# Patient Record
Sex: FEMALE | Race: WHITE | NOT HISPANIC OR LATINO | Employment: UNEMPLOYED | URBAN - METROPOLITAN AREA
[De-identification: names, ages, dates, MRNs, and addresses within clinical notes are randomized per-mention and may not be internally consistent; named-entity substitution may affect disease eponyms.]

---

## 2019-05-11 PROCEDURE — P9612 CATHETERIZE FOR URINE SPEC: HCPCS

## 2019-05-11 PROCEDURE — 99284 EMERGENCY DEPT VISIT MOD MDM: CPT

## 2019-05-12 ENCOUNTER — HOSPITAL ENCOUNTER (INPATIENT)
Facility: HOSPITAL | Age: 34
LOS: 2 days | Discharge: HOME | End: 2019-05-14
Attending: EMERGENCY MEDICINE | Admitting: INTERNAL MEDICINE

## 2019-05-12 ENCOUNTER — APPOINTMENT (OUTPATIENT)
Dept: CT IMAGING | Facility: HOSPITAL | Age: 34
End: 2019-05-12

## 2019-05-12 DIAGNOSIS — D72.829 LEUKOCYTOSIS, UNSPECIFIED TYPE: ICD-10-CM

## 2019-05-12 DIAGNOSIS — E86.0 DEHYDRATION: ICD-10-CM

## 2019-05-12 DIAGNOSIS — R73.9 HYPERGLYCEMIA: ICD-10-CM

## 2019-05-12 DIAGNOSIS — N39.0 ACUTE UTI: ICD-10-CM

## 2019-05-12 DIAGNOSIS — K52.9 COLITIS: Primary | ICD-10-CM

## 2019-05-12 PROBLEM — E11.9 TYPE 2 DIABETES MELLITUS, WITHOUT LONG-TERM CURRENT USE OF INSULIN (HCC): Status: ACTIVE | Noted: 2019-05-12

## 2019-05-12 PROBLEM — E87.1 HYPONATREMIA: Status: ACTIVE | Noted: 2019-05-12

## 2019-05-12 PROBLEM — A41.9 SEPSIS (HCC): Status: ACTIVE | Noted: 2019-05-12

## 2019-05-12 PROBLEM — R10.2 SUPRAPUBIC PAIN, ACUTE: Status: ACTIVE | Noted: 2019-05-12

## 2019-05-12 LAB
ALBUMIN SERPL-MCNC: 4.2 G/DL (ref 3.5–5.2)
ALBUMIN/GLOB SERPL: 1.1 G/DL
ALP SERPL-CCNC: 114 U/L (ref 39–117)
ALT SERPL W P-5'-P-CCNC: 18 U/L (ref 1–33)
ANION GAP SERPL CALCULATED.3IONS-SCNC: 16.3 MMOL/L
ANION GAP SERPL CALCULATED.3IONS-SCNC: 19.5 MMOL/L
AST SERPL-CCNC: 22 U/L (ref 1–32)
BACTERIA UR QL AUTO: ABNORMAL /HPF
BACTERIA UR QL AUTO: NORMAL /HPF
BASOPHILS # BLD AUTO: 0.07 10*3/MM3 (ref 0–0.2)
BASOPHILS NFR BLD AUTO: 0.3 % (ref 0–1.5)
BILIRUB SERPL-MCNC: 0.8 MG/DL (ref 0.2–1.2)
BILIRUB UR QL STRIP: NEGATIVE
BILIRUB UR QL STRIP: NEGATIVE
BUN BLD-MCNC: 11 MG/DL (ref 6–20)
BUN BLD-MCNC: 13 MG/DL (ref 6–20)
BUN/CREAT SERPL: 21 (ref 7–25)
BUN/CREAT SERPL: 22 (ref 7–25)
CALCIUM SPEC-SCNC: 8.2 MG/DL (ref 8.6–10.5)
CALCIUM SPEC-SCNC: 9.5 MG/DL (ref 8.6–10.5)
CHLORIDE SERPL-SCNC: 102 MMOL/L (ref 98–107)
CHLORIDE SERPL-SCNC: 98 MMOL/L (ref 98–107)
CLARITY UR: ABNORMAL
CLARITY UR: CLEAR
CO2 SERPL-SCNC: 13.5 MMOL/L (ref 22–29)
CO2 SERPL-SCNC: 14.7 MMOL/L (ref 22–29)
COLOR UR: ABNORMAL
COLOR UR: ABNORMAL
CREAT BLD-MCNC: 0.5 MG/DL (ref 0.57–1)
CREAT BLD-MCNC: 0.62 MG/DL (ref 0.57–1)
D-LACTATE SERPL-SCNC: 1 MMOL/L (ref 0.5–2)
DEPRECATED RDW RBC AUTO: 42.7 FL (ref 37–54)
DEPRECATED RDW RBC AUTO: 43.3 FL (ref 37–54)
EOSINOPHIL # BLD AUTO: 0.07 10*3/MM3 (ref 0–0.4)
EOSINOPHIL NFR BLD AUTO: 0.3 % (ref 0.3–6.2)
ERYTHROCYTE [DISTWIDTH] IN BLOOD BY AUTOMATED COUNT: 12.6 % (ref 12.3–15.4)
ERYTHROCYTE [DISTWIDTH] IN BLOOD BY AUTOMATED COUNT: 12.8 % (ref 12.3–15.4)
GFR SERPL CREATININE-BSD FRML MDRD: 109 ML/MIN/1.73
GFR SERPL CREATININE-BSD FRML MDRD: 140 ML/MIN/1.73
GLOBULIN UR ELPH-MCNC: 3.7 GM/DL
GLUCOSE BLD-MCNC: 215 MG/DL (ref 65–99)
GLUCOSE BLD-MCNC: 301 MG/DL (ref 65–99)
GLUCOSE BLDC GLUCOMTR-MCNC: 197 MG/DL (ref 70–130)
GLUCOSE BLDC GLUCOMTR-MCNC: 201 MG/DL (ref 70–130)
GLUCOSE BLDC GLUCOMTR-MCNC: 209 MG/DL (ref 70–130)
GLUCOSE BLDC GLUCOMTR-MCNC: 226 MG/DL (ref 70–130)
GLUCOSE UR STRIP-MCNC: ABNORMAL MG/DL
GLUCOSE UR STRIP-MCNC: ABNORMAL MG/DL
HBA1C MFR BLD: 9.8 % (ref 4.8–5.6)
HCG SERPL QL: NEGATIVE
HCT VFR BLD AUTO: 42.3 % (ref 34–46.6)
HCT VFR BLD AUTO: 50.8 % (ref 34–46.6)
HGB BLD-MCNC: 14 G/DL (ref 12–15.9)
HGB BLD-MCNC: 16.5 G/DL (ref 12–15.9)
HGB UR QL STRIP.AUTO: ABNORMAL
HGB UR QL STRIP.AUTO: ABNORMAL
HOLD SPECIMEN: NORMAL
HOLD SPECIMEN: NORMAL
HYALINE CASTS UR QL AUTO: ABNORMAL /LPF
HYALINE CASTS UR QL AUTO: NORMAL /LPF
IMM GRANULOCYTES # BLD AUTO: 0.31 10*3/MM3 (ref 0–0.05)
IMM GRANULOCYTES NFR BLD AUTO: 1.3 % (ref 0–0.5)
KETONES UR QL STRIP: ABNORMAL
KETONES UR QL STRIP: ABNORMAL
LEUKOCYTE ESTERASE UR QL STRIP.AUTO: ABNORMAL
LEUKOCYTE ESTERASE UR QL STRIP.AUTO: NEGATIVE
LIPASE SERPL-CCNC: 47 U/L (ref 13–60)
LYMPHOCYTES # BLD AUTO: 2.01 10*3/MM3 (ref 0.7–3.1)
LYMPHOCYTES NFR BLD AUTO: 8.5 % (ref 19.6–45.3)
MCH RBC QN AUTO: 30.2 PG (ref 26.6–33)
MCH RBC QN AUTO: 30.3 PG (ref 26.6–33)
MCHC RBC AUTO-ENTMCNC: 32.5 G/DL (ref 31.5–35.7)
MCHC RBC AUTO-ENTMCNC: 33.1 G/DL (ref 31.5–35.7)
MCV RBC AUTO: 91.6 FL (ref 79–97)
MCV RBC AUTO: 93 FL (ref 79–97)
MONOCYTES # BLD AUTO: 1.22 10*3/MM3 (ref 0.1–0.9)
MONOCYTES NFR BLD AUTO: 5.2 % (ref 5–12)
NEUTROPHILS # BLD AUTO: 19.96 10*3/MM3 (ref 1.7–7)
NEUTROPHILS NFR BLD AUTO: 84.4 % (ref 42.7–76)
NITRITE UR QL STRIP: NEGATIVE
NITRITE UR QL STRIP: POSITIVE
NRBC BLD AUTO-RTO: 0 /100 WBC (ref 0–0.2)
PH UR STRIP.AUTO: <=5 [PH] (ref 5–8)
PH UR STRIP.AUTO: <=5 [PH] (ref 5–8)
PLAT MORPH BLD: NORMAL
PLATELET # BLD AUTO: 294 10*3/MM3 (ref 140–450)
PLATELET # BLD AUTO: 347 10*3/MM3 (ref 140–450)
PMV BLD AUTO: 9.2 FL (ref 6–12)
PMV BLD AUTO: 9.2 FL (ref 6–12)
POTASSIUM BLD-SCNC: 3.8 MMOL/L (ref 3.5–5.2)
POTASSIUM BLD-SCNC: 4.8 MMOL/L (ref 3.5–5.2)
PROCALCITONIN SERPL-MCNC: 0.11 NG/ML (ref 0.1–0.25)
PROT SERPL-MCNC: 7.9 G/DL (ref 6–8.5)
PROT UR QL STRIP: ABNORMAL
PROT UR QL STRIP: ABNORMAL
RBC # BLD AUTO: 4.62 10*6/MM3 (ref 3.77–5.28)
RBC # BLD AUTO: 5.46 10*6/MM3 (ref 3.77–5.28)
RBC # UR: ABNORMAL /HPF
RBC # UR: NORMAL /HPF
RBC MORPH BLD: NORMAL
REF LAB TEST METHOD: ABNORMAL
REF LAB TEST METHOD: NORMAL
SODIUM BLD-SCNC: 131 MMOL/L (ref 136–145)
SODIUM BLD-SCNC: 133 MMOL/L (ref 136–145)
SP GR UR STRIP: >=1.03 (ref 1–1.03)
SP GR UR STRIP: >=1.03 (ref 1–1.03)
SQUAMOUS #/AREA URNS HPF: ABNORMAL /HPF
SQUAMOUS #/AREA URNS HPF: NORMAL /HPF
UROBILINOGEN UR QL STRIP: ABNORMAL
UROBILINOGEN UR QL STRIP: ABNORMAL
WBC MORPH BLD: NORMAL
WBC NRBC COR # BLD: 19.16 10*3/MM3 (ref 3.4–10.8)
WBC NRBC COR # BLD: 23.64 10*3/MM3 (ref 3.4–10.8)
WBC UR QL AUTO: ABNORMAL /HPF
WBC UR QL AUTO: NORMAL /HPF
WHOLE BLOOD HOLD SPECIMEN: NORMAL

## 2019-05-12 PROCEDURE — 80053 COMPREHEN METABOLIC PANEL: CPT | Performed by: PHYSICIAN ASSISTANT

## 2019-05-12 PROCEDURE — 85025 COMPLETE CBC W/AUTO DIFF WBC: CPT | Performed by: PHYSICIAN ASSISTANT

## 2019-05-12 PROCEDURE — 84703 CHORIONIC GONADOTROPIN ASSAY: CPT | Performed by: PHYSICIAN ASSISTANT

## 2019-05-12 PROCEDURE — 63710000001 INSULIN LISPRO (HUMAN) PER 5 UNITS: Performed by: NURSE PRACTITIONER

## 2019-05-12 PROCEDURE — 74177 CT ABD & PELVIS W/CONTRAST: CPT

## 2019-05-12 PROCEDURE — 82962 GLUCOSE BLOOD TEST: CPT

## 2019-05-12 PROCEDURE — 36415 COLL VENOUS BLD VENIPUNCTURE: CPT | Performed by: NURSE PRACTITIONER

## 2019-05-12 PROCEDURE — 87040 BLOOD CULTURE FOR BACTERIA: CPT | Performed by: PHYSICIAN ASSISTANT

## 2019-05-12 PROCEDURE — 83605 ASSAY OF LACTIC ACID: CPT | Performed by: PHYSICIAN ASSISTANT

## 2019-05-12 PROCEDURE — 25010000002 IOPAMIDOL 61 % SOLUTION: Performed by: EMERGENCY MEDICINE

## 2019-05-12 PROCEDURE — 25010000002 HYDROMORPHONE PER 4 MG: Performed by: PHYSICIAN ASSISTANT

## 2019-05-12 PROCEDURE — 84145 PROCALCITONIN (PCT): CPT | Performed by: PHYSICIAN ASSISTANT

## 2019-05-12 PROCEDURE — 85007 BL SMEAR W/DIFF WBC COUNT: CPT | Performed by: PHYSICIAN ASSISTANT

## 2019-05-12 PROCEDURE — 25010000002 ONDANSETRON PER 1 MG: Performed by: PHYSICIAN ASSISTANT

## 2019-05-12 PROCEDURE — 83036 HEMOGLOBIN GLYCOSYLATED A1C: CPT | Performed by: NURSE PRACTITIONER

## 2019-05-12 PROCEDURE — 99254 IP/OBS CNSLTJ NEW/EST MOD 60: CPT | Performed by: INTERNAL MEDICINE

## 2019-05-12 PROCEDURE — 81001 URINALYSIS AUTO W/SCOPE: CPT | Performed by: PHYSICIAN ASSISTANT

## 2019-05-12 PROCEDURE — 83690 ASSAY OF LIPASE: CPT | Performed by: PHYSICIAN ASSISTANT

## 2019-05-12 PROCEDURE — 85027 COMPLETE CBC AUTOMATED: CPT | Performed by: NURSE PRACTITIONER

## 2019-05-12 PROCEDURE — 25010000002 PIPERACILLIN SOD-TAZOBACTAM PER 1 G: Performed by: PHYSICIAN ASSISTANT

## 2019-05-12 PROCEDURE — 25010000002 MORPHINE PER 10 MG: Performed by: INTERNAL MEDICINE

## 2019-05-12 PROCEDURE — 25010000002 HYDROMORPHONE 1 MG/ML SOLUTION: Performed by: PHYSICIAN ASSISTANT

## 2019-05-12 PROCEDURE — 25010000002 PIPERACILLIN SOD-TAZOBACTAM PER 1 G: Performed by: HOSPITALIST

## 2019-05-12 RX ORDER — DEXTROSE MONOHYDRATE 25 G/50ML
25 INJECTION, SOLUTION INTRAVENOUS
Status: DISCONTINUED | OUTPATIENT
Start: 2019-05-12 | End: 2019-05-14 | Stop reason: HOSPADM

## 2019-05-12 RX ORDER — ACETAMINOPHEN 325 MG/1
650 TABLET ORAL EVERY 4 HOURS PRN
Status: DISCONTINUED | OUTPATIENT
Start: 2019-05-12 | End: 2019-05-14 | Stop reason: HOSPADM

## 2019-05-12 RX ORDER — PANTOPRAZOLE SODIUM 20 MG/1
20 TABLET, DELAYED RELEASE ORAL
Status: DISCONTINUED | OUTPATIENT
Start: 2019-05-12 | End: 2019-05-12

## 2019-05-12 RX ORDER — HYDROMORPHONE HYDROCHLORIDE 1 MG/ML
0.5 INJECTION, SOLUTION INTRAMUSCULAR; INTRAVENOUS; SUBCUTANEOUS ONCE
Status: COMPLETED | OUTPATIENT
Start: 2019-05-12 | End: 2019-05-12

## 2019-05-12 RX ORDER — SODIUM CHLORIDE 0.9 % (FLUSH) 0.9 %
1-10 SYRINGE (ML) INJECTION AS NEEDED
Status: DISCONTINUED | OUTPATIENT
Start: 2019-05-12 | End: 2019-05-14 | Stop reason: HOSPADM

## 2019-05-12 RX ORDER — PANTOPRAZOLE SODIUM 40 MG/1
40 TABLET, DELAYED RELEASE ORAL
Status: DISCONTINUED | OUTPATIENT
Start: 2019-05-12 | End: 2019-05-14 | Stop reason: HOSPADM

## 2019-05-12 RX ORDER — GABAPENTIN 250 MG/5ML
250 SOLUTION ORAL AS NEEDED
COMMUNITY
End: 2019-05-14 | Stop reason: HOSPADM

## 2019-05-12 RX ORDER — FLUTICASONE PROPIONATE 50 MCG
1 SPRAY, SUSPENSION (ML) NASAL DAILY
Status: DISCONTINUED | OUTPATIENT
Start: 2019-05-12 | End: 2019-05-14 | Stop reason: HOSPADM

## 2019-05-12 RX ORDER — SODIUM CHLORIDE 0.9 % (FLUSH) 0.9 %
3 SYRINGE (ML) INJECTION EVERY 12 HOURS SCHEDULED
Status: DISCONTINUED | OUTPATIENT
Start: 2019-05-12 | End: 2019-05-14 | Stop reason: HOSPADM

## 2019-05-12 RX ORDER — MORPHINE SULFATE 2 MG/ML
2 INJECTION, SOLUTION INTRAMUSCULAR; INTRAVENOUS
Status: DISCONTINUED | OUTPATIENT
Start: 2019-05-12 | End: 2019-05-14 | Stop reason: HOSPADM

## 2019-05-12 RX ORDER — NICOTINE POLACRILEX 4 MG
15 LOZENGE BUCCAL
Status: DISCONTINUED | OUTPATIENT
Start: 2019-05-12 | End: 2019-05-14 | Stop reason: HOSPADM

## 2019-05-12 RX ORDER — ONDANSETRON 2 MG/ML
4 INJECTION INTRAMUSCULAR; INTRAVENOUS ONCE
Status: COMPLETED | OUTPATIENT
Start: 2019-05-12 | End: 2019-05-12

## 2019-05-12 RX ORDER — LEVOTHYROXINE SODIUM 0.1 MG/1
200 TABLET ORAL
Status: DISCONTINUED | OUTPATIENT
Start: 2019-05-12 | End: 2019-05-14 | Stop reason: HOSPADM

## 2019-05-12 RX ORDER — POLYETHYLENE GLYCOL 3350 17 G/17G
17 POWDER, FOR SOLUTION ORAL
Status: DISCONTINUED | OUTPATIENT
Start: 2019-05-12 | End: 2019-05-12

## 2019-05-12 RX ORDER — CETIRIZINE HYDROCHLORIDE 10 MG/1
10 TABLET ORAL NIGHTLY
Status: DISCONTINUED | OUTPATIENT
Start: 2019-05-12 | End: 2019-05-14 | Stop reason: HOSPADM

## 2019-05-12 RX ORDER — DOCUSATE SODIUM 100 MG/1
100 CAPSULE, LIQUID FILLED ORAL 2 TIMES DAILY
Status: DISCONTINUED | OUTPATIENT
Start: 2019-05-12 | End: 2019-05-14 | Stop reason: HOSPADM

## 2019-05-12 RX ORDER — NALOXONE HCL 0.4 MG/ML
0.4 VIAL (ML) INJECTION
Status: DISCONTINUED | OUTPATIENT
Start: 2019-05-12 | End: 2019-05-14 | Stop reason: HOSPADM

## 2019-05-12 RX ORDER — POLYETHYLENE GLYCOL 3350 17 G/17G
17 POWDER, FOR SOLUTION ORAL 2 TIMES DAILY
Status: DISCONTINUED | OUTPATIENT
Start: 2019-05-12 | End: 2019-05-13

## 2019-05-12 RX ORDER — MONTELUKAST SODIUM 10 MG/1
10 TABLET ORAL NIGHTLY
Status: DISCONTINUED | OUTPATIENT
Start: 2019-05-12 | End: 2019-05-14 | Stop reason: HOSPADM

## 2019-05-12 RX ORDER — SODIUM CHLORIDE 9 MG/ML
75 INJECTION, SOLUTION INTRAVENOUS CONTINUOUS
Status: DISCONTINUED | OUTPATIENT
Start: 2019-05-12 | End: 2019-05-13

## 2019-05-12 RX ADMIN — FLUTICASONE PROPIONATE 1 SPRAY: 50 SPRAY, METERED NASAL at 13:23

## 2019-05-12 RX ADMIN — INSULIN LISPRO 2 UNITS: 100 INJECTION, SOLUTION INTRAVENOUS; SUBCUTANEOUS at 21:37

## 2019-05-12 RX ADMIN — SODIUM CHLORIDE, PRESERVATIVE FREE 3 ML: 5 INJECTION INTRAVENOUS at 08:08

## 2019-05-12 RX ADMIN — SODIUM CHLORIDE 1000 ML: 9 INJECTION, SOLUTION INTRAVENOUS at 01:51

## 2019-05-12 RX ADMIN — MORPHINE SULFATE 2 MG: 2 INJECTION, SOLUTION INTRAMUSCULAR; INTRAVENOUS at 16:25

## 2019-05-12 RX ADMIN — POLYETHYLENE GLYCOL 3350 17 G: 17 POWDER, FOR SOLUTION ORAL at 21:37

## 2019-05-12 RX ADMIN — DOCUSATE SODIUM 100 MG: 100 CAPSULE, LIQUID FILLED ORAL at 11:51

## 2019-05-12 RX ADMIN — ACETAMINOPHEN 650 MG: 325 TABLET, FILM COATED ORAL at 15:04

## 2019-05-12 RX ADMIN — TAZOBACTAM SODIUM AND PIPERACILLIN SODIUM 3.38 G: 375; 3 INJECTION, SOLUTION INTRAVENOUS at 04:35

## 2019-05-12 RX ADMIN — ONDANSETRON 4 MG: 2 INJECTION INTRAMUSCULAR; INTRAVENOUS at 05:39

## 2019-05-12 RX ADMIN — PANTOPRAZOLE SODIUM 40 MG: 40 TABLET, DELAYED RELEASE ORAL at 11:51

## 2019-05-12 RX ADMIN — CETIRIZINE HYDROCHLORIDE 10 MG: 10 TABLET, FILM COATED ORAL at 21:37

## 2019-05-12 RX ADMIN — DOCUSATE SODIUM 100 MG: 100 CAPSULE, LIQUID FILLED ORAL at 21:37

## 2019-05-12 RX ADMIN — IOPAMIDOL 85 ML: 612 INJECTION, SOLUTION INTRAVENOUS at 02:26

## 2019-05-12 RX ADMIN — SODIUM CHLORIDE 100 ML/HR: 9 INJECTION, SOLUTION INTRAVENOUS at 21:35

## 2019-05-12 RX ADMIN — INSULIN LISPRO 3 UNITS: 100 INJECTION, SOLUTION INTRAVENOUS; SUBCUTANEOUS at 11:51

## 2019-05-12 RX ADMIN — ONDANSETRON 4 MG: 2 INJECTION INTRAMUSCULAR; INTRAVENOUS at 01:51

## 2019-05-12 RX ADMIN — SODIUM CHLORIDE 100 ML/HR: 9 INJECTION, SOLUTION INTRAVENOUS at 08:07

## 2019-05-12 RX ADMIN — HYDROMORPHONE HYDROCHLORIDE 1 MG: 1 INJECTION, SOLUTION INTRAMUSCULAR; INTRAVENOUS; SUBCUTANEOUS at 01:52

## 2019-05-12 RX ADMIN — HYDROMORPHONE HYDROCHLORIDE 0.5 MG: 1 INJECTION, SOLUTION INTRAMUSCULAR; INTRAVENOUS; SUBCUTANEOUS at 05:39

## 2019-05-12 RX ADMIN — SODIUM CHLORIDE, PRESERVATIVE FREE 3 ML: 5 INJECTION INTRAVENOUS at 21:38

## 2019-05-12 RX ADMIN — MORPHINE SULFATE 2 MG: 2 INJECTION, SOLUTION INTRAMUSCULAR; INTRAVENOUS at 20:35

## 2019-05-12 RX ADMIN — SODIUM CHLORIDE 1000 ML: 9 INJECTION, SOLUTION INTRAVENOUS at 04:36

## 2019-05-12 RX ADMIN — INSULIN LISPRO 3 UNITS: 100 INJECTION, SOLUTION INTRAVENOUS; SUBCUTANEOUS at 17:35

## 2019-05-12 RX ADMIN — TAZOBACTAM SODIUM AND PIPERACILLIN SODIUM 3.38 G: 375; 3 INJECTION, SOLUTION INTRAVENOUS at 10:06

## 2019-05-12 RX ADMIN — LEVOTHYROXINE SODIUM 200 MCG: 100 TABLET ORAL at 13:23

## 2019-05-12 RX ADMIN — TAZOBACTAM SODIUM AND PIPERACILLIN SODIUM 3.38 G: 375; 3 INJECTION, SOLUTION INTRAVENOUS at 18:31

## 2019-05-12 NOTE — CONSULTS
[facility]  Initial Inpatient Consult Note    Referring Provider: Dr. Kirk    Reason for Consultation: Chronic intermittent constipation, abnormal imaging, abdominal pain    Subjective     History of present illness:    34 y.o. female with chronic intermittent constipation, controlled with diet and over-the-counter laxatives and stool softeners, presents with abdominal pain of 4 days duration.  Lower quadrants bilaterally.  New onset on Thursday, has never had pain like this before.  Colicky in nature, worse with movement better at rest.  She has been constipated now for 6 days.  No bowel movement.  She denies rectal bleeding or diarrhea.  She has no vomiting.  She has never had a colonoscopy in her lifetime.  She believes her brother has some sort of colitis but has no details about the.  She has had no weight loss.      She also endorses GI upset with nausea and possible food poisoning she believes after [employer] sandwich 7 days ago.  There was a few days in between that illness and then the development of the abdominal pain Thursday.    She also has chronic GERD, takes 20 mg of Protonix a day    Past Medical History:  Past Medical History:   Diagnosis Date   • Diabetes mellitus (CMS/HCC)    • Disease of thyroid gland    • GERD (gastroesophageal reflux disease)    • Kidney stone    • Pregnancy    • Seasonal allergies      Past Surgical History:  Past Surgical History:   Procedure Laterality Date   • NEPHROSTOMY TRACT DILATATION W/ LITHOTRIPSY        Social History:   Social History     Tobacco Use   • Smoking status: Current Every Day Smoker     Packs/day: 0.50     Types: Cigarettes   Substance Use Topics   • Alcohol use: No      Family History:  History reviewed. No pertinent family history.    Home Meds:  Medications Prior to Admission   Medication Sig Dispense Refill Last Dose   • cetirizine (ZYRTEC ALLERGY) 10 MG tablet Take 10 mg by mouth.   5/11/2019 at Unknown time   • docusate  sodium (COLACE) 100 MG capsule Take 100 mg by mouth.   5/11/2019 at Unknown time   • fluticasone ([employer] FLUTICASONE PROPIONATE) 50 MCG/ACT nasal spray 1 spray into each nostril.   5/11/2019 at Unknown time   • gabapentin (NEURONTIN) 250 MG/5ML solution Take 250 mg by mouth As Needed.   5/11/2019 at Unknown time   • Insulin Lispro (HUMALOG KWIKPEN) 100 UNIT/ML solution pen-injector INJECT 1 UNIT PER 20MG/DL > 120 AS DIRECTED. MAX 25 UNITS PER DAY   5/11/2019 at Unknown time   • levothyroxine (SYNTHROID, LEVOTHROID) 200 MCG tablet Take 200 mcg by mouth.   5/11/2019 at Unknown time   • metFORMIN (GLUCOPHAGE) 500 MG tablet Take 500 mg by mouth.   5/11/2019 at Unknown time   • montelukast (SINGULAIR) 10 MG tablet Take 10 mg by mouth.   5/11/2019 at Unknown time   • ondansetron (ZOFRAN) 4 MG tablet Take 1 tablet by mouth every 6 (six) hours as needed for nausea or vomiting. 10 tablet 0 5/11/2019 at Unknown time   • pantoprazole (PROTONIX) 20 MG EC tablet Take 20 mg by mouth.   5/11/2019 at Unknown time     Current Meds:     cetirizine 10 mg Oral Nightly   docusate sodium 100 mg Oral BID   fluticasone 1 spray Nasal Daily   insulin lispro 0-7 Units Subcutaneous 4x Daily With Meals & Nightly   levothyroxine 200 mcg Oral Q AM   montelukast 10 mg Oral Nightly   pantoprazole 40 mg Oral Q AM   piperacillin-tazobactam 3.375 g Intravenous Q8H   sodium chloride 3 mL Intravenous Q12H     Allergies:  Allergies   Allergen Reactions   • Ciprofloxacin Anaphylaxis   • Sulfa Antibiotics Anaphylaxis   • Bactrim [Sulfamethoxazole-Trimethoprim] Angioedema     Review of Systems  She has weakness fatigue all other systems reviewed and negative     Objective     Vital Signs  Temp:  [98.2 °F (36.8 °C)-98.8 °F (37.1 °C)] 98.3 °F (36.8 °C)  Heart Rate:  [] 89  Resp:  [18-20] 18  BP: (107-156)/() 140/86  Physical Exam:  General Appearance:    Alert, cooperative, in no acute distress   Head:    Normocephalic, without obvious abnormality,  atraumatic   Eyes:            Lids and lashes normal, conjunctivae and sclerae normal, no   icterus   Throat:   No oral lesions, no thrush, oral mucosa moist   Neck:   No adenopathy, supple, trachea midline, no thyromegaly, no   carotid bruit, no JVD   Lungs:     Clear to auscultation,respirations regular, even and                   unlabored    Heart:    Regular rhythm and normal rate, normal S1 and S2, no            murmur, no gallop, no rub, no click   Chest Wall:    No abnormalities observed   Abdomen:     Normal bowel sounds, no masses, no organomegaly, soft        tender, non-distended, no guarding, no rebound                 tenderness   Rectal:     Deferred   Extremities:   no edema, no cyanosis, no redness   Skin:   No bleeding, bruising or rash   Lymph nodes:   No palpable adenopathy   Psychiatric:  Judgement and insight: normal   Orientation to person place and time: normal   Mood and affect: normal   Results Review:   I reviewed the patient's new clinical results.    Results from last 7 days   Lab Units 05/12/19  0703 05/12/19  0046   WBC 10*3/mm3 19.16* 23.64*   HEMOGLOBIN g/dL 14.0 16.5*   HEMATOCRIT % 42.3 50.8*   PLATELETS 10*3/mm3 294 347     Results from last 7 days   Lab Units 05/12/19  0703 05/12/19  0046   SODIUM mmol/L 133* 131*   POTASSIUM mmol/L 3.8 4.8   CHLORIDE mmol/L 102 98   CO2 mmol/L 14.7* 13.5*   BUN mg/dL 11 13   CREATININE mg/dL 0.50* 0.62   CALCIUM mg/dL 8.2* 9.5   BILIRUBIN mg/dL  --  0.8   ALK PHOS U/L  --  114   ALT (SGPT) U/L  --  18   AST (SGOT) U/L  --  22   GLUCOSE mg/dL 215* 301*         Lab Results   Lab Value Date/Time    LIPASE 47 05/12/2019 0046    LIPASE 37 06/18/2016 0255       Radiology:  CT Abdomen Pelvis With Contrast   Final Result       1. Abnormally thick-walled appearance to the sigmoid colon, with   adjacent pericolonic soft tissue stranding. Constellation of findings is   favored to reflect colitis. No pneumatosis or free air is seen. There is   no evidence  of obstruction.   2. Bilateral nonobstructing renal stones.       Radiation dose reduction techniques were utilized, including automated   exposure control and exposure modulation based on body size.       This report was finalized on 5/12/2019 2:42 AM by Dr. Marie Jeong M.D.              Assessment/Plan   Patient Active Problem List   Diagnosis   • Colitis presumed infectious   • Type 2 diabetes mellitus, without long-term current use of insulin (CMS/[facility])   • Suprapubic pain, acute   • Hyponatremia   • Dehydration   • Sepsis (CMS/[facility])     Problem list    Abnormal imaging, colitis in the setting of worsening constipation  Abdominal pain secondary to above  Chronic GERD      Assessment/Plan    She has what appears to be a colitis with associated constipation, not diarrhea or rectal bleeding  We have to wonder if whether she has fecal loading in this area with a reactive colitis  I will continue IV antibiotics  I am going to start stool softeners and gentle laxatives as she has been having worsening constipation over the last 6 days  She may come to a medication such as Linzess or Amitiza as an outpatient  She should absolutely have a colonoscopy in 6 to 8 weeks when she is recovered from this acute event not only for a history of chronic constipation but also to rule out a malignancy in this area as a cause for her symptoms and cause for the colitis that is found on imaging today  If she does not improve over the next couple days she may require unprepped lower endoscopy as an inpatient  Follow her white blood cell count daily and if it worsens I would get infectious disease consultation    I discussed the patients findings and my recommendations with patient.    Samuel Morfin MD

## 2019-05-12 NOTE — ED PROVIDER NOTES
" EMERGENCY DEPARTMENT ENCOUNTER    CHIEF COMPLAINT  Chief Complaint: lower abd pain  History given by: patient   History limited by: nothing   Room Number: 26/26  PMD: Layla Monique APRN      HPI:  Pt is a 34 y.o. female who presents to the ED complaining of constant lower abd pain that started 4 days ago. Pt states the pain worsened 1-2 days ago. Pt admits nausea, cough, L flank pain, and chills. Pt denies fever, vomiting, loose stools, and diarrhea. Pt reports she was seen at [facility] 2 nights ago they told her \"nothing was wrong\". Pt had a CT abd/pelvis w/o contrast at that time which revealed stones in kidney. Pt states they told her that \"the stones are not moving and not causing any problems\". Pt is a diabetic. Hx of kidney stones. There are no other complaints at this time.     Duration:  4 days   Onset: gradual  Timing: constant   Location: lower abd   Radiation: none mentioned   Quality: \"pain\"  Intensity/Severity: moderate  Progression: unchanged   Associated Symptoms:  pt admits nausea, cough, L flank pain, and chills  Aggravating Factors: none mentioned   Alleviating Factors: none mentioned   Previous Episodes: hx of kidney stones   Treatment before arrival: pt was seen at [facility] 2 nights ago for the same symptoms     PAST MEDICAL HISTORY  Active Ambulatory Problems     Diagnosis Date Noted   • No Active Ambulatory Problems     Resolved Ambulatory Problems     Diagnosis Date Noted   • No Resolved Ambulatory Problems     Past Medical History:   Diagnosis Date   • Diabetes mellitus (CMS/HCC)    • Disease of thyroid gland    • GERD (gastroesophageal reflux disease)    • Kidney stone    • Pregnancy    • Seasonal allergies        PAST SURGICAL HISTORY  Past Surgical History:   Procedure Laterality Date   • NEPHROSTOMY TRACT DILATATION W/ LITHOTRIPSY         FAMILY HISTORY  History reviewed. No pertinent family history.    SOCIAL HISTORY  Social History     Socioeconomic History   • Marital status:  "     Spouse name: Not on file   • Number of children: Not on file   • Years of education: Not on file   • Highest education level: Not on file   Tobacco Use   • Smoking status: Current Every Day Smoker     Packs/day: 0.50     Types: Cigarettes   Substance and Sexual Activity   • Alcohol use: No   • Drug use: No       ALLERGIES  Ciprofloxacin; Sulfa antibiotics; and Bactrim [sulfamethoxazole-trimethoprim]    REVIEW OF SYSTEMS  Review of Systems   Constitutional: Positive for chills. Negative for fever.   HENT: Negative for sore throat.    Eyes: Negative.    Respiratory: Positive for cough. Negative for shortness of breath.    Cardiovascular: Negative for chest pain.   Gastrointestinal: Positive for abdominal pain (lower) and nausea. Negative for blood in stool, diarrhea and vomiting.   Genitourinary: Positive for flank pain (Left). Negative for dysuria.   Musculoskeletal: Negative for neck pain.   Skin: Negative for rash.   Neurological: Negative for weakness, numbness and headaches.   Hematological: Negative.    Psychiatric/Behavioral: Negative.    All other systems reviewed and are negative.      PHYSICAL EXAM  ED Triage Vitals   Temp Heart Rate Resp BP SpO2   05/11/19 2317 05/11/19 2317 05/11/19 2317 05/11/19 2359 05/11/19 2317   98.8 °F (37.1 °C) (!) 132 20 (!) 156/102 96 %      Temp src Heart Rate Source Patient Position BP Location FiO2 (%)   05/11/19 2317 05/11/19 2317 -- 05/12/19 0032 --   Tympanic Monitor  Left arm        Physical Exam   Constitutional: She is oriented to person, place, and time. No distress.   HENT:   Head: Normocephalic.   Eyes: EOM are normal. Pupils are equal, round, and reactive to light.   Neck: Normal range of motion. Neck supple.   Cardiovascular: Normal rate, regular rhythm and normal heart sounds.   Good peripheral pulses.   Pulmonary/Chest: Effort normal and breath sounds normal. No respiratory distress.   Abdominal: Soft. There is tenderness in the right lower quadrant. There is  guarding (across suprapubic area) and CVA tenderness (questionable). There is no rebound.   Musculoskeletal: Normal range of motion. She exhibits no edema.   No BLE edema.   Neurological: She is alert and oriented to person, place, and time. She has normal sensation and normal strength.   Skin: Skin is warm and dry. No rash noted.   Psychiatric: Mood and affect normal.   Nursing note and vitals reviewed.      LAB RESULTS  Lab Results (last 24 hours)     Procedure Component Value Units Date/Time    CBC & Differential [[id number]] Collected:  05/12/19 0046    Specimen:  Blood Updated:  05/12/19 0119    Narrative:       The following orders were created for panel order CBC & Differential.  Procedure                               Abnormality         Status                     ---------                               -----------         ------                     Scan Slide[[id number]]                    Normal              Final result               CBC Auto Differential[[id number]]         Abnormal            Final result                 Please view results for these tests on the individual orders.    Comprehensive Metabolic Panel [[id number]]  (Abnormal) Collected:  05/12/19 0046    Specimen:  Blood Updated:  05/12/19 0121     Glucose 301 mg/dL      BUN 13 mg/dL      Creatinine 0.62 mg/dL      Sodium 131 mmol/L      Potassium 4.8 mmol/L      Comment: Specimen hemolyzed.  Results may be affected.        Chloride 98 mmol/L      CO2 13.5 mmol/L      Calcium 9.5 mg/dL      Total Protein 7.9 g/dL      Albumin 4.20 g/dL      ALT (SGPT) 18 U/L      Comment: Specimen hemolyzed.  Results may be affected.        AST (SGOT) 22 U/L      Comment: Specimen hemolyzed.  Results may be affected.        Alkaline Phosphatase 114 U/L      Total Bilirubin 0.8 mg/dL      eGFR Non African Amer 109 mL/min/1.73      Globulin 3.7 gm/dL      A/G Ratio 1.1 g/dL      BUN/Creatinine Ratio 21.0     Anion Gap 19.5 mmol/L     Narrative:       GFR Normal  >60  Chronic Kidney Disease <60  Kidney Failure <15    Lipase [[id number]]  (Normal) Collected:  05/12/19 0046    Specimen:  Blood Updated:  05/12/19 0119     Lipase 47 U/L     hCG, Serum, Qualitative [[id number]]  (Normal) Collected:  05/12/19 0046    Specimen:  Blood Updated:  05/12/19 0118     HCG Qualitative Negative    CBC Auto Differential [[id number]]  (Abnormal) Collected:  05/12/19 0046    Specimen:  Blood Updated:  05/12/19 0119     WBC 23.64 10*3/mm3      RBC 5.46 10*6/mm3      Hemoglobin 16.5 g/dL      Hematocrit 50.8 %      MCV 93.0 fL      MCH 30.2 pg      MCHC 32.5 g/dL      RDW 12.6 %      RDW-SD 43.3 fl      MPV 9.2 fL      Platelets 347 10*3/mm3      Neutrophil % 84.4 %      Lymphocyte % 8.5 %      Monocyte % 5.2 %      Eosinophil % 0.3 %      Basophil % 0.3 %      Immature Grans % 1.3 %      Neutrophils, Absolute 19.96 10*3/mm3      Lymphocytes, Absolute 2.01 10*3/mm3      Monocytes, Absolute 1.22 10*3/mm3      Eosinophils, Absolute 0.07 10*3/mm3      Basophils, Absolute 0.07 10*3/mm3      Immature Grans, Absolute 0.31 10*3/mm3      nRBC 0.0 /100 WBC     Scan Slide [[id number]]  (Normal) Collected:  05/12/19 0046    Specimen:  Blood Updated:  05/12/19 0119     RBC Morphology Normal     WBC Morphology Normal     Platelet Morphology Normal    Procalcitonin [[id number]]  (Normal) Collected:  05/12/19 0046    Specimen:  Blood Updated:  05/12/19 0402     Procalcitonin 0.11 ng/mL     Narrative:       As a Marker for Sepsis (Non-Neonates):   1. <0.5 ng/mL represents a low risk of severe sepsis and/or septic shock.  1. >2 ng/mL represents a high risk of severe sepsis and/or septic shock.    As a Marker for Lower Respiratory Tract Infections that require antibiotic therapy:  PCT on Admission     Antibiotic Therapy             6-12 Hrs later  > 0.5                Strongly Recommended            >0.25 - <0.5         Recommended  0.1 - 0.25           Discouraged                   Remeasure/reassess PCT  <0.1            "      Strongly Discouraged          Remeasure/reassess PCT      As 28 day mortality risk marker: \"Change in Procalcitonin Result\" (> 80 % or <=80 %) if Day 0 (or Day 1) and Day 4 values are available. Refer to http://www.[facility]-pct-calculator.com/   Change in PCT <=80 %   A decrease of PCT levels below or equal to 80 % defines a positive change in PCT test result representing a higher risk for 28-day all-cause mortality of patients diagnosed with severe sepsis or septic shock.  Change in PCT > 80 %   A decrease of PCT levels of more than 80 % defines a negative change in PCT result representing a lower risk for 28-day all-cause mortality of patients diagnosed with severe sepsis or septic shock.                Urinalysis With Microscopic If Indicated (No Culture) - Urine, Clean Catch [[id number]]  (Abnormal) Collected:  05/12/19 0145    Specimen:  Urine, Clean Catch Updated:  05/12/19 0212     Color, UA Dark Yellow     Appearance, UA Cloudy     pH, UA <=5.0     Specific Gravity, UA >=1.030     Glucose, UA >=1000 mg/dL (3+)     Ketones, UA 80 mg/dL (3+)     Bilirubin, UA Negative     Blood, UA Large (3+)     Protein, UA >=300 mg/dL (3+)     Leuk Esterase, UA Trace     Nitrite, UA Positive     Urobilinogen, UA 1.0 E.U./dL    Urinalysis, Microscopic Only - Urine, Clean Catch [[id number]]  (Abnormal) Collected:  05/12/19 0145    Specimen:  Urine, Clean Catch Updated:  05/12/19 0208     RBC, UA Too Numerous to Count /HPF      WBC, UA 13-20 /HPF      Bacteria, UA None Seen /HPF      Squamous Epithelial Cells, UA 3-6 /HPF      Hyaline Casts, UA 7-12 /LPF      Methodology Automated Microscopy    Urinalysis With Culture If Indicated - Urine, Catheter [[id number]]  (Abnormal) Collected:  05/12/19 0421    Specimen:  Urine, Catheter Updated:  05/12/19 0447     Color, UA Dark Yellow     Appearance, UA Clear     pH, UA <=5.0     Specific Gravity, UA >=1.030     Glucose, UA >=1000 mg/dL (3+)     Ketones, UA 80 mg/dL (3+)     Bilirubin, " UA Negative     Blood, UA Trace     Protein, UA 30 mg/dL (1+)     Leuk Esterase, UA Negative     Nitrite, UA Negative     Urobilinogen, UA 1.0 E.U./dL    Urinalysis, Microscopic Only - Urine, Catheter [[id number]] Collected:  05/12/19 0421    Specimen:  Urine, Catheter Updated:  05/12/19 0447     RBC, UA 0-2 /HPF      WBC, UA 0-2 /HPF      Bacteria, UA None Seen /HPF      Squamous Epithelial Cells, UA 0-2 /HPF      Hyaline Casts, UA 3-6 /LPF      Methodology Automated Microscopy    Lactic Acid, Plasma [[id number]]  (Normal) Collected:  05/12/19 0429    Specimen:  Blood Updated:  05/12/19 0453     Lactate 1.0 mmol/L     Blood Culture - Blood, Arm, Left [[id number]] Collected:  05/12/19 0429    Specimen:  Blood from Arm, Left Updated:  05/12/19 0436    Blood Culture - Blood, Arm, Right [[id number]] Collected:  05/12/19 0429    Specimen:  Blood from Arm, Right Updated:  05/12/19 0436          I ordered the above labs and reviewed the results    RADIOLOGY  CT Abdomen Pelvis With Contrast   Final Result       1. Abnormally thick-walled appearance to the sigmoid colon, with   adjacent pericolonic soft tissue stranding. Constellation of findings is   favored to reflect colitis. No pneumatosis or free air is seen. There is   no evidence of obstruction.   2. Bilateral nonobstructing renal stones.       Radiation dose reduction techniques were utilized, including automated   exposure control and exposure modulation based on body size.       This report was finalized on 5/12/2019 2:42 AM by Dr. Marie Jeong M.D.               I ordered the above noted radiological studies. Interpreted by radiologist. Reviewed by me in PACS.       PROCEDURES  Procedures      PROGRESS AND CONSULTS     0148  Ordered IVF bolus for hydration, Dilaudid for pain, and Zofran for N/V.    0208  Ordered CT abd/pelvis for further evaluation.     0319  Ordered procal, blood culture, and lactic acid for further evaluation.     0330  Ordered  Zosyn.    0340  Discussed pt case with Dr. Mora (ER physician). After his own bedside evaluation of the pt, Dr. Mora agrees with the plan of care.     0424  Placed a call to A.    0432  Ordered IVF bolus for hydration.     0436  Discussed pt case with Beatriz martin [facility] who agrees to admit pt to Dr. Ingram.     0444  Ordered urinalysis for further evaluation.     0457  Rechecked pt. Pt is resting comfortably. Notified pt of elevated lab results and imaging which revealed bilateral non-obstructing renal stones. Discussed the plan to admit for further treatment. Pt understands and agrees with the plan, all questions answered.      MEDICAL DECISION MAKING  Results were reviewed/discussed with the patient and they were also made aware of online access. Pt also made aware that some labs, such as cultures, will not be resulted during ER visit and follow up with PMD is necessary.     MDM  Number of Diagnoses or Management Options  Acute UTI:   Colitis:   Dehydration:   Hyperglycemia:   Leukocytosis, unspecified type:      Amount and/or Complexity of Data Reviewed  Clinical lab tests: reviewed and ordered (Glucose= 301  Sodium= 131  Lipase= 47  WBC= 23.64  HGB= 16.5)  Tests in the radiology section of CPT®: ordered and reviewed (CT abd/pelvis= Abnormally thick-walled appearance to the sigmoid colon w adjacent pericolonic soft tissue stranding. Bilateral non-obstructing renal stones.)  Decide to obtain previous medical records or to obtain history from someone other than the patient: yes  Review and summarize past medical records: yes (Pt's previous medical records show pt was seen here in the ED on 11/9/2016 for hyperglycemia.)  Discuss the patient with other providers: yes (Dr. Mora (ER physician)  Beatriz martin [facility])           DIAGNOSIS  Final diagnoses:   Colitis   Acute UTI   Leukocytosis, unspecified type   Dehydration   Hyperglycemia       DISPOSITION  ADMISSION    Discussed treatment plan and reason for admission with  pt/family and admitting physician.  Pt/family voiced understanding of the plan for admission for further testing/treatment as needed.       Latest Documented Vital Signs:  As of 4:58 AM  BP- 149/92 HR- 117 Temp- 98.8 °F (37.1 °C) (Tympanic) O2 sat- 95%    --  Documentation assistance provided by martinez Posada for Luis Vo PA-C.  Information recorded by the scribe was done at my direction and has been verified and validated by me.     Mildred Posada  05/12/19 1909       Luis Vo III, PA  05/12/19 0511

## 2019-05-12 NOTE — PROGRESS NOTES
Pharmacy Consult: Zosyn IV Dosing  Patient: Edie Camacho  Admission Date: 512  MRN: [id number]  : 1985    34 y.o. female 85.7 kg (189 lb).    Pharmacy consulted to dose per JEFE Brewer  Indication: UTI    Day of Zosyn therapy:   Current Zosyn regimen: 3.375 gm IV q8hrs EI  Other antimicrobials: None    Results from last 7 days   Lab Units 19  0703 19  0046   CREATININE mg/dL 0.50* 0.62     Estimated Creatinine Clearance: 171.6 mL/min (A) (by C-G formula based on SCr of 0.5 mg/dL (L)).  Body mass index is 30.51 kg/m².    Lab Results   Component Value Date    WBC 19.16 (H) 2019    WBC 23.64 (H) 2019     Temp:  [98.2 °F (36.8 °C)-98.8 °F (37.1 °C)] 98.5 °F (36.9 °C)  Heart Rate:  [] 90  Resp:  [18-20] 18  BP: (107-156)/() 144/87    Baseline cultures/labs/radiology:   Lactate: 1.0   PCT: 0.11   Blood cx x2: In process   CT abd/pelvis: Abnormally thick-walled appearance to the sigmoid colon, with  adjacent pericolonic soft tissue stranding. Constellation of findings is favored to reflect colitis. No pneumatosis or free air is seen. There is no evidence of obstruction.    Assessment/Plan:    Will continue Zosyn to 3.375 gm IV Q 8 hr infused over 4 hours.  Will monitor and adjust as needed.     “Pharmacy will discontinue the Pharmacy to Dose consult at this time. Renal function will continue to be monitored and dosing adjustments will be made by pharmacy based on renal function if necessary.”     Thank you,    Salma Mcdaniels [facility]

## 2019-05-12 NOTE — ED NOTES
Pt reports having lower pelvic pain x 2 days. Pt reports that the pain is wrapping around her abd. Pt states that she is in not having any difficulty urinating. Pt state that she hasn't had a BM in 3-4 days. Pt reports going to [facility] same 2 nights ago and they did not find any. Pt reports having vomiting.     Sandra Baez RN  05/11/19 0554

## 2019-05-12 NOTE — H&P
Patient Name:  Edie Camacho  YOB: 1985  MRN:  [id number]  Admit Date:  5/12/2019  Patient Care Team:  Layla Monique APRN as PCP - General (Family Medicine)      Subjective   History Present Illness     Chief Complaint   Patient presents with   • Pelvic Pain       Ms. Camacho is a 34 y.o. smoker with a history of type 2 DM and nephrolithiaisis that presents to [facility] complaining of left lower quadrant/suprapubic pain described as intense intermittent cramping which started 3 days ago.  She visited her PCP and [facility] ER and had no abnormal findings at that time. She reports nausea and vomiting initially but has not had this in the past couple days. She denies diarrhea, and in fact she has not had a BM in about 4d.  There has been no dysuria, flank pain, or abnormal vaginal discharge.  She is currently menstruating.  She denies having had this issue before and has had no history of colitis.  She was found to have a markedly elevated WBC count and evidence of sigmoid colitis on abdominal CT.    History of Present Illness  Review of Systems   Constitutional: Positive for appetite change (decreased). Negative for chills and fever.   HENT: Negative for congestion, mouth sores, nosebleeds, sore throat and trouble swallowing.    Eyes: Negative for redness and visual disturbance.   Respiratory: Negative for cough, choking and shortness of breath.    Cardiovascular: Negative for chest pain, palpitations and leg swelling.   Gastrointestinal: Positive for abdominal pain, constipation, nausea and vomiting. Negative for blood in stool and diarrhea.   Endocrine: Negative for cold intolerance and heat intolerance.   Genitourinary: Negative for difficulty urinating, dysuria, flank pain and hematuria.   Musculoskeletal: Negative for arthralgias and myalgias.   Skin: Negative for pallor and rash.   Neurological: Negative for dizziness, weakness, light-headedness and headaches.    Hematological: Negative for adenopathy. Does not bruise/bleed easily.   Psychiatric/Behavioral: Negative for confusion and decreased concentration.        Personal History     Past Medical History:   Diagnosis Date   • Diabetes mellitus (CMS/HCC)    • Disease of thyroid gland    • GERD (gastroesophageal reflux disease)    • Kidney stone    • Pregnancy    • Seasonal allergies      Past Surgical History:   Procedure Laterality Date   • NEPHROSTOMY TRACT DILATATION W/ LITHOTRIPSY       History reviewed. No pertinent family history.  Social History     Tobacco Use   • Smoking status: Current Every Day Smoker     Packs/day: 0.50     Types: Cigarettes   Substance Use Topics   • Alcohol use: No   • Drug use: No     Medications Prior to Admission   Medication Sig Dispense Refill Last Dose   • cetirizine (ZYRTEC ALLERGY) 10 MG tablet Take 10 mg by mouth.   5/11/2019 at Unknown time   • docusate sodium (COLACE) 100 MG capsule Take 100 mg by mouth.   5/11/2019 at Unknown time   • fluticasone ([employer] FLUTICASONE PROPIONATE) 50 MCG/ACT nasal spray 1 spray into each nostril.   5/11/2019 at Unknown time   • gabapentin (NEURONTIN) 250 MG/5ML solution Take 250 mg by mouth As Needed.   5/11/2019 at Unknown time   • Insulin Lispro (HUMALOG KWIKPEN) 100 UNIT/ML solution pen-injector INJECT 1 UNIT PER 20MG/DL > 120 AS DIRECTED. MAX 25 UNITS PER DAY   5/11/2019 at Unknown time   • levothyroxine (SYNTHROID, LEVOTHROID) 200 MCG tablet Take 200 mcg by mouth.   5/11/2019 at Unknown time   • metFORMIN (GLUCOPHAGE) 500 MG tablet Take 500 mg by mouth.   5/11/2019 at Unknown time   • montelukast (SINGULAIR) 10 MG tablet Take 10 mg by mouth.   5/11/2019 at Unknown time   • ondansetron (ZOFRAN) 4 MG tablet Take 1 tablet by mouth every 6 (six) hours as needed for nausea or vomiting. 10 tablet 0 5/11/2019 at Unknown time   • pantoprazole (PROTONIX) 20 MG EC tablet Take 20 mg by mouth.   5/11/2019 at Unknown time     Allergies:    Allergies   Allergen  Reactions   • Ciprofloxacin Anaphylaxis   • Sulfa Antibiotics Anaphylaxis   • Bactrim [Sulfamethoxazole-Trimethoprim] Angioedema       Objective    Objective     Vital Signs  Temp:  [98.2 °F (36.8 °C)-98.8 °F (37.1 °C)] 98.5 °F (36.9 °C)  Heart Rate:  [] 90  Resp:  [18-20] 18  BP: (107-156)/() 144/87  SpO2:  [93 %-100 %] 93 %  on   ;   Device (Oxygen Therapy): room air  Body mass index is 30.51 kg/m².    Physical Exam   Constitutional: She is oriented to person, place, and time. No distress.   HENT:   Head: Normocephalic and atraumatic.   Mouth/Throat: Oropharynx is clear and moist.   Eyes: Conjunctivae and EOM are normal. Pupils are equal, round, and reactive to light.   Neck: Normal range of motion. Neck supple. No JVD present.   Cardiovascular: Normal rate, regular rhythm and intact distal pulses.   Pulmonary/Chest: Effort normal and breath sounds normal.   Abdominal: Soft. Bowel sounds are normal. She exhibits no distension. There is tenderness (very tender LLQ/suprapubic area). There is no rebound and no guarding.   Musculoskeletal: She exhibits no edema or tenderness.   Neurological: She is alert and oriented to person, place, and time.   Skin: Skin is warm and dry. Capillary refill takes less than 2 seconds. She is not diaphoretic.   Psychiatric: She has a normal mood and affect. Her behavior is normal.   Nursing note and vitals reviewed.      Results Review:  I reviewed the patient's new clinical results.  I reviewed the patient's new imaging results and agree with the interpretation.  I reviewed the patient's other test results and agree with the interpretation  I personally viewed and interpreted the patient's EKG/Telemetry data    Lab Results (last 24 hours)     Procedure Component Value Units Date/Time    CBC & Differential [[id number]] Collected:  05/12/19 0046    Specimen:  Blood Updated:  05/12/19 0119    Narrative:       The following orders were created for panel order CBC &  Differential.  Procedure                               Abnormality         Status                     ---------                               -----------         ------                     Scan Slide[[id number]]                    Normal              Final result               CBC Auto Differential[[id number]]         Abnormal            Final result                 Please view results for these tests on the individual orders.    Comprehensive Metabolic Panel [[id number]]  (Abnormal) Collected:  05/12/19 0046    Specimen:  Blood Updated:  05/12/19 0121     Glucose 301 mg/dL      BUN 13 mg/dL      Creatinine 0.62 mg/dL      Sodium 131 mmol/L      Potassium 4.8 mmol/L      Comment: Specimen hemolyzed.  Results may be affected.        Chloride 98 mmol/L      CO2 13.5 mmol/L      Calcium 9.5 mg/dL      Total Protein 7.9 g/dL      Albumin 4.20 g/dL      ALT (SGPT) 18 U/L      Comment: Specimen hemolyzed.  Results may be affected.        AST (SGOT) 22 U/L      Comment: Specimen hemolyzed.  Results may be affected.        Alkaline Phosphatase 114 U/L      Total Bilirubin 0.8 mg/dL      eGFR Non African Amer 109 mL/min/1.73      Globulin 3.7 gm/dL      A/G Ratio 1.1 g/dL      BUN/Creatinine Ratio 21.0     Anion Gap 19.5 mmol/L     Narrative:       GFR Normal >60  Chronic Kidney Disease <60  Kidney Failure <15    Lipase [[id number]]  (Normal) Collected:  05/12/19 0046    Specimen:  Blood Updated:  05/12/19 0119     Lipase 47 U/L     hCG, Serum, Qualitative [[id number]]  (Normal) Collected:  05/12/19 0046    Specimen:  Blood Updated:  05/12/19 0118     HCG Qualitative Negative    CBC Auto Differential [[id number]]  (Abnormal) Collected:  05/12/19 0046    Specimen:  Blood Updated:  05/12/19 0119     WBC 23.64 10*3/mm3      RBC 5.46 10*6/mm3      Hemoglobin 16.5 g/dL      Hematocrit 50.8 %      MCV 93.0 fL      MCH 30.2 pg      MCHC 32.5 g/dL      RDW 12.6 %      RDW-SD 43.3 fl      MPV 9.2 fL      Platelets 347 10*3/mm3      Neutrophil %  "84.4 %      Lymphocyte % 8.5 %      Monocyte % 5.2 %      Eosinophil % 0.3 %      Basophil % 0.3 %      Immature Grans % 1.3 %      Neutrophils, Absolute 19.96 10*3/mm3      Lymphocytes, Absolute 2.01 10*3/mm3      Monocytes, Absolute 1.22 10*3/mm3      Eosinophils, Absolute 0.07 10*3/mm3      Basophils, Absolute 0.07 10*3/mm3      Immature Grans, Absolute 0.31 10*3/mm3      nRBC 0.0 /100 WBC     Scan Slide [[id number]]  (Normal) Collected:  05/12/19 0046    Specimen:  Blood Updated:  05/12/19 0119     RBC Morphology Normal     WBC Morphology Normal     Platelet Morphology Normal    Procalcitonin [[id number]]  (Normal) Collected:  05/12/19 0046    Specimen:  Blood Updated:  05/12/19 0402     Procalcitonin 0.11 ng/mL     Narrative:       As a Marker for Sepsis (Non-Neonates):   1. <0.5 ng/mL represents a low risk of severe sepsis and/or septic shock.  1. >2 ng/mL represents a high risk of severe sepsis and/or septic shock.    As a Marker for Lower Respiratory Tract Infections that require antibiotic therapy:  PCT on Admission     Antibiotic Therapy             6-12 Hrs later  > 0.5                Strongly Recommended            >0.25 - <0.5         Recommended  0.1 - 0.25           Discouraged                   Remeasure/reassess PCT  <0.1                 Strongly Discouraged          Remeasure/reassess PCT      As 28 day mortality risk marker: \"Change in Procalcitonin Result\" (> 80 % or <=80 %) if Day 0 (or Day 1) and Day 4 values are available. Refer to [email or web address]-pct-[email or web address]/   Change in PCT <=80 %   A decrease of PCT levels below or equal to 80 % defines a positive change in PCT test result representing a higher risk for 28-day all-cause mortality of patients diagnosed with severe sepsis or septic shock.  Change in PCT > 80 %   A decrease of PCT levels of more than 80 % defines a negative change in PCT result representing a lower risk for 28-day all-cause mortality of patients diagnosed with severe " sepsis or septic shock.                Urinalysis With Microscopic If Indicated (No Culture) - Urine, Clean Catch [[id number]]  (Abnormal) Collected:  05/12/19 0145    Specimen:  Urine, Clean Catch Updated:  05/12/19 0212     Color, UA Dark Yellow     Appearance, UA Cloudy     pH, UA <=5.0     Specific Gravity, UA >=1.030     Glucose, UA >=1000 mg/dL (3+)     Ketones, UA 80 mg/dL (3+)     Bilirubin, UA Negative     Blood, UA Large (3+)     Protein, UA >=300 mg/dL (3+)     Leuk Esterase, UA Trace     Nitrite, UA Positive     Urobilinogen, UA 1.0 E.U./dL    Urinalysis, Microscopic Only - Urine, Clean Catch [[id number]]  (Abnormal) Collected:  05/12/19 0145    Specimen:  Urine, Clean Catch Updated:  05/12/19 0208     RBC, UA Too Numerous to Count /HPF      WBC, UA 13-20 /HPF      Bacteria, UA None Seen /HPF      Squamous Epithelial Cells, UA 3-6 /HPF      Hyaline Casts, UA 7-12 /LPF      Methodology Automated Microscopy    Urinalysis With Culture If Indicated - Urine, Catheter [[id number]]  (Abnormal) Collected:  05/12/19 0421    Specimen:  Urine, Catheter Updated:  05/12/19 0447     Color, UA Dark Yellow     Appearance, UA Clear     pH, UA <=5.0     Specific Gravity, UA >=1.030     Glucose, UA >=1000 mg/dL (3+)     Ketones, UA 80 mg/dL (3+)     Bilirubin, UA Negative     Blood, UA Trace     Protein, UA 30 mg/dL (1+)     Leuk Esterase, UA Negative     Nitrite, UA Negative     Urobilinogen, UA 1.0 E.U./dL    Urinalysis, Microscopic Only - Urine, Catheter [[id number]] Collected:  05/12/19 0421    Specimen:  Urine, Catheter Updated:  05/12/19 0447     RBC, UA 0-2 /HPF      WBC, UA 0-2 /HPF      Bacteria, UA None Seen /HPF      Squamous Epithelial Cells, UA 0-2 /HPF      Hyaline Casts, UA 3-6 /LPF      Methodology Automated Microscopy    Lactic Acid, Plasma [[id number]]  (Normal) Collected:  05/12/19 0429    Specimen:  Blood Updated:  05/12/19 0453     Lactate 1.0 mmol/L     Blood Culture - Blood, Arm, Left [[id number]] Collected:   05/12/19 0429    Specimen:  Blood from Arm, Left Updated:  05/12/19 0436    Blood Culture - Blood, Arm, Right [[id number]] Collected:  05/12/19 0429    Specimen:  Blood from Arm, Right Updated:  05/12/19 0436    Hemoglobin A1c [[id number]]  (Abnormal) Collected:  05/12/19 0703    Specimen:  Blood Updated:  05/12/19 0751     Hemoglobin A1C 9.80 %     Narrative:       Hemoglobin A1C Ranges:    Increased Risk for Diabetes  5.7% to 6.4%  Diabetes                     >= 6.5%  Diabetic Goal                < 7.0%    Basic Metabolic Panel [[id number]]  (Abnormal) Collected:  05/12/19 0703    Specimen:  Blood Updated:  05/12/19 0807     Glucose 215 mg/dL      BUN 11 mg/dL      Creatinine 0.50 mg/dL      Sodium 133 mmol/L      Potassium 3.8 mmol/L      Chloride 102 mmol/L      CO2 14.7 mmol/L      Calcium 8.2 mg/dL      eGFR Non African Amer 140 mL/min/1.73      BUN/Creatinine Ratio 22.0     Anion Gap 16.3 mmol/L     Narrative:       GFR Normal >60  Chronic Kidney Disease <60  Kidney Failure <15    CBC (No Diff) [[id number]]  (Abnormal) Collected:  05/12/19 0703    Specimen:  Blood Updated:  05/12/19 0746     WBC 19.16 10*3/mm3      RBC 4.62 10*6/mm3      Hemoglobin 14.0 g/dL      Hematocrit 42.3 %      MCV 91.6 fL      MCH 30.3 pg      MCHC 33.1 g/dL      RDW 12.8 %      RDW-SD 42.7 fl      MPV 9.2 fL      Platelets 294 10*3/mm3     POC Glucose Once [[id number]]  (Abnormal) Collected:  05/12/19 0719    Specimen:  Blood Updated:  05/12/19 0721     Glucose 209 mg/dL     POC Glucose Once [[id number]]  (Abnormal) Collected:  05/12/19 1108    Specimen:  Blood Updated:  05/12/19 1110     Glucose 226 mg/dL           Imaging Results (last 24 hours)     Procedure Component Value Units Date/Time    CT Abdomen Pelvis With Contrast [[id number]] Collected:  05/12/19 0235     Updated:  05/12/19 0245    Narrative:       CT OF THE ABDOMEN AND PELVIS WITH CONTRAST     HISTORY: Bilateral lower abdominal pain radiating to both legs.     COMPARISON:  None available.     TECHNIQUE: Axial CT imaging was obtained from the dome of diaphragm to  the symphysis pubis. IV contrast was administered.     FINDINGS:  Images through the lung bases bilaterally demonstrate some groundglass  infiltrates. These are nonspecific, and may reflect some atelectasis.  The stomach and proximal small bowel appear unremarkable. The adrenal  glands are within normal limits. The spleen is normal. No focal hepatic  lesions are seen. The liver may be steatotic. However, this is difficult  to fully assess on this contrast-enhanced study. The spleen appears  normal. The pancreas appears unremarkable. Bilateral nonobstructing  renal stones are identified. Both kidneys enhance symmetrically. Patient  does have an area of cortical thinning identified posteriorly within the  right kidney, likely reflecting the sequela of prior insult. The  appendix is visualized, and is within normal limits. The uterus and  ovaries appear normal. This patient's sigmoid colon appears abnormally  thick walled, with associated pericolonic soft tissue stranding. The  appearance is concerning for colitis. Patient really does not have any  significant diverticular disease, so this would argue against  diverticulitis. There is no adenopathy seen within the pelvis. Review of  bony windows does not demonstrate any aggressive osseous abnormalities.  Degenerative changes are noted at L5-S1.       Impression:          1. Abnormally thick-walled appearance to the sigmoid colon, with  adjacent pericolonic soft tissue stranding. Constellation of findings is  favored to reflect colitis. No pneumatosis or free air is seen. There is  no evidence of obstruction.  2. Bilateral nonobstructing renal stones.     Radiation dose reduction techniques were utilized, including automated  exposure control and exposure modulation based on body size.     This report was finalized on 5/12/2019 2:42 AM by Dr. Marie Jeong M.D.                   No orders to display        Assessment/Plan     Active Hospital Problems    Diagnosis POA   • Colitis presumed infectious [K52.9] Yes   • Type 2 diabetes mellitus, without long-term current use of insulin (CMS/HCC) [E11.9] Yes   • Suprapubic pain, acute [R10.2] Yes   • Hyponatremia [E87.1] Yes   • Dehydration [E86.0] Yes   • Sepsis (CMS/HCC) [A41.9] Yes   Abbominal Pain  - she appears to have sigmoid colitis on abdominal CT-it is unusual that she is not having diarrhea if this is in fact colitis but her symptoms and tenderness are out of proportion to what would be expected from constipation  - no e/o UTI or ureteral calculi, no e/o gynecologic issues  - meets SIRS with WBC 23k and tachycardia, LA nml-will send blood cx's and start on zosyn  - will ask GI opinion    Hyponatremia  - mild, probably from dehydration given history and as evidenced by hemoconcentration  - give IVF until she can take PO reliably    Type 2 DM  - cover with ssi  - A1C 9.80%    I discussed the patients findings and my recommendations with patient and nursing staff.    VTE Prophylaxis - SCDs .  Code Status - Full code.       Sam Kirk MD  [facility]  05/12/19  11:50 AM

## 2019-05-12 NOTE — ED NOTES
"Pt to R c/o bilateral lower abdominal pain that radiates to both flanks. Pt has Hx of kidney stones and reports she went to [facility] 2 nights ago and they told her \"nothing was wrong but they scanned me and found some stones but they said the stones aren't moving\" Pt denies being sent home with medications.     Suzan Bruner, RUFINO  05/12/19 0034    "

## 2019-05-12 NOTE — ED PROVIDER NOTES
Pt presents to the ED complaining of suprapubic abd pain for the past 3 days. Pt confirms difficulty urinating, but denies known fever or dysuria. Pt states this is the first menstrual cycle she has had since being started on birth control.     On exam, moderate lower abd tenderness without mass or rebound, normal active bowel sounds.     I agree with midlevel plan to treat pt with abx for cystitis and UTI.    The [facility] and I have discussed this patient's history, physical exam, and treatment plan.  I have reviewed the documentation and personally had a face to face interaction with the patient. I affirm the documentation and agree with the treatment and plan.  The attached note describes my personal findings.    Documentation assistance provided by martinez Rothman for Dr. Mora.  Information recorded by the scribe was done at my direction and has been verified and validated by me.            Jammie Rothman  05/12/19 0346       Anthony Mora MD  05/12/19 2323

## 2019-05-13 PROBLEM — K59.09 CHRONIC CONSTIPATION: Status: ACTIVE | Noted: 2019-05-13

## 2019-05-13 PROBLEM — E87.20 METABOLIC ACIDOSIS: Status: ACTIVE | Noted: 2019-05-13

## 2019-05-13 PROBLEM — E86.0 DEHYDRATION: Status: RESOLVED | Noted: 2019-05-12 | Resolved: 2019-05-13

## 2019-05-13 LAB
BACTERIA UR QL AUTO: ABNORMAL /HPF
BILIRUB UR QL STRIP: NEGATIVE
CLARITY UR: CLEAR
COLOR UR: YELLOW
GLUCOSE BLDC GLUCOMTR-MCNC: 193 MG/DL (ref 70–130)
GLUCOSE BLDC GLUCOMTR-MCNC: 213 MG/DL (ref 70–130)
GLUCOSE BLDC GLUCOMTR-MCNC: 248 MG/DL (ref 70–130)
GLUCOSE BLDC GLUCOMTR-MCNC: 263 MG/DL (ref 70–130)
GLUCOSE UR STRIP-MCNC: ABNORMAL MG/DL
HGB UR QL STRIP.AUTO: ABNORMAL
HYALINE CASTS UR QL AUTO: ABNORMAL /LPF
KETONES UR QL STRIP: ABNORMAL
LEUKOCYTE ESTERASE UR QL STRIP.AUTO: NEGATIVE
NITRITE UR QL STRIP: NEGATIVE
PH UR STRIP.AUTO: 7 [PH] (ref 5–8)
PROT UR QL STRIP: NEGATIVE
RBC # UR: ABNORMAL /HPF
REF LAB TEST METHOD: ABNORMAL
SP GR UR STRIP: 1.03 (ref 1–1.03)
SQUAMOUS #/AREA URNS HPF: ABNORMAL /HPF
UROBILINOGEN UR QL STRIP: ABNORMAL
WBC UR QL AUTO: ABNORMAL /HPF

## 2019-05-13 PROCEDURE — 82962 GLUCOSE BLOOD TEST: CPT

## 2019-05-13 PROCEDURE — 25010000002 PIPERACILLIN SOD-TAZOBACTAM PER 1 G: Performed by: HOSPITALIST

## 2019-05-13 PROCEDURE — 63710000001 INSULIN LISPRO (HUMAN) PER 5 UNITS: Performed by: NURSE PRACTITIONER

## 2019-05-13 PROCEDURE — 25010000002 MORPHINE PER 10 MG: Performed by: INTERNAL MEDICINE

## 2019-05-13 PROCEDURE — 63710000001 INSULIN GLARGINE PER 5 UNITS: Performed by: NURSE PRACTITIONER

## 2019-05-13 PROCEDURE — 81001 URINALYSIS AUTO W/SCOPE: CPT | Performed by: INTERNAL MEDICINE

## 2019-05-13 PROCEDURE — 99231 SBSQ HOSP IP/OBS SF/LOW 25: CPT | Performed by: INTERNAL MEDICINE

## 2019-05-13 RX ORDER — INSULIN GLARGINE 100 [IU]/ML
10 INJECTION, SOLUTION SUBCUTANEOUS NIGHTLY
Status: DISCONTINUED | OUTPATIENT
Start: 2019-05-13 | End: 2019-05-14 | Stop reason: HOSPADM

## 2019-05-13 RX ORDER — LUBIPROSTONE 8 UG/1
8 CAPSULE ORAL 2 TIMES DAILY WITH MEALS
Status: DISCONTINUED | OUTPATIENT
Start: 2019-05-13 | End: 2019-05-14 | Stop reason: HOSPADM

## 2019-05-13 RX ORDER — LACTULOSE 10 G/15ML
10 SOLUTION ORAL 2 TIMES DAILY PRN
Status: DISCONTINUED | OUTPATIENT
Start: 2019-05-13 | End: 2019-05-14 | Stop reason: HOSPADM

## 2019-05-13 RX ADMIN — SODIUM CHLORIDE 75 ML/HR: 9 INJECTION, SOLUTION INTRAVENOUS at 17:50

## 2019-05-13 RX ADMIN — MORPHINE SULFATE 2 MG: 2 INJECTION, SOLUTION INTRAMUSCULAR; INTRAVENOUS at 00:38

## 2019-05-13 RX ADMIN — LEVOTHYROXINE SODIUM 200 MCG: 100 TABLET ORAL at 05:11

## 2019-05-13 RX ADMIN — METFORMIN HYDROCHLORIDE 500 MG: 500 TABLET ORAL at 17:52

## 2019-05-13 RX ADMIN — MONTELUKAST SODIUM 10 MG: 10 TABLET, FILM COATED ORAL at 08:07

## 2019-05-13 RX ADMIN — MORPHINE SULFATE 2 MG: 2 INJECTION, SOLUTION INTRAMUSCULAR; INTRAVENOUS at 23:17

## 2019-05-13 RX ADMIN — SODIUM CHLORIDE, PRESERVATIVE FREE 3 ML: 5 INJECTION INTRAVENOUS at 08:08

## 2019-05-13 RX ADMIN — MORPHINE SULFATE 2 MG: 2 INJECTION, SOLUTION INTRAMUSCULAR; INTRAVENOUS at 19:54

## 2019-05-13 RX ADMIN — LACTULOSE 10 G: 10 SOLUTION ORAL at 11:32

## 2019-05-13 RX ADMIN — SODIUM CHLORIDE, PRESERVATIVE FREE 3 ML: 5 INJECTION INTRAVENOUS at 20:02

## 2019-05-13 RX ADMIN — FLUTICASONE PROPIONATE 1 SPRAY: 50 SPRAY, METERED NASAL at 08:07

## 2019-05-13 RX ADMIN — ACETAMINOPHEN 650 MG: 325 TABLET, FILM COATED ORAL at 15:10

## 2019-05-13 RX ADMIN — DOCUSATE SODIUM 100 MG: 100 CAPSULE, LIQUID FILLED ORAL at 20:01

## 2019-05-13 RX ADMIN — CETIRIZINE HYDROCHLORIDE 10 MG: 10 TABLET, FILM COATED ORAL at 20:01

## 2019-05-13 RX ADMIN — SODIUM CHLORIDE 100 ML/HR: 9 INJECTION, SOLUTION INTRAVENOUS at 06:46

## 2019-05-13 RX ADMIN — INSULIN LISPRO 4 UNITS: 100 INJECTION, SOLUTION INTRAVENOUS; SUBCUTANEOUS at 17:50

## 2019-05-13 RX ADMIN — TAZOBACTAM SODIUM AND PIPERACILLIN SODIUM 3.38 G: 375; 3 INJECTION, SOLUTION INTRAVENOUS at 10:06

## 2019-05-13 RX ADMIN — INSULIN GLARGINE 10 UNITS: 100 INJECTION, SOLUTION SUBCUTANEOUS at 22:09

## 2019-05-13 RX ADMIN — TAZOBACTAM SODIUM AND PIPERACILLIN SODIUM 3.38 G: 375; 3 INJECTION, SOLUTION INTRAVENOUS at 02:45

## 2019-05-13 RX ADMIN — INSULIN LISPRO 3 UNITS: 100 INJECTION, SOLUTION INTRAVENOUS; SUBCUTANEOUS at 22:09

## 2019-05-13 RX ADMIN — INSULIN LISPRO 3 UNITS: 100 INJECTION, SOLUTION INTRAVENOUS; SUBCUTANEOUS at 11:32

## 2019-05-13 RX ADMIN — ACETAMINOPHEN 650 MG: 325 TABLET, FILM COATED ORAL at 05:11

## 2019-05-13 RX ADMIN — INSULIN LISPRO 2 UNITS: 100 INJECTION, SOLUTION INTRAVENOUS; SUBCUTANEOUS at 08:07

## 2019-05-13 RX ADMIN — PANTOPRAZOLE SODIUM 40 MG: 40 TABLET, DELAYED RELEASE ORAL at 05:11

## 2019-05-13 RX ADMIN — DOCUSATE SODIUM 100 MG: 100 CAPSULE, LIQUID FILLED ORAL at 08:06

## 2019-05-13 RX ADMIN — POLYETHYLENE GLYCOL 3350 17 G: 17 POWDER, FOR SOLUTION ORAL at 08:06

## 2019-05-13 RX ADMIN — LUBIPROSTONE 8 MCG: 8 CAPSULE, GELATIN COATED ORAL at 20:01

## 2019-05-13 RX ADMIN — TAZOBACTAM SODIUM AND PIPERACILLIN SODIUM 3.38 G: 375; 3 INJECTION, SOLUTION INTRAVENOUS at 17:50

## 2019-05-13 RX ADMIN — MORPHINE SULFATE 2 MG: 2 INJECTION, SOLUTION INTRAMUSCULAR; INTRAVENOUS at 16:10

## 2019-05-13 NOTE — PROGRESS NOTES
Discharge Planning Assessment  [facility]     Patient Name: Edie Camacho  MRN: [id number]  Today's Date: 5/13/2019    Admit Date: 5/12/2019    Discharge Needs Assessment     Row Name 05/13/19 1814       Living Environment    Lives With  child(inez), dependent    Current Living Arrangements  home/apartment/condo    Primary Care Provided by  self    Provides Primary Care For  child(inez)    Able to Return to Prior Arrangements  yes       Resource/Environmental Concerns    Resource/Environmental Concerns  none    Transportation Concerns  car, none       Transition Planning    Patient/Family Anticipates Transition to  home with family    Patient/Family Anticipated Services at Transition  none    Transportation Anticipated  family or friend will provide       Discharge Needs Assessment    Readmission Within the Last 30 Days  no previous admission in last 30 days    Concerns to be Addressed  no discharge needs identified    Equipment Currently Used at Home  none    Equipment Needed After Discharge  none        Discharge Plan     Row Name 05/13/19 1814       Plan    Plan  Home    Patient/Family in Agreement with Plan  yes    Plan Comments  Facesheet inforamtion was verified.  Patient is IADLs and works full time.  She has 2 dependent children that are staying with their aunt.  She plans home and denies needs.  PCP and pharmacy are verified.......................Eboni Cerda RN        Destination      No service coordination in this encounter.      Durable Medical Equipment      No service coordination in this encounter.      Dialysis/Infusion      No service coordination in this encounter.      Home Medical Care      No service coordination in this encounter.      Therapy      No service coordination in this encounter.      Community Resources      No service coordination in this encounter.          Demographic Summary     Row Name 05/13/19 1812       General Information    Admission Type  inpatient    Arrived From   home    Preferred Language  English       Contact Information    Permission Granted to Share Info With  family/designee;    Contact Information Comments  Norma Brooks [phone], sister in law        Functional Status     Row Name 05/13/19 4394       Functional Status    Usual Activity Tolerance  good    Current Activity Tolerance  moderate       Functional Status, IADL    Medications  independent    Meal Preparation  independent    Housekeeping  independent    Laundry  independent    Shopping  independent       Mental Status    General Appearance WDL  WDL       Mental Status Summary    Recent Changes in Mental Status/Cognitive Functioning  no changes       Employment/    Employment Status  employed full time        Psychosocial    No documentation.       Abuse/Neglect    No documentation.       Legal    No documentation.       Substance Abuse    No documentation.       Patient Forms    No documentation.           Eboni Cerda RN

## 2019-05-13 NOTE — PROGRESS NOTES
[facility]  Inpatient Progress Note    Reason for Follow Up: Chronic intermittent constipation, abdominal pain, abnormal CT scan.    Subjective     Interval History:   Patient states overall improvement of abdominal pain, still reports no bowel movement or function.  Reviewed CT scan results with patient.  Encouraged ambulation to facilitate bowel clearance.    Current Facility-Administered Medications:   •  acetaminophen (TYLENOL) tablet 650 mg, 650 mg, Oral, Q4H PRN, Beatriz Martinez APRN, 650 mg at 05/13/19 0511  •  cetirizine (zyrTEC) tablet 10 mg, 10 mg, Oral, Nightly, Sam Kirk MD, 10 mg at 05/12/19 2137  •  dextrose (D50W) 25 g/ 50mL Intravenous Solution 25 g, 25 g, Intravenous, Q15 Min PRN, Beatriz Martinez APRN  •  dextrose (GLUTOSE) oral gel 15 g, 15 g, Oral, Q15 Min PRN, Beatriz Martinez APRN  •  docusate sodium (COLACE) capsule 100 mg, 100 mg, Oral, BID, Sam Kirk MD, 100 mg at 05/13/19 0806  •  fluticasone (FLONASE) 50 MCG/ACT nasal spray 1 spray, 1 spray, Nasal, Daily, Sam Kirk MD, 1 spray at 05/13/19 0807  •  glucagon (human recombinant) (GLUCAGEN DIAGNOSTIC) injection 1 mg, 1 mg, Subcutaneous, PRN, Beatriz Martinez, APRN  •  insulin lispro (humaLOG) injection 0-7 Units, 0-7 Units, Subcutaneous, 4x Daily With Meals & Nightly, Beatriz Martinez APRN, 2 Units at 05/13/19 0807  •  lactulose (CHRONULAC) 10 GM/15ML solution 10 g, 10 g, Oral, BID PRN, Ana Rosa Coelho MD  •  levothyroxine (SYNTHROID, LEVOTHROID) tablet 200 mcg, 200 mcg, Oral, Q AM, Sam Kirk MD, 200 mcg at 05/13/19 0511  •  montelukast (SINGULAIR) tablet 10 mg, 10 mg, Oral, Nightly, Sam Kirk MD, 10 mg at 05/13/19 0807  •  morphine injection 2 mg, 2 mg, Intravenous, Q3H PRN, 2 mg at 05/13/19 0038 **AND** naloxone (NARCAN) injection 0.4 mg, 0.4 mg, Intravenous, Q5 Min PRN, Sam Kirk MD  •  pantoprazole (PROTONIX) EC tablet 40 mg, 40 mg, Oral, Q AM, Reagan,  Sam RICHARDSON MD, 40 mg at 05/13/19 0511  •  piperacillin-tazobactam (ZOSYN) 3.375 g in iso-osmotic dextrose 50 ml (premix), 3.375 g, Intravenous, Q8H, Davidson Ingram MD, 3.375 g at 05/13/19 1006  •  polyethylene glycol (MIRALAX) powder 17 g, 17 g, Oral, BID, Sam Kirk MD, 17 g at 05/13/19 0806  •  sodium chloride 0.9 % flush 1-10 mL, 1-10 mL, Intravenous, PRN, English, Beatriz C, APRN  •  sodium chloride 0.9 % flush 3 mL, 3 mL, Intravenous, Q12H, English, Beatriz C, APRN, 3 mL at 05/13/19 0808  •  sodium chloride 0.9 % infusion, 100 mL/hr, Intravenous, Continuous, English, Beatriz C, APRN, Last Rate: 100 mL/hr at 05/13/19 0646, 100 mL/hr at 05/13/19 0646  Review of Systems:    All systems were reviewed and negative except for:  Gastrointestinal: positive for  See HPI    Objective     Vital Signs  Temp:  [98 °F (36.7 °C)-98.6 °F (37 °C)] 98.1 °F (36.7 °C)  Heart Rate:  [88-99] 93  Resp:  [18] 18  BP: (140-153)/(86-98) 143/94  Body mass index is 30.51 kg/m².    Intake/Output Summary (Last 24 hours) at 5/13/2019 1022  Last data filed at 5/13/2019 0647  Gross per 24 hour   Intake 3247 ml   Output --   Net 3247 ml     No intake/output data recorded.     Physical Exam:   General: patient awake, alert and cooperative   Eyes: Normal lids and lashes, no scleral icterus   Neck: supple, normal ROM   Skin: warm and dry, not jaundiced   Cardiovascular: regular rhythm and rate, no murmurs auscultated   Pulm: clear to auscultation bilaterally, regular and unlabored   Abdomen: soft, nontender, nondistended; normal bowel sounds   Rectal: deferred   Extremities: no rash or edema   Psychiatric: Normal mood and behavior; memory intact     Results Review:     I reviewed the patient's new clinical results.    Results from last 7 days   Lab Units 05/12/19  0703 05/12/19  0046   WBC 10*3/mm3 19.16* 23.64*   HEMOGLOBIN g/dL 14.0 16.5*   HEMATOCRIT % 42.3 50.8*   PLATELETS 10*3/mm3 294 347     Results from last 7 days   Lab Units  05/12/19  0703 05/12/19  0046   SODIUM mmol/L 133* 131*   POTASSIUM mmol/L 3.8 4.8   CHLORIDE mmol/L 102 98   CO2 mmol/L 14.7* 13.5*   BUN mg/dL 11 13   CREATININE mg/dL 0.50* 0.62   CALCIUM mg/dL 8.2* 9.5   BILIRUBIN mg/dL  --  0.8   ALK PHOS U/L  --  114   ALT (SGPT) U/L  --  18   AST (SGOT) U/L  --  22   GLUCOSE mg/dL 215* 301*         Lab Results   Lab Value Date/Time    LIPASE 47 05/12/2019 0046    LIPASE 37 06/18/2016 0255       Radiology:  CT Abdomen Pelvis With Contrast   Final Result       1. Abnormally thick-walled appearance to the sigmoid colon, with   adjacent pericolonic soft tissue stranding. Constellation of findings is   favored to reflect colitis. No pneumatosis or free air is seen. There is   no evidence of obstruction.   2. Bilateral nonobstructing renal stones.       Radiation dose reduction techniques were utilized, including automated   exposure control and exposure modulation based on body size.       This report was finalized on 5/12/2019 2:42 AM by Dr. Marie Jeong M.D.              Assessment/Plan     Patient Active Problem List   Diagnosis   • Colitis presumed infectious   • Type 2 diabetes mellitus, without long-term current use of insulin (CMS/[facility])   • Suprapubic pain, acute   • Hyponatremia   • Dehydration   • Sepsis (CMS/[facility])     Impression  #1 colitis: Etiology not well-defined, does seem to respond to antibiotic therapy.  #2 abdominal pain: Improved  #3 chronic intermittent constipation      Recommendation  Encourage ambulation  Continue current medical regimen  Eventual colonoscopic assessment in outpatient setting.    I discussed the patients findings and my recommendations with patient   .    Je Mcintosh MD

## 2019-05-13 NOTE — PROGRESS NOTES
"     LOS: 1 day   Primary Care Physician: Layla Monique APRN     Subjective   Feels better.  Has had 2 bowel movements since lactulose given earlier.  Pain is very low pelvic area.  Some dysuria.  No hesitancy    Vital Signs  Body mass index is 30.51 kg/m².  Temp:  [98 °F (36.7 °C)-99.2 °F (37.3 °C)] 99.2 °F (37.3 °C)  Heart Rate:  [] 102  Resp:  [18] 18  BP: (143-159)/() 159/104      Objective:  General Appearance:  In no acute distress (Morbidly obese.  Looks older than age).    Vital signs: (most recent): Blood pressure (!) 159/104, pulse 102, temperature 99.2 °F (37.3 °C), temperature source Oral, resp. rate 18, height 167.6 cm (66\"), weight 85.7 kg (189 lb), last menstrual period 05/09/2019, SpO2 95 %, not currently breastfeeding.    Lungs:  She is not in respiratory distress.  No stridor.  There are decreased breath sounds and wheezes.  No rales or rhonchi.  (A few light and expiratory wheezes)  Heart: Normal rate.  Regular rhythm.  No murmur.   Abdomen: Abdomen is soft.  (Obese).  Bowel sounds are normal.   There is no abdominal tenderness.   There is no mass.   Extremities: There is dependent edema.  (Trace)  Neurological: Patient is alert.    Skin:  Warm and dry.  No rash.         Results Review:    I reviewed the patient's new clinical results.    Results from last 7 days   Lab Units 05/12/19  0703 05/12/19  0046   WBC 10*3/mm3 19.16* 23.64*   HEMOGLOBIN g/dL 14.0 16.5*   PLATELETS 10*3/mm3 294 347     Results from last 7 days   Lab Units 05/12/19  0703 05/12/19  0046   SODIUM mmol/L 133* 131*   POTASSIUM mmol/L 3.8 4.8   CHLORIDE mmol/L 102 98   CO2 mmol/L 14.7* 13.5*   BUN mg/dL 11 13   CREATININE mg/dL 0.50* 0.62   CALCIUM mg/dL 8.2* 9.5   GLUCOSE mg/dL 215* 301*         Hemoglobin A1C:  Lab Results   Component Value Date    HGBA1C 9.80 (H) 05/12/2019       Glucose Range:  Glucose   Date/Time Value Ref Range Status   05/13/2019 1744 263 (H) 70 - 130 mg/dL Final   05/13/2019 1121 248 (H) " 70 - 130 mg/dL Final   05/13/2019 0628 193 (H) 70 - 130 mg/dL Final   05/12/2019 2130 197 (H) 70 - 130 mg/dL Final   05/12/2019 1617 201 (H) 70 - 130 mg/dL Final   05/12/2019 1108 226 (H) 70 - 130 mg/dL Final       No results found for: [id number]    No results found for: TSH    Assessment & Plan      Medication Review: Yes    Active Hospital Problems    Diagnosis  POA   • **Colitis presumed infectious [K52.9]  Yes   • Metabolic acidosis [E87.2]  Unknown   • Chronic constipation [K59.09]  Unknown   • Type 2 diabetes mellitus, without long-term current use of insulin (CMS/[facility]) [E11.9]  Yes   • Suprapubic pain, acute [R10.2]  Yes   • Hyponatremia [E87.1]  Yes   • Sepsis (CMS/[facility]) [A41.9]  Yes      Resolved Hospital Problems    Diagnosis Date Resolved POA   • Dehydration [E86.0] 05/13/2019 Yes       Assessment/Plan  1.  Colitis with chronic constipation.  I started lactulose earlier and she has had good response.  Add amitiza.  Recheck white count in a.m.  Change antibiotics to p.o. or stop depending on GI recommendations.  2.  Dysuria.  Urinalysis ordered.  3.  Diabetes mellitus type 2.  Restart metformin.  Follow-up on acidosis previously noted.  Continue sliding scale regimen  4.  Dehydration.  Much improved.  Stop IV fluids.  Recheck labs in a.m.  Hyponatremia improving    Ana Rosa Coelho MD  05/13/19  6:23 PM

## 2019-05-13 NOTE — PROGRESS NOTES
Name: Edie Camacho ADMIT: 2019   : 1985  PCP: Layla Monique APRN    MRN: [id number] LOS: 1 days   AGE/SEX: 34 y.o. female  ROOM: [facility]     Subjective   Subjective    Abdominal pain improving. No BM but tolerating clear liquids.   Denies fever chills N/V SOA or CP     Objective   Objective   Vital Signs  Temp:  [98 °F (36.7 °C)-98.6 °F (37 °C)] 98.1 °F (36.7 °C)  Heart Rate:  [88-99] 93  Resp:  [18] 18  BP: (140-153)/(86-98) 143/94  SpO2:  [95 %-96 %] 95 %  on   ;   Device (Oxygen Therapy): room air  Body mass index is 30.51 kg/m².  Physical Exam   Constitutional: She is oriented to person, place, and time. She appears well-developed and well-nourished. No distress.   HENT:   Mouth/Throat: Oropharynx is clear and moist.   Eyes: EOM are normal. No scleral icterus.   Cardiovascular: Normal rate, regular rhythm and normal heart sounds.   Pulmonary/Chest: Effort normal and breath sounds normal.   Abdominal: Soft. Bowel sounds are normal. She exhibits no shifting dullness. There is no hepatosplenomegaly. There is tenderness in the suprapubic area and left lower quadrant. There is no rigidity and no guarding. No hernia.   Soft obese, mild LLQ suprapubic pain    Musculoskeletal: She exhibits no edema.   Neurological: She is alert and oriented to person, place, and time. No cranial nerve deficit.   Skin: Skin is warm and dry. Capillary refill takes less than 2 seconds.   Psychiatric: She has a normal mood and affect. Her behavior is normal. Thought content normal.   Nursing note and vitals reviewed.      Results Review:       I reviewed the patient's new clinical results.  Results from last 7 days   Lab Units 19  0703 19  0046   WBC 10*3/mm3 19.16* 23.64*   HEMOGLOBIN g/dL 14.0 16.5*   PLATELETS 10*3/mm3 294 347     Results from last 7 days   Lab Units 19  0703 19  0046   SODIUM mmol/L 133* 131*   POTASSIUM mmol/L 3.8 4.8   CHLORIDE mmol/L 102 98   CO2 mmol/L 14.7* 13.5*    BUN mg/dL 11 13   CREATININE mg/dL 0.50* 0.62   GLUCOSE mg/dL 215* 301*   Estimated Creatinine Clearance: 171.6 mL/min (A) (by C-G formula based on SCr of 0.5 mg/dL (L)).  Results from last 7 days   Lab Units 05/12/19  0046   ALBUMIN g/dL 4.20   BILIRUBIN mg/dL 0.8   ALK PHOS U/L 114   AST (SGOT) U/L 22   ALT (SGPT) U/L 18     Results from last 7 days   Lab Units 05/12/19  0703 05/12/19  0046   CALCIUM mg/dL 8.2* 9.5   ALBUMIN g/dL  --  4.20     Results from last 7 days   Lab Units 05/12/19  0429 05/12/19  0046   PROCALCITONIN ng/mL  --  0.11   LACTATE mmol/L 1.0  --      Hemoglobin A1C   Date/Time Value Ref Range Status   05/12/2019 0703 9.80 (H) 4.80 - 5.60 % Final     Glucose   Date/Time Value Ref Range Status   05/13/2019 1121 248 (H) 70 - 130 mg/dL Final   05/13/2019 0628 193 (H) 70 - 130 mg/dL Final   05/12/2019 2130 197 (H) 70 - 130 mg/dL Final   05/12/2019 1617 201 (H) 70 - 130 mg/dL Final   05/12/2019 1108 226 (H) 70 - 130 mg/dL Final   05/12/2019 0719 209 (H) 70 - 130 mg/dL Final         cetirizine 10 mg Oral Nightly   docusate sodium 100 mg Oral BID   fluticasone 1 spray Nasal Daily   insulin lispro 0-7 Units Subcutaneous 4x Daily With Meals & Nightly   levothyroxine 200 mcg Oral Q AM   montelukast 10 mg Oral Nightly   pantoprazole 40 mg Oral Q AM   piperacillin-tazobactam 3.375 g Intravenous Q8H   polyethylene glycol 17 g Oral BID   sodium chloride 3 mL Intravenous Q12H       sodium chloride 100 mL/hr Last Rate: 100 mL/hr (05/13/19 0646)   Diet Clear Liquid       Assessment/Plan     Active Hospital Problems    Diagnosis  POA   • Colitis presumed infectious [K52.9]  Yes   • Type 2 diabetes mellitus, without long-term current use of insulin (CMS/HCC) [E11.9]  Yes   • Suprapubic pain, acute [R10.2]  Yes   • Hyponatremia [E87.1]  Yes   • Dehydration [E86.0]  Yes   • Sepsis (CMS/HCC) [A41.9]  Yes      Resolved Hospital Problems   No resolved problems to display.     Sigmoid Colitis/ Abbominal Pain  -  sigmoid colitis etiology not well defined. Possible reactive inflammation from constipation. No BM in almost 1 week  -WBC trending down, prelim blood cx negative   - continue zosyn, good response  - No BM with bowel regimen. Add Lactulose BID PRN    - likely start Amitiza prior to discharge.   - endoscopy as outpatient      Hyponatremia  - improving and mild, probably from dehydration given history and as evidenced by hemoconcentration  - continue IVF until she can take PO reliably     Type 2 DM  - cover with ssi but BG have been elevated.  - Crt stable. Resume metformin but increase to 500mg BID   - Patient states she is on insulin at night but not on med list. Will start Lantus 10 units QHS   - A1C 9.80%. She will need to follow up with PCP to improve glucose control     Request RN double check home medication list for accuracy     VTE Prophylaxis - SCDs   Code Status - Full code  Disposition - Anticipate discharge to home when ok with GI       JEFE Loyd  [facility]  05/13/19  11:45 AM

## 2019-05-13 NOTE — PAYOR COMM NOTE
"Edie Cao (34 y.o. Female)                     ATTENTION;   INITIAL CLINICAL FOR NURSE REVIEW, REVIEW , REPLY TO UR DEPT, SAMANTHA RANGEL N  OR UR  [phone]         Date of Birth Social Security Number Address Home Phone MRN    1985  [address] [place] [id number] [phone] [id number]    Voodoo Marital Status          None        Admission Date Admission Type Admitting Provider Attending Provider Department, Room/Bed    5/12/19 Emergency Sam Kirk MD Hayden, Juliana, MD [address], S416/1    Discharge Date Discharge Disposition Discharge Destination                       Attending Provider:  Ana Rosa Coelho MD    Allergies:  Ciprofloxacin, Sulfa Antibiotics, Bactrim [Sulfamethoxazole-trimethoprim]    Isolation:  None   Infection:  None   Code Status:  CPR    Ht:  167.6 cm (66\")   Wt:  85.7 kg (189 lb)    Admission Cmt:  None   Principal Problem:  None   ACUTE COLITIS   ICD 10 K52.9                Active Insurance as of 5/12/2019     Primary Coverage     Payor Plan Insurance Group Employer/Plan Group    [employer] MEDICAID [employer] [id number]     Payor Plan Address Payor Plan Phone Number Payor Plan Fax Number Effective Dates    PO  [phone]  11/1/2014 - None Entered    [facility] [id number]       Subscriber Name Subscriber Birth Date Member ID       EDIE CAO 1985 [id number]                 Emergency Contacts      (Rel.) Home Phone Work Phone Mobile Phone    JANET FRANCISCO (SISTER IN LAW) (Relative) [phone] -- --               History & Physical      Sam Kirk MD at 5/12/2019 11:16 AM              Patient Name:  Edie Cao  YOB: 1985  MRN:  [id number]  Admit Date:  5/12/2019  Patient Care Team:  Layla Monique APRN as PCP - General (Family Medicine)      Subjective   History Present Illness     Chief Complaint   Patient presents " with   • Pelvic Pain       Ms. Camacho is a 34 y.o. smoker with a history of type 2 DM and nephrolithiaisis that presents to [facility] complaining of left lower quadrant/suprapubic pain described as intense intermittent cramping which started 3 days ago.  She visited her PCP and [facility] and had no abnormal findings at that time. She reports nausea and vomiting initially but has not had this in the past couple days. She denies diarrhea, and in fact she has not had a BM in about 4d.  There has been no dysuria, flank pain, or abnormal vaginal discharge.  She is currently menstruating.  She denies having had this issue before and has had no history of colitis.  She was found to have a markedly elevated WBC count and evidence of sigmoid colitis on abdominal CT.    History of Present Illness  Review of Systems   Constitutional: Positive for appetite change (decreased). Negative for chills and fever.   HENT: Negative for congestion, mouth sores, nosebleeds, sore throat and trouble swallowing.    Eyes: Negative for redness and visual disturbance.   Respiratory: Negative for cough, choking and shortness of breath.    Cardiovascular: Negative for chest pain, palpitations and leg swelling.   Gastrointestinal: Positive for abdominal pain, constipation, nausea and vomiting. Negative for blood in stool and diarrhea.   Endocrine: Negative for cold intolerance and heat intolerance.   Genitourinary: Negative for difficulty urinating, dysuria, flank pain and hematuria.   Musculoskeletal: Negative for arthralgias and myalgias.   Skin: Negative for pallor and rash.   Neurological: Negative for dizziness, weakness, light-headedness and headaches.   Hematological: Negative for adenopathy. Does not bruise/bleed easily.   Psychiatric/Behavioral: Negative for confusion and decreased concentration.        Personal History     Past Medical History:   Diagnosis Date   • Diabetes mellitus (CMS/[facility])    • Disease of  thyroid gland    • GERD (gastroesophageal reflux disease)    • Kidney stone    • Pregnancy    • Seasonal allergies      Past Surgical History:   Procedure Laterality Date   • NEPHROSTOMY TRACT DILATATION W/ LITHOTRIPSY       History reviewed. No pertinent family history.  Social History     Tobacco Use   • Smoking status: Current Every Day Smoker     Packs/day: 0.50     Types: Cigarettes   Substance Use Topics   • Alcohol use: No   • Drug use: No     Medications Prior to Admission   Medication Sig Dispense Refill Last Dose   • cetirizine (ZYRTEC ALLERGY) 10 MG tablet Take 10 mg by mouth.   5/11/2019 at Unknown time   • docusate sodium (COLACE) 100 MG capsule Take 100 mg by mouth.   5/11/2019 at Unknown time   • fluticasone ([employer] FLUTICASONE PROPIONATE) 50 MCG/ACT nasal spray 1 spray into each nostril.   5/11/2019 at Unknown time   • gabapentin (NEURONTIN) 250 MG/5ML solution Take 250 mg by mouth As Needed.   5/11/2019 at Unknown time   • Insulin Lispro (HUMALOG KWIKPEN) 100 UNIT/ML solution pen-injector INJECT 1 UNIT PER 20MG/DL > 120 AS DIRECTED. MAX 25 UNITS PER DAY   5/11/2019 at Unknown time   • levothyroxine (SYNTHROID, LEVOTHROID) 200 MCG tablet Take 200 mcg by mouth.   5/11/2019 at Unknown time   • metFORMIN (GLUCOPHAGE) 500 MG tablet Take 500 mg by mouth.   5/11/2019 at Unknown time   • montelukast (SINGULAIR) 10 MG tablet Take 10 mg by mouth.   5/11/2019 at Unknown time   • ondansetron (ZOFRAN) 4 MG tablet Take 1 tablet by mouth every 6 (six) hours as needed for nausea or vomiting. 10 tablet 0 5/11/2019 at Unknown time   • pantoprazole (PROTONIX) 20 MG EC tablet Take 20 mg by mouth.   5/11/2019 at Unknown time     Allergies:    Allergies   Allergen Reactions   • Ciprofloxacin Anaphylaxis   • Sulfa Antibiotics Anaphylaxis   • Bactrim [Sulfamethoxazole-Trimethoprim] Angioedema       Objective    Objective     Vital Signs  Temp:  [98.2 °F (36.8 °C)-98.8 °F (37.1 °C)] 98.5 °F (36.9 °C)  Heart Rate:  []  90  Resp:  [18-20] 18  BP: (107-156)/() 144/87  SpO2:  [93 %-100 %] 93 %  on   ;   Device (Oxygen Therapy): room air  Body mass index is 30.51 kg/m².    Physical Exam   Constitutional: She is oriented to person, place, and time. No distress.   HENT:   Head: Normocephalic and atraumatic.   Mouth/Throat: Oropharynx is clear and moist.   Eyes: Conjunctivae and EOM are normal. Pupils are equal, round, and reactive to light.   Neck: Normal range of motion. Neck supple. No JVD present.   Cardiovascular: Normal rate, regular rhythm and intact distal pulses.   Pulmonary/Chest: Effort normal and breath sounds normal.   Abdominal: Soft. Bowel sounds are normal. She exhibits no distension. There is tenderness (very tender LLQ/suprapubic area). There is no rebound and no guarding.   Musculoskeletal: She exhibits no edema or tenderness.   Neurological: She is alert and oriented to person, place, and time.   Skin: Skin is warm and dry. Capillary refill takes less than 2 seconds. She is not diaphoretic.   Psychiatric: She has a normal mood and affect. Her behavior is normal.   Nursing note and vitals reviewed.      Results Review:  I reviewed the patient's new clinical results.  I reviewed the patient's new imaging results and agree with the interpretation.  I reviewed the patient's other test results and agree with the interpretation  I personally viewed and interpreted the patient's EKG/Telemetry data    Lab Results (last 24 hours)     Procedure Component Value Units Date/Time    CBC & Differential [[id number]] Collected:  05/12/19 0046    Specimen:  Blood Updated:  05/12/19 0119    Narrative:       The following orders were created for panel order CBC & Differential.  Procedure                               Abnormality         Status                     ---------                               -----------         ------                     Scan Slide[[id number]]                    Normal              Final result                CBC Auto Differential[[id number]]         Abnormal            Final result                 Please view results for these tests on the individual orders.    Comprehensive Metabolic Panel [[id number]]  (Abnormal) Collected:  05/12/19 0046    Specimen:  Blood Updated:  05/12/19 0121     Glucose 301 mg/dL      BUN 13 mg/dL      Creatinine 0.62 mg/dL      Sodium 131 mmol/L      Potassium 4.8 mmol/L      Comment: Specimen hemolyzed.  Results may be affected.        Chloride 98 mmol/L      CO2 13.5 mmol/L      Calcium 9.5 mg/dL      Total Protein 7.9 g/dL      Albumin 4.20 g/dL      ALT (SGPT) 18 U/L      Comment: Specimen hemolyzed.  Results may be affected.        AST (SGOT) 22 U/L      Comment: Specimen hemolyzed.  Results may be affected.        Alkaline Phosphatase 114 U/L      Total Bilirubin 0.8 mg/dL      eGFR Non African Amer 109 mL/min/1.73      Globulin 3.7 gm/dL      A/G Ratio 1.1 g/dL      BUN/Creatinine Ratio 21.0     Anion Gap 19.5 mmol/L     Narrative:       GFR Normal >60  Chronic Kidney Disease <60  Kidney Failure <15    Lipase [[id number]]  (Normal) Collected:  05/12/19 0046    Specimen:  Blood Updated:  05/12/19 0119     Lipase 47 U/L     hCG, Serum, Qualitative [[id number]]  (Normal) Collected:  05/12/19 0046    Specimen:  Blood Updated:  05/12/19 0118     HCG Qualitative Negative    CBC Auto Differential [[id number]]  (Abnormal) Collected:  05/12/19 0046    Specimen:  Blood Updated:  05/12/19 0119     WBC 23.64 10*3/mm3      RBC 5.46 10*6/mm3      Hemoglobin 16.5 g/dL      Hematocrit 50.8 %      MCV 93.0 fL      MCH 30.2 pg      MCHC 32.5 g/dL      RDW 12.6 %      RDW-SD 43.3 fl      MPV 9.2 fL      Platelets 347 10*3/mm3      Neutrophil % 84.4 %      Lymphocyte % 8.5 %      Monocyte % 5.2 %      Eosinophil % 0.3 %      Basophil % 0.3 %      Immature Grans % 1.3 %      Neutrophils, Absolute 19.96 10*3/mm3      Lymphocytes, Absolute 2.01 10*3/mm3      Monocytes, Absolute 1.22 10*3/mm3      Eosinophils,  "Absolute 0.07 10*3/mm3      Basophils, Absolute 0.07 10*3/mm3      Immature Grans, Absolute 0.31 10*3/mm3      nRBC 0.0 /100 WBC     Scan Slide [[id number]]  (Normal) Collected:  05/12/19 0046    Specimen:  Blood Updated:  05/12/19 0119     RBC Morphology Normal     WBC Morphology Normal     Platelet Morphology Normal    Procalcitonin [[id number]]  (Normal) Collected:  05/12/19 0046    Specimen:  Blood Updated:  05/12/19 0402     Procalcitonin 0.11 ng/mL     Narrative:       As a Marker for Sepsis (Non-Neonates):   1. <0.5 ng/mL represents a low risk of severe sepsis and/or septic shock.  1. >2 ng/mL represents a high risk of severe sepsis and/or septic shock.    As a Marker for Lower Respiratory Tract Infections that require antibiotic therapy:  PCT on Admission     Antibiotic Therapy             6-12 Hrs later  > 0.5                Strongly Recommended            >0.25 - <0.5         Recommended  0.1 - 0.25           Discouraged                   Remeasure/reassess PCT  <0.1                 Strongly Discouraged          Remeasure/reassess PCT      As 28 day mortality risk marker: \"Change in Procalcitonin Result\" (> 80 % or <=80 %) if Day 0 (or Day 1) and Day 4 values are available. Refer to http://www.[employer]-pct-calculator.com/   Change in PCT <=80 %   A decrease of PCT levels below or equal to 80 % defines a positive change in PCT test result representing a higher risk for 28-day all-cause mortality of patients diagnosed with severe sepsis or septic shock.  Change in PCT > 80 %   A decrease of PCT levels of more than 80 % defines a negative change in PCT result representing a lower risk for 28-day all-cause mortality of patients diagnosed with severe sepsis or septic shock.                Urinalysis With Microscopic If Indicated (No Culture) - Urine, Clean Catch [[id number]]  (Abnormal) Collected:  05/12/19 0145    Specimen:  Urine, Clean Catch Updated:  05/12/19 0212     Color, UA Dark Yellow     Appearance, UA " Cloudy     pH, UA <=5.0     Specific Gravity, UA >=1.030     Glucose, UA >=1000 mg/dL (3+)     Ketones, UA 80 mg/dL (3+)     Bilirubin, UA Negative     Blood, UA Large (3+)     Protein, UA >=300 mg/dL (3+)     Leuk Esterase, UA Trace     Nitrite, UA Positive     Urobilinogen, UA 1.0 E.U./dL    Urinalysis, Microscopic Only - Urine, Clean Catch [[id number]]  (Abnormal) Collected:  05/12/19 0145    Specimen:  Urine, Clean Catch Updated:  05/12/19 0208     RBC, UA Too Numerous to Count /HPF      WBC, UA 13-20 /HPF      Bacteria, UA None Seen /HPF      Squamous Epithelial Cells, UA 3-6 /HPF      Hyaline Casts, UA 7-12 /LPF      Methodology Automated Microscopy    Urinalysis With Culture If Indicated - Urine, Catheter [[id number]]  (Abnormal) Collected:  05/12/19 0421    Specimen:  Urine, Catheter Updated:  05/12/19 0447     Color, UA Dark Yellow     Appearance, UA Clear     pH, UA <=5.0     Specific Gravity, UA >=1.030     Glucose, UA >=1000 mg/dL (3+)     Ketones, UA 80 mg/dL (3+)     Bilirubin, UA Negative     Blood, UA Trace     Protein, UA 30 mg/dL (1+)     Leuk Esterase, UA Negative     Nitrite, UA Negative     Urobilinogen, UA 1.0 E.U./dL    Urinalysis, Microscopic Only - Urine, Catheter [[id number]] Collected:  05/12/19 0421    Specimen:  Urine, Catheter Updated:  05/12/19 0447     RBC, UA 0-2 /HPF      WBC, UA 0-2 /HPF      Bacteria, UA None Seen /HPF      Squamous Epithelial Cells, UA 0-2 /HPF      Hyaline Casts, UA 3-6 /LPF      Methodology Automated Microscopy    Lactic Acid, Plasma [[id number]]  (Normal) Collected:  05/12/19 0429    Specimen:  Blood Updated:  05/12/19 0453     Lactate 1.0 mmol/L     Blood Culture - Blood, Arm, Left [[id number]] Collected:  05/12/19 0429    Specimen:  Blood from Arm, Left Updated:  05/12/19 0436    Blood Culture - Blood, Arm, Right [[id number]] Collected:  05/12/19 0429    Specimen:  Blood from Arm, Right Updated:  05/12/19 0436    Hemoglobin A1c [[id number]]  (Abnormal) Collected:   05/12/19 0703    Specimen:  Blood Updated:  05/12/19 0751     Hemoglobin A1C 9.80 %     Narrative:       Hemoglobin A1C Ranges:    Increased Risk for Diabetes  5.7% to 6.4%  Diabetes                     >= 6.5%  Diabetic Goal                < 7.0%    Basic Metabolic Panel [[id number]]  (Abnormal) Collected:  05/12/19 0703    Specimen:  Blood Updated:  05/12/19 0807     Glucose 215 mg/dL      BUN 11 mg/dL      Creatinine 0.50 mg/dL      Sodium 133 mmol/L      Potassium 3.8 mmol/L      Chloride 102 mmol/L      CO2 14.7 mmol/L      Calcium 8.2 mg/dL      eGFR Non African Amer 140 mL/min/1.73      BUN/Creatinine Ratio 22.0     Anion Gap 16.3 mmol/L     Narrative:       GFR Normal >60  Chronic Kidney Disease <60  Kidney Failure <15    CBC (No Diff) [[id number]]  (Abnormal) Collected:  05/12/19 0703    Specimen:  Blood Updated:  05/12/19 0746     WBC 19.16 10*3/mm3      RBC 4.62 10*6/mm3      Hemoglobin 14.0 g/dL      Hematocrit 42.3 %      MCV 91.6 fL      MCH 30.3 pg      MCHC 33.1 g/dL      RDW 12.8 %      RDW-SD 42.7 fl      MPV 9.2 fL      Platelets 294 10*3/mm3     POC Glucose Once [[id number]]  (Abnormal) Collected:  05/12/19 0719    Specimen:  Blood Updated:  05/12/19 0721     Glucose 209 mg/dL     POC Glucose Once [[id number]]  (Abnormal) Collected:  05/12/19 1108    Specimen:  Blood Updated:  05/12/19 1110     Glucose 226 mg/dL           Imaging Results (last 24 hours)     Procedure Component Value Units Date/Time    CT Abdomen Pelvis With Contrast [[id number]] Collected:  05/12/19 0235     Updated:  05/12/19 0245    Narrative:       CT OF THE ABDOMEN AND PELVIS WITH CONTRAST     HISTORY: Bilateral lower abdominal pain radiating to both legs.     COMPARISON: None available.     TECHNIQUE: Axial CT imaging was obtained from the dome of diaphragm to  the symphysis pubis. IV contrast was administered.     FINDINGS:  Images through the lung bases bilaterally demonstrate some groundglass  infiltrates. These are  nonspecific, and may reflect some atelectasis.  The stomach and proximal small bowel appear unremarkable. The adrenal  glands are within normal limits. The spleen is normal. No focal hepatic  lesions are seen. The liver may be steatotic. However, this is difficult  to fully assess on this contrast-enhanced study. The spleen appears  normal. The pancreas appears unremarkable. Bilateral nonobstructing  renal stones are identified. Both kidneys enhance symmetrically. Patient  does have an area of cortical thinning identified posteriorly within the  right kidney, likely reflecting the sequela of prior insult. The  appendix is visualized, and is within normal limits. The uterus and  ovaries appear normal. This patient's sigmoid colon appears abnormally  thick walled, with associated pericolonic soft tissue stranding. The  appearance is concerning for colitis. Patient really does not have any  significant diverticular disease, so this would argue against  diverticulitis. There is no adenopathy seen within the pelvis. Review of  bony windows does not demonstrate any aggressive osseous abnormalities.  Degenerative changes are noted at L5-S1.       Impression:          1. Abnormally thick-walled appearance to the sigmoid colon, with  adjacent pericolonic soft tissue stranding. Constellation of findings is  favored to reflect colitis. No pneumatosis or free air is seen. There is  no evidence of obstruction.  2. Bilateral nonobstructing renal stones.     Radiation dose reduction techniques were utilized, including automated  exposure control and exposure modulation based on body size.     This report was finalized on 5/12/2019 2:42 AM by Dr. Marie Jeong M.D.                  No orders to display        Assessment/Plan     Active Hospital Problems    Diagnosis POA   • Colitis presumed infectious [K52.9] Yes   • Type 2 diabetes mellitus, without long-term current use of insulin (CMS/[facility]) [E11.9] Yes   • Suprapubic pain,  "acute [R10.2] Yes   • Hyponatremia [E87.1] Yes   • Dehydration [E86.0] Yes   • Sepsis (CMS/HCC) [A41.9] Yes   Abbominal Pain  - she appears to have sigmoid colitis on abdominal CT-it is unusual that she is not having diarrhea if this is in fact colitis but her symptoms and tenderness are out of proportion to what would be expected from constipation  - no e/o UTI or ureteral calculi, no e/o gynecologic issues  - meets SIRS with WBC 23k and tachycardia, LA nml-will send blood cx's and start on zosyn  - will ask GI opinion    Hyponatremia  - mild, probably from dehydration given history and as evidenced by hemoconcentration  - give IVF until she can take PO reliably    Type 2 DM  - cover with ssi  - A1C 9.80%    I discussed the patients findings and my recommendations with patient and nursing staff.    VTE Prophylaxis - SCDs .  Code Status - Full code.       Sam Kirk MD  [facility]  05/12/19  11:50 AM      Electronically signed by Sam Kirk MD at 5/12/2019 11:55 AM          Emergency Department Notes      Sandra Baez RN at 5/11/2019 11:15 PM        Pt reports having lower pelvic pain x 2 days. Pt reports that the pain is wrapping around her abd. Pt states that she is in not having any difficulty urinating. Pt state that she hasn't had a BM in 3-4 days. Pt reports going to [facility] for same 2 nights ago and they did not find any. Pt reports having vomiting.     Sandra Baez RN  05/11/19 5033      Electronically signed by Sadnra Baez RN at 5/11/2019 11:17 PM     Suzan Bruner RN at 5/12/2019 12:33 AM        Pt to R c/o bilateral lower abdominal pain that radiates to both flanks. Pt has Hx of kidney stones and reports she went to [facility] 2 nights ago and they told her \"nothing was wrong but they scanned me and found some stones but they said the stones aren't moving\" Pt denies being sent home with medications.     Suzan Bruner, RUFINO  05/12/19 " "[id number]      Electronically signed by Suzan Bruner RN at 5/12/2019 12:34 AM     Luis Vo III, PA at 5/12/2019  2:02 AM     Attestation signed by Anthony Mora MD at 5/12/2019 11:33 PM    I supervised care provided by the midlevel provider.    We have discussed this patient's history, physical exam, and treatment plan.   I have reviewed the note and personally had a face to face encounter with the patient and agree with the plan of care. See my attending note.    I agree with the PA's or NP's findings and plan.  I reviewed the PA's or NP's note.  Anthony Mora MD                    EMERGENCY DEPARTMENT ENCOUNTER    CHIEF COMPLAINT  Chief Complaint: lower abd pain  History given by: patient   History limited by: nothing   Room Number: 26/26  PMD: KaydenLayla APRN      HPI:  Pt is a 34 y.o. female who presents to the ED complaining of constant lower abd pain that started 4 days ago. Pt states the pain worsened 1-2 days ago. Pt admits nausea, cough, L flank pain, and chills. Pt denies fever, vomiting, loose stools, and diarrhea. Pt reports she was seen at [facility] 2 nights ago they told her \"nothing was wrong\". Pt had a CT abd/pelvis w/o contrast at that time which revealed stones in kidney. Pt states they told her that \"the stones are not moving and not causing any problems\". Pt is a diabetic. Hx of kidney stones. There are no other complaints at this time.     Duration:  4 days   Onset: gradual  Timing: constant   Location: lower abd   Radiation: none mentioned   Quality: \"pain\"  Intensity/Severity: moderate  Progression: unchanged   Associated Symptoms:  pt admits nausea, cough, L flank pain, and chills  Aggravating Factors: none mentioned   Alleviating Factors: none mentioned   Previous Episodes: hx of kidney stones   Treatment before arrival: pt was seen at [facility] 2 nights ago for the same symptoms     PAST MEDICAL HISTORY  Active Ambulatory Problems     Diagnosis Date Noted   • No " Active Ambulatory Problems     Resolved Ambulatory Problems     Diagnosis Date Noted   • No Resolved Ambulatory Problems     Past Medical History:   Diagnosis Date   • Diabetes mellitus (CMS/HCC)    • Disease of thyroid gland    • GERD (gastroesophageal reflux disease)    • Kidney stone    • Pregnancy    • Seasonal allergies        PAST SURGICAL HISTORY  Past Surgical History:   Procedure Laterality Date   • NEPHROSTOMY TRACT DILATATION W/ LITHOTRIPSY         FAMILY HISTORY  History reviewed. No pertinent family history.    SOCIAL HISTORY  Social History     Socioeconomic History   • Marital status:      Spouse name: Not on file   • Number of children: Not on file   • Years of education: Not on file   • Highest education level: Not on file   Tobacco Use   • Smoking status: Current Every Day Smoker     Packs/day: 0.50     Types: Cigarettes   Substance and Sexual Activity   • Alcohol use: No   • Drug use: No       ALLERGIES  Ciprofloxacin; Sulfa antibiotics; and Bactrim [sulfamethoxazole-trimethoprim]    REVIEW OF SYSTEMS  Review of Systems   Constitutional: Positive for chills. Negative for fever.   HENT: Negative for sore throat.    Eyes: Negative.    Respiratory: Positive for cough. Negative for shortness of breath.    Cardiovascular: Negative for chest pain.   Gastrointestinal: Positive for abdominal pain (lower) and nausea. Negative for blood in stool, diarrhea and vomiting.   Genitourinary: Positive for flank pain (Left). Negative for dysuria.   Musculoskeletal: Negative for neck pain.   Skin: Negative for rash.   Neurological: Negative for weakness, numbness and headaches.   Hematological: Negative.    Psychiatric/Behavioral: Negative.    All other systems reviewed and are negative.      PHYSICAL EXAM  ED Triage Vitals   Temp Heart Rate Resp BP SpO2   05/11/19 2317 05/11/19 2317 05/11/19 2317 05/11/19 2359 05/11/19 2317   98.8 °F (37.1 °C) (!) 132 20 (!) 156/102 96 %      Temp src Heart Rate Source  Patient Position BP Location FiO2 (%)   05/11/19 2317 05/11/19 2317 -- 05/12/19 0032 --   Tympanic Monitor  Left arm        Physical Exam   Constitutional: She is oriented to person, place, and time. No distress.   HENT:   Head: Normocephalic.   Eyes: EOM are normal. Pupils are equal, round, and reactive to light.   Neck: Normal range of motion. Neck supple.   Cardiovascular: Normal rate, regular rhythm and normal heart sounds.   Good peripheral pulses.   Pulmonary/Chest: Effort normal and breath sounds normal. No respiratory distress.   Abdominal: Soft. There is tenderness in the right lower quadrant. There is guarding (across suprapubic area) and CVA tenderness (questionable). There is no rebound.   Musculoskeletal: Normal range of motion. She exhibits no edema.   No BLE edema.   Neurological: She is alert and oriented to person, place, and time. She has normal sensation and normal strength.   Skin: Skin is warm and dry. No rash noted.   Psychiatric: Mood and affect normal.   Nursing note and vitals reviewed.      LAB RESULTS  Lab Results (last 24 hours)     Procedure Component Value Units Date/Time    CBC & Differential [[id number]] Collected:  05/12/19 0046    Specimen:  Blood Updated:  05/12/19 0119    Narrative:       The following orders were created for panel order CBC & Differential.  Procedure                               Abnormality         Status                     ---------                               -----------         ------                     Scan Slide[[id number]]                    Normal              Final result               CBC Auto Differential[[id number]]         Abnormal            Final result                 Please view results for these tests on the individual orders.    Comprehensive Metabolic Panel [[id number]]  (Abnormal) Collected:  05/12/19 0046    Specimen:  Blood Updated:  05/12/19 0121     Glucose 301 mg/dL      BUN 13 mg/dL      Creatinine 0.62 mg/dL      Sodium 131 mmol/L       Potassium 4.8 mmol/L      Comment: Specimen hemolyzed.  Results may be affected.        Chloride 98 mmol/L      CO2 13.5 mmol/L      Calcium 9.5 mg/dL      Total Protein 7.9 g/dL      Albumin 4.20 g/dL      ALT (SGPT) 18 U/L      Comment: Specimen hemolyzed.  Results may be affected.        AST (SGOT) 22 U/L      Comment: Specimen hemolyzed.  Results may be affected.        Alkaline Phosphatase 114 U/L      Total Bilirubin 0.8 mg/dL      eGFR Non African Amer 109 mL/min/1.73      Globulin 3.7 gm/dL      A/G Ratio 1.1 g/dL      BUN/Creatinine Ratio 21.0     Anion Gap 19.5 mmol/L     Narrative:       GFR Normal >60  Chronic Kidney Disease <60  Kidney Failure <15    Lipase [[id number]]  (Normal) Collected:  05/12/19 0046    Specimen:  Blood Updated:  05/12/19 0119     Lipase 47 U/L     hCG, Serum, Qualitative [[id number]]  (Normal) Collected:  05/12/19 0046    Specimen:  Blood Updated:  05/12/19 0118     HCG Qualitative Negative    CBC Auto Differential [[id number]]  (Abnormal) Collected:  05/12/19 0046    Specimen:  Blood Updated:  05/12/19 0119     WBC 23.64 10*3/mm3      RBC 5.46 10*6/mm3      Hemoglobin 16.5 g/dL      Hematocrit 50.8 %      MCV 93.0 fL      MCH 30.2 pg      MCHC 32.5 g/dL      RDW 12.6 %      RDW-SD 43.3 fl      MPV 9.2 fL      Platelets 347 10*3/mm3      Neutrophil % 84.4 %      Lymphocyte % 8.5 %      Monocyte % 5.2 %      Eosinophil % 0.3 %      Basophil % 0.3 %      Immature Grans % 1.3 %      Neutrophils, Absolute 19.96 10*3/mm3      Lymphocytes, Absolute 2.01 10*3/mm3      Monocytes, Absolute 1.22 10*3/mm3      Eosinophils, Absolute 0.07 10*3/mm3      Basophils, Absolute 0.07 10*3/mm3      Immature Grans, Absolute 0.31 10*3/mm3      nRBC 0.0 /100 WBC     Scan Slide [[id number]]  (Normal) Collected:  05/12/19 0046    Specimen:  Blood Updated:  05/12/19 0119     RBC Morphology Normal     WBC Morphology Normal     Platelet Morphology Normal    Procalcitonin [[id number]]  (Normal) Collected:  05/12/19  "0046    Specimen:  Blood Updated:  05/12/19 0402     Procalcitonin 0.11 ng/mL     Narrative:       As a Marker for Sepsis (Non-Neonates):   1. <0.5 ng/mL represents a low risk of severe sepsis and/or septic shock.  1. >2 ng/mL represents a high risk of severe sepsis and/or septic shock.    As a Marker for Lower Respiratory Tract Infections that require antibiotic therapy:  PCT on Admission     Antibiotic Therapy             6-12 Hrs later  > 0.5                Strongly Recommended            >0.25 - <0.5         Recommended  0.1 - 0.25           Discouraged                   Remeasure/reassess PCT  <0.1                 Strongly Discouraged          Remeasure/reassess PCT      As 28 day mortality risk marker: \"Change in Procalcitonin Result\" (> 80 % or <=80 %) if Day 0 (or Day 1) and Day 4 values are available. Refer to http://www.Savorpct-calculator.com/   Change in PCT <=80 %   A decrease of PCT levels below or equal to 80 % defines a positive change in PCT test result representing a higher risk for 28-day all-cause mortality of patients diagnosed with severe sepsis or septic shock.  Change in PCT > 80 %   A decrease of PCT levels of more than 80 % defines a negative change in PCT result representing a lower risk for 28-day all-cause mortality of patients diagnosed with severe sepsis or septic shock.                Urinalysis With Microscopic If Indicated (No Culture) - Urine, Clean Catch [[id number]]  (Abnormal) Collected:  05/12/19 0145    Specimen:  Urine, Clean Catch Updated:  05/12/19 0212     Color, UA Dark Yellow     Appearance, UA Cloudy     pH, UA <=5.0     Specific Gravity, UA >=1.030     Glucose, UA >=1000 mg/dL (3+)     Ketones, UA 80 mg/dL (3+)     Bilirubin, UA Negative     Blood, UA Large (3+)     Protein, UA >=300 mg/dL (3+)     Leuk Esterase, UA Trace     Nitrite, UA Positive     Urobilinogen, UA 1.0 E.U./dL    Urinalysis, Microscopic Only - Urine, Clean Catch [[id number]]  (Abnormal) Collected:  " 05/12/19 0145    Specimen:  Urine, Clean Catch Updated:  05/12/19 0208     RBC, UA Too Numerous to Count /HPF      WBC, UA 13-20 /HPF      Bacteria, UA None Seen /HPF      Squamous Epithelial Cells, UA 3-6 /HPF      Hyaline Casts, UA 7-12 /LPF      Methodology Automated Microscopy    Urinalysis With Culture If Indicated - Urine, Catheter [[id number]]  (Abnormal) Collected:  05/12/19 0421    Specimen:  Urine, Catheter Updated:  05/12/19 0447     Color, UA Dark Yellow     Appearance, UA Clear     pH, UA <=5.0     Specific Gravity, UA >=1.030     Glucose, UA >=1000 mg/dL (3+)     Ketones, UA 80 mg/dL (3+)     Bilirubin, UA Negative     Blood, UA Trace     Protein, UA 30 mg/dL (1+)     Leuk Esterase, UA Negative     Nitrite, UA Negative     Urobilinogen, UA 1.0 E.U./dL    Urinalysis, Microscopic Only - Urine, Catheter [[id number]] Collected:  05/12/19 0421    Specimen:  Urine, Catheter Updated:  05/12/19 0447     RBC, UA 0-2 /HPF      WBC, UA 0-2 /HPF      Bacteria, UA None Seen /HPF      Squamous Epithelial Cells, UA 0-2 /HPF      Hyaline Casts, UA 3-6 /LPF      Methodology Automated Microscopy    Lactic Acid, Plasma [[id number]]  (Normal) Collected:  05/12/19 0429    Specimen:  Blood Updated:  05/12/19 0453     Lactate 1.0 mmol/L     Blood Culture - Blood, Arm, Left [[id number]] Collected:  05/12/19 0429    Specimen:  Blood from Arm, Left Updated:  05/12/19 0436    Blood Culture - Blood, Arm, Right [[id number]] Collected:  05/12/19 0429    Specimen:  Blood from Arm, Right Updated:  05/12/19 0436          I ordered the above labs and reviewed the results    RADIOLOGY  CT Abdomen Pelvis With Contrast   Final Result       1. Abnormally thick-walled appearance to the sigmoid colon, with   adjacent pericolonic soft tissue stranding. Constellation of findings is   favored to reflect colitis. No pneumatosis or free air is seen. There is   no evidence of obstruction.   2. Bilateral nonobstructing renal stones.       Radiation dose  reduction techniques were utilized, including automated   exposure control and exposure modulation based on body size.       This report was finalized on 5/12/2019 2:42 AM by Dr. Marie Jeong M.D.               I ordered the above noted radiological studies. Interpreted by radiologist. Reviewed by me in PACS.       PROCEDURES  Procedures      PROGRESS AND CONSULTS     0148  Ordered IVF bolus for hydration, Dilaudid for pain, and Zofran for N/V.    0208  Ordered CT abd/pelvis for further evaluation.     0319  Ordered procal, blood culture, and lactic acid for further evaluation.     0330  Ordered Zosyn.    0340  Discussed pt case with Dr. Mora (ER physician). After his own bedside evaluation of the pt, Dr. Mora agrees with the plan of care.     0424  Placed a call to [facility].    0432  Ordered IVF bolus for hydration.     0436  Discussed pt case with Beatriz martin [facility] who agrees to admit pt to Dr. Ingram.     0444  Ordered urinalysis for further evaluation.     0457  Rechecked pt. Pt is resting comfortably. Notified pt of elevated lab results and imaging which revealed bilateral non-obstructing renal stones. Discussed the plan to admit for further treatment. Pt understands and agrees with the plan, all questions answered.      MEDICAL DECISION MAKING  Results were reviewed/discussed with the patient and they were also made aware of online access. Pt also made aware that some labs, such as cultures, will not be resulted during ER visit and follow up with PMD is necessary.     MDM  Number of Diagnoses or Management Options  Acute UTI:   Colitis:   Dehydration:   Hyperglycemia:   Leukocytosis, unspecified type:      Amount and/or Complexity of Data Reviewed  Clinical lab tests: reviewed and ordered (Glucose= 301  Sodium= 131  Lipase= 47  WBC= 23.64  HGB= 16.5)  Tests in the radiology section of CPT®:  ordered and reviewed (CT abd/pelvis= Abnormally thick-walled appearance to the sigmoid colon w adjacent pericolonic soft  tissue stranding. Bilateral non-obstructing renal stones.)  Decide to obtain previous medical records or to obtain history from someone other than the patient: yes  Review and summarize past medical records: yes (Pt's previous medical records show pt was seen here in the ED on 11/9/2016 for hyperglycemia.)  Discuss the patient with other providers: yes (Dr. Mora (ER physician)  Beatriz martin [facility])           DIAGNOSIS  Final diagnoses:   Colitis   Acute UTI   Leukocytosis, unspecified type   Dehydration   Hyperglycemia       DISPOSITION  ADMISSION    Discussed treatment plan and reason for admission with pt/family and admitting physician.  Pt/family voiced understanding of the plan for admission for further testing/treatment as needed.       Latest Documented Vital Signs:  As of 4:58 AM  BP- 149/92 HR- 117 Temp- 98.8 °F (37.1 °C) (Tympanic) O2 sat- 95%    --  Documentation assistance provided by martinez Posada for Luis Vo PA-C.  Information recorded by the scribe was done at my direction and has been verified and validated by me.     Mildred Posada  05/12/19 5241       Luis Vo III, PA  05/12/19 9495      Electronically signed by Anthony Mora MD at 5/12/2019 11:33 PM     Anthony Mora MD at 5/12/2019  3:39 AM        Pt presents to the ED complaining of suprapubic abd pain for the past 3 days. Pt confirms difficulty urinating, but denies known fever or dysuria. Pt states this is the first menstrual cycle she has had since being started on birth control.     On exam, moderate lower abd tenderness without mass or rebound, normal active bowel sounds.     I agree with midlevel plan to treat pt with abx for cystitis and UTI.    The [facility] and I have discussed this patient's history, physical exam, and treatment plan.  I have reviewed the documentation and personally had a face to face interaction with the patient. I affirm the documentation and agree with the treatment and plan.  The attached note describes  my personal findings.    Documentation assistance provided by martinez Rothman for Dr. Mora.  Information recorded by the scribe was done at my direction and has been verified and validated by me.            Jammie Rothman  05/12/19 0346       Anthony Mora MD  05/12/19 0705      Electronically signed by Anthony Mora MD at 5/12/2019  7:05 AM       ICU Vital Signs     Row Name 05/13/19 1412 05/13/19 0750 05/13/19 0613 05/13/19 0037 05/13/19 0008       Vitals    Temp  99.2 °F (37.3 °C)  98.1 °F (36.7 °C)  --  --  98.6 °F (37 °C)    Temp src  Oral  Oral  --  --  Oral    Pulse  102  93  88  99  --    Heart Rate Source  Monitor  Monitor  Monitor  Monitor  --    Resp  18  18  --  18  --    Resp Rate Source  Visual  Visual  --  Visual  --    BP  159/104  (Abnormal)   143/94  --  153/91  --    BP Location  Right arm  Right arm  --  Right arm  --    BP Method  Automatic  Automatic  --  Automatic  --    Patient Position  Sitting  Lying  --  Lying  --       Oxygen Therapy    SpO2  --  --  --  95 %  --    Pulse Oximetry Type  --  --  --  Intermittent  --    Device (Oxygen Therapy)  room air  room air  --  room air  --    Row Name 05/12/19 2038 05/12/19 2035 05/12/19 2001 05/12/19 1332 05/12/19 0807       Vitals    Temp  --  --  98 °F (36.7 °C)  98.3 °F (36.8 °C)  --    Temp src  --  --  Oral  Oral  --    Pulse  99  --  --  89  --    Heart Rate Source  Monitor  --  --  Monitor  --    Resp  18  --  --  18  --    Resp Rate Source  Visual  --  --  Visual  --    BP  145/98  --  --  140/86  --    BP Location  Left arm  --  --  Right arm  --    BP Method  Automatic  --  --  Automatic  --    Patient Position  Lying  --  --  Lying  --       Oxygen Therapy    SpO2  96 %  --  --  96 %  --    Pulse Oximetry Type  Intermittent  --  --  --  --    Device (Oxygen Therapy)  room air  room air  --  room air  room air    Row Name 05/12/19 0720 05/12/19 0620 05/12/19 05:43:32 05/12/19 0208 05/12/19 00:32:54       Height and Weight     "Height  --  167.6 cm (66\")  --  --  --    Height Method  --  Stated  --  --  --    Weight  --  85.7 kg (189 lb)  --  --  84.4 kg (186 lb)    Weight Method  --  --  --  --  Bed scale    Ideal Body Weight (IBW) (kg)  --  59.58  --  --  --    BSA (Calculated - sq m)  --  1.95 sq meters  --  --  --    BMI (Calculated)  --  30.5  --  --  --    Weight in (lb) to have BMI = 25  --  154.6  --  --  --       Vitals    Temp  98.5 °F (36.9 °C)  98.2 °F (36.8 °C)  --  --  --    Temp src  Oral  Oral  --  --  --    Pulse  90  86  104  --  117    Heart Rate Source  Monitor  Monitor  Monitor  --  Monitor    Resp  18  18  18  --  18    Resp Rate Source  Visual  --  Visual  --  Visual    BP  144/87  130/80  107/75  149/92  152/119  (Abnormal)     Noninvasive MAP (mmHg)  --  --  --  106  --    BP Location  Left arm  --  --  --  Left arm    BP Method  Automatic  --  --  --  --    Patient Position  Lying  --  --  --  --       Oxygen Therapy    SpO2  93 %  100 %  96 %  95 %  96 %    Device (Oxygen Therapy)  room air  room air  room air  --  room air    Row Name 05/11/19 5659 05/11/19 4698                Height and Weight    Height  --  167.6 cm (66\")       Height Method  --  Stated       Ideal Body Weight (IBW) (kg)  --  59.58       Weight in (lb) to have BMI = 25  --  154.6          Vitals    Temp  --  98.8 °F (37.1 °C)       Temp src  --  Tympanic       Pulse  114  132  (Abnormal)        Heart Rate Source  --  Monitor       Resp  --  20       BP  156/102  (Abnormal)   --          Oxygen Therapy    SpO2  --  96 %           Lines, Drains & Airways    Active LDAs     Name:   Placement date:   Placement time:   Site:   Days:    Peripheral IV 05/12/19 0945 Left Hand   05/12/19 0945    Hand   1         Inactive LDAs     Name:   Placement date:   Placement time:   Removal date:   Removal time:   Site:   Days:    [REMOVED] Peripheral IV 05/12/19 0045 Right;Lateral Antecubital   05/12/19    0045    05/12/19    0945    Antecubital   less than " 1                Hospital Medications (all)       Dose Frequency Start End    acetaminophen (TYLENOL) tablet 650 mg 650 mg Every 4 Hours PRN 5/12/2019     Sig - Route: Take 2 tablets by mouth Every 4 (Four) Hours As Needed for Mild Pain . - Oral    cetirizine (zyrTEC) tablet 10 mg 10 mg Nightly 5/12/2019     Sig - Route: Take 1 tablet by mouth Every Night. - Oral    dextrose (D50W) 25 g/ 50mL Intravenous Solution 25 g 25 g Every 15 Minutes PRN 5/12/2019     Sig - Route: Infuse 50 mL into a venous catheter Every 15 (Fifteen) Minutes As Needed for Low Blood Sugar (Blood Sugar Less Than 70). - Intravenous    dextrose (GLUTOSE) oral gel 15 g 15 g Every 15 Minutes PRN 5/12/2019     Sig - Route: Take 15 application by mouth Every 15 (Fifteen) Minutes As Needed for Low Blood Sugar (Blood sugar less than 70). - Oral    docusate sodium (COLACE) capsule 100 mg 100 mg 2 Times Daily 5/12/2019     Sig - Route: Take 1 capsule by mouth 2 (Two) Times a Day. - Oral    fluticasone (FLONASE) 50 MCG/ACT nasal spray 1 spray 1 spray Daily 5/12/2019     Sig - Route: 1 spray into the nostril(s) as directed by provider Daily. - Nasal    glucagon (human recombinant) (GLUCAGEN DIAGNOSTIC) injection 1 mg 1 mg As Needed 5/12/2019     Sig - Route: Inject 1 mg under the skin into the appropriate area as directed As Needed (Blood Glucose Less Than 70). - Subcutaneous    HYDROmorphone (DILAUDID) injection 0.5 mg 0.5 mg Once 5/12/2019 5/12/2019    Sig - Route: Infuse 0.5 mL into a venous catheter 1 (One) Time. - Intravenous    HYDROmorphone (DILAUDID) injection 1 mg 1 mg Once 5/12/2019 5/12/2019    Sig - Route: Infuse 1 mL into a venous catheter 1 (One) Time. - Intravenous    insulin glargine (LANTUS) injection 10 Units 10 Units Nightly 5/13/2019     Sig - Route: Inject 10 Units under the skin into the appropriate area as directed Every Night. - Subcutaneous    insulin lispro (humaLOG) injection 0-7 Units 0-7 Units 4 Times Daily With Meals & Nightly  "5/12/2019     Sig - Route: Inject 0-7 Units under the skin into the appropriate area as directed 4 (Four) Times a Day With Meals & at Bedtime. - Subcutaneous    iopamidol (ISOVUE-300) 61 % injection 100 mL 100 mL Once in Imaging 5/12/2019 5/12/2019    Sig - Route: Infuse 100 mL into a venous catheter Once. - Intravenous    lactulose (CHRONULAC) 10 GM/15ML solution 10 g 10 g 2 Times Daily PRN 5/13/2019     Sig - Route: Take 15 mL by mouth 2 (Two) Times a Day As Needed (constipation). - Oral    levothyroxine (SYNTHROID, LEVOTHROID) tablet 200 mcg 200 mcg Every Early Morning 5/12/2019     Sig - Route: Take 2 tablets by mouth Every Morning. - Oral    metFORMIN (GLUCOPHAGE) tablet 500 mg 500 mg 2 Times Daily With Meals 5/13/2019     Sig - Route: Take 1 tablet by mouth 2 (Two) Times a Day With Meals. - Oral    montelukast (SINGULAIR) tablet 10 mg 10 mg Nightly 5/12/2019     Sig - Route: Take 1 tablet by mouth Every Night. - Oral    morphine injection 2 mg 2 mg Every 3 Hours PRN 5/12/2019 5/22/2019    Sig - Route: Infuse 1 mL into a venous catheter Every 3 (Three) Hours As Needed for Severe Pain . - Intravenous    Linked Group 1:  \"And\" Linked Group Details        naloxone (NARCAN) injection 0.4 mg 0.4 mg Every 5 Minutes PRN 5/12/2019     Sig - Route: Infuse 1 mL into a venous catheter Every 5 (Five) Minutes As Needed for Opioid Reversal. - Intravenous    Linked Group 1:  \"And\" Linked Group Details        ondansetron (ZOFRAN) injection 4 mg 4 mg Once 5/12/2019 5/12/2019    Sig - Route: Infuse 2 mL into a venous catheter 1 (One) Time. - Intravenous    Cosign for Ordering: Accepted by Anthony Mora MD on 5/12/2019  6:36 AM    ondansetron (ZOFRAN) injection 4 mg 4 mg Once 5/12/2019 5/12/2019    Sig - Route: Infuse 2 mL into a venous catheter 1 (One) Time. - Intravenous    Cosign for Ordering: Required by Anthony Mora MD    pantoprazole (PROTONIX) EC tablet 40 mg 40 mg Every Early Morning 5/12/2019     Sig - Route: Take 1 " tablet by mouth Every Morning. - Oral    piperacillin-tazobactam (ZOSYN) 3.375 g in iso-osmotic dextrose 50 ml (premix) 3.375 g Once 5/12/2019 5/12/2019    Sig - Route: Infuse 50 mL into a venous catheter 1 (One) Time. - Intravenous    Cosign for Ordering: Accepted by Anthony Mora MD on 5/12/2019  6:36 AM    piperacillin-tazobactam (ZOSYN) 3.375 g in iso-osmotic dextrose 50 ml (premix) 3.375 g Every 8 Hours 5/12/2019 5/19/2019    Sig - Route: Infuse 50 mL into a venous catheter Every 8 (Eight) Hours. - Intravenous    sodium chloride 0.9 % bolus 1,000 mL 1,000 mL Once 5/12/2019 5/12/2019    Sig - Route: Infuse 1,000 mL into a venous catheter 1 (One) Time. - Intravenous    Cosign for Ordering: Accepted by Anthony Mora MD on 5/12/2019  6:36 AM    sodium chloride 0.9 % bolus 1,000 mL 1,000 mL Once 5/12/2019 5/12/2019    Sig - Route: Infuse 1,000 mL into a venous catheter 1 (One) Time. - Intravenous    Cosign for Ordering: Accepted by Anthony Mora MD on 5/12/2019  6:36 AM    sodium chloride 0.9 % flush 1-10 mL 1-10 mL As Needed 5/12/2019     Sig - Route: Infuse 1-10 mL into a venous catheter As Needed for Line Care. - Intravenous    sodium chloride 0.9 % flush 3 mL 3 mL Every 12 Hours Scheduled 5/12/2019     Sig - Route: Infuse 3 mL into a venous catheter Every 12 (Twelve) Hours. - Intravenous    sodium chloride 0.9 % infusion 75 mL/hr Continuous 5/12/2019     Sig - Route: Infuse 75 mL/hr into a venous catheter Continuous. - Intravenous    pantoprazole (PROTONIX) EC tablet 20 mg (Discontinued) 20 mg Every Early Morning 5/12/2019 5/12/2019    Sig - Route: Take 1 tablet by mouth Every Morning. - Oral    Pharmacy to Dose Zosyn (Discontinued)  Continuous PRN 5/12/2019 5/12/2019    Sig - Route: Continuous As Needed for Consult. - Does not apply    polyethylene glycol (MIRALAX) powder 17 g (Discontinued) 17 g 2 Times Daily - RT 5/12/2019 5/12/2019    Sig - Route: Take 17 g by mouth 2 (Two) Times a Day. - Oral     polyethylene glycol (MIRALAX) powder 17 g (Discontinued) 17 g 2 Times Daily 5/12/2019 5/13/2019    Sig - Route: Take 17 g by mouth 2 (Two) Times a Day. - Oral            Orders (last 48 hrs)     Start     Ordered    05/14/19 0600  Basic Metabolic Panel  Morning Draw      05/13/19 1207    05/14/19 0600  CBC (No Diff)  Morning Draw      05/13/19 1207    05/13/19 2100  insulin glargine (LANTUS) injection 10 Units  Nightly      05/13/19 1201    05/13/19 1800  metFORMIN (GLUCOPHAGE) tablet 500 mg  2 Times Daily With Meals      05/13/19 1216    05/13/19 1209  Please double check home med rec for accuracy. Pt states she takes 10 units of insulin every night and jardiance neither are listed.  Misc Nursing Order (Specify)  Once     Comments:  Please double check home med rec for accuracy. Pt states she takes 10 units of insulin every night and jardiance neither are listed.    05/13/19 1210    05/13/19 1208  Diet Full Liquid; Consistent Carbohydrate  Diet Effective Now      05/13/19 1207    05/13/19 1126  POC Glucose Once  Once      05/13/19 1121    05/13/19 1022  lactulose (CHRONULAC) 10 GM/15ML solution 10 g  2 Times Daily PRN      05/13/19 1022    05/13/19 1022  Patient May Shower  Once      05/13/19 1022    05/13/19 0631  POC Glucose Once  Once      05/13/19 0628    05/12/19 2132  POC Glucose Once  Once      05/12/19 2130    05/12/19 2130  polyethylene glycol (MIRALAX) powder 17 g  2 Times Daily - RT,   Status:  Discontinued      05/12/19 1738    05/12/19 2100  cetirizine (zyrTEC) tablet 10 mg  Nightly      05/12/19 1112    05/12/19 2100  montelukast (SINGULAIR) tablet 10 mg  Nightly      05/12/19 1112    05/12/19 2100  polyethylene glycol (MIRALAX) powder 17 g  2 Times Daily,   Status:  Discontinued      05/12/19 1740    05/12/19 1620  naloxone (NARCAN) injection 0.4 mg  Every 5 Minutes PRN      05/12/19 1621    05/12/19 1620  morphine injection 2 mg  Every 3 Hours PRN      05/12/19 1621    05/12/19 1620  POC Glucose  Once  Once      05/12/19 1617    05/12/19 1200  docusate sodium (COLACE) capsule 100 mg  2 Times Daily      05/12/19 1112    05/12/19 1200  fluticasone (FLONASE) 50 MCG/ACT nasal spray 1 spray  Daily      05/12/19 1112    05/12/19 1200  levothyroxine (SYNTHROID, LEVOTHROID) tablet 200 mcg  Every Early Morning      05/12/19 1112    05/12/19 1200  pantoprazole (PROTONIX) EC tablet 20 mg  Every Early Morning,   Status:  Discontinued      05/12/19 1112    05/12/19 1200  pantoprazole (PROTONIX) EC tablet 40 mg  Every Early Morning      05/12/19 1128    05/12/19 1115  Inpatient Gastroenterology Consult  Once     Specialty:  Gastroenterology  Provider:  Samuel Morfin MD    05/12/19 1114    05/12/19 1111  POC Glucose Once  Once      05/12/19 1108    05/12/19 1000  piperacillin-tazobactam (ZOSYN) 3.375 g in iso-osmotic dextrose 50 ml (premix)  Every 8 Hours      05/12/19 0701    05/12/19 0900  sodium chloride 0.9 % flush 3 mL  Every 12 Hours Scheduled      05/12/19 0450    05/12/19 0800  Vital Signs  Every 4 Hours      05/12/19 0450    05/12/19 0800  insulin lispro (humaLOG) injection 0-7 Units  4 Times Daily With Meals & Nightly      05/12/19 0450    05/12/19 0721  POC Glucose Once  Once      05/12/19 0719    05/12/19 0700  POC Glucose 4x Daily AC & at Bedtime  4 Times Daily Before Meals & at Bedtime      05/12/19 0450    05/12/19 0600  Hemoglobin A1c  Morning Draw      05/12/19 0450    05/12/19 0600  Basic Metabolic Panel  Morning Draw      05/12/19 0450    05/12/19 0600  CBC (No Diff)  Morning Draw      05/12/19 0450    05/12/19 0535  HYDROmorphone (DILAUDID) injection 0.5 mg  Once      05/12/19 0533    05/12/19 0535  ondansetron (ZOFRAN) injection 4 mg  Once      05/12/19 0533    05/12/19 0452  sodium chloride 0.9 % infusion  Continuous      05/12/19 0450 05/12/19 0450  Pharmacy to Dose Zosyn  Continuous PRN,   Status:  Discontinued      05/12/19 0450 05/12/19 0450  Diet Clear Liquid  Diet Effective Now,    Status:  Canceled      05/12/19 0450 05/12/19 0449  acetaminophen (TYLENOL) tablet 650 mg  Every 4 Hours PRN      05/12/19 0450 05/12/19 0449  Intake & Output  Every Shift      05/12/19 0450 05/12/19 0449  Weigh Patient  Once      05/12/19 0450 05/12/19 0449  Do NOT Hold Basal Insulin When Patient is NPO, Hold Bolus Dose if NPO  Continuous      05/12/19 0450 05/12/19 0449  Follow [facility] Hypoglycemia Protocol For Blood Glucose Less Than 70 mg/dL  Until Discontinued      05/12/19 0450 05/12/19 0449  Hypoglycemia Treatment - Alert Patient That is Not NPO and Can Safely Swallow  Until Discontinued     Comments:  Administer 4 oz Fruit Juice OR 4 oz Regular Soda OR 8 oz Milk OR 15-30 grams (1 tube) of Glucose Gel.  Recheck Blood Glucose 15 Minutes After Ingestion, Repeat Treatment & Continue to Recheck Blood Sugar Every 15 Minutes Until Blood Glucose is 70 mg/dL or Higher.  Once Blood Glucose is 70 mg/dL or Higher and if It Will Be more than 60 Minutes Until the Next Meal, Provide Appropriate Snack (Including Carbohydrate Food) Based on Meal Plan Order. Give Meal Tray As Soon As Possible.    05/12/19 0450 05/12/19 0449  Hypoglycemia Treatment - Patient Has IV Access - Unresponsive, NPO or Unable To Safely Swallow  Until Discontinued     Comments:  Administer 25g (50ml) D50W IV Push.  Recheck Blood Glucose 15 Minutes After Administration, if Blood Glucose Remains Less Than 70 mg/dl, Repeat Treatment   Recheck Blood Glucose 15 Minutes After 2nd Administration, if Blood Glucose Remains Less Than 70 mg/dL After 2nd Dose of D50W, Contact Provider for Further Treatment Orders & Consider Adding IVF With D5W for Maintenance    05/12/19 0450 05/12/19 0449  Hypoglycemia Treatment - Patient Without IV Access - Unresponsive, NPO or Unable To Safely Swallow  Until Discontinued     Comments:  Administer 1mg Glucagon SQ & Establish IV Access.  Turn Patient on Side - Nausea / Vomiting May Occur.  Recheck Blood  Glucose 15 Minutes After Administration.  If Blood Glucose Remains Less Than 70, Administer 25g D50W IV Push (50ml).  Recheck Blood Glucose 15 Minutes After Administration of D50W, if Blood Glucose Remains Less Than 70 mg/dl, Contact Provider for Further Treatment Orders & Consider Adding IVF With D5 for Maintenance    05/12/19 0450 05/12/19 0449  Notify Provider of event. Communicate needs and document in EMR.  Once      05/12/19 0450 05/12/19 0449  Document event and patients response to treatment in EMR, any medications given to be documented on MAR.  Once      05/12/19 0450 05/12/19 0449  Oxygen Therapy- Nasal Cannula; Titrate for SPO2: 90%  Continuous      05/12/19 0450 05/12/19 0449  Insert Peripheral IV  Once      05/12/19 0450 05/12/19 0449  Saline Lock & Maintain IV Access  Continuous      05/12/19 0450 05/12/19 0449  Code Status and Medical Interventions:  Continuous      05/12/19 0450 05/12/19 0449  Place Sequential Compression Device  Once      05/12/19 0450 05/12/19 0449  Maintain Sequential Compression Device  Continuous      05/12/19 0450 05/12/19 0448  dextrose (GLUTOSE) oral gel 15 g  Every 15 Minutes PRN      05/12/19 0450 05/12/19 0448  dextrose (D50W) 25 g/ 50mL Intravenous Solution 25 g  Every 15 Minutes PRN      05/12/19 0450    05/12/19 0448  glucagon (human recombinant) (GLUCAGEN DIAGNOSTIC) injection 1 mg  As Needed      05/12/19 0450 05/12/19 0448  sodium chloride 0.9 % flush 1-10 mL  As Needed      05/12/19 0450 05/12/19 0445  Urinalysis, Microscopic Only - Urine, Clean Catch  Once      05/12/19 0444    05/12/19 0444  Inpatient Admission  Once      05/12/19 0444    05/12/19 0434  sodium chloride 0.9 % bolus 1,000 mL  Once      05/12/19 0432    05/12/19 0425  [facility] (on-call MD unless specified)  Once     Specialty:  Internal Medicine  Provider:  (Not yet assigned)    05/12/19 0424    05/12/19 0332  piperacillin-tazobactam (ZOSYN) 3.375 g in iso-osmotic  dextrose 50 ml (premix)  Once      05/12/19 0330    05/12/19 0320  Urinalysis With Culture If Indicated - Urine, Catheter  Once      05/12/19 0320    05/12/19 0319  Lactic Acid, Plasma  STAT      05/12/19 0319    05/12/19 0319  Blood Culture - Blood,  Once      05/12/19 0319    05/12/19 0319  Blood Culture - Blood,  Once      05/12/19 0319    05/12/19 0319  Procalcitonin  Once      05/12/19 0319    05/12/19 0228  iopamidol (ISOVUE-300) 61 % injection 100 mL  Once in Imaging      05/12/19 0226    05/12/19 0209  CT Abdomen Pelvis With Contrast  1 Time Imaging      05/12/19 0208    05/12/19 0154  Urinalysis, Microscopic Only - Urine, Clean Catch  Once      05/12/19 0153    05/12/19 0150  ondansetron (ZOFRAN) injection 4 mg  Once      05/12/19 0148    05/12/19 0150  HYDROmorphone (DILAUDID) injection 1 mg  Once      05/12/19 0148    05/12/19 0150  sodium chloride 0.9 % bolus 1,000 mL  Once      05/12/19 0148    05/12/19 0119  Scan Slide  Once      05/12/19 0118    05/12/19 0101  Manual Differential  Once,   Status:  Canceled      05/12/19 0100    05/12/19 0057  CBC Auto Differential  Once      05/12/19 0056    05/12/19 0051  CBC & Differential  Once      05/12/19 0050    05/12/19 0051  Comprehensive Metabolic Panel  Once      05/12/19 0050    05/12/19 0051  Lipase  Once      05/12/19 0050    05/12/19 0051  Urinalysis With Microscopic If Indicated (No Culture) - Urine, Clean Catch  Once      05/12/19 0050    05/12/19 0051  hCG, Serum, Qualitative  Once      05/12/19 0050    05/12/19 0047  [place] Draw  Once      05/12/19 0046    05/12/19 0047  Green Top (Gel)  PROCEDURE ONCE      05/12/19 0046    05/12/19 0047  Lavender Top  PROCEDURE ONCE      05/12/19 0046    05/12/19 0047  Gold Top - SST  PROCEDURE ONCE      05/12/19 0046    Unscheduled  Up With Assistance  As Needed      05/12/19 0450    --  gabapentin (NEURONTIN) 250 MG/5ML solution  As Needed      05/12/19 0608           Physician Progress Notes (last 48 hours)  (Notes from 2019  2:48 PM through 2019  2:48 PM)      Lizzie Anthony APRN at 2019 11:45 AM              Name: Edie Camacho ADMIT: 2019   : 1985  PCP: Layla Monique APRN    MRN: [id number] LOS: 1 days   AGE/SEX: 34 y.o. female  ROOM: [facility]     Subjective   Subjective    Abdominal pain improving. No BM but tolerating clear liquids.   Denies fever chills N/V SOA or CP    Objective   Objective   Vital Signs  Temp:  [98 °F (36.7 °C)-98.6 °F (37 °C)] 98.1 °F (36.7 °C)  Heart Rate:  [88-99] 93  Resp:  [18] 18  BP: (140-153)/(86-98) 143/94  SpO2:  [95 %-96 %] 95 %  on   ;   Device (Oxygen Therapy): room air  Body mass index is 30.51 kg/m².  Physical Exam   Constitutional: She is oriented to person, place, and time. She appears well-developed and well-nourished. No distress.   HENT:   Mouth/Throat: Oropharynx is clear and moist.   Eyes: EOM are normal. No scleral icterus.   Cardiovascular: Normal rate, regular rhythm and normal heart sounds.   Pulmonary/Chest: Effort normal and breath sounds normal.   Abdominal: Soft. Bowel sounds are normal. She exhibits no shifting dullness. There is no hepatosplenomegaly. There is tenderness in the suprapubic area and left lower quadrant. There is no rigidity and no guarding. No hernia.   Soft obese, mild LLQ suprapubic pain    Musculoskeletal: She exhibits no edema.   Neurological: She is alert and oriented to person, place, and time. No cranial nerve deficit.   Skin: Skin is warm and dry. Capillary refill takes less than 2 seconds.   Psychiatric: She has a normal mood and affect. Her behavior is normal. Thought content normal.   Nursing note and vitals reviewed.      Results Review:       I reviewed the patient's new clinical results.  Results from last 7 days   Lab Units 19  0703 19  0046   WBC 10*3/mm3 19.16* 23.64*   HEMOGLOBIN g/dL 14.0 16.5*   PLATELETS 10*3/mm3 294 347     Results from last 7 days   Lab Units 19  0703  05/12/19  0046   SODIUM mmol/L 133* 131*   POTASSIUM mmol/L 3.8 4.8   CHLORIDE mmol/L 102 98   CO2 mmol/L 14.7* 13.5*   BUN mg/dL 11 13   CREATININE mg/dL 0.50* 0.62   GLUCOSE mg/dL 215* 301*   Estimated Creatinine Clearance: 171.6 mL/min (A) (by C-G formula based on SCr of 0.5 mg/dL (L)).  Results from last 7 days   Lab Units 05/12/19  0046   ALBUMIN g/dL 4.20   BILIRUBIN mg/dL 0.8   ALK PHOS U/L 114   AST (SGOT) U/L 22   ALT (SGPT) U/L 18     Results from last 7 days   Lab Units 05/12/19  0703 05/12/19  0046   CALCIUM mg/dL 8.2* 9.5   ALBUMIN g/dL  --  4.20     Results from last 7 days   Lab Units 05/12/19  0429 05/12/19  0046   PROCALCITONIN ng/mL  --  0.11   LACTATE mmol/L 1.0  --      Hemoglobin A1C   Date/Time Value Ref Range Status   05/12/2019 0703 9.80 (H) 4.80 - 5.60 % Final     Glucose   Date/Time Value Ref Range Status   05/13/2019 1121 248 (H) 70 - 130 mg/dL Final   05/13/2019 0628 193 (H) 70 - 130 mg/dL Final   05/12/2019 2130 197 (H) 70 - 130 mg/dL Final   05/12/2019 1617 201 (H) 70 - 130 mg/dL Final   05/12/2019 1108 226 (H) 70 - 130 mg/dL Final   05/12/2019 0719 209 (H) 70 - 130 mg/dL Final         cetirizine 10 mg Oral Nightly   docusate sodium 100 mg Oral BID   fluticasone 1 spray Nasal Daily   insulin lispro 0-7 Units Subcutaneous 4x Daily With Meals & Nightly   levothyroxine 200 mcg Oral Q AM   montelukast 10 mg Oral Nightly   pantoprazole 40 mg Oral Q AM   piperacillin-tazobactam 3.375 g Intravenous Q8H   polyethylene glycol 17 g Oral BID   sodium chloride 3 mL Intravenous Q12H       sodium chloride 100 mL/hr Last Rate: 100 mL/hr (05/13/19 0646)   Diet Clear Liquid      Assessment/Plan     Active Hospital Problems    Diagnosis  POA   • Colitis presumed infectious [K52.9]  Yes   • Type 2 diabetes mellitus, without long-term current use of insulin (CMS/[facility]) [E11.9]  Yes   • Suprapubic pain, acute [R10.2]  Yes   • Hyponatremia [E87.1]  Yes   • Dehydration [E86.0]  Yes   • Sepsis (CMS/[facility]) [A41.9]   Yes      Resolved Hospital Problems   No resolved problems to display.     Sigmoid Colitis/ Abbominal Pain  - sigmoid colitis etiology not well defined. Possible reactive inflammation from constipation. No BM in almost 1 week  -WBC trending down, prelim blood cx negative   - continue zosyn, good response  - No BM with bowel regimen. Add Lactulose BID PRN    - likely start Amitiza prior to discharge.   - endoscopy as outpatient      Hyponatremia  - improving and mild, probably from dehydration given history and as evidenced by hemoconcentration  - continue IVF until she can take PO reliably     Type 2 DM  - cover with ssi but BG have been elevated.  - Crt stable. Resume metformin but increase to 500mg BID   - Patient states she is on insulin at night but not on med list. Will start Lantus 10 units QHS   - A1C 9.80%. She will need to follow up with PCP to improve glucose control     Request RN double check home medication list for accuracy     VTE Prophylaxis - SCDs   Code Status - Full code  Disposition - Anticipate discharge to home when ok with GI       JEFE Loyd  [facility]  05/13/19  11:45 AM          Electronically signed by Lizzie Anthony APRN at 5/13/2019 12:18 PM     Je Mcintosh MD at 5/13/2019 10:22 AM          [facility]  Inpatient Progress Note    Reason for Follow Up: Chronic intermittent constipation, abdominal pain, abnormal CT scan.    Subjective     Interval History:   Patient states overall improvement of abdominal pain, still reports no bowel movement or function.  Reviewed CT scan results with patient.  Encouraged ambulation to facilitate bowel clearance.    Current Facility-Administered Medications:   •  acetaminophen (TYLENOL) tablet 650 mg, 650 mg, Oral, Q4H PRN, Beatriz Martinez APRN, 650 mg at 05/13/19 0556  •  cetirizine (zyrTEC) tablet 10 mg, 10 mg, Oral, Nightly, Sam Kirk MD, 10 mg at 05/12/19 6955  •   dextrose (D50W) 25 g/ 50mL Intravenous Solution 25 g, 25 g, Intravenous, Q15 Min PRN, Beatriz Martinez, APRN  •  dextrose (GLUTOSE) oral gel 15 g, 15 g, Oral, Q15 Min PRN, Beatriz Martinez APRN  •  docusate sodium (COLACE) capsule 100 mg, 100 mg, Oral, BID, Sam Kirk MD, 100 mg at 05/13/19 0806  •  fluticasone (FLONASE) 50 MCG/ACT nasal spray 1 spray, 1 spray, Nasal, Daily, Sam Kirk MD, 1 spray at 05/13/19 0807  •  glucagon (human recombinant) (GLUCAGEN DIAGNOSTIC) injection 1 mg, 1 mg, Subcutaneous, PRN, Beatriz Martinez APRN  •  insulin lispro (humaLOG) injection 0-7 Units, 0-7 Units, Subcutaneous, 4x Daily With Meals & Nightly, Beatriz Martinez APRN, 2 Units at 05/13/19 0807  •  lactulose (CHRONULAC) 10 GM/15ML solution 10 g, 10 g, Oral, BID PRN, Ana Rosa Coelho MD  •  levothyroxine (SYNTHROID, LEVOTHROID) tablet 200 mcg, 200 mcg, Oral, Q AM, Sam Kirk MD, 200 mcg at 05/13/19 0511  •  montelukast (SINGULAIR) tablet 10 mg, 10 mg, Oral, Nightly, Sam Kirk MD, 10 mg at 05/13/19 0807  •  morphine injection 2 mg, 2 mg, Intravenous, Q3H PRN, 2 mg at 05/13/19 0038 **AND** naloxone (NARCAN) injection 0.4 mg, 0.4 mg, Intravenous, Q5 Min PRN, Sam Kirk MD  •  pantoprazole (PROTONIX) EC tablet 40 mg, 40 mg, Oral, Q AM, Sam Kirk MD, 40 mg at 05/13/19 0511  •  piperacillin-tazobactam (ZOSYN) 3.375 g in iso-osmotic dextrose 50 ml (premix), 3.375 g, Intravenous, Q8H, Davidson Ingram MD, 3.375 g at 05/13/19 1006  •  polyethylene glycol (MIRALAX) powder 17 g, 17 g, Oral, BID, Sam Kirk MD, 17 g at 05/13/19 0806  •  sodium chloride 0.9 % flush 1-10 mL, 1-10 mL, Intravenous, PRN, Beatriz Martinez APRN  •  sodium chloride 0.9 % flush 3 mL, 3 mL, Intravenous, Q12H, Beatriz Martinez APRN, 3 mL at 05/13/19 0808  •  sodium chloride 0.9 % infusion, 100 mL/hr, Intravenous, Continuous, Beatriz Martinez APRN, Last Rate: 100 mL/hr at 05/13/19 0646, 100 mL/hr at  05/13/19 0646  Review of Systems:    All systems were reviewed and negative except for:  Gastrointestinal: positive for  See HPI    Objective     Vital Signs  Temp:  [98 °F (36.7 °C)-98.6 °F (37 °C)] 98.1 °F (36.7 °C)  Heart Rate:  [88-99] 93  Resp:  [18] 18  BP: (140-153)/(86-98) 143/94  Body mass index is 30.51 kg/m².    Intake/Output Summary (Last 24 hours) at 5/13/2019 1022  Last data filed at 5/13/2019 0647  Gross per 24 hour   Intake 3247 ml   Output --   Net 3247 ml     No intake/output data recorded.     Physical Exam:   General: patient awake, alert and cooperative   Eyes: Normal lids and lashes, no scleral icterus   Neck: supple, normal ROM   Skin: warm and dry, not jaundiced   Cardiovascular: regular rhythm and rate, no murmurs auscultated   Pulm: clear to auscultation bilaterally, regular and unlabored   Abdomen: soft, nontender, nondistended; normal bowel sounds   Rectal: deferred   Extremities: no rash or edema   Psychiatric: Normal mood and behavior; memory intact     Results Review:     I reviewed the patient's new clinical results.    Results from last 7 days   Lab Units 05/12/19  0703 05/12/19  0046   WBC 10*3/mm3 19.16* 23.64*   HEMOGLOBIN g/dL 14.0 16.5*   HEMATOCRIT % 42.3 50.8*   PLATELETS 10*3/mm3 294 347     Results from last 7 days   Lab Units 05/12/19  0703 05/12/19  0046   SODIUM mmol/L 133* 131*   POTASSIUM mmol/L 3.8 4.8   CHLORIDE mmol/L 102 98   CO2 mmol/L 14.7* 13.5*   BUN mg/dL 11 13   CREATININE mg/dL 0.50* 0.62   CALCIUM mg/dL 8.2* 9.5   BILIRUBIN mg/dL  --  0.8   ALK PHOS U/L  --  114   ALT (SGPT) U/L  --  18   AST (SGOT) U/L  --  22   GLUCOSE mg/dL 215* 301*         Lab Results   Lab Value Date/Time    LIPASE 47 05/12/2019 0046    LIPASE 37 06/18/2016 0255       Radiology:  CT Abdomen Pelvis With Contrast   Final Result       1. Abnormally thick-walled appearance to the sigmoid colon, with   adjacent pericolonic soft tissue stranding. Constellation of findings is   favored to  reflect colitis. No pneumatosis or free air is seen. There is   no evidence of obstruction.   2. Bilateral nonobstructing renal stones.       Radiation dose reduction techniques were utilized, including automated   exposure control and exposure modulation based on body size.       This report was finalized on 5/12/2019 2:42 AM by Dr. Marie Jeong M.D.              Assessment/Plan     Patient Active Problem List   Diagnosis   • Colitis presumed infectious   • Type 2 diabetes mellitus, without long-term current use of insulin (CMS/[facility])   • Suprapubic pain, acute   • Hyponatremia   • Dehydration   • Sepsis (CMS/[facility])     Impression  #1 colitis: Etiology not well-defined, does seem to respond to antibiotic therapy.  #2 abdominal pain: Improved  #3 chronic intermittent constipation      Recommendation  Encourage ambulation  Continue current medical regimen  Eventual colonoscopic assessment in outpatient setting.    I discussed the patients findings and my recommendations with patient   .    Je Mcintosh MD                Electronically signed by Je Mcintosh MD at 5/13/2019 10:31 AM          Consult Notes (last 48 hours) (Notes from 5/11/2019  2:48 PM through 5/13/2019  2:48 PM)      Samuel Morfin MD at 5/12/2019  5:33 PM      Consult Orders    1. Inpatient Gastroenterology Consult [[id number]] ordered by Sam Kirk MD at 05/12/19 1114                [facility]  Initial Inpatient Consult Note    Referring Provider: Dr. Kirk    Reason for Consultation: Chronic intermittent constipation, abnormal imaging, abdominal pain    Subjective     History of present illness:    34 y.o. female with chronic intermittent constipation, controlled with diet and over-the-counter laxatives and stool softeners, presents with abdominal pain of 4 days duration.  Lower quadrants bilaterally.  New onset on Thursday, has never had pain like this before.  Colicky in nature, worse with  movement better at rest.  She has been constipated now for 6 days.  No bowel movement.  She denies rectal bleeding or diarrhea.  She has no vomiting.  She has never had a colonoscopy in her lifetime.  She believes her brother has some sort of colitis but has no details about the.  She has had no weight loss.      She also endorses GI upset with nausea and possible food poisoning she believes after [employer] sandwich 7 days ago.  There was a few days in between that illness and then the development of the abdominal pain Thursday.    She also has chronic GERD, takes 20 mg of Protonix a day    Past Medical History:  Past Medical History:   Diagnosis Date   • Diabetes mellitus (CMS/HCC)    • Disease of thyroid gland    • GERD (gastroesophageal reflux disease)    • Kidney stone    • Pregnancy    • Seasonal allergies      Past Surgical History:  Past Surgical History:   Procedure Laterality Date   • NEPHROSTOMY TRACT DILATATION W/ LITHOTRIPSY        Social History:   Social History     Tobacco Use   • Smoking status: Current Every Day Smoker     Packs/day: 0.50     Types: Cigarettes   Substance Use Topics   • Alcohol use: No      Family History:  History reviewed. No pertinent family history.    Home Meds:  Medications Prior to Admission   Medication Sig Dispense Refill Last Dose   • cetirizine (ZYRTEC ALLERGY) 10 MG tablet Take 10 mg by mouth.   5/11/2019 at Unknown time   • docusate sodium (COLACE) 100 MG capsule Take 100 mg by mouth.   5/11/2019 at Unknown time   • fluticasone ([employer] FLUTICASONE PROPIONATE) 50 MCG/ACT nasal spray 1 spray into each nostril.   5/11/2019 at Unknown time   • gabapentin (NEURONTIN) 250 MG/5ML solution Take 250 mg by mouth As Needed.   5/11/2019 at Unknown time   • Insulin Lispro (HUMALOG KWIKPEN) 100 UNIT/ML solution pen-injector INJECT 1 UNIT PER 20MG/DL > 120 AS DIRECTED. MAX 25 UNITS PER DAY   5/11/2019 at Unknown time   • levothyroxine (SYNTHROID, LEVOTHROID) 200 MCG tablet Take 200 mcg by  mouth.   5/11/2019 at Unknown time   • metFORMIN (GLUCOPHAGE) 500 MG tablet Take 500 mg by mouth.   5/11/2019 at Unknown time   • montelukast (SINGULAIR) 10 MG tablet Take 10 mg by mouth.   5/11/2019 at Unknown time   • ondansetron (ZOFRAN) 4 MG tablet Take 1 tablet by mouth every 6 (six) hours as needed for nausea or vomiting. 10 tablet 0 5/11/2019 at Unknown time   • pantoprazole (PROTONIX) 20 MG EC tablet Take 20 mg by mouth.   5/11/2019 at Unknown time     Current Meds:     cetirizine 10 mg Oral Nightly   docusate sodium 100 mg Oral BID   fluticasone 1 spray Nasal Daily   insulin lispro 0-7 Units Subcutaneous 4x Daily With Meals & Nightly   levothyroxine 200 mcg Oral Q AM   montelukast 10 mg Oral Nightly   pantoprazole 40 mg Oral Q AM   piperacillin-tazobactam 3.375 g Intravenous Q8H   sodium chloride 3 mL Intravenous Q12H     Allergies:  Allergies   Allergen Reactions   • Ciprofloxacin Anaphylaxis   • Sulfa Antibiotics Anaphylaxis   • Bactrim [Sulfamethoxazole-Trimethoprim] Angioedema     Review of Systems  She has weakness fatigue all other systems reviewed and negative     Objective     Vital Signs  Temp:  [98.2 °F (36.8 °C)-98.8 °F (37.1 °C)] 98.3 °F (36.8 °C)  Heart Rate:  [] 89  Resp:  [18-20] 18  BP: (107-156)/() 140/86  Physical Exam:  General Appearance:    Alert, cooperative, in no acute distress   Head:    Normocephalic, without obvious abnormality, atraumatic   Eyes:            Lids and lashes normal, conjunctivae and sclerae normal, no   icterus   Throat:   No oral lesions, no thrush, oral mucosa moist   Neck:   No adenopathy, supple, trachea midline, no thyromegaly, no   carotid bruit, no JVD   Lungs:     Clear to auscultation,respirations regular, even and                   unlabored    Heart:    Regular rhythm and normal rate, normal S1 and S2, no            murmur, no gallop, no rub, no click   Chest Wall:    No abnormalities observed   Abdomen:     Normal bowel sounds, no masses, no  organomegaly, soft        tender, non-distended, no guarding, no rebound                 tenderness   Rectal:     Deferred   Extremities:   no edema, no cyanosis, no redness   Skin:   No bleeding, bruising or rash   Lymph nodes:   No palpable adenopathy   Psychiatric:  Judgement and insight: normal   Orientation to person place and time: normal   Mood and affect: normal   Results Review:   I reviewed the patient's new clinical results.    Results from last 7 days   Lab Units 05/12/19  0703 05/12/19  0046   WBC 10*3/mm3 19.16* 23.64*   HEMOGLOBIN g/dL 14.0 16.5*   HEMATOCRIT % 42.3 50.8*   PLATELETS 10*3/mm3 294 347     Results from last 7 days   Lab Units 05/12/19  0703 05/12/19  0046   SODIUM mmol/L 133* 131*   POTASSIUM mmol/L 3.8 4.8   CHLORIDE mmol/L 102 98   CO2 mmol/L 14.7* 13.5*   BUN mg/dL 11 13   CREATININE mg/dL 0.50* 0.62   CALCIUM mg/dL 8.2* 9.5   BILIRUBIN mg/dL  --  0.8   ALK PHOS U/L  --  114   ALT (SGPT) U/L  --  18   AST (SGOT) U/L  --  22   GLUCOSE mg/dL 215* 301*         Lab Results   Lab Value Date/Time    LIPASE 47 05/12/2019 0046    LIPASE 37 06/18/2016 0255       Radiology:  CT Abdomen Pelvis With Contrast   Final Result       1. Abnormally thick-walled appearance to the sigmoid colon, with   adjacent pericolonic soft tissue stranding. Constellation of findings is   favored to reflect colitis. No pneumatosis or free air is seen. There is   no evidence of obstruction.   2. Bilateral nonobstructing renal stones.       Radiation dose reduction techniques were utilized, including automated   exposure control and exposure modulation based on body size.       This report was finalized on 5/12/2019 2:42 AM by Dr. Marie Jeong M.D.              Assessment/Plan   Patient Active Problem List   Diagnosis   • Colitis presumed infectious   • Type 2 diabetes mellitus, without long-term current use of insulin (CMS/HCC)   • Suprapubic pain, acute   • Hyponatremia   • Dehydration   • Sepsis (CMS/HCC)      Problem list    Abnormal imaging, colitis in the setting of worsening constipation  Abdominal pain secondary to above  Chronic GERD      Assessment/Plan    She has what appears to be a colitis with associated constipation, not diarrhea or rectal bleeding  We have to wonder if whether she has fecal loading in this area with a reactive colitis  I will continue IV antibiotics  I am going to start stool softeners and gentle laxatives as she has been having worsening constipation over the last 6 days  She may come to a medication such as Linzess or Amitiza as an outpatient  She should absolutely have a colonoscopy in 6 to 8 weeks when she is recovered from this acute event not only for a history of chronic constipation but also to rule out a malignancy in this area as a cause for her symptoms and cause for the colitis that is found on imaging today  If she does not improve over the next couple days she may require unprepped lower endoscopy as an inpatient  Follow her white blood cell count daily and if it worsens I would get infectious disease consultation    I discussed the patients findings and my recommendations with patient.    Samuel Morfin MD            Electronically signed by Samuel Morfin MD at 5/12/2019  5:39 PM

## 2019-05-14 VITALS
WEIGHT: 189 LBS | DIASTOLIC BLOOD PRESSURE: 86 MMHG | OXYGEN SATURATION: 95 % | SYSTOLIC BLOOD PRESSURE: 131 MMHG | HEIGHT: 66 IN | RESPIRATION RATE: 18 BRPM | TEMPERATURE: 98.5 F | HEART RATE: 99 BPM | BODY MASS INDEX: 30.37 KG/M2

## 2019-05-14 PROBLEM — R31.29 OTHER MICROSCOPIC HEMATURIA: Status: ACTIVE | Noted: 2019-05-14

## 2019-05-14 LAB
ANION GAP SERPL CALCULATED.3IONS-SCNC: 9.7 MMOL/L
BUN BLD-MCNC: 5 MG/DL (ref 6–20)
BUN/CREAT SERPL: 15.6 (ref 7–25)
CALCIUM SPEC-SCNC: 8.4 MG/DL (ref 8.6–10.5)
CHLORIDE SERPL-SCNC: 103 MMOL/L (ref 98–107)
CO2 SERPL-SCNC: 22.3 MMOL/L (ref 22–29)
CREAT BLD-MCNC: 0.32 MG/DL (ref 0.57–1)
D-LACTATE SERPL-SCNC: 0.7 MMOL/L (ref 0.5–2)
DEPRECATED RDW RBC AUTO: 42.8 FL (ref 37–54)
ERYTHROCYTE [DISTWIDTH] IN BLOOD BY AUTOMATED COUNT: 12.7 % (ref 12.3–15.4)
GFR SERPL CREATININE-BSD FRML MDRD: >150 ML/MIN/1.73
GLUCOSE BLD-MCNC: 217 MG/DL (ref 65–99)
GLUCOSE BLDC GLUCOMTR-MCNC: 200 MG/DL (ref 70–130)
GLUCOSE BLDC GLUCOMTR-MCNC: 220 MG/DL (ref 70–130)
HCT VFR BLD AUTO: 37.2 % (ref 34–46.6)
HGB BLD-MCNC: 12.5 G/DL (ref 12–15.9)
MCH RBC QN AUTO: 30.9 PG (ref 26.6–33)
MCHC RBC AUTO-ENTMCNC: 33.6 G/DL (ref 31.5–35.7)
MCV RBC AUTO: 91.9 FL (ref 79–97)
PLATELET # BLD AUTO: 299 10*3/MM3 (ref 140–450)
PMV BLD AUTO: 9.3 FL (ref 6–12)
POTASSIUM BLD-SCNC: 3.1 MMOL/L (ref 3.5–5.2)
RBC # BLD AUTO: 4.05 10*6/MM3 (ref 3.77–5.28)
SODIUM BLD-SCNC: 135 MMOL/L (ref 136–145)
WBC NRBC COR # BLD: 12.25 10*3/MM3 (ref 3.4–10.8)

## 2019-05-14 PROCEDURE — 25010000002 PIPERACILLIN SOD-TAZOBACTAM PER 1 G: Performed by: INTERNAL MEDICINE

## 2019-05-14 PROCEDURE — 82962 GLUCOSE BLOOD TEST: CPT

## 2019-05-14 PROCEDURE — 85027 COMPLETE CBC AUTOMATED: CPT | Performed by: NURSE PRACTITIONER

## 2019-05-14 PROCEDURE — 63710000001 INSULIN LISPRO (HUMAN) PER 5 UNITS: Performed by: NURSE PRACTITIONER

## 2019-05-14 PROCEDURE — 80048 BASIC METABOLIC PNL TOTAL CA: CPT | Performed by: NURSE PRACTITIONER

## 2019-05-14 PROCEDURE — 99231 SBSQ HOSP IP/OBS SF/LOW 25: CPT | Performed by: INTERNAL MEDICINE

## 2019-05-14 PROCEDURE — 83605 ASSAY OF LACTIC ACID: CPT | Performed by: INTERNAL MEDICINE

## 2019-05-14 RX ORDER — LACTULOSE 10 G/15ML
10 SOLUTION ORAL 2 TIMES DAILY PRN
Qty: 946 ML | Refills: 0 | Status: SHIPPED | OUTPATIENT
Start: 2019-05-14 | End: 2019-06-18

## 2019-05-14 RX ORDER — PANTOPRAZOLE SODIUM 20 MG/1
40 TABLET, DELAYED RELEASE ORAL DAILY
Start: 2019-05-14 | End: 2019-06-20

## 2019-05-14 RX ORDER — POTASSIUM CHLORIDE 750 MG/1
40 CAPSULE, EXTENDED RELEASE ORAL ONCE
Status: COMPLETED | OUTPATIENT
Start: 2019-05-14 | End: 2019-05-14

## 2019-05-14 RX ORDER — LUBIPROSTONE 8 UG/1
8 CAPSULE ORAL 2 TIMES DAILY WITH MEALS
Qty: 60 CAPSULE | Refills: 0 | Status: SHIPPED | OUTPATIENT
Start: 2019-05-14 | End: 2019-06-20

## 2019-05-14 RX ADMIN — TAZOBACTAM SODIUM AND PIPERACILLIN SODIUM 3.38 G: 375; 3 INJECTION, SOLUTION INTRAVENOUS at 01:50

## 2019-05-14 RX ADMIN — DOCUSATE SODIUM 100 MG: 100 CAPSULE, LIQUID FILLED ORAL at 08:23

## 2019-05-14 RX ADMIN — POTASSIUM CHLORIDE 40 MEQ: 750 CAPSULE, EXTENDED RELEASE ORAL at 08:23

## 2019-05-14 RX ADMIN — INSULIN LISPRO 3 UNITS: 100 INJECTION, SOLUTION INTRAVENOUS; SUBCUTANEOUS at 08:23

## 2019-05-14 RX ADMIN — SODIUM CHLORIDE, PRESERVATIVE FREE 3 ML: 5 INJECTION INTRAVENOUS at 08:23

## 2019-05-14 RX ADMIN — LEVOTHYROXINE SODIUM 200 MCG: 100 TABLET ORAL at 05:10

## 2019-05-14 RX ADMIN — FLUTICASONE PROPIONATE 1 SPRAY: 50 SPRAY, METERED NASAL at 08:24

## 2019-05-14 RX ADMIN — METFORMIN HYDROCHLORIDE 500 MG: 500 TABLET ORAL at 08:23

## 2019-05-14 RX ADMIN — TAZOBACTAM SODIUM AND PIPERACILLIN SODIUM 3.38 G: 375; 3 INJECTION, SOLUTION INTRAVENOUS at 09:15

## 2019-05-14 RX ADMIN — MONTELUKAST SODIUM 10 MG: 10 TABLET, FILM COATED ORAL at 08:23

## 2019-05-14 RX ADMIN — INSULIN LISPRO 3 UNITS: 100 INJECTION, SOLUTION INTRAVENOUS; SUBCUTANEOUS at 11:36

## 2019-05-14 RX ADMIN — PANTOPRAZOLE SODIUM 40 MG: 40 TABLET, DELAYED RELEASE ORAL at 05:10

## 2019-05-14 RX ADMIN — LUBIPROSTONE 8 MCG: 8 CAPSULE, GELATIN COATED ORAL at 08:23

## 2019-05-14 NOTE — PROGRESS NOTES
[facility]  Inpatient Progress Note    Reason for Follow Up: Chronic intermittent constipation, abdominal pain, colitis by CT scan    Subjective     Interval History: Patient reports bowel function returning, requesting discharge.  Discussed antibiotic use, not clear that she had infectious colitis.  Would be inclined to hold antibiotics for the present as long as she continues to show clinical improvement.  She can follow-up with Dr. Morfin in the outpatient setting for colonoscopy in 6 to 8 weeks.      Current Facility-Administered Medications:   •  acetaminophen (TYLENOL) tablet 650 mg, 650 mg, Oral, Q4H PRN, Beatriz Martinez APRN, 650 mg at 05/13/19 1510  •  cetirizine (zyrTEC) tablet 10 mg, 10 mg, Oral, Nightly, Sam Kirk MD, 10 mg at 05/13/19 2001  •  dextrose (D50W) 25 g/ 50mL Intravenous Solution 25 g, 25 g, Intravenous, Q15 Min PRN, Beatriz Martinez APRN  •  dextrose (GLUTOSE) oral gel 15 g, 15 g, Oral, Q15 Min PRN, Beatriz Martinez APRN  •  docusate sodium (COLACE) capsule 100 mg, 100 mg, Oral, BID, Sam Kirk MD, 100 mg at 05/14/19 0823  •  fluticasone (FLONASE) 50 MCG/ACT nasal spray 1 spray, 1 spray, Nasal, Daily, Sam Kirk MD, 1 spray at 05/14/19 0824  •  glucagon (human recombinant) (GLUCAGEN DIAGNOSTIC) injection 1 mg, 1 mg, Subcutaneous, PRN, Beatriz Martinez APRN  •  insulin glargine (LANTUS) injection 10 Units, 10 Units, Subcutaneous, Nightly, Lizzie Anthony APRN, 10 Units at 05/13/19 2209  •  insulin lispro (humaLOG) injection 0-7 Units, 0-7 Units, Subcutaneous, 4x Daily With Meals & Nightly, Beatriz Martinez APRN, 3 Units at 05/14/19 0823  •  lactulose (CHRONULAC) 10 GM/15ML solution 10 g, 10 g, Oral, BID PRN, Ana Rosa Coelho MD, 10 g at 05/13/19 1132  •  levothyroxine (SYNTHROID, LEVOTHROID) tablet 200 mcg, 200 mcg, Oral, Q AM, Sam Kirk MD, 200 mcg at 05/14/19 0510  •  lubiprostone (AMITIZA) capsule 8 mcg, 8 mcg, Oral, BID  With Meals, Ana Rosa Coelho MD, 8 mcg at 05/14/19 0823  •  metFORMIN (GLUCOPHAGE) tablet 500 mg, 500 mg, Oral, BID With Meals, Lizzie Anthony APRN, 500 mg at 05/14/19 0823  •  montelukast (SINGULAIR) tablet 10 mg, 10 mg, Oral, Nightly, Sam Kirk MD, 10 mg at 05/14/19 0823  •  morphine injection 2 mg, 2 mg, Intravenous, Q3H PRN, 2 mg at 05/13/19 2317 **AND** naloxone (NARCAN) injection 0.4 mg, 0.4 mg, Intravenous, Q5 Min PRN, Sam Kirk MD  •  pantoprazole (PROTONIX) EC tablet 40 mg, 40 mg, Oral, Q AM, Sam Kirk MD, 40 mg at 05/14/19 0510  •  piperacillin-tazobactam (ZOSYN) 3.375 g in iso-osmotic dextrose 50 ml (premix), 3.375 g, Intravenous, Q8H, Ana Rosa Coelho MD, 3.375 g at 05/14/19 0915  •  sodium chloride 0.9 % flush 1-10 mL, 1-10 mL, Intravenous, PRN, Beatriz Martinez APRN  •  sodium chloride 0.9 % flush 3 mL, 3 mL, Intravenous, Q12H, Beatriz Martinez APRN, 3 mL at 05/14/19 0823  Review of Systems:    All systems were reviewed and negative except for:  Gastrointestinal: positive for  constipation and pain    Objective     Vital Signs  Temp:  [98.5 °F (36.9 °C)-99.2 °F (37.3 °C)] 98.5 °F (36.9 °C)  Heart Rate:  [] 99  Resp:  [18] 18  BP: (126-159)/() 131/86  Body mass index is 30.51 kg/m².  No intake or output data in the 24 hours ending 05/14/19 0945  No intake/output data recorded.     Physical Exam:   General: patient awake, alert and cooperative   Eyes: Normal lids and lashes, no scleral icterus   Neck: supple, normal ROM   Skin: warm and dry, not jaundiced   Cardiovascular: regular rhythm and rate, no murmurs auscultated   Pulm: clear to auscultation bilaterally, regular and unlabored   Abdomen: soft, nontender, nondistended; normal bowel sounds   Rectal: deferred   Extremities: no rash or edema   Psychiatric: Normal mood and behavior; memory intact     Results Review:     I reviewed the patient's new clinical results.    Results from last 7 days   Lab Units  05/14/19  0355 05/12/19  0703 05/12/19  0046   WBC 10*3/mm3 12.25* 19.16* 23.64*   HEMOGLOBIN g/dL 12.5 14.0 16.5*   HEMATOCRIT % 37.2 42.3 50.8*   PLATELETS 10*3/mm3 299 294 347     Results from last 7 days   Lab Units 05/14/19  0355 05/12/19  0703 05/12/19  0046   SODIUM mmol/L 135* 133* 131*   POTASSIUM mmol/L 3.1* 3.8 4.8   CHLORIDE mmol/L 103 102 98   CO2 mmol/L 22.3 14.7* 13.5*   BUN mg/dL 5* 11 13   CREATININE mg/dL 0.32* 0.50* 0.62   CALCIUM mg/dL 8.4* 8.2* 9.5   BILIRUBIN mg/dL  --   --  0.8   ALK PHOS U/L  --   --  114   ALT (SGPT) U/L  --   --  18   AST (SGOT) U/L  --   --  22   GLUCOSE mg/dL 217* 215* 301*         Lab Results   Lab Value Date/Time    LIPASE 47 05/12/2019 0046    LIPASE 37 06/18/2016 0255       Radiology:  CT Abdomen Pelvis With Contrast   Final Result       1. Abnormally thick-walled appearance to the sigmoid colon, with   adjacent pericolonic soft tissue stranding. Constellation of findings is   favored to reflect colitis. No pneumatosis or free air is seen. There is   no evidence of obstruction.   2. Bilateral nonobstructing renal stones.       Radiation dose reduction techniques were utilized, including automated   exposure control and exposure modulation based on body size.       This report was finalized on 5/12/2019 2:42 AM by Dr. Marie Jeong M.D.              Assessment/Plan     Patient Active Problem List   Diagnosis   • Colitis presumed infectious   • Type 2 diabetes mellitus, without long-term current use of insulin (CMS/HCC)   • Suprapubic pain, acute   • Hyponatremia   • Sepsis (CMS/HCC)   • Metabolic acidosis   • Chronic constipation       Impression  #1 colitis: Clinically improved   #2 abdominal pain: Resolved  #3 chronic intermittent constipation: Improved      Recommendation  Consider holding antibiotics  Okay from GI perspective for discharge  Follow-up with Dr. Morfin for colonoscopy in 6 to 8 weeks  I discussed the patients findings and my recommendations with  patient and nursing staff.    Je Mcintosh MD

## 2019-05-14 NOTE — PLAN OF CARE
Problem: Patient Care Overview  Goal: Plan of Care Review  Outcome: Ongoing (interventions implemented as appropriate)   05/12/19 0217   Coping/Psychosocial   Plan of Care Reviewed With patient   Plan of Care Review   Progress improving   OTHER   Outcome Summary Pt admitted from ER at 0630am with abd pain and gastritis. Tolerating clears well. Tylenol given x1 and morphine given x1. Amb to BR, IVF continued. Will continue to monitor          
Problem: Patient Care Overview  Goal: Plan of Care Review  Outcome: Ongoing (interventions implemented as appropriate)   05/12/19 0651   Coping/Psychosocial   Plan of Care Reviewed With patient   Plan of Care Review   Progress no change   OTHER   Outcome Summary Arrived at 0620, pt oriented to room.       Problem: Fall Risk (Adult)  Goal: Identify Related Risk Factors and Signs and Symptoms  Outcome: Ongoing (interventions implemented as appropriate)   05/12/19 0651   Fall Risk (Adult)   Related Risk Factors (Fall Risk) environment unfamiliar         
Problem: Patient Care Overview  Goal: Plan of Care Review  Outcome: Ongoing (interventions implemented as appropriate)   05/13/19 0359   Coping/Psychosocial   Plan of Care Reviewed With patient   Plan of Care Review   Progress no change   OTHER   Outcome Summary Pt alert and oriented x4, medicated as needed for pain with IV morphine. Ambulating to BR with steady gait. Stool softener and miralax given per order, will continue to monitor.        Problem: Pain, Acute (Adult)  Goal: Acceptable Pain Control/Comfort Level  Outcome: Ongoing (interventions implemented as appropriate)      Problem: Fall Risk (Adult)  Goal: Absence of Fall  Outcome: Ongoing (interventions implemented as appropriate)      Problem: Infection, Risk/Actual (Adult)  Goal: Infection Prevention/Resolution  Outcome: Ongoing (interventions implemented as appropriate)        
Problem: Patient Care Overview  Goal: Plan of Care Review  Outcome: Ongoing (interventions implemented as appropriate)   05/13/19 1667   Coping/Psychosocial   Plan of Care Reviewed With patient   OTHER   Outcome Summary 2 BMs today, one episode of severe pain in lower abdomen, will check UA, Lantus added nightly, ambulated around the iraheta, BG increasing, vss, will continue to monitor      Goal: Individualization and Mutuality  Outcome: Ongoing (interventions implemented as appropriate)    Goal: Discharge Needs Assessment  Outcome: Ongoing (interventions implemented as appropriate)    Goal: Interprofessional Rounds/Family Conf  Outcome: Ongoing (interventions implemented as appropriate)      Problem: Pain, Acute (Adult)  Goal: Identify Related Risk Factors and Signs and Symptoms  Outcome: Ongoing (interventions implemented as appropriate)    Goal: Acceptable Pain Control/Comfort Level  Outcome: Ongoing (interventions implemented as appropriate)      Problem: Fall Risk (Adult)  Goal: Identify Related Risk Factors and Signs and Symptoms  Outcome: Ongoing (interventions implemented as appropriate)    Goal: Absence of Fall  Outcome: Ongoing (interventions implemented as appropriate)      Problem: Infection, Risk/Actual (Adult)  Goal: Identify Related Risk Factors and Signs and Symptoms  Outcome: Ongoing (interventions implemented as appropriate)    Goal: Infection Prevention/Resolution  Outcome: Ongoing (interventions implemented as appropriate)        
Problem: Patient Care Overview  Goal: Plan of Care Review  Outcome: Ongoing (interventions implemented as appropriate)   05/14/19 0224   Coping/Psychosocial   Plan of Care Reviewed With patient   Plan of Care Review   Progress no change   OTHER   Outcome Summary VSS. Complaining of pain and cramping in lower abdomen. Requiring morphine Q3. Remains sinus tachy. No acute changes overnight. Will continue to monitor.      Goal: Individualization and Mutuality  Outcome: Ongoing (interventions implemented as appropriate)    Goal: Discharge Needs Assessment  Outcome: Ongoing (interventions implemented as appropriate)    Goal: Interprofessional Rounds/Family Conf  Outcome: Ongoing (interventions implemented as appropriate)        
Problem: Patient Care Overview  Goal: Plan of Care Review  Outcome: Ongoing (interventions implemented as appropriate)   05/14/19 1144   Coping/Psychosocial   Plan of Care Reviewed With patient   OTHER   Outcome Summary patient is being discharged      Goal: Individualization and Mutuality  Outcome: Ongoing (interventions implemented as appropriate)    Goal: Discharge Needs Assessment  Outcome: Ongoing (interventions implemented as appropriate)    Goal: Interprofessional Rounds/Family Conf  Outcome: Ongoing (interventions implemented as appropriate)      Problem: Pain, Acute (Adult)  Goal: Identify Related Risk Factors and Signs and Symptoms  Outcome: Ongoing (interventions implemented as appropriate)    Goal: Acceptable Pain Control/Comfort Level  Outcome: Ongoing (interventions implemented as appropriate)      Problem: Fall Risk (Adult)  Goal: Identify Related Risk Factors and Signs and Symptoms  Outcome: Ongoing (interventions implemented as appropriate)    Goal: Absence of Fall  Outcome: Ongoing (interventions implemented as appropriate)      Problem: Infection, Risk/Actual (Adult)  Goal: Identify Related Risk Factors and Signs and Symptoms  Outcome: Ongoing (interventions implemented as appropriate)    Goal: Infection Prevention/Resolution  Outcome: Ongoing (interventions implemented as appropriate)        
yesterday

## 2019-05-14 NOTE — DISCHARGE SUMMARY
Date of Admission: 5/12/2019  Date of Discharge:  5/14/2019  Primary Care Physician: Layla Monique APRN     Discharge Diagnosis:  Active Hospital Problems    Diagnosis  POA   • **Colitis presumed infectious [K52.9]  Yes   • Other microscopic hematuria [R31.29]  Unknown   • Metabolic acidosis [E87.2]  Unknown   • Chronic constipation [K59.09]  Unknown   • Type 2 diabetes mellitus, without long-term current use of insulin (CMS/HCC) [E11.9]  Yes   • Suprapubic pain, acute [R10.2]  Yes   • Hyponatremia [E87.1]  Yes   • Sepsis (CMS/HCC) [A41.9]  Yes      Resolved Hospital Problems    Diagnosis Date Resolved POA   • Dehydration [E86.0] 05/13/2019 Yes       Presenting Problem/History of Present Illness:  Dehydration [E86.0]  Colitis [K52.9]  Hyperglycemia [R73.9]  Acute UTI [N39.0]  Leukocytosis, unspecified type [D72.829]     Hospital Course:  The patient is a 34 y.o. woman admitted for pelvic pain.  CT showed colitis.  She was started on Zosyn.  She was seen by GI.  She had not had a bowel movement for 5 days.  I ordered lactulose for her yesterday.  She had had 2 bowel movements yesterday and was feeling better.  She still had some low pelvic pain and dysuria.  Urinalysis was done and showed microscopic hematuria but no bacteria or white blood cells.  This morning she saw some sediment in the urine.  She thought she had probably passed a stone.  She has a history of kidney stones.  She was feeling much better and was anxious for discharge.  She was seen by GI.  Colitis likely was not infectious but could have been secondary to obstipation.  Antibiotics were stopped.  She was discharged home with lactulose and Amitiza although it appears that the latter requires a preauth.  CCP will be notified.    Metformin was restarted yesterday and dosage was increased.  Acidosis resolved.  Lactic acid is normal.  Fluids were stopped yesterday.      Stable condition; good prognosis    Exam Today: Feeling much better this  morning.  Pelvic pain is gone.  She thinks she may have passed a stone.  There was some sludge or debris in her urine that she noticed.  Vital signs noted no distress heart is regular.  Lungs are clear.  No wheezing today.  Abdomen is obese, nontender.  Trace edema    Procedures Performed:  Ct Abdomen Pelvis With Contrast    Result Date: 5/12/2019   1. Abnormally thick-walled appearance to the sigmoid colon, with adjacent pericolonic soft tissue stranding. Constellation of findings is favored to reflect colitis. No pneumatosis or free air is seen. There is no evidence of obstruction. 2. Bilateral nonobstructing renal stones.  Radiation dose reduction techniques were utilized, including automated exposure control and exposure modulation based on body size.  This report was finalized on 5/12/2019 2:42 AM by Dr. Marie Jeong M.D.             Labs:  Lab Results   Component Value Date    WBC 12.25 (H) 05/14/2019    HGB 12.5 05/14/2019    HCT 37.2 05/14/2019     05/14/2019     Lab Results   Component Value Date     (L) 05/14/2019    K 3.1 (L) 05/14/2019     05/14/2019    CO2 22.3 05/14/2019    BUN 5 (L) 05/14/2019    CREATININE 0.32 (L) 05/14/2019    GLUCOSE 217 (H) 05/14/2019     Lab Results   Component Value Date    AST 22 05/12/2019    ALT 18 05/12/2019    ALKPHOS 114 05/12/2019     Lactic acid 0.7 on 5/14/2019  A1c 9.8    Results from last 7 days   Lab Units 05/12/19  0429   BLOODCX  No growth at 2 days  No growth at 2 days       Consults:   Dr. Morfin    Discharge Disposition:  Home or Self Care    Discharge Medications:     Discharge Medications      New Medications      Instructions Start Date   lactulose 10 GM/15ML solution  Commonly known as:  CHRONULAC   10 g, Oral, 2 Times Daily PRN      lubiprostone 8 MCG capsule  Commonly known as:  AMITIZA   8 mcg, Oral, 2 Times Daily With Meals         Changes to Medications      Instructions Start Date   metFORMIN 500 MG tablet  Commonly known as:   GLUCOPHAGE  What changed:  when to take this   500 mg, Oral, 2 Times Daily With Meals      pantoprazole 20 MG EC tablet  Commonly known as:  PROTONIX  What changed:    · how much to take  · when to take this   40 mg, Oral, Daily         Continue These Medications      Instructions Start Date   [employer] FLUTICASONE PROPIONATE 50 MCG/ACT nasal spray  Generic drug:  fluticasone   1 spray, Nasal      HUMALOG KWIKPEN 100 UNIT/ML solution pen-injector  Generic drug:  Insulin Lispro   INJECT 1 UNIT PER 20MG/DL > 120 AS DIRECTED. MAX 25 UNITS PER DAY      levothyroxine 200 MCG tablet  Commonly known as:  SYNTHROID, LEVOTHROID   200 mcg, Oral      montelukast 10 MG tablet  Commonly known as:  SINGULAIR   10 mg, Oral      ZYRTEC ALLERGY 10 MG tablet  Generic drug:  cetirizine   10 mg, Oral         Stop These Medications    docusate sodium 100 MG capsule  Commonly known as:  COLACE     gabapentin 250 MG/5ML solution  Commonly known as:  NEURONTIN     ondansetron 4 MG tablet  Commonly known as:  ZOFRAN            Discharge Diet: No concentrated sweets    Activity at Discharge: Ad haily.    Follow-up Appointments:  No future appointments.  Follow-up Information     Layla Monique APRN Follow up in 1 week(s).    Specialty:  Family Medicine  Why:  Microscopic hematuria, suspected kidney stone  Contact information:  [address] [facility] [address] [phone]             Samuel Morfin MD Follow up in 5 week(s).    Specialty:  Gastroenterology  Why:  Colonoscopy for colitis  Contact information:  [address]  [facility] [id number] [phone]                     Test Results Pending at Discharge:   Order Current Status    Blood Culture - Blood, Arm, Left Preliminary result    Blood Culture - Blood, Arm, Right Preliminary result           Ana Rosa Coelho MD  05/14/19  12:32 PM    Time Spent on Discharge Activities: 35 minutes.  Medical record reviewed

## 2019-05-14 NOTE — PROGRESS NOTES
Case Management Discharge Note    Final Note: Home.  Needs prior auth for anzimeta needed.  Arleen Rivas is assisting with this    Destination      No service has been selected for the patient.      Durable Medical Equipment      No service has been selected for the patient.      Dialysis/Infusion      No service has been selected for the patient.      Home Medical Care      No service has been selected for the patient.      Therapy      No service has been selected for the patient.      Community Resources      No service has been selected for the patient.             Final Discharge Disposition Code: 01 - home or self-care

## 2019-05-17 ENCOUNTER — PRIOR AUTHORIZATION (OUTPATIENT)
Dept: GASTROENTEROLOGY | Facility: CLINIC | Age: 34
End: 2019-05-17

## 2019-05-17 LAB
BACTERIA SPEC AEROBE CULT: NORMAL
BACTERIA SPEC AEROBE CULT: NORMAL

## 2019-06-18 ENCOUNTER — TELEPHONE (OUTPATIENT)
Dept: GASTROENTEROLOGY | Facility: CLINIC | Age: 34
End: 2019-06-18

## 2019-06-18 ENCOUNTER — PREP FOR SURGERY (OUTPATIENT)
Dept: OTHER | Facility: HOSPITAL | Age: 34
End: 2019-06-18

## 2019-06-18 ENCOUNTER — OFFICE VISIT (OUTPATIENT)
Dept: GASTROENTEROLOGY | Facility: CLINIC | Age: 34
End: 2019-06-18

## 2019-06-18 VITALS — HEIGHT: 66 IN | TEMPERATURE: 98.1 F | BODY MASS INDEX: 28.48 KG/M2 | WEIGHT: 177.2 LBS

## 2019-06-18 DIAGNOSIS — R10.30 LOWER ABDOMINAL PAIN: Primary | ICD-10-CM

## 2019-06-18 DIAGNOSIS — K59.09 CHRONIC CONSTIPATION: ICD-10-CM

## 2019-06-18 DIAGNOSIS — R14.0 ABDOMINAL BLOATING: Primary | ICD-10-CM

## 2019-06-18 DIAGNOSIS — K52.9 COLITIS: ICD-10-CM

## 2019-06-18 LAB
ALBUMIN SERPL-MCNC: 4.5 G/DL (ref 3.5–5.2)
ALBUMIN/GLOB SERPL: 2 G/DL
ALP SERPL-CCNC: 120 U/L (ref 39–117)
ALT SERPL-CCNC: 17 U/L (ref 1–33)
AST SERPL-CCNC: 12 U/L (ref 1–32)
BASOPHILS # BLD AUTO: 0.03 10*3/MM3 (ref 0–0.2)
BASOPHILS NFR BLD AUTO: 0.4 % (ref 0–1.5)
BILIRUB SERPL-MCNC: 0.5 MG/DL (ref 0.2–1.2)
BUN SERPL-MCNC: 13 MG/DL (ref 6–20)
BUN/CREAT SERPL: 19.1 (ref 7–25)
CALCIUM SERPL-MCNC: 9.7 MG/DL (ref 8.6–10.5)
CHLORIDE SERPL-SCNC: 99 MMOL/L (ref 98–107)
CO2 SERPL-SCNC: 24.4 MMOL/L (ref 22–29)
CREAT SERPL-MCNC: 0.68 MG/DL (ref 0.57–1)
CRP SERPL-MCNC: 1.2 MG/DL (ref 0–0.5)
EOSINOPHIL # BLD AUTO: 0.09 10*3/MM3 (ref 0–0.4)
EOSINOPHIL NFR BLD AUTO: 1.1 % (ref 0.3–6.2)
ERYTHROCYTE [DISTWIDTH] IN BLOOD BY AUTOMATED COUNT: 13.5 % (ref 12.3–15.4)
GLOBULIN SER CALC-MCNC: 2.3 GM/DL
GLUCOSE SERPL-MCNC: 438 MG/DL (ref 65–99)
HCT VFR BLD AUTO: 44.1 % (ref 34–46.6)
HGB BLD-MCNC: 13.9 G/DL (ref 12–15.9)
IMM GRANULOCYTES # BLD AUTO: 0.03 10*3/MM3 (ref 0–0.05)
IMM GRANULOCYTES NFR BLD AUTO: 0.4 % (ref 0–0.5)
LYMPHOCYTES # BLD AUTO: 2.08 10*3/MM3 (ref 0.7–3.1)
LYMPHOCYTES NFR BLD AUTO: 26.2 % (ref 19.6–45.3)
MCH RBC QN AUTO: 29.8 PG (ref 26.6–33)
MCHC RBC AUTO-ENTMCNC: 31.5 G/DL (ref 31.5–35.7)
MCV RBC AUTO: 94.4 FL (ref 79–97)
MONOCYTES # BLD AUTO: 0.46 10*3/MM3 (ref 0.1–0.9)
MONOCYTES NFR BLD AUTO: 5.8 % (ref 5–12)
NEUTROPHILS # BLD AUTO: 5.25 10*3/MM3 (ref 1.7–7)
NEUTROPHILS NFR BLD AUTO: 66.1 % (ref 42.7–76)
NRBC BLD AUTO-RTO: 0 /100 WBC (ref 0–0.2)
PLATELET # BLD AUTO: 323 10*3/MM3 (ref 140–450)
POTASSIUM SERPL-SCNC: 4.9 MMOL/L (ref 3.5–5.2)
PROT SERPL-MCNC: 6.8 G/DL (ref 6–8.5)
RBC # BLD AUTO: 4.67 10*6/MM3 (ref 3.77–5.28)
SODIUM SERPL-SCNC: 137 MMOL/L (ref 136–145)
WBC # BLD AUTO: 7.94 10*3/MM3 (ref 3.4–10.8)

## 2019-06-18 PROCEDURE — 99214 OFFICE O/P EST MOD 30 MIN: CPT | Performed by: NURSE PRACTITIONER

## 2019-06-18 RX ORDER — SITAGLIPTIN 100 MG/1
TABLET, FILM COATED ORAL
COMMUNITY
Start: 2019-05-01 | End: 2019-06-20

## 2019-06-18 RX ORDER — GABAPENTIN 300 MG/1
CAPSULE ORAL
Refills: 0 | COMMUNITY
Start: 2019-06-11 | End: 2019-06-20

## 2019-06-18 RX ORDER — INSULIN GLARGINE 100 [IU]/ML
INJECTION, SOLUTION SUBCUTANEOUS
Refills: 1 | COMMUNITY
Start: 2019-04-01 | End: 2019-06-20

## 2019-06-18 RX ORDER — IBUPROFEN 600 MG/1
600 TABLET ORAL EVERY 8 HOURS PRN
COMMUNITY
Start: 2019-05-01 | End: 2019-06-23 | Stop reason: HOSPADM

## 2019-06-18 RX ORDER — HYOSCYAMINE SULFATE 0.125 MG
0.12 TABLET ORAL
Qty: 120 TABLET | Refills: 5 | Status: SHIPPED | OUTPATIENT
Start: 2019-06-18 | End: 2020-02-12

## 2019-06-18 NOTE — PROGRESS NOTES
Chief Complaint   Patient presents with   • Hospital follow up-colitis   • Abdominal Pain     HPI    Edie Camacho is a  34 y.o. female here for a follow up visit for abdominal pain.    Patient seen today in follow-up from recent hospitalization, discharged from [facility] on 5/14/2019.  Patient was admitted for pelvic pain and CT showed colitis.  She was started on Zosyn and seen by GI service.  She had also been struggling w/ constipation. Colitis likely was not infectious but could have been secondary to obstipation.  Antibiotics were stopped.  She was discharged home with Amitiza.  Recommendations were for follow-up in the outpatient setting for colonoscopy in 6 to 8 weeks.    Labs from 5/14/2019 with white blood cells 12.25, hemoglobin 12.5, hematocrit 37.2, Platelet 299.  Normal liver function.    On visit today the patient continues to complain of lower abdominal pain.  Pain is described as an intense ache that comes and goes occurs daily.  Denies rectal bleeding.  Patient managing constipation with 1 capful of MiraLAX daily.  Amitiza was not covered by her insurance.  Her bowels are moving 2-3 times a day.    Patient denies nausea, vomiting, heartburn, acid reflux, or dysphagia.    Past Medical History:   Diagnosis Date   • Diabetes mellitus (CMS/HCC)    • Disease of thyroid gland    • GERD (gastroesophageal reflux disease)    • Kidney stone    • Pregnancy    • Seasonal allergies        Past Surgical History:   Procedure Laterality Date   • NEPHROSTOMY TRACT DILATATION W/ LITHOTRIPSY         Scheduled Meds:  Outpatient Encounter Medications as of 6/18/2019   Medication Sig Dispense Refill   • cetirizine (ZYRTEC ALLERGY) 10 MG tablet Take 10 mg by mouth.     • fluticasone ([employer] FLUTICASONE PROPIONATE) 50 MCG/ACT nasal spray 1 spray into each nostril.     • gabapentin (NEURONTIN) 300 MG capsule TAKE 1 CAPSULE BY MOUTH THREE TIMES A DAY FOR DIABETIC NEUROPATHY  0   • ibuprofen (ADVIL,MOTRIN)  600 MG tablet      • Insulin Glargine (BASAGLAR KWIKPEN) 100 UNIT/ML injection pen INJECT 5 UNITS INTO THE SKIN ONCE DAILY FOR 2 WEEKS. IF BLOOD SUGAR NOT UNDER 120 THEN, INCREASE TO 10 UNITS ONCE DAILY OR AS DIRECTED.  1   • Insulin Lispro (HUMALOG KWIKPEN) 100 UNIT/ML solution pen-injector INJECT 1 UNIT PER 20MG/DL > 120 AS DIRECTED. MAX 25 UNITS PER DAY     • JANUVIA 100 MG tablet      • levothyroxine (SYNTHROID, LEVOTHROID) 200 MCG tablet Take 200 mcg by mouth.     • montelukast (SINGULAIR) 10 MG tablet Take 10 mg by mouth.     • pantoprazole (PROTONIX) 20 MG EC tablet Take 2 tablets by mouth Daily.     • hyoscyamine (ANASPAZ,LEVSIN) 0.125 MG tablet Take 1 tablet by mouth 4 (Four) Times a Day With Meals & at Bedtime. 120 tablet 5   • lubiprostone (AMITIZA) 8 MCG capsule Take 1 capsule by mouth 2 (Two) Times a Day With Meals. 60 capsule 0   • [DISCONTINUED] lactulose (CHRONULAC) 10 GM/15ML solution Take 15 mL by mouth 2 (Two) Times a Day As Needed (constipation). 946 mL 0   • [DISCONTINUED] metFORMIN (GLUCOPHAGE) 500 MG tablet Take 1 tablet by mouth 2 (Two) Times a Day With Meals.       No facility-administered encounter medications on file as of 6/18/2019.        Continuous Infusions:  No current facility-administered medications for this visit.     PRN Meds:.    Allergies   Allergen Reactions   • Ciprofloxacin Anaphylaxis   • Sulfa Antibiotics Anaphylaxis   • Bactrim [Sulfamethoxazole-Trimethoprim] Angioedema       Social History     Socioeconomic History   • Marital status:      Spouse name: Not on file   • Number of children: Not on file   • Years of education: Not on file   • Highest education level: Not on file   Tobacco Use   • Smoking status: Current Every Day Smoker     Packs/day: 0.50     Types: Cigarettes     Start date: 1969   • Smokeless tobacco: Never Used   Substance and Sexual Activity   • Alcohol use: No   • Drug use: No       History reviewed. No pertinent family history.    Review of  Systems   Constitutional: Negative for activity change, appetite change, fatigue, fever and unexpected weight change.   HENT: Negative for trouble swallowing.    Respiratory: Negative for apnea, cough, choking, chest tightness, shortness of breath and wheezing.    Cardiovascular: Negative for chest pain, palpitations and leg swelling.   Gastrointestinal: Positive for abdominal pain and constipation. Negative for abdominal distention, anal bleeding, blood in stool, diarrhea, nausea, rectal pain and vomiting.       Vitals:    06/18/19 1027   Temp: 98.1 °F (36.7 °C)       Physical Exam   Constitutional: She is oriented to person, place, and time. She appears well-developed and well-nourished.   Eyes: Pupils are equal, round, and reactive to light.   Cardiovascular: Normal rate, regular rhythm and normal heart sounds.   Pulmonary/Chest: Effort normal and breath sounds normal. No respiratory distress. She has no wheezes.   Abdominal: Soft. Bowel sounds are normal. She exhibits no distension and no mass. There is tenderness. There is no guarding. No hernia.   Very tender to palpation lower abdominal area   Musculoskeletal: Normal range of motion.   Neurological: She is alert and oriented to person, place, and time.   Skin: Skin is warm and dry. Capillary refill takes less than 2 seconds.   Psychiatric: She has a normal mood and affect. Her behavior is normal.       No images are attached to the encounter.    Edie was seen today for hospital follow up-colitis and abdominal pain.    Diagnoses and all orders for this visit:    Lower abdominal pain  -     CBC & Differential  -     Comprehensive Metabolic Panel  -     Case Request; Standing  -     Case Request  -     C-reactive Protein    Colitis  -     Case Request; Standing  -     Case Request  -     C-reactive Protein    Chronic constipation  -     Case Request; Standing  -     Case Request  -     C-reactive Protein    Other orders  -     hyoscyamine (ANASPAZ,LEVSIN)  0.125 MG tablet; Take 1 tablet by mouth 4 (Four) Times a Day With Meals & at Bedtime.    Impression:    This is a 34-year-old female seen today in follow-up for recent hospitalization, CT indicating colitis.  Continues to have lower abdominal pain.  Constipation has improved with daily MiraLAX.  At this point labs today with CBC and CMP.  Colonoscopy for further evaluation possibly in the next week or so with Dr. Morfin.  In the interim I will provide the patient with antispasmodics for relief.  I did instruct the patient to go to the emergency room if symptoms worsen however she is reluctant to do so due to excessive medical bills.  Return to clinic 6 weeks.  Further recommendations pending the aforementioned work-up.

## 2019-06-18 NOTE — TELEPHONE ENCOUNTER
----- Message from Samuel Morfin MD sent at 6/18/2019 11:56 AM EDT -----    Add her on at the surgery center tomorrow please, have her do split dose MiraLAX prep, even if she is eaten today I should have  no problem getting to the ileum.  She is young and I am not really screening her for colon cancer I do not need a perfect prep    thx!    ----- Message -----  From: Isabela Leung, APRN  Sent: 6/18/2019  11:20 AM  To: Samuel Morfin MD    Would it be possible to work this patient in for colonoscopy this week or next week?  Was recently the hospital for lower abdominal pain, CT positive colitis.  Still having issues on office visit today.  Thanks BG

## 2019-06-19 NOTE — TELEPHONE ENCOUNTER
Previous note was sent to scheduling to call pt and move C/S sooner as RNs do not schedule/reschedule endoscopies.

## 2019-06-19 NOTE — TELEPHONE ENCOUNTER
Notes recorded by Isabela Leung APRN on 6/19/2019 at 9:11 AM EDT  I spoke with the patient this morning regarding her labs and elevated glucose.  The patient actually reach out to her primary care provider yesterday and adjustments to her insulin were made.    Dr. Morfin--- looks like you were going to try to work this patient in to be done today for colonoscopy.  The patient is agreeable to scope this week but request Thursday or Friday to give her enough time to take off from work.  Is this something we can accommodate?  Thanks BG

## 2019-06-20 ENCOUNTER — APPOINTMENT (OUTPATIENT)
Dept: GENERAL RADIOLOGY | Facility: HOSPITAL | Age: 34
End: 2019-06-20

## 2019-06-20 ENCOUNTER — HOSPITAL ENCOUNTER (INPATIENT)
Facility: HOSPITAL | Age: 34
LOS: 3 days | Discharge: HOME | End: 2019-06-23
Attending: EMERGENCY MEDICINE | Admitting: SURGERY

## 2019-06-20 ENCOUNTER — APPOINTMENT (OUTPATIENT)
Dept: CT IMAGING | Facility: HOSPITAL | Age: 34
End: 2019-06-20

## 2019-06-20 DIAGNOSIS — K56.699 LARGE BOWEL STRICTURE (HCC): ICD-10-CM

## 2019-06-20 DIAGNOSIS — K52.9 COLITIS: ICD-10-CM

## 2019-06-20 DIAGNOSIS — K56.609 LARGE BOWEL OBSTRUCTION (HCC): Primary | ICD-10-CM

## 2019-06-20 LAB
ALBUMIN SERPL-MCNC: 4.5 G/DL (ref 3.5–5.2)
ALBUMIN/GLOB SERPL: 1.7 G/DL
ALP SERPL-CCNC: 121 U/L (ref 39–117)
ALT SERPL W P-5'-P-CCNC: 21 U/L (ref 1–33)
ANION GAP SERPL CALCULATED.3IONS-SCNC: 16.9 MMOL/L
AST SERPL-CCNC: 14 U/L (ref 1–32)
BACTERIA UR QL AUTO: ABNORMAL /HPF
BASOPHILS # BLD AUTO: 0.03 10*3/MM3 (ref 0–0.2)
BASOPHILS NFR BLD AUTO: 0.3 % (ref 0–1.5)
BILIRUB SERPL-MCNC: 0.6 MG/DL (ref 0.2–1.2)
BILIRUB UR QL STRIP: NEGATIVE
BUN BLD-MCNC: 11 MG/DL (ref 6–20)
BUN/CREAT SERPL: 18.6 (ref 7–25)
CALCIUM SPEC-SCNC: 9.5 MG/DL (ref 8.6–10.5)
CHLORIDE SERPL-SCNC: 99 MMOL/L (ref 98–107)
CLARITY UR: CLEAR
CO2 SERPL-SCNC: 20.1 MMOL/L (ref 22–29)
COLOR UR: ABNORMAL
CREAT BLD-MCNC: 0.59 MG/DL (ref 0.57–1)
DEPRECATED RDW RBC AUTO: 44.4 FL (ref 37–54)
EOSINOPHIL # BLD AUTO: 0.11 10*3/MM3 (ref 0–0.4)
EOSINOPHIL NFR BLD AUTO: 1 % (ref 0.3–6.2)
ERYTHROCYTE [DISTWIDTH] IN BLOOD BY AUTOMATED COUNT: 13.5 % (ref 12.3–15.4)
GFR SERPL CREATININE-BSD FRML MDRD: 115 ML/MIN/1.73
GLOBULIN UR ELPH-MCNC: 2.7 GM/DL
GLUCOSE BLD-MCNC: 287 MG/DL (ref 65–99)
GLUCOSE UR STRIP-MCNC: ABNORMAL MG/DL
HCT VFR BLD AUTO: 44.2 % (ref 34–46.6)
HGB BLD-MCNC: 14.6 G/DL (ref 12–15.9)
HGB UR QL STRIP.AUTO: NEGATIVE
HYALINE CASTS UR QL AUTO: ABNORMAL /LPF
IMM GRANULOCYTES # BLD AUTO: 0.04 10*3/MM3 (ref 0–0.05)
IMM GRANULOCYTES NFR BLD AUTO: 0.4 % (ref 0–0.5)
KETONES UR QL STRIP: ABNORMAL
LEUKOCYTE ESTERASE UR QL STRIP.AUTO: NEGATIVE
LIPASE SERPL-CCNC: 48 U/L (ref 13–60)
LYMPHOCYTES # BLD AUTO: 2.27 10*3/MM3 (ref 0.7–3.1)
LYMPHOCYTES NFR BLD AUTO: 21.5 % (ref 19.6–45.3)
MCH RBC QN AUTO: 29.9 PG (ref 26.6–33)
MCHC RBC AUTO-ENTMCNC: 33 G/DL (ref 31.5–35.7)
MCV RBC AUTO: 90.4 FL (ref 79–97)
MONOCYTES # BLD AUTO: 0.61 10*3/MM3 (ref 0.1–0.9)
MONOCYTES NFR BLD AUTO: 5.8 % (ref 5–12)
NEUTROPHILS # BLD AUTO: 7.52 10*3/MM3 (ref 1.7–7)
NEUTROPHILS NFR BLD AUTO: 71 % (ref 42.7–76)
NITRITE UR QL STRIP: NEGATIVE
NRBC BLD AUTO-RTO: 0 /100 WBC (ref 0–0.2)
PH UR STRIP.AUTO: 6 [PH] (ref 5–8)
PLATELET # BLD AUTO: 346 10*3/MM3 (ref 140–450)
PMV BLD AUTO: 9.7 FL (ref 6–12)
POTASSIUM BLD-SCNC: 3.9 MMOL/L (ref 3.5–5.2)
PROT SERPL-MCNC: 7.2 G/DL (ref 6–8.5)
PROT UR QL STRIP: ABNORMAL
RBC # BLD AUTO: 4.89 10*6/MM3 (ref 3.77–5.28)
RBC # UR: ABNORMAL /HPF
REF LAB TEST METHOD: ABNORMAL
SODIUM BLD-SCNC: 136 MMOL/L (ref 136–145)
SP GR UR STRIP: >=1.03 (ref 1–1.03)
SQUAMOUS #/AREA URNS HPF: ABNORMAL /HPF
UROBILINOGEN UR QL STRIP: ABNORMAL
WBC NRBC COR # BLD: 10.58 10*3/MM3 (ref 3.4–10.8)
WBC UR QL AUTO: ABNORMAL /HPF

## 2019-06-20 PROCEDURE — 51798 US URINE CAPACITY MEASURE: CPT

## 2019-06-20 PROCEDURE — 25810000003 SODIUM CHLORIDE 0.9 % WITH KCL 20 MEQ 20-0.9 MEQ/L-% SOLUTION: Performed by: SURGERY

## 2019-06-20 PROCEDURE — 83690 ASSAY OF LIPASE: CPT | Performed by: EMERGENCY MEDICINE

## 2019-06-20 PROCEDURE — 25010000002 KETOROLAC TROMETHAMINE PER 15 MG: Performed by: EMERGENCY MEDICINE

## 2019-06-20 PROCEDURE — 80053 COMPREHEN METABOLIC PANEL: CPT | Performed by: EMERGENCY MEDICINE

## 2019-06-20 PROCEDURE — 74018 RADEX ABDOMEN 1 VIEW: CPT

## 2019-06-20 PROCEDURE — 99222 1ST HOSP IP/OBS MODERATE 55: CPT | Performed by: SURGERY

## 2019-06-20 PROCEDURE — 85025 COMPLETE CBC W/AUTO DIFF WBC: CPT | Performed by: EMERGENCY MEDICINE

## 2019-06-20 PROCEDURE — 82962 GLUCOSE BLOOD TEST: CPT

## 2019-06-20 PROCEDURE — 25010000002 IOPAMIDOL 61 % SOLUTION: Performed by: EMERGENCY MEDICINE

## 2019-06-20 PROCEDURE — 25010000002 ONDANSETRON PER 1 MG: Performed by: EMERGENCY MEDICINE

## 2019-06-20 PROCEDURE — 25010000002 CEFTRIAXONE PER 250 MG: Performed by: EMERGENCY MEDICINE

## 2019-06-20 PROCEDURE — 81001 URINALYSIS AUTO W/SCOPE: CPT | Performed by: EMERGENCY MEDICINE

## 2019-06-20 PROCEDURE — 25010000002 HYDROMORPHONE PER 4 MG: Performed by: SURGERY

## 2019-06-20 PROCEDURE — 74177 CT ABD & PELVIS W/CONTRAST: CPT

## 2019-06-20 PROCEDURE — 25010000002 ONDANSETRON PER 1 MG: Performed by: SURGERY

## 2019-06-20 PROCEDURE — 99284 EMERGENCY DEPT VISIT MOD MDM: CPT

## 2019-06-20 RX ORDER — HYDROMORPHONE HYDROCHLORIDE 1 MG/ML
0.5 INJECTION, SOLUTION INTRAMUSCULAR; INTRAVENOUS; SUBCUTANEOUS
Status: DISCONTINUED | OUTPATIENT
Start: 2019-06-20 | End: 2019-06-21

## 2019-06-20 RX ORDER — INSULIN GLARGINE 100 [IU]/ML
5 INJECTION, SOLUTION SUBCUTANEOUS NIGHTLY
Status: ON HOLD | COMMUNITY
End: 2020-05-25 | Stop reason: SDUPTHER

## 2019-06-20 RX ORDER — FLUTICASONE PROPIONATE 50 MCG
1 SPRAY, SUSPENSION (ML) NASAL DAILY
Status: DISCONTINUED | OUTPATIENT
Start: 2019-06-21 | End: 2019-06-23 | Stop reason: HOSPADM

## 2019-06-20 RX ORDER — LEVOTHYROXINE SODIUM 0.1 MG/1
200 TABLET ORAL
Status: DISCONTINUED | OUTPATIENT
Start: 2019-06-21 | End: 2019-06-23 | Stop reason: HOSPADM

## 2019-06-20 RX ORDER — NICOTINE POLACRILEX 4 MG
15 LOZENGE BUCCAL
Status: DISCONTINUED | OUTPATIENT
Start: 2019-06-20 | End: 2019-06-23 | Stop reason: HOSPADM

## 2019-06-20 RX ORDER — FLUTICASONE PROPIONATE 50 MCG
2 SPRAY, SUSPENSION (ML) NASAL DAILY
COMMUNITY

## 2019-06-20 RX ORDER — SODIUM CHLORIDE 0.9 % (FLUSH) 0.9 %
10 SYRINGE (ML) INJECTION AS NEEDED
Status: DISCONTINUED | OUTPATIENT
Start: 2019-06-20 | End: 2019-06-23 | Stop reason: HOSPADM

## 2019-06-20 RX ORDER — CEFTRIAXONE SODIUM 1 G/50ML
1 INJECTION, SOLUTION INTRAVENOUS ONCE
Status: COMPLETED | OUTPATIENT
Start: 2019-06-20 | End: 2019-06-20

## 2019-06-20 RX ORDER — GABAPENTIN 300 MG/1
300 CAPSULE ORAL 3 TIMES DAILY
Status: DISCONTINUED | OUTPATIENT
Start: 2019-06-20 | End: 2019-06-23 | Stop reason: HOSPADM

## 2019-06-20 RX ORDER — KETOROLAC TROMETHAMINE 15 MG/ML
15 INJECTION, SOLUTION INTRAMUSCULAR; INTRAVENOUS ONCE
Status: COMPLETED | OUTPATIENT
Start: 2019-06-20 | End: 2019-06-20

## 2019-06-20 RX ORDER — ONDANSETRON 2 MG/ML
4 INJECTION INTRAMUSCULAR; INTRAVENOUS EVERY 6 HOURS PRN
Status: DISCONTINUED | OUTPATIENT
Start: 2019-06-20 | End: 2019-06-23 | Stop reason: HOSPADM

## 2019-06-20 RX ORDER — MAGNESIUM CARB/ALUMINUM HYDROX 105-160MG
150 TABLET,CHEWABLE ORAL ONCE
Status: COMPLETED | OUTPATIENT
Start: 2019-06-20 | End: 2019-06-20

## 2019-06-20 RX ORDER — HYOSCYAMINE SULFATE EXTENDED-RELEASE 0.38 MG/1
375 TABLET ORAL EVERY 12 HOURS PRN
Status: DISCONTINUED | OUTPATIENT
Start: 2019-06-20 | End: 2019-06-23 | Stop reason: HOSPADM

## 2019-06-20 RX ORDER — SODIUM CHLORIDE AND POTASSIUM CHLORIDE 150; 900 MG/100ML; MG/100ML
100 INJECTION, SOLUTION INTRAVENOUS CONTINUOUS
Status: DISCONTINUED | OUTPATIENT
Start: 2019-06-20 | End: 2019-06-23

## 2019-06-20 RX ORDER — ONDANSETRON 2 MG/ML
4 INJECTION INTRAMUSCULAR; INTRAVENOUS ONCE
Status: COMPLETED | OUTPATIENT
Start: 2019-06-20 | End: 2019-06-20

## 2019-06-20 RX ORDER — DEXTROSE MONOHYDRATE 25 G/50ML
25 INJECTION, SOLUTION INTRAVENOUS
Status: DISCONTINUED | OUTPATIENT
Start: 2019-06-20 | End: 2019-06-23 | Stop reason: HOSPADM

## 2019-06-20 RX ORDER — POLYETHYLENE GLYCOL 3350 17 G/17G
17 POWDER, FOR SOLUTION ORAL DAILY PRN
COMMUNITY
End: 2019-07-26 | Stop reason: HOSPADM

## 2019-06-20 RX ORDER — GABAPENTIN 400 MG/1
400 CAPSULE ORAL 3 TIMES DAILY
Status: ON HOLD | COMMUNITY
End: 2020-05-25 | Stop reason: SDUPTHER

## 2019-06-20 RX ADMIN — HYDROMORPHONE HYDROCHLORIDE 0.5 MG: 1 INJECTION, SOLUTION INTRAMUSCULAR; INTRAVENOUS; SUBCUTANEOUS at 23:22

## 2019-06-20 RX ADMIN — SODIUM CHLORIDE 1000 ML: 9 INJECTION, SOLUTION INTRAVENOUS at 17:32

## 2019-06-20 RX ADMIN — CEFTRIAXONE SODIUM 1 G: 1 INJECTION, SOLUTION INTRAVENOUS at 19:26

## 2019-06-20 RX ADMIN — ONDANSETRON 4 MG: 2 INJECTION INTRAMUSCULAR; INTRAVENOUS at 17:32

## 2019-06-20 RX ADMIN — KETOROLAC TROMETHAMINE 15 MG: 15 INJECTION, SOLUTION INTRAMUSCULAR; INTRAVENOUS at 17:32

## 2019-06-20 RX ADMIN — POTASSIUM CHLORIDE AND SODIUM CHLORIDE 100 ML/HR: 900; 150 INJECTION, SOLUTION INTRAVENOUS at 23:10

## 2019-06-20 RX ADMIN — METRONIDAZOLE 500 MG: 500 INJECTION, SOLUTION INTRAVENOUS at 19:57

## 2019-06-20 RX ADMIN — IOPAMIDOL 85 ML: 612 INJECTION, SOLUTION INTRAVENOUS at 18:18

## 2019-06-20 RX ADMIN — ONDANSETRON HYDROCHLORIDE 4 MG: 2 SOLUTION INTRAMUSCULAR; INTRAVENOUS at 23:22

## 2019-06-20 RX ADMIN — Medication 150 ML: at 23:10

## 2019-06-20 NOTE — ED TRIAGE NOTES
Pt supposed to be taking miralax for constipation. However, this morning pt was told that she may have a SBO. Pt c/o generalized abd pain.

## 2019-06-20 NOTE — PROGRESS NOTES
Clinical Pharmacy Services: Medication History    Edie Camacho is a 34 y.o. female presenting to [facility] for   Chief Complaint   Patient presents with   • Abdominal Pain       She  has a past medical history of Diabetes mellitus (CMS/[facility]), Disease of thyroid gland, GERD (gastroesophageal reflux disease), Kidney stone, Pregnancy, and Seasonal allergies.    Allergies as of 06/20/2019 - Reviewed 06/20/2019   Allergen Reaction Noted   • Ciprofloxacin Anaphylaxis 06/18/2016   • Sulfa antibiotics Anaphylaxis 06/18/2016   • Bactrim [sulfamethoxazole-trimethoprim] Angioedema 05/11/2019       Medication information was obtained from: Patient  Pharmacy and Phone Number: [facility] [phone]    Prior to Admission Medications     Prescriptions Last Dose Informant Patient Reported? Taking?    fluticasone (FLONASE) 50 MCG/ACT nasal spray Past Week Self Yes Yes    1 spray into the nostril(s) as directed by provider Daily.    gabapentin (NEURONTIN) 300 MG capsule 6/19/2019 Self Yes Yes    Take 300 mg by mouth 3 (Three) Times a Day.    hyoscyamine (ANASPAZ,LEVSIN) 0.125 MG tablet 6/20/2019 Self No Yes    Take 1 tablet by mouth 4 (Four) Times a Day With Meals & at Bedtime.    ibuprofen (ADVIL,MOTRIN) 600 MG tablet Past Week Self Yes Yes    Take 600 mg by mouth Every 8 (Eight) Hours As Needed.    Insulin Glargine (BASAGLAR KWIKPEN) 100 UNIT/ML injection pen 6/19/2019 Self Yes Yes    Inject 5-15 Units under the skin into the appropriate area as directed Daily.    levothyroxine (SYNTHROID, LEVOTHROID) 200 MCG tablet 6/19/2019 Self Yes Yes    Take 200 mcg by mouth Daily.    montelukast (SINGULAIR) 10 MG tablet 6/19/2019 Self Yes Yes    Take 10 mg by mouth Daily.    polyethylene glycol (MIRALAX) packet Past Week Self Yes Yes    Take 17 g by mouth Daily As Needed.    SITagliptin (JANUVIA) 100 MG tablet 6/19/2019 Self Yes Yes    Take 100 mg by mouth Daily.            Medication notes: Removed per  patient:  Cetirizine-not needed  Humalog-patient stopped 2 years ago after having her baby  Amitiza-insurance would not cover  Pantoprazole-not effective    Added: Miralax    This medication list is complete to the best of my knowledge as of 6/20/2019    Please call if questions.    Coretta Strong, Medication History Technician  6/20/2019 7:15 PM

## 2019-06-20 NOTE — ED NOTES
Patient informed of hospital admission.  Patient is a single mom and has two children at bedside.  She is attempting to locate  for hospital admission.  Notified [facility] to assist patient is obtaining care.     Sosa Lovelace RN  06/20/19 2083

## 2019-06-20 NOTE — ED PROVIDER NOTES
" EMERGENCY DEPARTMENT ENCOUNTER    CHIEF COMPLAINT  Chief Complaint: Abd pain  History given by: Patient  History limited by: None  Room Number: P684/1  PMD: Layla Monique APRN      HPI:  Pt is a 34 y.o. female who presents complaining of diffuse abd pain, she describes as \"cramping\", that began 6 weeks ago. Pt also c/o abd distention, urinary urgency, difficulty urinating, and nausea, but denies vomiting and fever. Her last BM was today, but she states her stool output has been decreased. She has been taking Miralax for chronic constipation. She was admitted for colitis on 19. Hx of DM. She denies any chance of pregnancy.    Duration: Began 6 weeks ago  Onset: Gradual  Timing: Constant  Quality: \"cramping\" pain  Location: Diffuse abd  Intensity/Severity: Moderate  Progression: Unchanged  Associated Symptoms: Abd distention, urinary urgency, difficulty urinating, nausea, and decreased stool output  Previous Episodes: Hx of chronic constipation and colitis.  Treatment before arrival: She has been taking Miralax.    PAST MEDICAL HISTORY  Active Ambulatory Problems     Diagnosis Date Noted   • Colitis presumed infectious 2019   • Type 2 diabetes mellitus, without long-term current use of insulin (CMS/[facility]) 2019   • Suprapubic pain, acute 2019   • Hyponatremia 2019   • Sepsis (CMS/[facility]) 2019   • Metabolic acidosis 2019   • Chronic constipation 2019   • Other microscopic hematuria 2019   • Lower abdominal pain 2019   • Colitis 2019     Resolved Ambulatory Problems     Diagnosis Date Noted   • Dehydration 2019     Past Medical History:   Diagnosis Date   • Diabetes mellitus (CMS/[facility])    • Disease of thyroid gland    • GERD (gastroesophageal reflux disease)    • Kidney stone    • Pregnancy    • Seasonal allergies        PAST SURGICAL HISTORY  Past Surgical History:   Procedure Laterality Date   •  SECTION     • NEPHROSTOMY TRACT DILATATION W/ " "LITHOTRIPSY         FAMILY HISTORY  History reviewed. No pertinent family history.    SOCIAL HISTORY  Social History     Socioeconomic History   • Marital status:      Spouse name: Not on file   • Number of children: Not on file   • Years of education: Not on file   • Highest education level: Not on file   Tobacco Use   • Smoking status: Current Every Day Smoker     Packs/day: 0.50     Types: Cigarettes     Start date: 1969   • Smokeless tobacco: Never Used   Substance and Sexual Activity   • Alcohol use: No   • Drug use: No       ALLERGIES  Ciprofloxacin; Sulfa antibiotics; and Bactrim [sulfamethoxazole-trimethoprim]    REVIEW OF SYSTEMS  Review of Systems   Constitutional: Negative for fever.   HENT: Negative for sore throat.    Eyes: Negative.    Respiratory: Negative for cough and shortness of breath.    Cardiovascular: Negative for chest pain.   Gastrointestinal: Positive for abdominal distention, abdominal pain (diffuse she describes as \"cramping\") and nausea. Negative for diarrhea and vomiting.        Reports decreased stool output.   Genitourinary: Positive for difficulty urinating and urgency (urinary). Negative for dysuria.   Musculoskeletal: Negative for neck pain.   Skin: Negative for rash.   Allergic/Immunologic: Negative.    Neurological: Negative for weakness, numbness and headaches.   Hematological: Negative.    Psychiatric/Behavioral: Negative.    All other systems reviewed and are negative.      PHYSICAL EXAM  ED Triage Vitals   Temp Heart Rate Resp BP SpO2   06/20/19 1635 06/20/19 1635 06/20/19 1659 06/20/19 1701 06/20/19 1635   99.4 °F (37.4 °C) (!) 144 15 141/97 96 %      Temp src Heart Rate Source Patient Position BP Location FiO2 (%)   06/20/19 1635 06/20/19 1635 06/20/19 2132 06/20/19 2132 --   Tympanic Monitor Lying Left arm          Physical Exam   Constitutional: She is oriented to person, place, and time and well-developed, well-nourished, and in no distress. No distress.   HENT: "   Head: Normocephalic and atraumatic.   Eyes: EOM are normal. Pupils are equal, round, and reactive to light.   Neck: Normal range of motion. Neck supple.   Cardiovascular: Normal rate, regular rhythm and normal heart sounds.   No murmur heard.  Pulmonary/Chest: Effort normal and breath sounds normal. No respiratory distress.   Abdominal: Soft. She exhibits distension (mild). Bowel sounds are hypoactive. There is tenderness (diffuse mild, with moderate suprapubic). There is no rebound and no guarding.   Musculoskeletal: Normal range of motion. She exhibits no edema.   Neurological: She is alert and oriented to person, place, and time. She has normal sensation and normal strength.   Skin: Skin is warm and dry. No rash noted.   Psychiatric: Mood and affect normal.   Nursing note and vitals reviewed.      LAB RESULTS  Lab Results (last 24 hours)     Procedure Component Value Units Date/Time    CBC & Differential [[id number]] Collected:  06/20/19 1716    Specimen:  Blood Updated:  06/20/19 1729    Narrative:       The following orders were created for panel order CBC & Differential.  Procedure                               Abnormality         Status                     ---------                               -----------         ------                     CBC Auto Differential[[id number]]        Abnormal            Final result                 Please view results for these tests on the individual orders.    Comprehensive Metabolic Panel [[id number]]  (Abnormal) Collected:  06/20/19 1716    Specimen:  Blood Updated:  06/20/19 1749     Glucose 287 mg/dL      BUN 11 mg/dL      Creatinine 0.59 mg/dL      Sodium 136 mmol/L      Potassium 3.9 mmol/L      Chloride 99 mmol/L      CO2 20.1 mmol/L      Calcium 9.5 mg/dL      Total Protein 7.2 g/dL      Albumin 4.50 g/dL      ALT (SGPT) 21 U/L      AST (SGOT) 14 U/L      Alkaline Phosphatase 121 U/L      Total Bilirubin 0.6 mg/dL      eGFR Non African Amer 115 mL/min/1.73       Globulin 2.7 gm/dL      A/G Ratio 1.7 g/dL      BUN/Creatinine Ratio 18.6     Anion Gap 16.9 mmol/L     Narrative:       GFR Normal >60  Chronic Kidney Disease <60  Kidney Failure <15    Lipase [[id number]]  (Normal) Collected:  06/20/19 1716    Specimen:  Blood Updated:  06/20/19 1749     Lipase 48 U/L     CBC Auto Differential [[id number]]  (Abnormal) Collected:  06/20/19 1716    Specimen:  Blood Updated:  06/20/19 1729     WBC 10.58 10*3/mm3      RBC 4.89 10*6/mm3      Hemoglobin 14.6 g/dL      Hematocrit 44.2 %      MCV 90.4 fL      MCH 29.9 pg      MCHC 33.0 g/dL      RDW 13.5 %      RDW-SD 44.4 fl      MPV 9.7 fL      Platelets 346 10*3/mm3      Neutrophil % 71.0 %      Lymphocyte % 21.5 %      Monocyte % 5.8 %      Eosinophil % 1.0 %      Basophil % 0.3 %      Immature Grans % 0.4 %      Neutrophils, Absolute 7.52 10*3/mm3      Lymphocytes, Absolute 2.27 10*3/mm3      Monocytes, Absolute 0.61 10*3/mm3      Eosinophils, Absolute 0.11 10*3/mm3      Basophils, Absolute 0.03 10*3/mm3      Immature Grans, Absolute 0.04 10*3/mm3      nRBC 0.0 /100 WBC     Urinalysis With Microscopic If Indicated (No Culture) - Urine, Clean Catch [[id number]]  (Abnormal) Collected:  06/20/19 1726    Specimen:  Urine, Clean Catch Updated:  06/20/19 1811     Color, UA Dark Yellow     Appearance, UA Clear     pH, UA 6.0     Specific Gravity, UA >=1.030     Glucose, UA >=1000 mg/dL (3+)     Ketones, UA 40 mg/dL (2+)     Bilirubin, UA Negative     Blood, UA Negative     Protein,  mg/dL (2+)     Leuk Esterase, UA Negative     Nitrite, UA Negative     Urobilinogen, UA 1.0 E.U./dL    Urinalysis, Microscopic Only - Urine, Clean Catch [[id number]]  (Abnormal) Collected:  06/20/19 1726    Specimen:  Urine, Clean Catch Updated:  06/20/19 1811     RBC, UA 0-2 /HPF      WBC, UA 3-5 /HPF      Bacteria, UA None Seen /HPF      Squamous Epithelial Cells, UA 3-6 /HPF      Hyaline Casts, UA 3-6 /LPF      Methodology Automated Microscopy          I  ordered the above labs and reviewed the results    RADIOLOGY  CT Abdomen Pelvis With Contrast   Preliminary Result   1.  Wall thickening and hyperenhancement involving a long segment of the   distal sigmoid colon and rectum with surrounding fat stranding, free   fluid and vascular engorgement involving a segment which was seen to be   inflamed on CT 05/12/2019. This area now causes a probable stricture   with at least partial upstream obstruction with dilation and air-fluid   levels within the remainder of the proximal small bowel and distal   ileum. The small bowel more proximally is decompressed. Given patient   age and stricture formation, Crohn's disease is a possible etiology;   however, this is an atypical location for presentation. Other   differential considerations include infectious colitis and correlation   with patient history is recommended.   2.  Bilateral nephrolithiasis.   3.  Probable hepatic steatosis.    4.  Other findings as above.       The above findings were discussed with Dr. Jack by telephone by Steven Ramachandran at 6:50 PM on 06/20/2019.          XR Abdomen KUB   Final Result   FINDINGS AND IMPRESSION:   There is a relative paucity of bowel gas within the visualized abdomen   which is a nonspecific finding. Findings are best evaluated on   subsequent CT abdomen and pelvis 06/20/2019 and please refer to this   dictation for further evaluation.       This report was finalized on 6/20/2019 6:30 PM by Dr. Steven Ramachandran M.D.               I ordered the above noted radiological studies. Interpreted by radiologist. Discussed with radiologist (Dr. Moreno). Reviewed by me in PACS.     PROGRESS AND CONSULTS     1708 Ordered bladder scan, CBC, UA, lipase, CMP, and XR Abd KUB for further evaluation. Ordered toradol for pain, IVF for hydration, and zofran for nausea.    1804 Ordered CT abd/pel for further evaluation.    1855 Rechecked with pt and informed her of the results of her work up. Plan to  admit. Pt understands and agrees with the plan, all questions answered.    1900 Placed call to GI and surgery for consult.    1904 Discussed pt with Dr. Gruber, GI, who will consult.    1907 Discussed pt with Dr. Arnold, surgery, who agrees to admit.    1910 Ordered rocephin and flagyl.    MEDICAL DECISION MAKING  Results were reviewed/discussed with the patient and they were also made aware of online access. Pt also made aware that some labs, such as cultures, will not be resulted during ER visit and follow up with PMD is necessary.     MDM  Number of Diagnoses or Management Options     Amount and/or Complexity of Data Reviewed  Clinical lab tests: ordered and reviewed  Tests in the radiology section of CPT®: ordered and reviewed (CT abd/pel shows worsening inflammation of the colon. There is stricture of the distal colon that is causing a partial SBO.)  Decide to obtain previous medical records or to obtain history from someone other than the patient: yes  Review and summarize past medical records: yes (In progress notes from 6/18/19 by Dr. Morfin they believed at this time pt symptoms were related to constipation, but that pt should go to ER if symptoms worsen.)    Patient Progress  Patient progress: stable         DIAGNOSIS  Final diagnoses:   Large bowel obstruction (CMS/HCC)   Colitis   Large bowel stricture (CMS/HCC)       DISPOSITION  ADMISSION TO MED SURG    Discussed treatment plan and reason for admission with pt/family and admitting physician.  Pt/family voiced understanding of the plan for admission for further testing/treatment as needed.     Latest Documented Vital Signs:  As of 10:44 PM  BP- 154/99 HR- 102 Temp- 97.5 °F (36.4 °C) (Oral) O2 sat- 97%    --  Documentation assistance provided by martinez Jack MD for Dr. Jack.  Information recorded by the martinez was done at my direction and has been verified and validated by me.     Zenia Maloney  06/20/19 1924       Blake Jack,  MD  06/20/19 3953

## 2019-06-21 LAB
ANION GAP SERPL CALCULATED.3IONS-SCNC: 14.4 MMOL/L
BASOPHILS # BLD AUTO: 0.02 10*3/MM3 (ref 0–0.2)
BASOPHILS NFR BLD AUTO: 0.2 % (ref 0–1.5)
BUN BLD-MCNC: 10 MG/DL (ref 6–20)
BUN/CREAT SERPL: 21.3 (ref 7–25)
CALCIUM SPEC-SCNC: 9.1 MG/DL (ref 8.6–10.5)
CHLORIDE SERPL-SCNC: 103 MMOL/L (ref 98–107)
CO2 SERPL-SCNC: 18.6 MMOL/L (ref 22–29)
CREAT BLD-MCNC: 0.47 MG/DL (ref 0.57–1)
DEPRECATED RDW RBC AUTO: 46.3 FL (ref 37–54)
EOSINOPHIL # BLD AUTO: 0.08 10*3/MM3 (ref 0–0.4)
EOSINOPHIL NFR BLD AUTO: 0.8 % (ref 0.3–6.2)
ERYTHROCYTE [DISTWIDTH] IN BLOOD BY AUTOMATED COUNT: 13.5 % (ref 12.3–15.4)
GFR SERPL CREATININE-BSD FRML MDRD: 150 ML/MIN/1.73
GLUCOSE BLD-MCNC: 256 MG/DL (ref 65–99)
GLUCOSE BLDC GLUCOMTR-MCNC: 158 MG/DL (ref 70–130)
GLUCOSE BLDC GLUCOMTR-MCNC: 194 MG/DL (ref 70–130)
GLUCOSE BLDC GLUCOMTR-MCNC: 238 MG/DL (ref 70–130)
GLUCOSE BLDC GLUCOMTR-MCNC: 294 MG/DL (ref 70–130)
GLUCOSE BLDC GLUCOMTR-MCNC: 294 MG/DL (ref 70–130)
HCT VFR BLD AUTO: 41.6 % (ref 34–46.6)
HGB BLD-MCNC: 13.3 G/DL (ref 12–15.9)
IMM GRANULOCYTES # BLD AUTO: 0.04 10*3/MM3 (ref 0–0.05)
IMM GRANULOCYTES NFR BLD AUTO: 0.4 % (ref 0–0.5)
LYMPHOCYTES # BLD AUTO: 2.18 10*3/MM3 (ref 0.7–3.1)
LYMPHOCYTES NFR BLD AUTO: 21.7 % (ref 19.6–45.3)
MCH RBC QN AUTO: 30.2 PG (ref 26.6–33)
MCHC RBC AUTO-ENTMCNC: 32 G/DL (ref 31.5–35.7)
MCV RBC AUTO: 94.3 FL (ref 79–97)
MONOCYTES # BLD AUTO: 0.59 10*3/MM3 (ref 0.1–0.9)
MONOCYTES NFR BLD AUTO: 5.9 % (ref 5–12)
NEUTROPHILS # BLD AUTO: 7.15 10*3/MM3 (ref 1.7–7)
NEUTROPHILS NFR BLD AUTO: 71 % (ref 42.7–76)
NRBC BLD AUTO-RTO: 0 /100 WBC (ref 0–0.2)
PLATELET # BLD AUTO: 321 10*3/MM3 (ref 140–450)
PMV BLD AUTO: 9.9 FL (ref 6–12)
POTASSIUM BLD-SCNC: 4 MMOL/L (ref 3.5–5.2)
RBC # BLD AUTO: 4.41 10*6/MM3 (ref 3.77–5.28)
SODIUM BLD-SCNC: 136 MMOL/L (ref 136–145)
WBC NRBC COR # BLD: 10.06 10*3/MM3 (ref 3.4–10.8)

## 2019-06-21 PROCEDURE — 63710000001 INSULIN LISPRO (HUMAN) PER 5 UNITS: Performed by: NURSE PRACTITIONER

## 2019-06-21 PROCEDURE — 99254 IP/OBS CNSLTJ NEW/EST MOD 60: CPT | Performed by: INTERNAL MEDICINE

## 2019-06-21 PROCEDURE — 25010000002 HYDROMORPHONE PER 4 MG: Performed by: SURGERY

## 2019-06-21 PROCEDURE — 99231 SBSQ HOSP IP/OBS SF/LOW 25: CPT | Performed by: SURGERY

## 2019-06-21 PROCEDURE — 85025 COMPLETE CBC W/AUTO DIFF WBC: CPT | Performed by: SURGERY

## 2019-06-21 PROCEDURE — 80048 BASIC METABOLIC PNL TOTAL CA: CPT | Performed by: SURGERY

## 2019-06-21 PROCEDURE — 25010000002 PROMETHAZINE PER 50 MG: Performed by: SURGERY

## 2019-06-21 PROCEDURE — 25810000003 SODIUM CHLORIDE 0.9 % WITH KCL 20 MEQ 20-0.9 MEQ/L-% SOLUTION: Performed by: SURGERY

## 2019-06-21 PROCEDURE — 25010000002 ONDANSETRON PER 1 MG: Performed by: SURGERY

## 2019-06-21 PROCEDURE — 82962 GLUCOSE BLOOD TEST: CPT

## 2019-06-21 PROCEDURE — 63710000001 INSULIN REGULAR HUMAN PER 5 UNITS: Performed by: SURGERY

## 2019-06-21 RX ORDER — HYDROMORPHONE HCL IN 0.9% NACL 10 MG/50ML
PATIENT CONTROLLED ANALGESIA SYRINGE INTRAVENOUS CONTINUOUS
Status: DISCONTINUED | OUTPATIENT
Start: 2019-06-21 | End: 2019-06-23

## 2019-06-21 RX ORDER — NALOXONE HCL 0.4 MG/ML
0.1 VIAL (ML) INJECTION
Status: DISCONTINUED | OUTPATIENT
Start: 2019-06-21 | End: 2019-06-23 | Stop reason: HOSPADM

## 2019-06-21 RX ORDER — PROMETHAZINE HYDROCHLORIDE 25 MG/ML
12.5 INJECTION, SOLUTION INTRAMUSCULAR; INTRAVENOUS EVERY 6 HOURS PRN
Status: DISCONTINUED | OUTPATIENT
Start: 2019-06-21 | End: 2019-06-23 | Stop reason: HOSPADM

## 2019-06-21 RX ADMIN — INSULIN HUMAN 6 UNITS: 100 INJECTION, SOLUTION PARENTERAL at 00:33

## 2019-06-21 RX ADMIN — HYDROMORPHONE HYDROCHLORIDE 0.5 MG: 1 INJECTION, SOLUTION INTRAMUSCULAR; INTRAVENOUS; SUBCUTANEOUS at 06:46

## 2019-06-21 RX ADMIN — INSULIN LISPRO 3 UNITS: 100 INJECTION, SOLUTION INTRAVENOUS; SUBCUTANEOUS at 17:48

## 2019-06-21 RX ADMIN — PROMETHAZINE HYDROCHLORIDE 12.5 MG: 25 INJECTION INTRAMUSCULAR; INTRAVENOUS at 16:43

## 2019-06-21 RX ADMIN — ONDANSETRON HYDROCHLORIDE 4 MG: 2 SOLUTION INTRAMUSCULAR; INTRAVENOUS at 06:08

## 2019-06-21 RX ADMIN — GABAPENTIN 300 MG: 300 CAPSULE ORAL at 21:27

## 2019-06-21 RX ADMIN — INSULIN LISPRO 5 UNITS: 100 INJECTION, SOLUTION INTRAVENOUS; SUBCUTANEOUS at 12:17

## 2019-06-21 RX ADMIN — INSULIN LISPRO 3 UNITS: 100 INJECTION, SOLUTION INTRAVENOUS; SUBCUTANEOUS at 21:27

## 2019-06-21 RX ADMIN — POTASSIUM CHLORIDE AND SODIUM CHLORIDE 100 ML/HR: 900; 150 INJECTION, SOLUTION INTRAVENOUS at 18:24

## 2019-06-21 RX ADMIN — HYDROMORPHONE HYDROCHLORIDE: 10 INJECTION, SOLUTION INTRAMUSCULAR; INTRAVENOUS; SUBCUTANEOUS at 13:12

## 2019-06-21 RX ADMIN — ONDANSETRON HYDROCHLORIDE 4 MG: 2 SOLUTION INTRAMUSCULAR; INTRAVENOUS at 12:33

## 2019-06-21 RX ADMIN — INSULIN HUMAN 6 UNITS: 100 INJECTION, SOLUTION PARENTERAL at 06:39

## 2019-06-21 RX ADMIN — POTASSIUM CHLORIDE AND SODIUM CHLORIDE 100 ML/HR: 900; 150 INJECTION, SOLUTION INTRAVENOUS at 09:46

## 2019-06-21 RX ADMIN — HYDROMORPHONE HYDROCHLORIDE 0.5 MG: 1 INJECTION, SOLUTION INTRAMUSCULAR; INTRAVENOUS; SUBCUTANEOUS at 09:06

## 2019-06-21 NOTE — PAYOR COMM NOTE
"Edie Cao (34 y.o. Female)     ATTN:   SABINO ANDINO  ID#[id number]  ICD 10 CODE=K56.609  PLEASE CALL BACK TO SAROJ [phone] OR [phone]  THANKS!   SAROJ      Date of Birth Social Security Number Address Home Phone MRN    1985  [address] [place], [place] [address] [phone] [id number]    [employer] Marital Status          [employer]        Admission Date Admission Type Admitting Provider Attending Provider Department, Room/Bed    6/20/19 Emergency Roderick Arnold MD Englund, Graham D, MD [address], 84/1    Discharge Date Discharge Disposition Discharge Destination                       Attending Provider:  Roderick Arnold MD    Allergies:  Ciprofloxacin, Sulfa Antibiotics, Bactrim [Sulfamethoxazole-trimethoprim]    Isolation:  None   Infection:  None   Code Status:  CPR    Ht:  167.6 cm (66\")   Wt:  82.2 kg (181 lb 4.8 oz)    Admission Cmt:  None   Principal Problem:  None                Active Insurance as of 6/20/2019     Primary Coverage     Payor Plan Insurance Group Employer/Plan Group    HUMANA MEDICAID HU[employer] [id number]     Payor Plan Address Payor Plan Phone Number Payor Plan Fax Number Effective Dates    PO  [phone]  11/1/2014 - None Entered    [facility] [id number]       Subscriber Name Subscriber Birth Date Member ID       EDIE CAO 1985 [id number]                 Emergency Contacts      (Rel.) Home Phone Work Phone Mobile Phone    JANET FRANCISCO ([employer]) (Relative) [phone] -- --               History & Physical      Roderick Arnold MD at 6/20/2019 10:08 PM        Cc: Abdominal pain, bloating    History of presenting illness:   This is a 34-year-old obese lady with a history of poorly controlled diabetes who was admitted around 5 weeks ago with similar complaints.  At that time she was diagnosed as having colitis based on an abnormal CT.  She was treated with " antibiotics and did seem to improve.  She was actually supposed to have an outpatient CT but began having worsening pain.  She has been having a few small bowel movements, but really has not passed any gas over the last day or so and felt that her abdominal distention was becoming such that she really could not eat.  Repeat CT scan was obtained and suggested a distal large bowel obstruction.  Etiology of this is unclear.  Pain is worse in the pelvis but it does radiate into the upper abdomen.  It is associated with nausea.  It comes in waves.    Past Medical History: Type 2 diabetes, hypothyroidism, kidney stones, gastroesophageal reflux disease    Past Surgical History: Significant for  section, lithotripsy    Medications: MiraLAX, hyoscyamine, insulin, metformin, Januvia    Allergies: Ciprofloxacin, Bactrim, sulfa    Social History: Patient denies alcohol use.  She is .  She admits to smoking about 1/2 pack of cigarettes per day.  Discussed risks of surgery with patient and in particular increased risk of wound infection, poor wound healing, hernias (with abdominal surgery) and post-operative pulmonary complications associated with smoking.     Family History: Negative for colorectal cancer    Review of Systems:  Constitutional: Positive for weakness, decreased appetite, negative for weight loss or fever  Neck: no swollen glands or dysphagia or odynophagia  Respiratory: negative for SOB, cough, hemoptysis or wheezing  Cardiovascular: negative for chest pain, palpitations or peripheral edema  Gastrointestinal: Positive for abdominal pain, bloating, nausea and constipation      Physical Exam:   Body mass index 29.2  Heart rate 102 blood pressure 154/99 temperature 97.5  General: alert and oriented, appropriate, no acute distress  Neck: Supple without lymphadenopathy or thyromegaly, trachea is in the midline  Respiratory: Lungs are clear bilaterally without wheezing, no use of accessory muscles is  noted  Cardiovascular: Regular rate and rhythm without murmur, no peripheral edema  Gastrointestinal: Distended, tender, particularly in the lower quadrants without guarding or rebound.  No hernias noted.  There is tympany, particularly on the right side.    Laboratory data: White blood cell count 10.5, no left shift.  Hemoglobin 14.6.  Chemistries look reasonable.  Glucose is elevated at 287.  Albumin 4.5.    Imaging data: CT is reviewed by me.  The distal sigmoid colon is thickened and a portion of it is distended.  There is a small amounts of fat stranding and vascular engorgement.  Beyond this area there appears to be a stricture with some tortuous distal sigmoid colon.  It is difficult to see healthy rectum.  The remainder of the more proximal colon is distended.  The small bowel appears relatively normal without evidence for small bowel obstruction.      Assessment and plan:   -Abdominal pain, probably related to at least partial large bowel obstruction  -Etiology of large bowel obstruction is not clear.  There are no significant changes suggestive of typical diverticular disease  -Patient has never had a colonoscopy, she had actually seen GI recently to be considered for this, but was not scheduled for the next couple of weeks  -Discussed with patient.  It seems likely she is going to require sigmoid resection.  The question is whether or not she can get cleared out enough to undergo at least a contrast imaging study, or possibly a flexible sigmoidoscopy.  It would be nice to know what the proximal distal extents are.  Based on the CT I think it is unlikely the entire sigmoid could be traversed.  Contrast study may be more useful.  If we can get her cleared out I think that our odds of being able to perform a safe resection with anastomosis would also improve.  The patient understands that she may fail efforts at a slow cleanout over the next couple of days and may require exploration and possible diverting  "colostomy.  -Type 2 diabetes, poorly controlled.  I am going to ask internal medicine to see her for assistance in this regard.  -Tobacco abuse, I explained to the patient the potential wound healing as well as anastomotic complications associated with continued tobacco use.  She understands that this increases her risk of anastomotic failure, ventral hernia and wound infection as well as potential cardiopulmonary issues.      Roderick Arnold MD, FACS  General, Minimally Invasive and Endoscopic Surgery  [facility]    [address], [address]  [place], [place], [address]  P: [phone]  F: [phone]           Electronically signed by Roderick Arnold MD at 6/20/2019 10:17 PM          Emergency Department Notes      Laura Colon RN at 6/20/2019  4:35 PM        Pt supposed to be taking miralax for constipation. However, this morning pt was told that she may have a SBO. Pt c/o generalized abd pain.    Electronically signed by Laura Colon RN at 6/20/2019  4:35 PM     Blake Jack MD at 6/20/2019  4:54 PM           EMERGENCY DEPARTMENT ENCOUNTER    CHIEF COMPLAINT  Chief Complaint: Abd pain  History given by: Patient  History limited by: None  Room Number: P684/1  PMD: Layal Monique, JEFE      HPI:  Pt is a 34 y.o. female who presents complaining of diffuse abd pain, she describes as \"cramping\", that began 6 weeks ago. Pt also c/o abd distention, urinary urgency, difficulty urinating, and nausea, but denies vomiting and fever. Her last BM was today, but she states her stool output has been decreased. She has been taking Miralax for chronic constipation. She was admitted for colitis on 5/12/19. Hx of DM. She denies any chance of pregnancy.    Duration: Began 6 weeks ago  Onset: Gradual  Timing: Constant  Quality: \"cramping\" pain  Location: Diffuse abd  Intensity/Severity: Moderate  Progression: Unchanged  Associated Symptoms: Abd distention, urinary urgency, difficulty urinating, " nausea, and decreased stool output  Previous Episodes: Hx of chronic constipation and colitis.  Treatment before arrival: She has been taking Miralax.    PAST MEDICAL HISTORY  Active Ambulatory Problems     Diagnosis Date Noted   • Colitis presumed infectious 2019   • Type 2 diabetes mellitus, without long-term current use of insulin (CMS/[facility]) 2019   • Suprapubic pain, acute 2019   • Hyponatremia 2019   • Sepsis (CMS/[facility]) 2019   • Metabolic acidosis 2019   • Chronic constipation 2019   • Other microscopic hematuria 2019   • Lower abdominal pain 2019   • Colitis 2019     Resolved Ambulatory Problems     Diagnosis Date Noted   • Dehydration 2019     Past Medical History:   Diagnosis Date   • Diabetes mellitus (CMS/[facility])    • Disease of thyroid gland    • GERD (gastroesophageal reflux disease)    • Kidney stone    • Pregnancy    • Seasonal allergies        PAST SURGICAL HISTORY  Past Surgical History:   Procedure Laterality Date   •  SECTION     • NEPHROSTOMY TRACT DILATATION W/ LITHOTRIPSY         FAMILY HISTORY  History reviewed. No pertinent family history.    SOCIAL HISTORY  Social History     Socioeconomic History   • Marital status:      Spouse name: Not on file   • Number of children: Not on file   • Years of education: Not on file   • Highest education level: Not on file   Tobacco Use   • Smoking status: Current Every Day Smoker     Packs/day: 0.50     Types: Cigarettes     Start date:    • Smokeless tobacco: Never Used   Substance and Sexual Activity   • Alcohol use: No   • Drug use: No       ALLERGIES  Ciprofloxacin; Sulfa antibiotics; and Bactrim [sulfamethoxazole-trimethoprim]    REVIEW OF SYSTEMS  Review of Systems   Constitutional: Negative for fever.   HENT: Negative for sore throat.    Eyes: Negative.    Respiratory: Negative for cough and shortness of breath.    Cardiovascular: Negative for chest pain.  "  Gastrointestinal: Positive for abdominal distention, abdominal pain (diffuse she describes as \"cramping\") and nausea. Negative for diarrhea and vomiting.        Reports decreased stool output.   Genitourinary: Positive for difficulty urinating and urgency (urinary). Negative for dysuria.   Musculoskeletal: Negative for neck pain.   Skin: Negative for rash.   Allergic/Immunologic: Negative.    Neurological: Negative for weakness, numbness and headaches.   Hematological: Negative.    Psychiatric/Behavioral: Negative.    All other systems reviewed and are negative.      PHYSICAL EXAM  ED Triage Vitals   Temp Heart Rate Resp BP SpO2   06/20/19 1635 06/20/19 1635 06/20/19 1659 06/20/19 1701 06/20/19 1635   99.4 °F (37.4 °C) (!) 144 15 141/97 96 %      Temp src Heart Rate Source Patient Position BP Location FiO2 (%)   06/20/19 1635 06/20/19 1635 06/20/19 2132 06/20/19 2132 --   Tympanic Monitor Lying Left arm          Physical Exam   Constitutional: She is oriented to person, place, and time and well-developed, well-nourished, and in no distress. No distress.   HENT:   Head: Normocephalic and atraumatic.   Eyes: EOM are normal. Pupils are equal, round, and reactive to light.   Neck: Normal range of motion. Neck supple.   Cardiovascular: Normal rate, regular rhythm and normal heart sounds.   No murmur heard.  Pulmonary/Chest: Effort normal and breath sounds normal. No respiratory distress.   Abdominal: Soft. She exhibits distension (mild). Bowel sounds are hypoactive. There is tenderness (diffuse mild, with moderate suprapubic). There is no rebound and no guarding.   Musculoskeletal: Normal range of motion. She exhibits no edema.   Neurological: She is alert and oriented to person, place, and time. She has normal sensation and normal strength.   Skin: Skin is warm and dry. No rash noted.   Psychiatric: Mood and affect normal.   Nursing note and vitals reviewed.      LAB RESULTS  Lab Results (last 24 hours)     Procedure " Component Value Units Date/Time    CBC & Differential [[id number]] Collected:  06/20/19 1716    Specimen:  Blood Updated:  06/20/19 1729    Narrative:       The following orders were created for panel order CBC & Differential.  Procedure                               Abnormality         Status                     ---------                               -----------         ------                     CBC Auto Differential[[id number]]        Abnormal            Final result                 Please view results for these tests on the individual orders.    Comprehensive Metabolic Panel [[id number]]  (Abnormal) Collected:  06/20/19 1716    Specimen:  Blood Updated:  06/20/19 1749     Glucose 287 mg/dL      BUN 11 mg/dL      Creatinine 0.59 mg/dL      Sodium 136 mmol/L      Potassium 3.9 mmol/L      Chloride 99 mmol/L      CO2 20.1 mmol/L      Calcium 9.5 mg/dL      Total Protein 7.2 g/dL      Albumin 4.50 g/dL      ALT (SGPT) 21 U/L      AST (SGOT) 14 U/L      Alkaline Phosphatase 121 U/L      Total Bilirubin 0.6 mg/dL      eGFR Non African Amer 115 mL/min/1.73      Globulin 2.7 gm/dL      A/G Ratio 1.7 g/dL      BUN/Creatinine Ratio 18.6     Anion Gap 16.9 mmol/L     Narrative:       GFR Normal >60  Chronic Kidney Disease <60  Kidney Failure <15    Lipase [[id number]]  (Normal) Collected:  06/20/19 1716    Specimen:  Blood Updated:  06/20/19 1749     Lipase 48 U/L     CBC Auto Differential [[id number]]  (Abnormal) Collected:  06/20/19 1716    Specimen:  Blood Updated:  06/20/19 1729     WBC 10.58 10*3/mm3      RBC 4.89 10*6/mm3      Hemoglobin 14.6 g/dL      Hematocrit 44.2 %      MCV 90.4 fL      MCH 29.9 pg      MCHC 33.0 g/dL      RDW 13.5 %      RDW-SD 44.4 fl      MPV 9.7 fL      Platelets 346 10*3/mm3      Neutrophil % 71.0 %      Lymphocyte % 21.5 %      Monocyte % 5.8 %      Eosinophil % 1.0 %      Basophil % 0.3 %      Immature Grans % 0.4 %      Neutrophils, Absolute 7.52 10*3/mm3      Lymphocytes, Absolute 2.27  10*3/mm3      Monocytes, Absolute 0.61 10*3/mm3      Eosinophils, Absolute 0.11 10*3/mm3      Basophils, Absolute 0.03 10*3/mm3      Immature Grans, Absolute 0.04 10*3/mm3      nRBC 0.0 /100 WBC     Urinalysis With Microscopic If Indicated (No Culture) - Urine, Clean Catch [[id number]]  (Abnormal) Collected:  06/20/19 1726    Specimen:  Urine, Clean Catch Updated:  06/20/19 1811     Color, UA Dark Yellow     Appearance, UA Clear     pH, UA 6.0     Specific Gravity, UA >=1.030     Glucose, UA >=1000 mg/dL (3+)     Ketones, UA 40 mg/dL (2+)     Bilirubin, UA Negative     Blood, UA Negative     Protein,  mg/dL (2+)     Leuk Esterase, UA Negative     Nitrite, UA Negative     Urobilinogen, UA 1.0 E.U./dL    Urinalysis, Microscopic Only - Urine, Clean Catch [[id number]]  (Abnormal) Collected:  06/20/19 1726    Specimen:  Urine, Clean Catch Updated:  06/20/19 1811     RBC, UA 0-2 /HPF      WBC, UA 3-5 /HPF      Bacteria, UA None Seen /HPF      Squamous Epithelial Cells, UA 3-6 /HPF      Hyaline Casts, UA 3-6 /LPF      Methodology Automated Microscopy          I ordered the above labs and reviewed the results    RADIOLOGY  CT Abdomen Pelvis With Contrast   Preliminary Result   1.  Wall thickening and hyperenhancement involving a long segment of the   distal sigmoid colon and rectum with surrounding fat stranding, free   fluid and vascular engorgement involving a segment which was seen to be   inflamed on CT 05/12/2019. This area now causes a probable stricture   with at least partial upstream obstruction with dilation and air-fluid   levels within the remainder of the proximal small bowel and distal   ileum. The small bowel more proximally is decompressed. Given patient   age and stricture formation, Crohn's disease is a possible etiology;   however, this is an atypical location for presentation. Other   differential considerations include infectious colitis and correlation   with patient history is recommended.   2.   Bilateral nephrolithiasis.   3.  Probable hepatic steatosis.    4.  Other findings as above.       The above findings were discussed with Dr. Jack by telephone by Steven Ramachandran at 6:50 PM on 06/20/2019.          XR Abdomen KUB   Final Result   FINDINGS AND IMPRESSION:   There is a relative paucity of bowel gas within the visualized abdomen   which is a nonspecific finding. Findings are best evaluated on   subsequent CT abdomen and pelvis 06/20/2019 and please refer to this   dictation for further evaluation.       This report was finalized on 6/20/2019 6:30 PM by Dr. Steven Ramachandran M.D.               I ordered the above noted radiological studies. Interpreted by radiologist. Discussed with radiologist (Dr. Moreno). Reviewed by me in PACS.     PROGRESS AND CONSULTS     1708 Ordered bladder scan, CBC, UA, lipase, CMP, and XR Abd KUB for further evaluation. Ordered toradol for pain, IVF for hydration, and zofran for nausea.    1804 Ordered CT abd/pel for further evaluation.    1855 Rechecked with pt and informed her of the results of her work up. Plan to admit. Pt understands and agrees with the plan, all questions answered.    1900 Placed call to GI and surgery for consult.    1904 Discussed pt with Dr. Gruber, GI, who will consult.    1907 Discussed pt with Dr. Arnold, surgery, who agrees to admit.    1910 Ordered rocephin and flagyl.    MEDICAL DECISION MAKING  Results were reviewed/discussed with the patient and they were also made aware of online access. Pt also made aware that some labs, such as cultures, will not be resulted during ER visit and follow up with PMD is necessary.     MDM  Number of Diagnoses or Management Options     Amount and/or Complexity of Data Reviewed  Clinical lab tests: ordered and reviewed  Tests in the radiology section of CPT®:  ordered and reviewed (CT abd/pel shows worsening inflammation of the colon. There is stricture of the distal colon that is causing a partial SBO.)  Decide  "to obtain previous medical records or to obtain history from someone other than the patient: yes  Review and summarize past medical records: yes (In progress notes from 6/18/19 by Dr. Morfin they believed at this time pt symptoms were related to constipation, but that pt should go to ER if symptoms worsen.)    Patient Progress  Patient progress: stable         DIAGNOSIS  Final diagnoses:   Large bowel obstruction (CMS/HCC)   Colitis   Large bowel stricture (CMS/HCC)       DISPOSITION  ADMISSION TO [facility]    Discussed treatment plan and reason for admission with pt/family and admitting physician.  Pt/family voiced understanding of the plan for admission for further testing/treatment as needed.     Latest Documented Vital Signs:  As of 10:44 PM  BP- 154/99 HR- 102 Temp- 97.5 °F (36.4 °C) (Oral) O2 sat- 97%    --  Documentation assistance provided by martinez Jack MD for Dr. Jack.  Information recorded by the martinez was done at my direction and has been verified and validated by me.     Zenia Maloney  06/20/19 1924       Blake Jack MD  06/20/19 2244      Electronically signed by Blake Jack MD at 6/20/2019 10:44 PM     Sosa Lovelace, RN at 6/20/2019  7:13 PM        Patient informed of hospital admission.  Patient is a single mom and has two children at bedside.  She is attempting to locate  for hospital admission.  Notified Debbie MIRANDA to assist patient is obtaining care.     Sosa Lovelace RN  06/20/19 1913      Electronically signed by Sosa Lovelace, RN at 6/20/2019  7:13 PM       ICU Vital Signs     Row Name 06/21/19 0901 06/21/19 0605 06/21/19 0528 06/21/19 0126 06/20/19 2132       Height and Weight    Height  --  --  --  --  167.6 cm (66\")    Height Method  --  --  --  --  Stated    Weight  --  --  --  --  82.2 kg (181 lb 4.8 oz)    Weight Method  --  --  --  --  Bed scale    Ideal Body Weight (IBW) (kg)  --  --  --  --  59.58    BSA (Calculated - sq m)  --  --  --  --  1.92 sq " "meters    BMI (Calculated)  --  --  --  --  29.3    Weight in (lb) to have BMI = 25  --  --  --  --  154.6       Vitals    Temp  98.3 °F (36.8 °C)  --  97.4 °F (36.3 °C)  97.2 °F (36.2 °C)  97.5 °F (36.4 °C)    Temp src  Oral  --  Oral  Oral  Oral    Pulse  85  --  92  84  102    Heart Rate Source  Monitor  --  Monitor  Monitor  Monitor    Resp  18  --  18 18 18    Resp Rate Source  --  --  Visual  Visual  Visual    BP  142/95  152/106  (Abnormal)   142/103  (Abnormal)   137/86  154/99    BP Location  Left arm  Right arm  Left arm  Right arm  Left arm    BP Method  Automatic  Automatic  Automatic  Automatic  Automatic    Patient Position  Sitting  Sitting  Lying  Lying  Lying       Oxygen Therapy    SpO2  97 %  --  94 %  94 %  97 %    Pulse Oximetry Type  Intermittent  --  Intermittent  Intermittent  Intermittent    Device (Oxygen Therapy)  room air  --  room air  room air  room air    Row Name 06/20/19 2003 06/20/19 1928 06/20/19 1830 06/20/19 1745 06/20/19 17:01:31       Vitals    Pulse  102  90  93  96  109    Heart Rate Source  --  --  --  --  Monitor    Resp  --  --  --  --  15    BP  137/89  --  --  --  141/97    Noninvasive MAP (mmHg)  114  --  --  --  --       Oxygen Therapy    SpO2  95 %  96 %  94 %  97 %  --    Row Name 06/20/19 1659 06/20/19 1635                Height and Weight    Height  --  167.6 cm (66\")       Height Method  --  Stated       Weight  80.3 kg (177 lb)  --       Ideal Body Weight (IBW) (kg)  --  59.58       Weight in (lb) to have BMI = 25  --  154.6          Vitals    Temp  98.5 °F (36.9 °C)  99.4 °F (37.4 °C)       Temp src  Oral  Tympanic       Pulse  118  144  (Abnormal)        Heart Rate Source  Monitor  Monitor       Resp  15  --          Oxygen Therapy    SpO2  --  96 %       Device (Oxygen Therapy)  --  room air           Intake & Output (last day)       06/20 0701 - 06/21 0700 06/21 0701 - 06/22 0700    P.O. 150 0    I.V. (mL/kg) 683.3 (8.3)     IV Piggyback 50     Total " Intake(mL/kg) 883.3 (10.7) 0 (0)    Urine (mL/kg/hr) 650 400 (1.1)    Emesis/NG output 200 50    Total Output 850 450    Net +33.3 -450          Emesis Unmeasured Occurrence 1 x         Hospital Medications (active)       Dose Frequency Start End    cefTRIAXone (ROCEPHIN) IVPB 1 g 1 g Once 6/20/2019 6/20/2019    Sig - Route: Infuse 50 mL into a venous catheter 1 (One) Time. - Intravenous    dextrose (D50W) 25 g/ 50mL Intravenous Solution 25 g 25 g Every 15 Minutes PRN 6/20/2019     Sig - Route: Infuse 50 mL into a venous catheter Every 15 (Fifteen) Minutes As Needed for Low Blood Sugar (Blood Sugar Less Than 70). - Intravenous    dextrose (GLUTOSE) oral gel 15 g 15 g Every 15 Minutes PRN 6/20/2019     Sig - Route: Take 15 application by mouth Every 15 (Fifteen) Minutes As Needed for Low Blood Sugar (Blood sugar less than 70). - Oral    fluticasone (FLONASE) 50 MCG/ACT nasal spray 1 spray 1 spray Daily 6/21/2019     Sig - Route: 1 spray into the nostril(s) as directed by provider Daily. - Nasal    gabapentin (NEURONTIN) capsule 300 mg 300 mg 3 Times Daily 6/20/2019     Sig - Route: Take 1 capsule by mouth 3 (Three) Times a Day. - Oral    glucagon (human recombinant) (GLUCAGEN DIAGNOSTIC) injection 1 mg 1 mg As Needed 6/20/2019     Sig - Route: Inject 1 mg under the skin into the appropriate area as directed As Needed for Low Blood Sugar (Blood Glucose Less Than 70). - Subcutaneous    HYDROmorphone (DILAUDID) injection 0.5 mg 0.5 mg Every 2 Hours PRN 6/20/2019 6/30/2019    Sig - Route: Infuse 0.5 mL into a venous catheter Every 2 (Two) Hours As Needed for Severe Pain . - Intravenous    hyoscyamine (LEVBID) 12 hr tablet 375 mcg 375 mcg Every 12 Hours PRN 6/20/2019     Sig - Route: Take 1 tablet by mouth Every 12 (Twelve) Hours As Needed for Cramping. - Oral    insulin lispro (humaLOG) injection 0-14 Units 0-14 Units 4 Times Daily With Meals & Nightly 6/21/2019     Sig - Route: Inject 0-14 Units under the skin into the  "appropriate area as directed 4 (Four) Times a Day With Meals & at Bedtime. - Subcutaneous    iopamidol (ISOVUE-300) 61 % injection 100 mL 100 mL Once in Imaging 6/20/2019 6/20/2019    Sig - Route: Infuse 100 mL into a venous catheter Once. - Intravenous    ketorolac (TORADOL) injection 15 mg 15 mg Once 6/20/2019 6/20/2019    Sig - Route: Infuse 1 mL into a venous catheter 1 (One) Time. - Intravenous    levothyroxine (SYNTHROID, LEVOTHROID) tablet 200 mcg 200 mcg Every Early Morning 6/21/2019     Sig - Route: Take 2 tablets by mouth Every Morning. - Oral    magnesium citrate solution 150 mL 150 mL Once 6/20/2019 6/20/2019    Sig - Route: Take 150 mL by mouth 1 (One) Time. - Oral    metroNIDAZOLE (FLAGYL) IVPB 500 mg 500 mg Once 6/20/2019 6/20/2019    Sig - Route: Infuse 100 mL into a venous catheter 1 (One) Time. - Intravenous    ondansetron (ZOFRAN) injection 4 mg 4 mg Once 6/20/2019 6/20/2019    Sig - Route: Infuse 2 mL into a venous catheter 1 (One) Time. - Intravenous    ondansetron (ZOFRAN) injection 4 mg 4 mg Every 6 Hours PRN 6/20/2019     Sig - Route: Infuse 2 mL into a venous catheter Every 6 (Six) Hours As Needed for Nausea or Vomiting. - Intravenous    sodium chloride 0.9 % bolus 1,000 mL 1,000 mL Once 6/20/2019 6/20/2019    Sig - Route: Infuse 1,000 mL into a venous catheter 1 (One) Time. - Intravenous    sodium chloride 0.9 % flush 10 mL 10 mL As Needed 6/20/2019     Sig - Route: Infuse 10 mL into a venous catheter As Needed for Line Care. - Intravenous    Linked Group 1:  \"And\" Linked Group Details        sodium chloride 0.9 % with KCl 20 mEq/L infusion 100 mL/hr Continuous 6/20/2019     Sig - Route: Infuse 100 mL/hr into a venous catheter Continuous. - Intravenous    insulin regular (humuLIN R,novoLIN R) injection 0-9 Units (Discontinued) 0-9 Units Every 6 Hours Scheduled 6/21/2019 6/21/2019    Sig - Route: Inject 0-9 Units under the skin into the appropriate area as directed Every 6 (Six) Hours. - " Subcutaneous            Physician Progress Notes (last 48 hours) (Notes from 6/19/2019 11:21 AM through 6/21/2019 11:21 AM)     No notes of this type exist for this encounter.           Consult Notes (last 48 hours) (Notes from 6/19/2019 11:21 AM through 6/21/2019 11:21 AM)      Radha Trujillo APRN at 6/21/2019  9:01 AM              Patient Name:  Edie Camacho  YOB: 1985  MRN:  [id number]  Admit Date:  6/20/2019  Patient Care Team:  Layla Monique APRN as PCP - General (Family Medicine)      Subjective   History Present Illness     Chief Complaint   Patient presents with   • Abdominal Pain     HPI  Ms. Camacho is a 34 y.o. current 1/2 PPD smoker with a history of diabetes, hypothyroidism, GERD and seasonal allergies who presents to [facility] complaining of abdominal pain.  CT showed a distal large bowel obstruction and she was admitted for surgical evaluation.  Will likely require sigmoid resection but needs to be cleaned out prior to.  We have been consulted to manage her diabetes while inpatient.  Patient states she is on 15 units of Lantus at night, Januvia and metformin.  She denies annual foot and eye exams and does not check her blood sugars regularly.  Her last A1c in May was 9.8.  Due to abdominal pain over the past month and a half she has had a decreased appetite and has lost about 20 pounds.  At the time of my interview she was in too much pain and severely nauseated to answer any further questions.    Review of Systems   Constitutional: Positive for appetite change and unexpected weight change (states she has lost 20 pounds in 6 weeks).   HENT: Negative for congestion and dental problem.    Eyes: Negative.    Respiratory: Negative for chest tightness and shortness of breath.    Cardiovascular: Negative for chest pain, palpitations and leg swelling.   Gastrointestinal: Positive for abdominal distention, abdominal pain, nausea and vomiting.   Endocrine:  Negative.    Genitourinary: Negative for difficulty urinating and dysuria.   Musculoskeletal: Negative for back pain and gait problem.   Skin: Positive for pallor. Negative for color change.   Allergic/Immunologic: Negative for environmental allergies and food allergies.   Neurological: Negative for dizziness and headaches.   Hematological: Negative for adenopathy. Does not bruise/bleed easily.   Psychiatric/Behavioral: Negative for agitation and behavioral problems.        Personal History     Past Medical History:   Diagnosis Date   • Diabetes mellitus (CMS/HCC)    • Disease of thyroid gland    • GERD (gastroesophageal reflux disease)    • Kidney stone    • Pregnancy    • Seasonal allergies      Past Surgical History:   Procedure Laterality Date   •  SECTION     • NEPHROSTOMY TRACT DILATATION W/ LITHOTRIPSY       History reviewed. No pertinent family history.  Social History     Tobacco Use   • Smoking status: Current Every Day Smoker     Packs/day: 0.50     Types: Cigarettes     Start date:    • Smokeless tobacco: Never Used   Substance Use Topics   • Alcohol use: No   • Drug use: No     Medications Prior to Admission   Medication Sig Dispense Refill Last Dose   • fluticasone (FLONASE) 50 MCG/ACT nasal spray 1 spray into the nostril(s) as directed by provider Daily.   Past Week at Unknown time   • gabapentin (NEURONTIN) 300 MG capsule Take 300 mg by mouth 3 (Three) Times a Day.   2019 at Unknown time   • hyoscyamine (ANASPAZ,LEVSIN) 0.125 MG tablet Take 1 tablet by mouth 4 (Four) Times a Day With Meals & at Bedtime. 120 tablet 5 2019 at am   • ibuprofen (ADVIL,MOTRIN) 600 MG tablet Take 600 mg by mouth Every 8 (Eight) Hours As Needed.   Past Week at Unknown time   • Insulin Glargine (BASAGLAR KWIKPEN) 100 UNIT/ML injection pen Inject 5-15 Units under the skin into the appropriate area as directed Daily.   2019 at Unknown time   • levothyroxine (SYNTHROID, LEVOTHROID) 200 MCG tablet Take  200 mcg by mouth Daily.   6/19/2019 at Unknown time   • montelukast (SINGULAIR) 10 MG tablet Take 10 mg by mouth Daily.   6/19/2019 at Unknown time   • polyethylene glycol (MIRALAX) packet Take 17 g by mouth Daily As Needed.   Past Week at Unknown time   • SITagliptin (JANUVIA) 100 MG tablet Take 100 mg by mouth Daily.   6/19/2019 at Unknown time     Allergies:    Allergies   Allergen Reactions   • Ciprofloxacin Anaphylaxis   • Sulfa Antibiotics Anaphylaxis   • Bactrim [Sulfamethoxazole-Trimethoprim] Angioedema       Objective    Objective     Vital Signs  Temp:  [97.2 °F (36.2 °C)-99.4 °F (37.4 °C)] 98.3 °F (36.8 °C)  Heart Rate:  [] 85  Resp:  [15-18] 18  BP: (137-154)/() 142/95  SpO2:  [94 %-97 %] 97 %  on   ;   Device (Oxygen Therapy): room air  Body mass index is 29.26 kg/m².    Physical Exam   Constitutional: She is oriented to person, place, and time. She appears toxic. She has a sickly appearance. She appears distressed.   HENT:   Head: Normocephalic and atraumatic.   Eyes: Conjunctivae are normal. Pupils are equal, round, and reactive to light.   Neck: Normal range of motion. Neck supple.   Cardiovascular: Normal rate and regular rhythm.   Pulmonary/Chest: Effort normal and breath sounds normal. No respiratory distress.   Neurological: She is alert and oriented to person, place, and time.   Skin: Skin is warm and dry.   Psychiatric: Judgment and thought content normal.   in great amount of pain (abdominal)   Nursing note and vitals reviewed.      Results Review:  I reviewed the patient's new clinical results.  I reviewed the patient's new imaging results and agree with the interpretation.  I reviewed the patient's other test results and agree with the interpretation  I personally viewed and interpreted the patient's EKG/Telemetry data  Discussed with ED provider.    Lab Results (last 24 hours)     Procedure Component Value Units Date/Time    CBC & Differential [[id number]] Collected:  06/20/19  1716    Specimen:  Blood Updated:  06/20/19 1729    Narrative:       The following orders were created for panel order CBC & Differential.  Procedure                               Abnormality         Status                     ---------                               -----------         ------                     CBC Auto Differential[[id number]]        Abnormal            Final result                 Please view results for these tests on the individual orders.    Comprehensive Metabolic Panel [[id number]]  (Abnormal) Collected:  06/20/19 1716    Specimen:  Blood Updated:  06/20/19 1749     Glucose 287 mg/dL      BUN 11 mg/dL      Creatinine 0.59 mg/dL      Sodium 136 mmol/L      Potassium 3.9 mmol/L      Chloride 99 mmol/L      CO2 20.1 mmol/L      Calcium 9.5 mg/dL      Total Protein 7.2 g/dL      Albumin 4.50 g/dL      ALT (SGPT) 21 U/L      AST (SGOT) 14 U/L      Alkaline Phosphatase 121 U/L      Total Bilirubin 0.6 mg/dL      eGFR Non African Amer 115 mL/min/1.73      Globulin 2.7 gm/dL      A/G Ratio 1.7 g/dL      BUN/Creatinine Ratio 18.6     Anion Gap 16.9 mmol/L     Narrative:       GFR Normal >60  Chronic Kidney Disease <60  Kidney Failure <15    Lipase [[id number]]  (Normal) Collected:  06/20/19 1716    Specimen:  Blood Updated:  06/20/19 1749     Lipase 48 U/L     CBC Auto Differential [[id number]]  (Abnormal) Collected:  06/20/19 1716    Specimen:  Blood Updated:  06/20/19 1729     WBC 10.58 10*3/mm3      RBC 4.89 10*6/mm3      Hemoglobin 14.6 g/dL      Hematocrit 44.2 %      MCV 90.4 fL      MCH 29.9 pg      MCHC 33.0 g/dL      RDW 13.5 %      RDW-SD 44.4 fl      MPV 9.7 fL      Platelets 346 10*3/mm3      Neutrophil % 71.0 %      Lymphocyte % 21.5 %      Monocyte % 5.8 %      Eosinophil % 1.0 %      Basophil % 0.3 %      Immature Grans % 0.4 %      Neutrophils, Absolute 7.52 10*3/mm3      Lymphocytes, Absolute 2.27 10*3/mm3      Monocytes, Absolute 0.61 10*3/mm3      Eosinophils, Absolute 0.11 10*3/mm3       Basophils, Absolute 0.03 10*3/mm3      Immature Grans, Absolute 0.04 10*3/mm3      nRBC 0.0 /100 WBC     Urinalysis With Microscopic If Indicated (No Culture) - Urine, Clean Catch [[id number]]  (Abnormal) Collected:  06/20/19 1726    Specimen:  Urine, Clean Catch Updated:  06/20/19 1811     Color, UA Dark Yellow     Appearance, UA Clear     pH, UA 6.0     Specific Gravity, UA >=1.030     Glucose, UA >=1000 mg/dL (3+)     Ketones, UA 40 mg/dL (2+)     Bilirubin, UA Negative     Blood, UA Negative     Protein,  mg/dL (2+)     Leuk Esterase, UA Negative     Nitrite, UA Negative     Urobilinogen, UA 1.0 E.U./dL    Urinalysis, Microscopic Only - Urine, Clean Catch [[id number]]  (Abnormal) Collected:  06/20/19 1726    Specimen:  Urine, Clean Catch Updated:  06/20/19 1811     RBC, UA 0-2 /HPF      WBC, UA 3-5 /HPF      Bacteria, UA None Seen /HPF      Squamous Epithelial Cells, UA 3-6 /HPF      Hyaline Casts, UA 3-6 /LPF      Methodology Automated Microscopy    POC Glucose Once [[id number]]  (Abnormal) Collected:  06/20/19 2359    Specimen:  Blood Updated:  06/21/19 0014     Glucose 294 mg/dL     POC Glucose Once [[id number]]  (Abnormal) Collected:  06/21/19 0604    Specimen:  Blood Updated:  06/21/19 0610     Glucose 294 mg/dL     CBC & Differential [[id number]] Collected:  06/21/19 0613    Specimen:  Blood Updated:  06/21/19 0713    Narrative:       The following orders were created for panel order CBC & Differential.  Procedure                               Abnormality         Status                     ---------                               -----------         ------                     CBC Auto Differential[[id number]]        Abnormal            Final result                 Please view results for these tests on the individual orders.    Basic Metabolic Panel [[id number]]  (Abnormal) Collected:  06/21/19 0613    Specimen:  Blood Updated:  06/21/19 0753     Glucose 256 mg/dL      BUN 10 mg/dL      Creatinine 0.47  mg/dL      Sodium 136 mmol/L      Potassium 4.0 mmol/L      Chloride 103 mmol/L      CO2 18.6 mmol/L      Calcium 9.1 mg/dL      eGFR Non African Amer 150 mL/min/1.73      BUN/Creatinine Ratio 21.3     Anion Gap 14.4 mmol/L     Narrative:       GFR Normal >60  Chronic Kidney Disease <60  Kidney Failure <15    CBC Auto Differential [[id number]]  (Abnormal) Collected:  06/21/19 0613    Specimen:  Blood Updated:  06/21/19 0713     WBC 10.06 10*3/mm3      RBC 4.41 10*6/mm3      Hemoglobin 13.3 g/dL      Hematocrit 41.6 %      MCV 94.3 fL      MCH 30.2 pg      MCHC 32.0 g/dL      RDW 13.5 %      RDW-SD 46.3 fl      MPV 9.9 fL      Platelets 321 10*3/mm3      Neutrophil % 71.0 %      Lymphocyte % 21.7 %      Monocyte % 5.9 %      Eosinophil % 0.8 %      Basophil % 0.2 %      Immature Grans % 0.4 %      Neutrophils, Absolute 7.15 10*3/mm3      Lymphocytes, Absolute 2.18 10*3/mm3      Monocytes, Absolute 0.59 10*3/mm3      Eosinophils, Absolute 0.08 10*3/mm3      Basophils, Absolute 0.02 10*3/mm3      Immature Grans, Absolute 0.04 10*3/mm3      nRBC 0.0 /100 WBC           Imaging Results (last 24 hours)     Procedure Component Value Units Date/Time    CT Abdomen Pelvis With Contrast [[id number]] Collected:  06/20/19 1911     Updated:  06/21/19 0547    Narrative:       CT ABDOMEN AND PELVIS WITH IV CONTRAST     HISTORY: Increasing lower abdominal pain     TECHNIQUE: Radiation dose reduction techniques were utilized, including  automated exposure control and exposure modulation based on body size.   3 mm images were obtained through the abdomen and pelvis after the  administration of IV contrast.      COMPARISON: CT abdomen and pelvis 05/12/2019     FINDINGS:     The proximal small bowel is decompressed. There is wall thickening and  hyperenhancement involving the distal sigmoid colon and rectum with  surrounding fat stranding and vascular engorgement. The involved bowel  segment is severely decompressed with dilation and  air-fluid levels  involving the upstream large bowel through the cecum. There is also  minimal relative distention of the distal ileum with associated  air-fluid levels. This involved segment of the sigmoid colon and rectum  is the same segment of inflamed bowel seen on 05/12/2019.     The appendix is unremarkable.     Probable hepatic stenosis is present.     The gallbladder, pancreas, spleen and adrenal glands have an  unremarkable postcontrast CT appearance. Bilateral nephrolithiasis is  present and measures up to 0.6 cm on the left and 0.4 to 0.5 cm on the  right. There is no hydronephrosis. Subcentimeter hypodense lesion within  the right kidney is too small to characterize.      There is no abdominopelvic adenopathy by size criteria.     There is no free intraperitoneal air. Small amount of free peritoneal  fluid layers within the pelvis.     No suspicious lytic or blastic osseous lesions are visualized. Moderate  degenerative disc narrowing is present at L5-S1 with prominent posterior  disc osteophyte complex. This can be further evaluated with MRI if  clinically indicated.     The bladder is decompressed and not well evaluated.       Impression:       1.  Wall thickening and hyperenhancement involving a long segment of the  distal sigmoid colon and rectum with surrounding fat stranding, free  fluid and vascular engorgement involving a segment which was seen to be  inflamed on CT 05/12/2019. This area now causes a probable stricture  with at least partial upstream obstruction with dilation and air-fluid  levels within the remainder of the proximal large bowel and distal  ileum. The proximal small bowel is decompressed. Given patient age and  stricture formation, Crohn's disease is a possible etiology; however,  this is an atypical location for initial presentation. Other  differential considerations include infectious colitis and correlation  with patient history is recommended. Ischemic colitis is less  likely  given the involvement of the rectum.  2.  Bilateral nephrolithiasis.  3.  Probable hepatic steatosis.   4.  Other findings as above.     The above findings were discussed with Dr. Jack by telephone by Steven Ramachandran at 6:50 PM on 06/20/2019.     This report was finalized on 6/21/2019 5:44 AM by Dr. Steven Ramachandran M.D.       XR Abdomen KUB [[id number]] Collected:  06/20/19 1829     Updated:  06/20/19 1833    Narrative:       Abdominal radiographs     HISTORY:Constipation     TECHNIQUE:  2 AP supine radiographs of the abdomen     COMPARISON:CT abdomen and pelvis 6/20/2019 and 5/12/2019       Impression:       FINDINGS AND IMPRESSION:  There is a relative paucity of bowel gas within the visualized abdomen  which is a nonspecific finding. Findings are best evaluated on  subsequent CT abdomen and pelvis 06/20/2019 and please refer to this  dictation for further evaluation.     This report was finalized on 6/20/2019 6:30 PM by Dr. Steven Ramachandran M.D.                  No orders to display       Assessment/Plan     Active Hospital Problems    Diagnosis POA   • Large bowel obstruction (CMS/HCC) [K56.609] Yes   • Type 2 diabetes mellitus (CMS/HCC) [E11.9] Unknown     · Type 2 diabetes-hold oral hypoglycemics while inpatient as well as long-acting insulin.  Increase correctional factor insulin to moderate-high dose.  Follow hypoglycemia protocol.  NCS diet when appropriate.  · Large bowel obstruction-surgery following.  She has not been able to tolerate any p.o. intake.  Might require NGT for bowel prep.  · Thank you for asking A to be involved in this patient's care.  We will follow along.  I have discussed with patient and nursing staff.    VTE Prophylaxis - SCDs .  Code Status - Full code.       JEFE Wray  [facility]  06/21/19  9:02 AM        Electronically signed by Radha Trujillo APRN at 6/21/2019  9:18 AM     Samuel Stringer MD at 6/21/2019  8:10 AM       Consult Orders    1. Gastroenterology (on-call MD unless specified) [[id number]] ordered by Blake Jack MD at 19 1900                [facility]  Initial Inpatient Consult Note    Referring Provider: Dr Jack    Reason for Consultation: Bowel obstuction    Subjective     History of present illness:    34 y.o. female known to our service, recently seen in office for hospital f/u     Patient was discharged from [facility] on 2019.  Patient was admitted for pelvic pain and CT showed colitis.  She was started on Zosyn and seen by GI service.  She had also been struggling w/ constipation. Colitis likely was not infectious but could have been secondary to obstipation.  Antibiotics were stopped.  She was discharged home with Amitiza.  Recommendations were for follow-up in the outpatient setting for colonoscopy in 6 to 8 weeks.    Over last week patient has had increasing lower abdominal pain, predominately LLQ.  She presented to ER last night c/o this pain, and CT scan shows what appears to be element of colonic obstruction.  There is significant wall thickening involving long segment of sigmoid colon with proximal dilation of bowel.  The patient has been admitted to general surgery team. There is consideration for possible surgical intervention.  She was given some magnesium citrate o/n in attempt to further purge the colon prior to surgery, but has not tolerated this well.  She vomiting the magnesium almost immediately and has been having intermittent dry heaving and retching through out the night.  She has had no further BMs since admission.        Past Medical History:  Past Medical History:   Diagnosis Date   • Diabetes mellitus (CMS/HCC)    • Disease of thyroid gland    • GERD (gastroesophageal reflux disease)    • Kidney stone    • Pregnancy    • Seasonal allergies      Past Surgical History:  Past Surgical History:   Procedure Laterality Date   •   SECTION     • NEPHROSTOMY TRACT DILATATION W/ LITHOTRIPSY        Social History:   Social History     Tobacco Use   • Smoking status: Current Every Day Smoker     Packs/day: 0.50     Types: Cigarettes     Start date: 1969   • Smokeless tobacco: Never Used   Substance Use Topics   • Alcohol use: No      Family History:  History reviewed. No pertinent family history.    Home Meds:  Medications Prior to Admission   Medication Sig Dispense Refill Last Dose   • fluticasone (FLONASE) 50 MCG/ACT nasal spray 1 spray into the nostril(s) as directed by provider Daily.   Past Week at Unknown time   • gabapentin (NEURONTIN) 300 MG capsule Take 300 mg by mouth 3 (Three) Times a Day.   6/19/2019 at Unknown time   • hyoscyamine (ANASPAZ,LEVSIN) 0.125 MG tablet Take 1 tablet by mouth 4 (Four) Times a Day With Meals & at Bedtime. 120 tablet 5 6/20/2019 at am   • ibuprofen (ADVIL,MOTRIN) 600 MG tablet Take 600 mg by mouth Every 8 (Eight) Hours As Needed.   Past Week at Unknown time   • Insulin Glargine (BASAGLAR KWIKPEN) 100 UNIT/ML injection pen Inject 5-15 Units under the skin into the appropriate area as directed Daily.   6/19/2019 at Unknown time   • levothyroxine (SYNTHROID, LEVOTHROID) 200 MCG tablet Take 200 mcg by mouth Daily.   6/19/2019 at Unknown time   • montelukast (SINGULAIR) 10 MG tablet Take 10 mg by mouth Daily.   6/19/2019 at Unknown time   • polyethylene glycol (MIRALAX) packet Take 17 g by mouth Daily As Needed.   Past Week at Unknown time   • SITagliptin (JANUVIA) 100 MG tablet Take 100 mg by mouth Daily.   6/19/2019 at Unknown time     Current Meds:     fluticasone 1 spray Nasal Daily   gabapentin 300 mg Oral TID   insulin regular 0-9 Units Subcutaneous Q6H   levothyroxine 200 mcg Oral Q AM     Allergies:  Allergies   Allergen Reactions   • Ciprofloxacin Anaphylaxis   • Sulfa Antibiotics Anaphylaxis   • Bactrim [Sulfamethoxazole-Trimethoprim] Angioedema     Review of Systems  All systems were reviewed and  negative except for:  Constitution:  positive for anorexia and fatigue  Gastrointestinal: positive for  constipation, nausea, pain and vomiting     Objective     Vital Signs  Temp:  [97.2 °F (36.2 °C)-99.4 °F (37.4 °C)] 97.4 °F (36.3 °C)  Heart Rate:  [] 92  Resp:  [15-18] 18  BP: (137-154)/() 152/106  Physical Exam:  General Appearance:    Alert, cooperative, ill appearing   Head:    Normocephalic, without obvious abnormality, atraumatic   Eyes:            Lids and lashes normal, conjunctivae and sclerae normal, no   icterus   Throat:   No oral lesions, no thrush, oral mucosa moist   Neck:   No adenopathy, supple, trachea midline, no thyromegaly, no   carotid bruit, no JVD   Lungs:     Clear to auscultation,respirations regular, even and                   unlabored    Heart:    Regular rhythm and normal rate, normal S1 and S2, no            murmur, no gallop, no rub, no click   Chest Wall:    No abnormalities observed   Abdomen:     Mild distention, decrease BS, TTP LLQ   Rectal:     Deferred   Extremities:   no edema, no cyanosis, no redness   Skin:   No bleeding, bruising or rash   Lymph nodes:   No palpable adenopathy   Psychiatric:  Judgement and insight: normal   Orientation to person place and time: normal   Mood and affect: normal   Results Review:   I reviewed the patient's new clinical results.  I reviewed the patient's new imaging results and agree with the interpretation.    Results from last 7 days   Lab Units 06/21/19  0613 06/20/19  1716 06/18/19  1125   WBC 10*3/mm3 10.06 10.58 7.94   HEMOGLOBIN g/dL 13.3 14.6 13.9   HEMATOCRIT % 41.6 44.2 44.1   PLATELETS 10*3/mm3 321 346 323     Results from last 7 days   Lab Units 06/21/19  0613 06/20/19  1716 06/18/19  1125   SODIUM mmol/L 136 136 137   POTASSIUM mmol/L 4.0 3.9 4.9   CHLORIDE mmol/L 103 99 99   TOTAL CO2 mmol/L  --   --  24.4   CO2 mmol/L 18.6* 20.1*  --    BUN mg/dL 10 11 13   CREATININE mg/dL 0.47* 0.59 0.68   CALCIUM mg/dL 9.1 9.5  9.7   BILIRUBIN mg/dL  --  0.6 0.5   ALK PHOS U/L  --  121* 120*   ALT (SGPT) U/L  --  21 17   AST (SGOT) U/L  --  14 12   GLUCOSE mg/dL 256* 287*  --          Lab Results   Lab Value Date/Time    LIPASE 48 06/20/2019 1716    LIPASE 47 05/12/2019 0046    LIPASE 37 06/18/2016 0255       Radiology:  CT Abdomen Pelvis With Contrast   Final Result   1.  Wall thickening and hyperenhancement involving a long segment of the   distal sigmoid colon and rectum with surrounding fat stranding, free   fluid and vascular engorgement involving a segment which was seen to be   inflamed on CT 05/12/2019. This area now causes a probable stricture   with at least partial upstream obstruction with dilation and air-fluid   levels within the remainder of the proximal large bowel and distal   ileum. The proximal small bowel is decompressed. Given patient age and   stricture formation, Crohn's disease is a possible etiology; however,   this is an atypical location for initial presentation. Other   differential considerations include infectious colitis and correlation   with patient history is recommended. Ischemic colitis is less likely   given the involvement of the rectum.   2.  Bilateral nephrolithiasis.   3.  Probable hepatic steatosis.    4.  Other findings as above.       The above findings were discussed with Dr. Jack by telephone by Steven Ramachandran at 6:50 PM on 06/20/2019.       This report was finalized on 6/21/2019 5:44 AM by Dr. Steven Ramachandran M.D.          XR Abdomen KUB   Final Result   FINDINGS AND IMPRESSION:   There is a relative paucity of bowel gas within the visualized abdomen   which is a nonspecific finding. Findings are best evaluated on   subsequent CT abdomen and pelvis 06/20/2019 and please refer to this   dictation for further evaluation.       This report was finalized on 6/20/2019 6:30 PM by Dr. Steven Ramachandran M.D.              Assessment/Plan   Assessment:   1.  Abdominal pain  2.  Abnormal CT scan of  colon  3.  Sigmoid stricture/stenosis    Plan:   Patient has had progression of the changes in the sigmoid colon first noted last month to the point of what appears to be at least a partial colonic obstruction.  The etiology for this remains unclear.  She has not tolerated attempts at oral bowel prep very well overnight. I will discuss with Dr Arnold whether gastrografin enema would be of any benefit.  It would appear that she is going to require surgical intervention in the near future.      I discussed the patients findings and my recommendations with patient.         Samuel Stringer M.D.  [facility], [place], operated by [facility]  [address] [place] [address]  Office: [phone]      Electronically signed by Samuel Stringer MD at 6/21/2019  9:52 AM

## 2019-06-21 NOTE — PROGRESS NOTES
Discharge Planning Assessment  [facility]     Patient Name: Edie Camacho  MRN: [id number]  Today's Date: 6/21/2019    Admit Date: 6/20/2019    Discharge Needs Assessment     Row Name 06/21/19 1151       Living Environment    Lives With  child(inez), dependent    Current Living Arrangements  home/apartment/condo    Primary Care Provided by  self    Provides Primary Care For  child(inez)    Quality of Family Relationships  helpful    Able to Return to Prior Arrangements  yes       Resource/Environmental Concerns    Resource/Environmental Concerns  home accessibility    Home Accessibility Concerns  stairs to enter home 3 steps with no handrails to enter the single story home    Transportation Concerns  car, none       Transition Planning    Patient/Family Anticipates Transition to  home    Patient/Family Anticipated Services at Transition  none    Transportation Anticipated  family or friend will provide       Discharge Needs Assessment    Concerns to be Addressed  denies needs/concerns at this time    Equipment Currently Used at Home  glucometer    Anticipated Changes Related to Illness  none    Equipment Needed After Discharge  none    Offered/Gave Vendor List  yes    Current Discharge Risk  physical impairment        Discharge Plan     Row Name 06/21/19 4461       Plan    Plan  Home; denies needs.  Follow to see if the patient is d/c on Humalog or her home insulin.     Patient/Family in Agreement with Plan  yes    Plan Comments  Met with the patient at bedside; explained role of CCP, verified facesheet and discussed discharge planning needs.  The patient plans to return home upon d/c where she lives with her 2 sons who her aunt has while she is hospitalized.  The patient confirms that her aunt can keep her children while she is in the hospital.  The patient uses no DME, has 3 steps with no handrails to enter the single story home.  PCP is Layla Monique, pharmacy is [facility] in [place] and  the patient denies any trouble remembering to take her medication or with affording her medication.  The patient denies any HH/SNF history, was provided with a HH/SNF list, denies any POA documents and states that she may drive herself home if she is not d/c on any pain meds.  If so, she will have someone drive her home upon d/c.  The patient states that she was not on Humalog but is on Basaglar.    The patient denies any d/c needs and CCP will follow to see if the patient is d/c on Humalog or her home insulin.  CHIDI Jacobsen        Destination      No service coordination in this encounter.      Durable Medical Equipment      No service coordination in this encounter.      Dialysis/Infusion      No service coordination in this encounter.      Home Medical Care      No service coordination in this encounter.      Therapy      No service coordination in this encounter.      Community Resources      No service coordination in this encounter.          Demographic Summary     Row Name 06/21/19 1151       General Information    Admission Type  inpatient    Arrived From  home    Referral Source  admission list    Reason for Consult  discharge planning    Preferred Language  English     Used During This Interaction  no        Functional Status     Row Name 06/21/19 1151       Functional Status    Usual Activity Tolerance  good    Current Activity Tolerance  moderate       Functional Status, IADL    Medications  independent    Meal Preparation  independent    Housekeeping  independent    Laundry  independent    Shopping  independent       Mental Status    General Appearance WDL  WDL       Mental Status Summary    Recent Changes in Mental Status/Cognitive Functioning  no changes        Psychosocial    No documentation.       Abuse/Neglect    No documentation.       Legal    No documentation.       Substance Abuse    No documentation.       Patient Forms     Row Name 06/21/19 1159       Patient Forms     Provider Choice List  Delivered    Delivered to  Patient    Method of delivery  In person            CHIDI Lebron

## 2019-06-21 NOTE — PROGRESS NOTES
Chief complaint: Large bowel obstruction suspected stricture    Subjective:  Patient feels no better.  She has thrown up several times this morning.  Pain is cyclical and persistent.  No bowel function.    Objective:  Patient is afebrile, heart rate 85 blood pressure 142/95 oxygenation 97% room air  General: Appears uncomfortable and ill, appropriate  Eyes: Extraocular movements are intact, no scleral icterus  Abdomen: Soft but tender, particularly in the lower abdomen.  No guarding or rebound.  No hernia.  Mildly distended, some tympany, particularly on the right    Labs are reviewed.  White blood cell count 10.0.  Hemoglobin 13.3.  Renal function is preserved.    Assessment and plan:  -Large bowel obstruction, suspected to be from stricture in the distal sigmoid.  Etiology of the stricture is not clear.  Cancer seems unlikely to me but is difficult to rule out.  I had hoped we would be able to do a slow prep on her, but it seems at this point that is not likely.  I think she is going to require earlier surgical management.  Obviously doing this with unprepped bowel is less than ideal and certainly increases her risk for the need of colostomy or of an anastomotic leak.  I explained in detail to the patient that I do not think that medical management will improve her.  I am going to try to convince her to take a nasogastric tube and try to switch her pain medications around as well.  I am tentatively planning for sigmoid resection tomorrow.  We will have to determine at that time whether colostomy would be necessary.    Roderick Arnold MD  General and Endoscopic Surgery  [facility]    [address], [address]  [place], [place], [address]  P: [phone]  F: [phone]

## 2019-06-21 NOTE — CONSULTS
[facility] ([facility]  Initial Inpatient Consult Note    Referring Provider: Dr Jack    Reason for Consultation: Bowel obstuction    Subjective     History of present illness:    34 y.o. female known to our service, recently seen in office for hospital f/u     Patient was discharged from [facility] on 2019.  Patient was admitted for pelvic pain and CT showed colitis.  She was started on Zosyn and seen by GI service.  She had also been struggling w/ constipation. Colitis likely was not infectious but could have been secondary to obstipation.  Antibiotics were stopped.  She was discharged home with Amitiza.  Recommendations were for follow-up in the outpatient setting for colonoscopy in 6 to 8 weeks.    Over last week patient has had increasing lower abdominal pain, predominately LLQ.  She presented to ER last night c/o this pain, and CT scan shows what appears to be element of colonic obstruction.  There is significant wall thickening involving long segment of sigmoid colon with proximal dilation of bowel.  The patient has been admitted to general surgery team. There is consideration for possible surgical intervention.  She was given some magnesium citrate o/n in attempt to further purge the colon prior to surgery, but has not tolerated this well.  She vomiting the magnesium almost immediately and has been having intermittent dry heaving and retching through out the night.  She has had no further BMs since admission.        Past Medical History:  Past Medical History:   Diagnosis Date   • Diabetes mellitus (CMS/HCC)    • Disease of thyroid gland    • GERD (gastroesophageal reflux disease)    • Kidney stone    • Pregnancy    • Seasonal allergies      Past Surgical History:  Past Surgical History:   Procedure Laterality Date   •  SECTION     • NEPHROSTOMY TRACT DILATATION W/ LITHOTRIPSY        Social History:   Social History     Tobacco Use   • Smoking status: Current Every  Day Smoker     Packs/day: 0.50     Types: Cigarettes     Start date: 1969   • Smokeless tobacco: Never Used   Substance Use Topics   • Alcohol use: No      Family History:  History reviewed. No pertinent family history.    Home Meds:  Medications Prior to Admission   Medication Sig Dispense Refill Last Dose   • fluticasone (FLONASE) 50 MCG/ACT nasal spray 1 spray into the nostril(s) as directed by provider Daily.   Past Week at Unknown time   • gabapentin (NEURONTIN) 300 MG capsule Take 300 mg by mouth 3 (Three) Times a Day.   6/19/2019 at Unknown time   • hyoscyamine (ANASPAZ,LEVSIN) 0.125 MG tablet Take 1 tablet by mouth 4 (Four) Times a Day With Meals & at Bedtime. 120 tablet 5 6/20/2019 at am   • ibuprofen (ADVIL,MOTRIN) 600 MG tablet Take 600 mg by mouth Every 8 (Eight) Hours As Needed.   Past Week at Unknown time   • Insulin Glargine (BASAGLAR KWIKPEN) 100 UNIT/ML injection pen Inject 5-15 Units under the skin into the appropriate area as directed Daily.   6/19/2019 at Unknown time   • levothyroxine (SYNTHROID, LEVOTHROID) 200 MCG tablet Take 200 mcg by mouth Daily.   6/19/2019 at Unknown time   • montelukast (SINGULAIR) 10 MG tablet Take 10 mg by mouth Daily.   6/19/2019 at Unknown time   • polyethylene glycol (MIRALAX) packet Take 17 g by mouth Daily As Needed.   Past Week at Unknown time   • SITagliptin (JANUVIA) 100 MG tablet Take 100 mg by mouth Daily.   6/19/2019 at Unknown time     Current Meds:     fluticasone 1 spray Nasal Daily   gabapentin 300 mg Oral TID   insulin regular 0-9 Units Subcutaneous Q6H   levothyroxine 200 mcg Oral Q AM     Allergies:  Allergies   Allergen Reactions   • Ciprofloxacin Anaphylaxis   • Sulfa Antibiotics Anaphylaxis   • Bactrim [Sulfamethoxazole-Trimethoprim] Angioedema     Review of Systems  All systems were reviewed and negative except for:  Constitution:  positive for anorexia and fatigue  Gastrointestinal: positive for  constipation, nausea, pain and vomiting      Objective     Vital Signs  Temp:  [97.2 °F (36.2 °C)-99.4 °F (37.4 °C)] 97.4 °F (36.3 °C)  Heart Rate:  [] 92  Resp:  [15-18] 18  BP: (137-154)/() 152/106  Physical Exam:  General Appearance:    Alert, cooperative, ill appearing   Head:    Normocephalic, without obvious abnormality, atraumatic   Eyes:            Lids and lashes normal, conjunctivae and sclerae normal, no   icterus   Throat:   No oral lesions, no thrush, oral mucosa moist   Neck:   No adenopathy, supple, trachea midline, no thyromegaly, no   carotid bruit, no JVD   Lungs:     Clear to auscultation,respirations regular, even and                   unlabored    Heart:    Regular rhythm and normal rate, normal S1 and S2, no            murmur, no gallop, no rub, no click   Chest Wall:    No abnormalities observed   Abdomen:     Mild distention, decrease BS, TTP LLQ   Rectal:     Deferred   Extremities:   no edema, no cyanosis, no redness   Skin:   No bleeding, bruising or rash   Lymph nodes:   No palpable adenopathy   Psychiatric:  Judgement and insight: normal   Orientation to person place and time: normal   Mood and affect: normal   Results Review:   I reviewed the patient's new clinical results.  I reviewed the patient's new imaging results and agree with the interpretation.    Results from last 7 days   Lab Units 06/21/19  0613 06/20/19  1716 06/18/19  1125   WBC 10*3/mm3 10.06 10.58 7.94   HEMOGLOBIN g/dL 13.3 14.6 13.9   HEMATOCRIT % 41.6 44.2 44.1   PLATELETS 10*3/mm3 321 346 323     Results from last 7 days   Lab Units 06/21/19  0613 06/20/19  1716 06/18/19  1125   SODIUM mmol/L 136 136 137   POTASSIUM mmol/L 4.0 3.9 4.9   CHLORIDE mmol/L 103 99 99   TOTAL CO2 mmol/L  --   --  24.4   CO2 mmol/L 18.6* 20.1*  --    BUN mg/dL 10 11 13   CREATININE mg/dL 0.47* 0.59 0.68   CALCIUM mg/dL 9.1 9.5 9.7   BILIRUBIN mg/dL  --  0.6 0.5   ALK PHOS U/L  --  121* 120*   ALT (SGPT) U/L  --  21 17   AST (SGOT) U/L  --  14 12   GLUCOSE mg/dL 256*  287*  --          Lab Results   Lab Value Date/Time    LIPASE 48 06/20/2019 1716    LIPASE 47 05/12/2019 0046    LIPASE 37 06/18/2016 0255       Radiology:  CT Abdomen Pelvis With Contrast   Final Result   1.  Wall thickening and hyperenhancement involving a long segment of the   distal sigmoid colon and rectum with surrounding fat stranding, free   fluid and vascular engorgement involving a segment which was seen to be   inflamed on CT 05/12/2019. This area now causes a probable stricture   with at least partial upstream obstruction with dilation and air-fluid   levels within the remainder of the proximal large bowel and distal   ileum. The proximal small bowel is decompressed. Given patient age and   stricture formation, Crohn's disease is a possible etiology; however,   this is an atypical location for initial presentation. Other   differential considerations include infectious colitis and correlation   with patient history is recommended. Ischemic colitis is less likely   given the involvement of the rectum.   2.  Bilateral nephrolithiasis.   3.  Probable hepatic steatosis.    4.  Other findings as above.       The above findings were discussed with Dr. Jack by telephone by Steven Ramachandran at 6:50 PM on 06/20/2019.       This report was finalized on 6/21/2019 5:44 AM by Dr. Steven Ramachandran M.D.          XR Abdomen KUB   Final Result   FINDINGS AND IMPRESSION:   There is a relative paucity of bowel gas within the visualized abdomen   which is a nonspecific finding. Findings are best evaluated on   subsequent CT abdomen and pelvis 06/20/2019 and please refer to this   dictation for further evaluation.       This report was finalized on 6/20/2019 6:30 PM by Dr. Steven Ramachandran M.D.              Assessment/Plan   Assessment:   1.  Abdominal pain  2.  Abnormal CT scan of colon  3.  Sigmoid stricture/stenosis    Plan:   Patient has had progression of the changes in the sigmoid colon first noted last month to the  point of what appears to be at least a partial colonic obstruction.  The etiology for this remains unclear.  She has not tolerated attempts at oral bowel prep very well overnight. I will discuss with Dr Arnold whether gastrografin enema would be of any benefit.  It would appear that she is going to require surgical intervention in the near future.      I discussed the patients findings and my recommendations with patient.         Samuel Stringer M.D.  [facility]  [address] [place] [address]  Office: [phone]

## 2019-06-21 NOTE — PLAN OF CARE
Problem: Patient Care Overview  Goal: Plan of Care Review  Outcome: Ongoing (interventions implemented as appropriate)   06/21/19 9544   Coping/Psychosocial   Plan of Care Reviewed With patient   Plan of Care Review   Progress no change   OTHER   Outcome Summary from ER. c/o lower abd (suprapubic) pain, episodic and comes in waves. also nausea and small amt emesis. med x1 with IV dilaudid and Zofran. IV fluids infusing. sipping water. 150 cc citrate of magnesia given. glucose 294 at midnight and SSI provided. up with assist to bathroom.      Goal: Individualization and Mutuality  Outcome: Ongoing (interventions implemented as appropriate)    Goal: Discharge Needs Assessment  Outcome: Ongoing (interventions implemented as appropriate)    Goal: Interprofessional Rounds/Family Conf  Outcome: Ongoing (interventions implemented as appropriate)      Problem: Bowel Obstruction (Adult)  Goal: Signs and Symptoms of Listed Potential Problems Will be Absent, Minimized or Managed (Bowel Obstruction)  Outcome: Ongoing (interventions implemented as appropriate)

## 2019-06-21 NOTE — H&P
Cc: Abdominal pain, bloating    History of presenting illness:   This is a 34-year-old obese lady with a history of poorly controlled diabetes who was admitted around 5 weeks ago with similar complaints.  At that time she was diagnosed as having colitis based on an abnormal CT.  She was treated with antibiotics and did seem to improve.  She was actually supposed to have an outpatient CT but began having worsening pain.  She has been having a few small bowel movements, but really has not passed any gas over the last day or so and felt that her abdominal distention was becoming such that she really could not eat.  Repeat CT scan was obtained and suggested a distal large bowel obstruction.  Etiology of this is unclear.  Pain is worse in the pelvis but it does radiate into the upper abdomen.  It is associated with nausea.  It comes in waves.    Past Medical History: Type 2 diabetes, hypothyroidism, kidney stones, gastroesophageal reflux disease    Past Surgical History: Significant for  section, lithotripsy    Medications: MiraLAX, hyoscyamine, insulin, metformin, Januvia    Allergies: Ciprofloxacin, Bactrim, sulfa    Social History: Patient denies alcohol use.  She is .  She admits to smoking about 1/2 pack of cigarettes per day.  Discussed risks of surgery with patient and in particular increased risk of wound infection, poor wound healing, hernias (with abdominal surgery) and post-operative pulmonary complications associated with smoking.     Family History: Negative for colorectal cancer    Review of Systems:  Constitutional: Positive for weakness, decreased appetite, negative for weight loss or fever  Neck: no swollen glands or dysphagia or odynophagia  Respiratory: negative for SOB, cough, hemoptysis or wheezing  Cardiovascular: negative for chest pain, palpitations or peripheral edema  Gastrointestinal: Positive for abdominal pain, bloating, nausea and constipation      Physical Exam:   Body mass  index 29.2  Heart rate 102 blood pressure 154/99 temperature 97.5  General: alert and oriented, appropriate, no acute distress  Neck: Supple without lymphadenopathy or thyromegaly, trachea is in the midline  Respiratory: Lungs are clear bilaterally without wheezing, no use of accessory muscles is noted  Cardiovascular: Regular rate and rhythm without murmur, no peripheral edema  Gastrointestinal: Distended, tender, particularly in the lower quadrants without guarding or rebound.  No hernias noted.  There is tympany, particularly on the right side.    Laboratory data: White blood cell count 10.5, no left shift.  Hemoglobin 14.6.  Chemistries look reasonable.  Glucose is elevated at 287.  Albumin 4.5.    Imaging data: CT is reviewed by me.  The distal sigmoid colon is thickened and a portion of it is distended.  There is a small amounts of fat stranding and vascular engorgement.  Beyond this area there appears to be a stricture with some tortuous distal sigmoid colon.  It is difficult to see healthy rectum.  The remainder of the more proximal colon is distended.  The small bowel appears relatively normal without evidence for small bowel obstruction.      Assessment and plan:   -Abdominal pain, probably related to at least partial large bowel obstruction  -Etiology of large bowel obstruction is not clear.  There are no significant changes suggestive of typical diverticular disease  -Patient has never had a colonoscopy, she had actually seen GI recently to be considered for this, but was not scheduled for the next couple of weeks  -Discussed with patient.  It seems likely she is going to require sigmoid resection.  The question is whether or not she can get cleared out enough to undergo at least a contrast imaging study, or possibly a flexible sigmoidoscopy.  It would be nice to know what the proximal distal extents are.  Based on the CT I think it is unlikely the entire sigmoid could be traversed.  Contrast study may  be more useful.  If we can get her cleared out I think that our odds of being able to perform a safe resection with anastomosis would also improve.  The patient understands that she may fail efforts at a slow cleanout over the next couple of days and may require exploration and possible diverting colostomy.  -Type 2 diabetes, poorly controlled.  I am going to ask internal medicine to see her for assistance in this regard.  -Tobacco abuse, I explained to the patient the potential wound healing as well as anastomotic complications associated with continued tobacco use.  She understands that this increases her risk of anastomotic failure, ventral hernia and wound infection as well as potential cardiopulmonary issues.      Roderick Arnold MD, FACS  General, Minimally Invasive and Endoscopic Surgery  [facility]    [address], [address]  [place], [place], [address]  P: [phone]  F: [phone]

## 2019-06-21 NOTE — CONSULTS
Patient Name:  Edie Camacho  YOB: 1985  MRN:  [id number]  Admit Date:  6/20/2019  Patient Care Team:  Layla Monique APRN as PCP - General (Family Medicine)      Subjective   History Present Illness     Chief Complaint   Patient presents with   • Abdominal Pain     HPI  Ms. Camacho is a 34 y.o. current 1/2 PPD smoker with a history of diabetes, hypothyroidism, GERD and seasonal allergies who presents to [facility] complaining of abdominal pain.  CT showed a distal large bowel obstruction and she was admitted for surgical evaluation.  Will likely require sigmoid resection but needs to be cleaned out prior to.  We have been consulted to manage her diabetes while inpatient.  Patient states she is on 15 units of Lantus at night, Januvia and metformin.  She denies annual foot and eye exams and does not check her blood sugars regularly.  Her last A1c in May was 9.8.  Due to abdominal pain over the past month and a half she has had a decreased appetite and has lost about 20 pounds.  At the time of my interview she was in too much pain and severely nauseated to answer any further questions.    Review of Systems   Constitutional: Positive for appetite change and unexpected weight change (states she has lost 20 pounds in 6 weeks).   HENT: Negative for congestion and dental problem.    Eyes: Negative.    Respiratory: Negative for chest tightness and shortness of breath.    Cardiovascular: Negative for chest pain, palpitations and leg swelling.   Gastrointestinal: Positive for abdominal distention, abdominal pain, nausea and vomiting.   Endocrine: Negative.    Genitourinary: Negative for difficulty urinating and dysuria.   Musculoskeletal: Negative for back pain and gait problem.   Skin: Positive for pallor. Negative for color change.   Allergic/Immunologic: Negative for environmental allergies and food allergies.   Neurological: Negative for dizziness and headaches.   Hematological:  Negative for adenopathy. Does not bruise/bleed easily.   Psychiatric/Behavioral: Negative for agitation and behavioral problems.        Personal History     Past Medical History:   Diagnosis Date   • Diabetes mellitus (CMS/HCC)    • Disease of thyroid gland    • GERD (gastroesophageal reflux disease)    • Kidney stone    • Pregnancy    • Seasonal allergies      Past Surgical History:   Procedure Laterality Date   •  SECTION     • NEPHROSTOMY TRACT DILATATION W/ LITHOTRIPSY       History reviewed. No pertinent family history.  Social History     Tobacco Use   • Smoking status: Current Every Day Smoker     Packs/day: 0.50     Types: Cigarettes     Start date:    • Smokeless tobacco: Never Used   Substance Use Topics   • Alcohol use: No   • Drug use: No     Medications Prior to Admission   Medication Sig Dispense Refill Last Dose   • fluticasone (FLONASE) 50 MCG/ACT nasal spray 1 spray into the nostril(s) as directed by provider Daily.   Past Week at Unknown time   • gabapentin (NEURONTIN) 300 MG capsule Take 300 mg by mouth 3 (Three) Times a Day.   2019 at Unknown time   • hyoscyamine (ANASPAZ,LEVSIN) 0.125 MG tablet Take 1 tablet by mouth 4 (Four) Times a Day With Meals & at Bedtime. 120 tablet 5 2019 at am   • ibuprofen (ADVIL,MOTRIN) 600 MG tablet Take 600 mg by mouth Every 8 (Eight) Hours As Needed.   Past Week at Unknown time   • Insulin Glargine (BASAGLAR KWIKPEN) 100 UNIT/ML injection pen Inject 5-15 Units under the skin into the appropriate area as directed Daily.   2019 at Unknown time   • levothyroxine (SYNTHROID, LEVOTHROID) 200 MCG tablet Take 200 mcg by mouth Daily.   2019 at Unknown time   • montelukast (SINGULAIR) 10 MG tablet Take 10 mg by mouth Daily.   2019 at Unknown time   • polyethylene glycol (MIRALAX) packet Take 17 g by mouth Daily As Needed.   Past Week at Unknown time   • SITagliptin (JANUVIA) 100 MG tablet Take 100 mg by mouth Daily.   2019 at  Unknown time     Allergies:    Allergies   Allergen Reactions   • Ciprofloxacin Anaphylaxis   • Sulfa Antibiotics Anaphylaxis   • Bactrim [Sulfamethoxazole-Trimethoprim] Angioedema       Objective    Objective     Vital Signs  Temp:  [97.2 °F (36.2 °C)-99.4 °F (37.4 °C)] 98.3 °F (36.8 °C)  Heart Rate:  [] 85  Resp:  [15-18] 18  BP: (137-154)/() 142/95  SpO2:  [94 %-97 %] 97 %  on   ;   Device (Oxygen Therapy): room air  Body mass index is 29.26 kg/m².    Physical Exam   Constitutional: She is oriented to person, place, and time. She appears toxic. She has a sickly appearance. She appears distressed.   HENT:   Head: Normocephalic and atraumatic.   Eyes: Conjunctivae are normal. Pupils are equal, round, and reactive to light.   Neck: Normal range of motion. Neck supple.   Cardiovascular: Normal rate and regular rhythm.   Pulmonary/Chest: Effort normal and breath sounds normal. No respiratory distress.   Neurological: She is alert and oriented to person, place, and time.   Skin: Skin is warm and dry.   Psychiatric: Judgment and thought content normal.   in great amount of pain (abdominal)   Nursing note and vitals reviewed.      Results Review:  I reviewed the patient's new clinical results.  I reviewed the patient's new imaging results and agree with the interpretation.  I reviewed the patient's other test results and agree with the interpretation  I personally viewed and interpreted the patient's EKG/Telemetry data  Discussed with ED provider.    Lab Results (last 24 hours)     Procedure Component Value Units Date/Time    CBC & Differential [[id number]] Collected:  06/20/19 1716    Specimen:  Blood Updated:  06/20/19 1729    Narrative:       The following orders were created for panel order CBC & Differential.  Procedure                               Abnormality         Status                     ---------                               -----------         ------                     CBC Auto  Differential[[id number]]        Abnormal            Final result                 Please view results for these tests on the individual orders.    Comprehensive Metabolic Panel [[id number]]  (Abnormal) Collected:  06/20/19 1716    Specimen:  Blood Updated:  06/20/19 1749     Glucose 287 mg/dL      BUN 11 mg/dL      Creatinine 0.59 mg/dL      Sodium 136 mmol/L      Potassium 3.9 mmol/L      Chloride 99 mmol/L      CO2 20.1 mmol/L      Calcium 9.5 mg/dL      Total Protein 7.2 g/dL      Albumin 4.50 g/dL      ALT (SGPT) 21 U/L      AST (SGOT) 14 U/L      Alkaline Phosphatase 121 U/L      Total Bilirubin 0.6 mg/dL      eGFR Non African Amer 115 mL/min/1.73      Globulin 2.7 gm/dL      A/G Ratio 1.7 g/dL      BUN/Creatinine Ratio 18.6     Anion Gap 16.9 mmol/L     Narrative:       GFR Normal >60  Chronic Kidney Disease <60  Kidney Failure <15    Lipase [[id number]]  (Normal) Collected:  06/20/19 1716    Specimen:  Blood Updated:  06/20/19 1749     Lipase 48 U/L     CBC Auto Differential [[id number]]  (Abnormal) Collected:  06/20/19 1716    Specimen:  Blood Updated:  06/20/19 1729     WBC 10.58 10*3/mm3      RBC 4.89 10*6/mm3      Hemoglobin 14.6 g/dL      Hematocrit 44.2 %      MCV 90.4 fL      MCH 29.9 pg      MCHC 33.0 g/dL      RDW 13.5 %      RDW-SD 44.4 fl      MPV 9.7 fL      Platelets 346 10*3/mm3      Neutrophil % 71.0 %      Lymphocyte % 21.5 %      Monocyte % 5.8 %      Eosinophil % 1.0 %      Basophil % 0.3 %      Immature Grans % 0.4 %      Neutrophils, Absolute 7.52 10*3/mm3      Lymphocytes, Absolute 2.27 10*3/mm3      Monocytes, Absolute 0.61 10*3/mm3      Eosinophils, Absolute 0.11 10*3/mm3      Basophils, Absolute 0.03 10*3/mm3      Immature Grans, Absolute 0.04 10*3/mm3      nRBC 0.0 /100 WBC     Urinalysis With Microscopic If Indicated (No Culture) - Urine, Clean Catch [[id number]]  (Abnormal) Collected:  06/20/19 1726    Specimen:  Urine, Clean Catch Updated:  06/20/19 1811     Color, UA Dark Yellow      Appearance, UA Clear     pH, UA 6.0     Specific Gravity, UA >=1.030     Glucose, UA >=1000 mg/dL (3+)     Ketones, UA 40 mg/dL (2+)     Bilirubin, UA Negative     Blood, UA Negative     Protein,  mg/dL (2+)     Leuk Esterase, UA Negative     Nitrite, UA Negative     Urobilinogen, UA 1.0 E.U./dL    Urinalysis, Microscopic Only - Urine, Clean Catch [[id number]]  (Abnormal) Collected:  06/20/19 1726    Specimen:  Urine, Clean Catch Updated:  06/20/19 1811     RBC, UA 0-2 /HPF      WBC, UA 3-5 /HPF      Bacteria, UA None Seen /HPF      Squamous Epithelial Cells, UA 3-6 /HPF      Hyaline Casts, UA 3-6 /LPF      Methodology Automated Microscopy    POC Glucose Once [[id number]]  (Abnormal) Collected:  06/20/19 2359    Specimen:  Blood Updated:  06/21/19 0014     Glucose 294 mg/dL     POC Glucose Once [[id number]]  (Abnormal) Collected:  06/21/19 0604    Specimen:  Blood Updated:  06/21/19 0610     Glucose 294 mg/dL     CBC & Differential [[id number]] Collected:  06/21/19 0613    Specimen:  Blood Updated:  06/21/19 0713    Narrative:       The following orders were created for panel order CBC & Differential.  Procedure                               Abnormality         Status                     ---------                               -----------         ------                     CBC Auto Differential[[id number]]        Abnormal            Final result                 Please view results for these tests on the individual orders.    Basic Metabolic Panel [[id number]]  (Abnormal) Collected:  06/21/19 0613    Specimen:  Blood Updated:  06/21/19 0753     Glucose 256 mg/dL      BUN 10 mg/dL      Creatinine 0.47 mg/dL      Sodium 136 mmol/L      Potassium 4.0 mmol/L      Chloride 103 mmol/L      CO2 18.6 mmol/L      Calcium 9.1 mg/dL      eGFR Non African Amer 150 mL/min/1.73      BUN/Creatinine Ratio 21.3     Anion Gap 14.4 mmol/L     Narrative:       GFR Normal >60  Chronic Kidney Disease <60  Kidney Failure <15    CBC Auto  Differential [[id number]]  (Abnormal) Collected:  06/21/19 0613    Specimen:  Blood Updated:  06/21/19 0713     WBC 10.06 10*3/mm3      RBC 4.41 10*6/mm3      Hemoglobin 13.3 g/dL      Hematocrit 41.6 %      MCV 94.3 fL      MCH 30.2 pg      MCHC 32.0 g/dL      RDW 13.5 %      RDW-SD 46.3 fl      MPV 9.9 fL      Platelets 321 10*3/mm3      Neutrophil % 71.0 %      Lymphocyte % 21.7 %      Monocyte % 5.9 %      Eosinophil % 0.8 %      Basophil % 0.2 %      Immature Grans % 0.4 %      Neutrophils, Absolute 7.15 10*3/mm3      Lymphocytes, Absolute 2.18 10*3/mm3      Monocytes, Absolute 0.59 10*3/mm3      Eosinophils, Absolute 0.08 10*3/mm3      Basophils, Absolute 0.02 10*3/mm3      Immature Grans, Absolute 0.04 10*3/mm3      nRBC 0.0 /100 WBC           Imaging Results (last 24 hours)     Procedure Component Value Units Date/Time    CT Abdomen Pelvis With Contrast [[id number]] Collected:  06/20/19 1911     Updated:  06/21/19 0547    Narrative:       CT ABDOMEN AND PELVIS WITH IV CONTRAST     HISTORY: Increasing lower abdominal pain     TECHNIQUE: Radiation dose reduction techniques were utilized, including  automated exposure control and exposure modulation based on body size.   3 mm images were obtained through the abdomen and pelvis after the  administration of IV contrast.      COMPARISON: CT abdomen and pelvis 05/12/2019     FINDINGS:     The proximal small bowel is decompressed. There is wall thickening and  hyperenhancement involving the distal sigmoid colon and rectum with  surrounding fat stranding and vascular engorgement. The involved bowel  segment is severely decompressed with dilation and air-fluid levels  involving the upstream large bowel through the cecum. There is also  minimal relative distention of the distal ileum with associated  air-fluid levels. This involved segment of the sigmoid colon and rectum  is the same segment of inflamed bowel seen on 05/12/2019.     The appendix is unremarkable.      Probable hepatic stenosis is present.     The gallbladder, pancreas, spleen and adrenal glands have an  unremarkable postcontrast CT appearance. Bilateral nephrolithiasis is  present and measures up to 0.6 cm on the left and 0.4 to 0.5 cm on the  right. There is no hydronephrosis. Subcentimeter hypodense lesion within  the right kidney is too small to characterize.      There is no abdominopelvic adenopathy by size criteria.     There is no free intraperitoneal air. Small amount of free peritoneal  fluid layers within the pelvis.     No suspicious lytic or blastic osseous lesions are visualized. Moderate  degenerative disc narrowing is present at L5-S1 with prominent posterior  disc osteophyte complex. This can be further evaluated with MRI if  clinically indicated.     The bladder is decompressed and not well evaluated.       Impression:       1.  Wall thickening and hyperenhancement involving a long segment of the  distal sigmoid colon and rectum with surrounding fat stranding, free  fluid and vascular engorgement involving a segment which was seen to be  inflamed on CT 05/12/2019. This area now causes a probable stricture  with at least partial upstream obstruction with dilation and air-fluid  levels within the remainder of the proximal large bowel and distal  ileum. The proximal small bowel is decompressed. Given patient age and  stricture formation, Crohn's disease is a possible etiology; however,  this is an atypical location for initial presentation. Other  differential considerations include infectious colitis and correlation  with patient history is recommended. Ischemic colitis is less likely  given the involvement of the rectum.  2.  Bilateral nephrolithiasis.  3.  Probable hepatic steatosis.   4.  Other findings as above.     The above findings were discussed with Dr. Jack by telephone by Steven Ramachandran at 6:50 PM on 06/20/2019.     This report was finalized on 6/21/2019 5:44 AM by Dr. Harris  LINDA Ramachandran.       XR Abdomen KUB [[id number]] Collected:  06/20/19 1829     Updated:  06/20/19 1833    Narrative:       Abdominal radiographs     HISTORY:Constipation     TECHNIQUE:  2 AP supine radiographs of the abdomen     COMPARISON:CT abdomen and pelvis 6/20/2019 and 5/12/2019       Impression:       FINDINGS AND IMPRESSION:  There is a relative paucity of bowel gas within the visualized abdomen  which is a nonspecific finding. Findings are best evaluated on  subsequent CT abdomen and pelvis 06/20/2019 and please refer to this  dictation for further evaluation.     This report was finalized on 6/20/2019 6:30 PM by Dr. Steven Ramachandran M.D.                  No orders to display        Assessment/Plan     Active Hospital Problems    Diagnosis POA   • Large bowel obstruction (CMS/HCC) [K56.609] Yes   • Type 2 diabetes mellitus (CMS/HCC) [E11.9] Unknown     · Type 2 diabetes-hold oral hypoglycemics while inpatient as well as long-acting insulin.  Increase correctional factor insulin to moderate-high dose.  Follow hypoglycemia protocol.  NCS diet when appropriate.  · Large bowel obstruction-surgery following.  She has not been able to tolerate any p.o. intake.  Might require NGT for bowel prep.  · Thank you for asking A to be involved in this patient's care.  We will follow along.  I have discussed with patient and nursing staff.    VTE Prophylaxis - SCDs .  Code Status - Full code.       JEFE Wray  [facility]  06/21/19  9:02 AM

## 2019-06-21 NOTE — CONSULTS
"Adult Nutrition  Assessment/PES    Patient Name:  Edie Camacho  YOB: 1985  MRN: [id number]  Admit Date:  6/20/2019    Assessment Date:  6/21/2019    Comments:  Nursing nutrition screen. NPO, possible surgical intervention. Has lost about 8# since last admission per recorded weights. Will follow as diet advanced and for plan of care, educate as needed.     Reason for Assessment     Row Name 06/21/19 1138          Reason for Assessment    Reason For Assessment  identified at risk by screening criteria     Diagnosis  gastrointestinal disease Bowel obstruction     Identified At Risk by Screening Criteria  MST SCORE 2+         Nutrition/Diet History     Row Name 06/21/19 1138          Nutrition/Diet History    Typical Food/Fluid Intake  Reports that she has had a poor appetite and has lost 20# in 6 weeks. Nauseated. Potential surgery planned         Anthropometrics     Row Name 06/21/19 1139          Anthropometrics    Height  167.6 cm (65.98\")     Weight  82.2 kg (181 lb 3.5 oz)        Ideal Body Weight (IBW)    Ideal Body Weight (IBW) (kg)  59.54     % Ideal Body Weight  138.06        Usual Body Weight (UBW)    Weight Loss  unintentional     Weight Loss Time Frame  6 weeks, 20# reported by patient.  8# weight loss per weights in chart since last admission 5/2019        Body Mass Index (BMI)    BMI (kg/m2)  29.32     BMI Assessment  BMI 25-29.9: overweight            Labs/Tests/Procedures/Meds     Row Name 06/21/19 1144          Labs/Procedures/Meds    Lab Results Reviewed  reviewed, pertinent     Lab Results Comments  HgAIC in May 2019 9.8        Diagnostic Tests/Procedures    Diagnostic Test/Procedure Reviewed  reviewed, pertinent     Diagnostic Test/Procedures Comments  possible surgery        Medications    Pertinent Medications Reviewed  reviewed, pertinent     Pertinent Medications Comments  insulin, IVF         Physical Findings     Row Name 06/21/19 1145          Physical Findings    Overall " "Physical Appearance  overweight         Estimated/Assessed Needs     Row Name 06/21/19 1139          Calculation Measurements    Height  167.6 cm (65.98\")         Nutrition Prescription Ordered     Row Name 06/21/19 1145          Nutrition Prescription PO    Current PO Diet  Clear Liquid     Common Modifiers  Consistent Carbohydrate         Evaluation of Received Nutrient/Fluid Intake     Row Name 06/21/19 1146 06/21/19 1139       Fluid Intake Evaluation    IV Fluid (mL)  2400  --                 Problem/Interventions:  Problem 1     Row Name 06/21/19 1146          Nutrition Diagnoses Problem 1    Problem 1  Nutrition Appropriate for Condition at this Time     Etiology (related to)  Medical Diagnosis     Gastrointestinal  Bowel obstruction     Signs/Symptoms (evidenced by)  NPO         Problem 2     Row Name 06/21/19 1146          Nutrition Diagnoses Problem 2    Problem 2  Knowledge Deficit     Etiology (related to)  Medical Diagnosis     Endocrine  DM     Signs/Symptoms (evidenced by)  Potential Information Deficit               Intervention Goal     Row Name 06/21/19 1146          Intervention Goal    General  Disease management/therapy;Meet nutritional needs for age/condition;Provide information regarding MNT for treatment/condition     PO  Tolerate PO;Advance diet     Weight  Appropriate weight loss         Nutrition Intervention     Row Name 06/21/19 1147          Nutrition Intervention    RD/Tech Action  Follow Tx progress;Care plan reviewd;Await begin PO           Education/Evaluation     Row Name 06/21/19 1147          Education    Education  Will Instruct as appropriate        Monitor/Evaluation    Monitor  Per protocol           Electronically signed by:  Wendy Rocha RD, LD  06/21/19 11:47 AM  "

## 2019-06-21 NOTE — PROGRESS NOTES
Pts Aunt Luda present to receive her children and take them to her home while pt is in the hospital. Debbie Daniels RN

## 2019-06-21 NOTE — PLAN OF CARE
Problem: Patient Care Overview  Goal: Plan of Care Review  Outcome: Ongoing (interventions implemented as appropriate)   06/21/19 1702   Coping/Psychosocial   Plan of Care Reviewed With patient   Plan of Care Review   Progress no change   OTHER   Outcome Summary Pt has complained of intermittent nausea and lower abd pain throughout morning, Dr. Arnold was informed. Pt VSS. Pt was taking IV dilaudid and dc'd as dilaudid PCA started for pain control. Pt taking zofran and phenergan iv for nausea prn. pt has had no vomiting today, occ yellowish fluid, small amt noted as emesis in morning. pt refused placement of NG tube and taking sips of water only until NPO at midnight for possible surgery for tomorrow per Dr. Arnold. pt being given Lisapro for BS coverage. Will cont to monitor.      Goal: Individualization and Mutuality  Outcome: Ongoing (interventions implemented as appropriate)      Problem: Bowel Obstruction (Adult)  Goal: Signs and Symptoms of Listed Potential Problems Will be Absent, Minimized or Managed (Bowel Obstruction)  Outcome: Ongoing (interventions implemented as appropriate)   06/21/19 1702   Goal/Outcome Evaluation   Problems Assessed (Bowel Obstruction) all   Problems Present (Bowel Obstruction) nausea and vomiting;pain;situational response       Problem: Pain, Acute (Adult)  Goal: Identify Related Risk Factors and Signs and Symptoms  Outcome: Ongoing (interventions implemented as appropriate)   06/21/19 1702   Pain, Acute (Adult)   Related Risk Factors (Acute Pain) persistent pain   Signs and Symptoms (Acute Pain) nausea/vomiting/anorexia;verbalization of pain descriptors     Goal: Acceptable Pain Control/Comfort Level  Outcome: Ongoing (interventions implemented as appropriate)   06/21/19 1702   Pain, Acute (Adult)   Acceptable Pain Control/Comfort Level making progress toward outcome

## 2019-06-22 ENCOUNTER — INPATIENT HOSPITAL (OUTPATIENT)
Dept: URBAN - METROPOLITAN AREA HOSPITAL 113 | Facility: HOSPITAL | Age: 34
End: 2019-06-22
Payer: MEDICAID

## 2019-06-22 DIAGNOSIS — R10.32 LEFT LOWER QUADRANT PAIN: ICD-10-CM

## 2019-06-22 DIAGNOSIS — K51.50 LEFT SIDED COLITIS WITHOUT COMPLICATIONS: ICD-10-CM

## 2019-06-22 LAB
ANION GAP SERPL CALCULATED.3IONS-SCNC: 14.1 MMOL/L
BASOPHILS # BLD AUTO: 0.03 10*3/MM3 (ref 0–0.2)
BASOPHILS NFR BLD AUTO: 0.4 % (ref 0–1.5)
BUN BLD-MCNC: 8 MG/DL (ref 6–20)
BUN/CREAT SERPL: 16 (ref 7–25)
CALCIUM SPEC-SCNC: 9 MG/DL (ref 8.6–10.5)
CHLORIDE SERPL-SCNC: 105 MMOL/L (ref 98–107)
CO2 SERPL-SCNC: 19.9 MMOL/L (ref 22–29)
CREAT BLD-MCNC: 0.5 MG/DL (ref 0.57–1)
DEPRECATED RDW RBC AUTO: 46.3 FL (ref 37–54)
EOSINOPHIL # BLD AUTO: 0.13 10*3/MM3 (ref 0–0.4)
EOSINOPHIL NFR BLD AUTO: 1.8 % (ref 0.3–6.2)
ERYTHROCYTE [DISTWIDTH] IN BLOOD BY AUTOMATED COUNT: 13.4 % (ref 12.3–15.4)
GFR SERPL CREATININE-BSD FRML MDRD: 140 ML/MIN/1.73
GLUCOSE BLD-MCNC: 193 MG/DL (ref 65–99)
GLUCOSE BLDC GLUCOMTR-MCNC: 186 MG/DL (ref 70–130)
GLUCOSE BLDC GLUCOMTR-MCNC: 196 MG/DL (ref 70–130)
GLUCOSE BLDC GLUCOMTR-MCNC: 218 MG/DL (ref 70–130)
GLUCOSE BLDC GLUCOMTR-MCNC: 260 MG/DL (ref 70–130)
HCT VFR BLD AUTO: 38.7 % (ref 34–46.6)
HGB BLD-MCNC: 12.1 G/DL (ref 12–15.9)
IMM GRANULOCYTES # BLD AUTO: 0.03 10*3/MM3 (ref 0–0.05)
IMM GRANULOCYTES NFR BLD AUTO: 0.4 % (ref 0–0.5)
LYMPHOCYTES # BLD AUTO: 2.77 10*3/MM3 (ref 0.7–3.1)
LYMPHOCYTES NFR BLD AUTO: 37.7 % (ref 19.6–45.3)
MCH RBC QN AUTO: 29.4 PG (ref 26.6–33)
MCHC RBC AUTO-ENTMCNC: 31.3 G/DL (ref 31.5–35.7)
MCV RBC AUTO: 93.9 FL (ref 79–97)
MONOCYTES # BLD AUTO: 0.55 10*3/MM3 (ref 0.1–0.9)
MONOCYTES NFR BLD AUTO: 7.5 % (ref 5–12)
NEUTROPHILS # BLD AUTO: 3.84 10*3/MM3 (ref 1.7–7)
NEUTROPHILS NFR BLD AUTO: 52.2 % (ref 42.7–76)
NRBC BLD AUTO-RTO: 0 /100 WBC (ref 0–0.2)
PLATELET # BLD AUTO: 281 10*3/MM3 (ref 140–450)
PMV BLD AUTO: 9.7 FL (ref 6–12)
POTASSIUM BLD-SCNC: 4.2 MMOL/L (ref 3.5–5.2)
RBC # BLD AUTO: 4.12 10*6/MM3 (ref 3.77–5.28)
SODIUM BLD-SCNC: 139 MMOL/L (ref 136–145)
WBC NRBC COR # BLD: 7.35 10*3/MM3 (ref 3.4–10.8)

## 2019-06-22 PROCEDURE — 80048 BASIC METABOLIC PNL TOTAL CA: CPT | Performed by: SURGERY

## 2019-06-22 PROCEDURE — 63710000001 INSULIN GLARGINE PER 5 UNITS: Performed by: NURSE PRACTITIONER

## 2019-06-22 PROCEDURE — 99232 SBSQ HOSP IP/OBS MODERATE 35: CPT | Performed by: INTERNAL MEDICINE

## 2019-06-22 PROCEDURE — 25810000003 SODIUM CHLORIDE 0.9 % WITH KCL 20 MEQ 20-0.9 MEQ/L-% SOLUTION: Performed by: SURGERY

## 2019-06-22 PROCEDURE — 99232 SBSQ HOSP IP/OBS MODERATE 35: CPT | Performed by: SURGERY

## 2019-06-22 PROCEDURE — 85025 COMPLETE CBC W/AUTO DIFF WBC: CPT | Performed by: SURGERY

## 2019-06-22 PROCEDURE — 63710000001 INSULIN LISPRO (HUMAN) PER 5 UNITS: Performed by: NURSE PRACTITIONER

## 2019-06-22 PROCEDURE — 82962 GLUCOSE BLOOD TEST: CPT

## 2019-06-22 RX ORDER — INSULIN GLARGINE 100 [IU]/ML
5-15 INJECTION, SOLUTION SUBCUTANEOUS DAILY
Status: DISCONTINUED | OUTPATIENT
Start: 2019-06-22 | End: 2019-06-22

## 2019-06-22 RX ORDER — INSULIN GLARGINE 100 [IU]/ML
15 INJECTION, SOLUTION SUBCUTANEOUS NIGHTLY
Status: DISCONTINUED | OUTPATIENT
Start: 2019-06-22 | End: 2019-06-23 | Stop reason: HOSPADM

## 2019-06-22 RX ADMIN — GABAPENTIN 300 MG: 300 CAPSULE ORAL at 08:17

## 2019-06-22 RX ADMIN — LEVOTHYROXINE SODIUM 200 MCG: 100 TABLET ORAL at 06:07

## 2019-06-22 RX ADMIN — POTASSIUM CHLORIDE AND SODIUM CHLORIDE 100 ML/HR: 900; 150 INJECTION, SOLUTION INTRAVENOUS at 16:23

## 2019-06-22 RX ADMIN — POTASSIUM CHLORIDE AND SODIUM CHLORIDE 100 ML/HR: 900; 150 INJECTION, SOLUTION INTRAVENOUS at 06:08

## 2019-06-22 RX ADMIN — INSULIN LISPRO 5 UNITS: 100 INJECTION, SOLUTION INTRAVENOUS; SUBCUTANEOUS at 21:57

## 2019-06-22 RX ADMIN — INSULIN LISPRO 8 UNITS: 100 INJECTION, SOLUTION INTRAVENOUS; SUBCUTANEOUS at 12:52

## 2019-06-22 RX ADMIN — INSULIN GLARGINE 15 UNITS: 100 INJECTION, SOLUTION SUBCUTANEOUS at 21:58

## 2019-06-22 RX ADMIN — INSULIN LISPRO 3 UNITS: 100 INJECTION, SOLUTION INTRAVENOUS; SUBCUTANEOUS at 08:16

## 2019-06-22 RX ADMIN — FLUTICASONE PROPIONATE 1 SPRAY: 50 SPRAY, METERED NASAL at 08:17

## 2019-06-22 RX ADMIN — INSULIN LISPRO 3 UNITS: 100 INJECTION, SOLUTION INTRAVENOUS; SUBCUTANEOUS at 17:57

## 2019-06-22 RX ADMIN — GABAPENTIN 300 MG: 300 CAPSULE ORAL at 21:58

## 2019-06-22 NOTE — PROGRESS NOTES
Name: Edie Camacho ADMIT: 2019   : 1985  PCP: Layla Monique APRN    MRN: [id number] LOS: 2 days   AGE/SEX: 34 y.o. female  ROOM: [facility]     Subjective   Subjective   Several episodes of diarrhea last night with incontinence.  She has belching which she states she could not do before.  She tolerated a clear liquid diet today  Denies nausea, vomiting, abdominal pain fever or chills     Objective   Objective   Vital Signs  Temp:  [98.2 °F (36.8 °C)-100 °F (37.8 °C)] 98.7 °F (37.1 °C)  Heart Rate:  [75-94] 75  Resp:  [16-18] 16  BP: (112-151)/(74-96) 125/85  SpO2:  [94 %-99 %] 99 %  on   ;   Device (Oxygen Therapy): room air  Body mass index is 29.26 kg/m².  Physical Exam   Constitutional: She is oriented to person, place, and time. She appears well-developed and well-nourished. No distress.   Eyes: Conjunctivae and EOM are normal.   Cardiovascular: Normal rate, regular rhythm and normal heart sounds. Exam reveals no gallop and no friction rub.   No murmur heard.  Pulmonary/Chest: Effort normal and breath sounds normal. No respiratory distress. She has no wheezes. She has no rales. She exhibits no tenderness.   Abdominal: Soft. She exhibits no distension and no mass. Bowel sounds are decreased. There is no tenderness. There is no rebound and no guarding. No hernia.   Mild distention and mild tenderness left lower quadrant   Musculoskeletal: Normal range of motion.   Neurological: She is alert and oriented to person, place, and time.   Skin: Skin is warm and dry.   Psychiatric: She has a normal mood and affect. Her behavior is normal.       Results Review:       I reviewed the patient's new clinical results.  Results from last 7 days   Lab Units 19  0636 19  0613 19  1716 19  1125   WBC 10*3/mm3 7.35 10.06 10.58 7.94   HEMOGLOBIN g/dL 12.1 13.3 14.6 13.9   PLATELETS 10*3/mm3 281 321 346 323     Results from last 7 days   Lab Units 19  0754 19  0613  06/20/19  1716 06/18/19  1125   SODIUM mmol/L 139 136 136 137   POTASSIUM mmol/L 4.2 4.0 3.9 4.9   CHLORIDE mmol/L 105 103 99 99   TOTAL CO2 mmol/L  --   --   --  24.4   CO2 mmol/L 19.9* 18.6* 20.1*  --    BUN mg/dL 8 10 11 13   CREATININE mg/dL 0.50* 0.47* 0.59 0.68   GLUCOSE mg/dL 193* 256* 287*  --    Estimated Creatinine Clearance: 168.2 mL/min (A) (by C-G formula based on SCr of 0.5 mg/dL (L)).  Results from last 7 days   Lab Units 06/20/19  1716 06/18/19  1125   ALBUMIN g/dL 4.50 4.50   BILIRUBIN mg/dL 0.6 0.5   ALK PHOS U/L 121* 120*   AST (SGOT) U/L 14 12   ALT (SGPT) U/L 21 17     Results from last 7 days   Lab Units 06/22/19  0754 06/21/19  0613 06/20/19  1716 06/18/19  1125   CALCIUM mg/dL 9.0 9.1 9.5 9.7   ALBUMIN g/dL  --   --  4.50 4.50       Glucose   Date/Time Value Ref Range Status   06/22/2019 1056 260 (H) 70 - 130 mg/dL Final   06/22/2019 0637 186 (H) 70 - 130 mg/dL Final   06/21/2019 2113 158 (H) 70 - 130 mg/dL Final   06/21/2019 1704 194 (H) 70 - 130 mg/dL Final   06/21/2019 1051 238 (H) 70 - 130 mg/dL Final   06/21/2019 0604 294 (H) 70 - 130 mg/dL Final   06/20/2019 2359 294 (H) 70 - 130 mg/dL Final     Current medications    fluticasone 1 spray Nasal Daily   gabapentin 300 mg Oral TID   insulin lispro 0-14 Units Subcutaneous 4x Daily With Meals & Nightly   levothyroxine 200 mcg Oral Q AM       HYDROmorphone HCl-NaCl     sodium chloride 0.9 % with KCl 20 mEq 100 mL/hr Last Rate: 100 mL/hr (06/22/19 0608)   Diet Clear Liquid       Assessment/Plan     Active Hospital Problems    Diagnosis  POA   • Large bowel obstruction (CMS/HCC) [K56.609]  Yes   • Type 2 diabetes mellitus (CMS/HCC) [E11.9]  Unknown      Resolved Hospital Problems   No resolved problems to display.     · Type 2 diabetes-  correctional factor insulin moderate-high dose. Resume long acting insulin.    · Large bowel obstruction-surgery following. No plans for OR. Possible discharge tomorrow.   · Encourage incentive spirometry/  ambulation        JEFE Loyd  [facility]  06/22/19  1:15 PM

## 2019-06-22 NOTE — PLAN OF CARE
Problem: Patient Care Overview  Goal: Plan of Care Review  Outcome: Ongoing (interventions implemented as appropriate)   06/22/19 3557   Coping/Psychosocial   Plan of Care Reviewed With patient   Plan of Care Review   Progress improving   OTHER   Outcome Summary VSS. Dilaudid PCA controlling pain well, no complaints of nausea or vomiting. NG to R nare has minimal soliz/thin output. Had diarrhea x2 in AM. Kept NPO after midnight for poss surgery today. Up with asst x1. IVF continued.      Goal: Individualization and Mutuality  Outcome: Ongoing (interventions implemented as appropriate)    Goal: Discharge Needs Assessment  Outcome: Ongoing (interventions implemented as appropriate)    Goal: Interprofessional Rounds/Family Conf  Outcome: Ongoing (interventions implemented as appropriate)      Problem: Bowel Obstruction (Adult)  Goal: Signs and Symptoms of Listed Potential Problems Will be Absent, Minimized or Managed (Bowel Obstruction)  Outcome: Ongoing (interventions implemented as appropriate)      Problem: Pain, Acute (Adult)  Goal: Identify Related Risk Factors and Signs and Symptoms  Outcome: Outcome(s) achieved Date Met: 06/22/19    Goal: Acceptable Pain Control/Comfort Level  Outcome: Ongoing (interventions implemented as appropriate)

## 2019-06-22 NOTE — PLAN OF CARE
Problem: Patient Care Overview  Goal: Plan of Care Review  Outcome: Ongoing (interventions implemented as appropriate)   06/22/19 0358   Coping/Psychosocial   Plan of Care Reviewed With patient   Plan of Care Review   Progress no change   OTHER   Outcome Summary VSS. Dilaudid pca controlling pain well. No complaints of nausea or vomiting. Had diarrhea x2 in AM. Kept npo for poss surgery today. Up with asst x1. IVF continued.     Goal: Individualization and Mutuality  Outcome: Ongoing (interventions implemented as appropriate)    Goal: Discharge Needs Assessment  Outcome: Ongoing (interventions implemented as appropriate)    Goal: Interprofessional Rounds/Family Conf  Outcome: Ongoing (interventions implemented as appropriate)

## 2019-06-22 NOTE — PROGRESS NOTES
Chief complaint: Stricture of the sigmoid colon    Subjective:  Patient feels miraculously better today, no more nausea, no abdominal pain and she had several loose stools and passed flatus.    Review of systems:  Constitutional: Negative for fever or chills, positive for decreased appetite  Respiratory: Negative for cough or hemoptysis    Objective:  Temperature 98.7, heart rate 75 blood pressure 125/85 oxygenation 99% on room air  General: Awake alert and oriented, no acute distress  Eyes: Extraocular movements are intact, no scleral icterus  Abdomen: Soft and benign, nontender, nondistended, no hernia, no mass    White blood cell count 7.3, hemoglobin 12.1, creatinine 0.5, chemistries look good    Assessment and plan:  -Large bowel obstruction, etiology unclear.  I wonder if this may be related to endometriosis.  At any rate, she is substantially better today and is not obstructed at the moment.  I suspect that this problem is going to recur, but the patient is quite anxious to try to avoid surgery for the time being if possible.  I am going to try her on a diet and we will try to advance it tomorrow if she does well.  I think ideally she would be discharged home in follow-up to get her colonoscopy as was previously planned.  If her problem recurs after trying to eat then we will have to go back to her previous plan of colectomy, probably during this admission.    Roderick Arnold MD  General and Endoscopic Surgery  [facility]    [address], [address]  [place], [place], [address]  P: [phone]  F: [phone]

## 2019-06-22 NOTE — PLAN OF CARE
Problem: Patient Care Overview  Goal: Plan of Care Review  Outcome: Ongoing (interventions implemented as appropriate)   06/22/19 4210   Coping/Psychosocial   Plan of Care Reviewed With patient   Plan of Care Review   Progress improving   OTHER   Outcome Summary 7-3p VSS. Up to chair. +BM. Minimal use of PCA. Denies pain. Tolerating clear. No plan for OR. Possible discharge tmrw.     Goal: Individualization and Mutuality  Outcome: Ongoing (interventions implemented as appropriate)    Goal: Discharge Needs Assessment  Outcome: Ongoing (interventions implemented as appropriate)      Problem: Pain, Acute (Adult)  Goal: Acceptable Pain Control/Comfort Level  Outcome: Ongoing (interventions implemented as appropriate)

## 2019-06-23 VITALS
WEIGHT: 181.22 LBS | SYSTOLIC BLOOD PRESSURE: 119 MMHG | TEMPERATURE: 97.3 F | HEART RATE: 70 BPM | DIASTOLIC BLOOD PRESSURE: 84 MMHG | RESPIRATION RATE: 18 BRPM | HEIGHT: 66 IN | OXYGEN SATURATION: 96 % | BODY MASS INDEX: 29.12 KG/M2

## 2019-06-23 LAB — GLUCOSE BLDC GLUCOMTR-MCNC: 191 MG/DL (ref 70–130)

## 2019-06-23 PROCEDURE — 99238 HOSP IP/OBS DSCHRG MGMT 30/<: CPT | Performed by: SURGERY

## 2019-06-23 PROCEDURE — 25810000003 SODIUM CHLORIDE 0.9 % WITH KCL 20 MEQ 20-0.9 MEQ/L-% SOLUTION: Performed by: SURGERY

## 2019-06-23 PROCEDURE — 82962 GLUCOSE BLOOD TEST: CPT

## 2019-06-23 PROCEDURE — 63710000001 INSULIN LISPRO (HUMAN) PER 5 UNITS: Performed by: NURSE PRACTITIONER

## 2019-06-23 RX ADMIN — POTASSIUM CHLORIDE AND SODIUM CHLORIDE 100 ML/HR: 900; 150 INJECTION, SOLUTION INTRAVENOUS at 02:14

## 2019-06-23 RX ADMIN — LEVOTHYROXINE SODIUM 200 MCG: 100 TABLET ORAL at 06:24

## 2019-06-23 RX ADMIN — FLUTICASONE PROPIONATE 1 SPRAY: 50 SPRAY, METERED NASAL at 08:10

## 2019-06-23 RX ADMIN — INSULIN LISPRO 3 UNITS: 100 INJECTION, SOLUTION INTRAVENOUS; SUBCUTANEOUS at 08:09

## 2019-06-23 RX ADMIN — GABAPENTIN 300 MG: 300 CAPSULE ORAL at 08:09

## 2019-06-23 NOTE — PLAN OF CARE
Problem: Patient Care Overview  Goal: Plan of Care Review  Outcome: Ongoing (interventions implemented as appropriate)   06/23/19 0255   Coping/Psychosocial   Plan of Care Reviewed With patient   Plan of Care Review   Progress improving   OTHER   Outcome Summary VSS and afebrile. Up with asst x1. No c/o abd pain, nausea, or vomiting. Advanced to full liquid diet, tolerating without nausea. Dilaudid PCA continued however pt has not used it all night. IVF continued. Poss d/c today.      Goal: Individualization and Mutuality  Outcome: Ongoing (interventions implemented as appropriate)    Goal: Discharge Needs Assessment  Outcome: Ongoing (interventions implemented as appropriate)    Goal: Interprofessional Rounds/Family Conf  Outcome: Ongoing (interventions implemented as appropriate)      Problem: Bowel Obstruction (Adult)  Goal: Signs and Symptoms of Listed Potential Problems Will be Absent, Minimized or Managed (Bowel Obstruction)  Outcome: Ongoing (interventions implemented as appropriate)      Problem: Pain, Acute (Adult)  Goal: Acceptable Pain Control/Comfort Level  Outcome: Ongoing (interventions implemented as appropriate)

## 2019-06-23 NOTE — DISCHARGE SUMMARY
Discharge Summary    Patient name: Edie Camacho    Medical record number: [id number]    Admission date: 6/20/2019  Discharge date: 6/23/2019    Attending physician: Roderick Arnold MD    Primary care physician: Layla Monique APRN    Referring physician: Roderick Arnold MD  [address] [address]    Consulting physician(s): [facility]    Condition on discharge: improving    Primary Diagnoses: Large bowel obstruction/stricture of the sigmoid colon    Secondary Diagnoses: Type 2 diabetes    Operative Procedure: None    Hospital Course: The patient is a  34 y.o. female that was admitted to the hospital with abdominal pain and nausea and suspected stricture of the sigmoid colon.  She was admitted and a bowel prep was attempted, but the patient had substantial worsening of her pain and nausea and vomiting.  I talked to her about a nasogastric tube but she did not want and actually she seemed to respond to improve on her own.  I had actually thought she would probably need operative intervention during this admission with sigmoid resection, but she spontaneously improved and was quite insistent on going home.  She is actually scheduled for an outpatient colonoscopy which I think would actually be best for her, if she can get there.  It is hard for me to imagine that at some point she will not require surgical intervention, but I suppose it is possible.  Patient has multiple other social factors impacting her medical decision making, which is understandable.  We will discharge her home today to continue on MiraLAX.  I do not really see any role for antibiotics at this time.  She will keep her planned colonoscopy already scheduled with the GI service.    Discharge medications:      Discharge Medications      Continue These Medications      Instructions Start Date   fluticasone 50 MCG/ACT nasal spray  Commonly known as:  FLONASE   1 spray, Nasal, Daily      gabapentin  300 MG capsule  Commonly known as:  NEURONTIN   300 mg, Oral, 3 Times Daily      hyoscyamine 0.125 MG tablet  Commonly known as:  ANASPAZ,LEVSIN   0.125 mg, Oral, 4 Times Daily With Meals & Nightly      Insulin Glargine 100 UNIT/ML injection pen  Commonly known as:  BASAGLAR KWIKPEN   5-15 Units, Subcutaneous, Daily      levothyroxine 200 MCG tablet  Commonly known as:  SYNTHROID, LEVOTHROID   200 mcg, Oral, Daily      montelukast 10 MG tablet  Commonly known as:  SINGULAIR   10 mg, Oral, Daily      polyethylene glycol packet  Commonly known as:  MIRALAX   17 g, Oral, Daily PRN      SITagliptin 100 MG tablet  Commonly known as:  JANUVIA   100 mg, Oral, Daily         Stop These Medications    ibuprofen 600 MG tablet  Commonly known as:  ADVILMOTRIN            Discharge instructions: Soft, low fiber diet, lax      Follow-up appointment: Follow up with colonoscopy as already scheduled with the GI service.

## 2019-06-23 NOTE — PROGRESS NOTES
"   LOS: 2 days   Patient Care Team:  Layla Monique APRN as PCP - General (Family Medicine)    Chief Complaint:  Colitis, colon obstruction     Subjective     History of Present Illness    Subjective:  Symptoms:  Improved.    Diet:  Poor intake.    Activity level: Impaired due to weakness.        History taken from: patient chart    Objective     Vital Signs  Temp:  [97 °F (36.1 °C)-100 °F (37.8 °C)] 97 °F (36.1 °C)  Heart Rate:  [71-91] 71  Resp:  [16-18] 16  BP: (103-151)/(66-96) 103/66    Objective:  General Appearance:  Well-appearing.    Vital signs: (most recent): Blood pressure 103/66, pulse 71, temperature 97 °F (36.1 °C), temperature source Oral, resp. rate 16, height 167.6 cm (65.98\"), weight 82.2 kg (181 lb 3.5 oz), last menstrual period 05/06/2019, SpO2 99 %, not currently breastfeeding.    Output: Producing urine and producing stool.    HEENT: Normal HEENT exam.    Lungs:  Normal effort.    Heart: Normal rate.    Abdomen: Abdomen is soft.  There is left lower quadrant tenderness. There is no rebound tenderness.  There is no guarding.     Neurological: Patient is alert and oriented to person, place and time.    Skin:  Warm and dry.              Results Review:     I reviewed the patient's new clinical results.    Medication Review: done    Assessment/Plan       Type 2 diabetes mellitus (CMS/HCC)    Large bowel obstruction (CMS/HCC)      Assessment:  (Colon obstruction resolved  Left sided colitis ).     Plan:   (Hopefully patient can be discharged soon if improvement continues  Eventual colonoscopy ).       Leonardo France MD  06/22/19  9:16 PM    Time: Critical care 15 min      "

## 2019-06-24 ENCOUNTER — READMISSION MANAGEMENT (OUTPATIENT)
Dept: CALL CENTER | Facility: HOSPITAL | Age: 34
End: 2019-06-24

## 2019-06-24 NOTE — PAYOR COMM NOTE
"Edie Cao (34 y.o. Female)     ATTN: SABINO   REF#[id number]   D/C SUMMARY  THANKS!  SAROJ MORATAYA@[phone] OR [phone]      Date of Birth Social Security Number Address Home Phone MRN    1985  [address] [place] [id number] [phone] [id number]    Oriental orthodox Marital Status          Yarsani        Admission Date Admission Type Admitting Provider Attending Provider Department, Room/Bed    6/20/19 Emergency Roderick Arnold MD  [facility] [address] [place], 84/1    Discharge Date Discharge Disposition Discharge Destination        6/23/2019 Home or Self Care              Attending Provider:  (none)   Allergies:  Ciprofloxacin, Sulfa Antibiotics, Bactrim [Sulfamethoxazole-trimethoprim]    Isolation:  None   Infection:  None   Code Status:  Prior    Ht:  167.6 cm (65.98\")   Wt:  82.2 kg (181 lb 3.5 oz)    Admission Cmt:  None   Principal Problem:  None                Active Insurance as of 6/20/2019     Primary Coverage     Payor Plan Insurance Group Employer/Plan Group    [employer] MEDICAID [id number]     Payor Plan Address Payor Plan Phone Number Payor Plan Fax Number Effective Dates    PO  [phone]  11/1/2014 - None Entered    [facility] [id number]       Subscriber Name Subscriber Birth Date Member ID       EDIE CAO 1985 [id number]                 Emergency Contacts      (Rel.) Home Phone Work Phone Mobile Phone    JANET FRANCISCO (SISTER IN [employer]) (Relative) [phone] -- --               Discharge Summary      Roderick Arnold MD at 6/23/2019 10:50 AM          Discharge Summary    Patient name: Edie Cao    Medical record number: [id number]    Admission date: 6/20/2019  Discharge date: 6/23/2019    Attending physician: Roderick Arnold MD    Primary care physician: Layla Monique APRN    Referring physician: Roderick Arnold MD  [address] [address]    Cons[facility]ing physician([facility]): " [facility]    Condition on discharge: improving    Primary Diagnoses: Large bowel obstruction/stricture of the sigmoid colon    Secondary Diagnoses: Type 2 diabetes    Operative Procedure: None    Hospital Course: The patient is a  34 y.o. female that was admitted to the hospital with abdominal pain and nausea and suspected stricture of the sigmoid colon.  She was admitted and a bowel prep was attempted, but the patient had substantial worsening of her pain and nausea and vomiting.  I talked to her about a nasogastric tube but she did not want and actually she seemed to respond to improve on her own.  I had actually thought she would probably need operative intervention during this admission with sigmoid resection, but she spontaneously improved and was quite insistent on going home.  She is actually scheduled for an outpatient colonoscopy which I think would actually be best for her, if she can get there.  It is hard for me to imagine that at some point she will not require surgical intervention, but I suppose it is possible.  Patient has multiple other social factors impacting her medical decision making, which is understandable.  We will discharge her home today to continue on MiraLAX.  I do not really see any role for antibiotics at this time.  She will keep her planned colonoscopy already scheduled with the GI service.    Discharge medications:      Discharge Medications      Continue These Medications      Instructions Start Date   fluticasone 50 MCG/ACT nasal spray  Commonly known as:  FLONASE   1 spray, Nasal, Daily      gabapentin 300 MG capsule  Commonly known as:  NEURONTIN   300 mg, Oral, 3 Times Daily      hyoscyamine 0.125 MG tablet  Commonly known as:  ANASPAZ,LEVSIN   0.125 mg, Oral, 4 Times Daily With Meals & Nightly      Insulin Glargine 100 UNIT/ML injection pen  Commonly known as:  YUANAGLJEZ BONILLAPEN   5-15 Units, Subcutaneous, Daily      levothyroxine 200 MCG  tablet  Commonly known as:  SYNTHROID, LEVOTHROID   200 mcg, Oral, Daily      montelukast 10 MG tablet  Commonly known as:  SINGULAIR   10 mg, Oral, Daily      polyethylene glycol packet  Commonly known as:  MIRALAX   17 g, Oral, Daily PRN      SITagliptin 100 MG tablet  Commonly known as:  JANUVIA   100 mg, Oral, Daily         Stop These Medications    ibuprofen 600 MG tablet  Commonly known as:  ADVIL,MOTRIN            Discharge instructions: Soft, low fiber diet, lax      Follow-up appointment: Follow up with colonoscopy as already scheduled with the GI service.      Electronically signed by Roderick Arnold MD at 6/23/2019 10:53 AM

## 2019-06-24 NOTE — OUTREACH NOTE
Prep Survey      Responses   Facility patient discharged from?  [facility]   Is patient eligible?  Yes   Discharge diagnosis  Large bowel obstruction/stricture of the sigmoid colon   Does the patient have one of the following disease processes/diagnoses(primary or secondary)?  Other   Does the patient have Home health ordered?  No   Is there a DME ordered?  No   Prep survey completed?  Yes          Miguelina Millan RN

## 2019-06-24 NOTE — PROGRESS NOTES
Case Management Discharge Note    Final Note: home; self-care. Breann Patterson CSW     Destination      No service has been selected for the patient.      Durable Medical Equipment      No service has been selected for the patient.      Dialysis/Infusion      No service has been selected for the patient.      Home Medical Care      No service has been selected for the patient.      Therapy      No service has been selected for the patient.      Community Resources      No service has been selected for the patient.             Final Discharge Disposition Code: 01 - home or self-care

## 2019-06-27 ENCOUNTER — READMISSION MANAGEMENT (OUTPATIENT)
Dept: CALL CENTER | Facility: HOSPITAL | Age: 34
End: 2019-06-27

## 2019-06-27 NOTE — OUTREACH NOTE
Medical Week 1 Survey      Responses   Facility patient discharged from?  [facility]   Does the patient have one of the following disease processes/diagnoses(primary or secondary)?  Other   Is there a successful TCM telephone encounter documented?  No   Week 1 attempt successful?  No   Unsuccessful attempts  Attempt 1          Ny Royal RN

## 2019-06-28 ENCOUNTER — READMISSION MANAGEMENT (OUTPATIENT)
Dept: CALL CENTER | Facility: HOSPITAL | Age: 34
End: 2019-06-28

## 2019-06-28 NOTE — OUTREACH NOTE
Medical Week 1 Survey      Responses   Facility patient discharged from?  [facility]   Does the patient have one of the following disease processes/diagnoses(primary or secondary)?  Other   Is there a successful TCM telephone encounter documented?  No   Week 1 attempt successful?  No   Unsuccessful attempts  Attempt 2          Amna Riggins RN

## 2019-07-02 ENCOUNTER — READMISSION MANAGEMENT (OUTPATIENT)
Dept: CALL CENTER | Facility: HOSPITAL | Age: 34
End: 2019-07-02

## 2019-07-02 NOTE — OUTREACH NOTE
Medical Week 2 Survey      Responses   Facility patient discharged from?  [facility]   Does the patient have one of the following disease processes/diagnoses(primary or secondary)?  Other   Week 2 attempt successful?  Yes   Call start time  1633   Discharge diagnosis  Large bowel obstruction/stricture of the sigmoid colon   Call end time  1634   Meds reviewed with patient/caregiver?  Yes   Is the patient taking all medications as directed (includes completed medication regime)?  Yes   Has the patient kept scheduled appointments due by today?  Yes   What is the patient's perception of their health status since discharge?  Improving   Week 2 Call Completed?  Yes   Graduated  Yes   Did the patient feel the follow up calls were helpful during their recovery period?  Yes   Was the number of calls appropriate?  Yes          Debbie Kevin, RN

## 2019-07-03 ENCOUNTER — TELEPHONE (OUTPATIENT)
Dept: GASTROENTEROLOGY | Facility: CLINIC | Age: 34
End: 2019-07-03

## 2019-07-03 ENCOUNTER — OUTSIDE FACILITY SERVICE (OUTPATIENT)
Dept: GASTROENTEROLOGY | Facility: CLINIC | Age: 34
End: 2019-07-03

## 2019-07-03 PROCEDURE — 45380 COLONOSCOPY AND BIOPSY: CPT | Performed by: INTERNAL MEDICINE

## 2019-07-03 PROCEDURE — 45381 COLONOSCOPY SUBMUCOUS NJX: CPT | Performed by: INTERNAL MEDICINE

## 2019-07-03 PROCEDURE — 45385 COLONOSCOPY W/LESION REMOVAL: CPT | Performed by: INTERNAL MEDICINE

## 2019-07-03 NOTE — TELEPHONE ENCOUNTER
Called pt and advised that Dr Morfin asked that I get her set up tot see Dr Roderick Arnold in the office to discuss possible surgery. Advised that this appt has been made for Monday, July 8th at 1:40pm but they ask that she arrive at 1:20pm. Gave pt office address and phone number. Pt verb understanding.

## 2019-07-03 NOTE — TELEPHONE ENCOUNTER
Called Dr Arnold's office and spoke with scheduling. Scheduled pt to see Dr Arnold Monday, July 8th at 1:40PM, pt to arrive at 1:20PM for paperwork.     Message sent to MD regarding appt date and time.

## 2019-07-03 NOTE — TELEPHONE ENCOUNTER
----- Message from Samuel Morfin MD sent at 7/3/2019  9:18 AM EDT -----  Regarding: colonoscopy after hospital follow up  What appears to be a malig stricture 20cm from anus with opening of 3mm not traversed. Biopsied and inked. Please get patient back to Dr Arnold to discuss surgery ASAP, do not think she needs to be admitted today    Yesica please let me know what date her surgery appt is.    thx  bs

## 2019-07-08 ENCOUNTER — OFFICE VISIT (OUTPATIENT)
Dept: SURGERY | Facility: CLINIC | Age: 34
End: 2019-07-08

## 2019-07-08 VITALS — HEIGHT: 66 IN | BODY MASS INDEX: 27.32 KG/M2 | OXYGEN SATURATION: 96 % | HEART RATE: 92 BPM | WEIGHT: 170 LBS

## 2019-07-08 DIAGNOSIS — K56.609 LARGE BOWEL OBSTRUCTION (HCC): Primary | ICD-10-CM

## 2019-07-08 DIAGNOSIS — K56.699 STRICTURE OF SIGMOID COLON (HCC): ICD-10-CM

## 2019-07-08 PROCEDURE — 99214 OFFICE O/P EST MOD 30 MIN: CPT | Performed by: SURGERY

## 2019-07-08 RX ORDER — DOCUSATE SODIUM 100 MG/1
CAPSULE, LIQUID FILLED ORAL
Refills: 0 | COMMUNITY
Start: 2019-06-25 | End: 2019-07-09

## 2019-07-08 RX ORDER — ALVIMOPAN 12 MG/1
12 CAPSULE ORAL ONCE
Status: CANCELLED | OUTPATIENT
Start: 2019-07-10 | End: 2019-07-15

## 2019-07-08 NOTE — PROGRESS NOTES
Cc:  Sigmoid colon stricture     History of presenting illness:   This is a 34-year-old obese lady with a history of poorly controlled diabetes who presents today for consideration for sigmoid colon resection.  She has had 2 hospital admissions for large bowel obstruction which have both resolved spontaneously.  I saw her the last time she was here in the hospital a couple of weeks ago.  She was treated conservatively with bowel rest and eventually improved after receiving MiraLAX and magnesium citrate.  She was discharged home with plans for follow-up colonoscopy which was completed by gastroenterology.  The precise details of that scope are pending at this moment, but the notes that I have received suggest that the sigmoid colon was unable to be traversed and that there was a suspicious malignant appearance to the stricture.  Biopsies are pending.  The patient describes continued problems with abdominal bloating and lower abdominal pain and difficulty eating.  She has had some associated weight loss.     Past Medical History: Type 2 diabetes, hypothyroidism, kidney stones, gastroesophageal reflux disease     Past Surgical History: Significant for  section, lithotripsy     Medications: MiraLAX, hyoscyamine, insulin, metformin, Januvia     Allergies: Ciprofloxacin, Bactrim, sulfa     Social History: Patient denies alcohol use.  She is .  She admits to smoking about 1/2 pack of cigarettes per day.  She has cut back on her smoking recently due to her illness.  Discussed risks of surgery with patient and in particular increased risk of wound infection, poor wound healing, hernias (with abdominal surgery) and post-operative pulmonary complications associated with smoking.      Family History: Negative for colorectal cancer     Review of Systems:  Constitutional: Positive for weakness, decreased appetite, negative for weight loss or fever  Neck: no swollen glands or dysphagia or odynophagia  Respiratory:  negative for SOB, cough, hemoptysis or wheezing  Cardiovascular: negative for chest pain, palpitations or peripheral edema  Gastrointestinal: Positive for abdominal pain, bloating, nausea and constipation        Physical Exam:   Body mass index 27.4  General: alert and oriented, appropriate, no acute distress  Neck: Supple without lymphadenopathy or thyromegaly, trachea is in the midline  Respiratory: Lungs are clear bilaterally without wheezing, no use of accessory muscles is noted  Cardiovascular: Regular rate and rhythm without murmur, no peripheral edema  Gastrointestinal:  Mild tenderness in the lower abdomen.  Less distended than on her prior admission.  No guarding or rebound.  No mass or hernia.     Laboratory data:  Pathology report from recent colonoscopy is pending     Imaging data: CT is reviewed by me.  The distal sigmoid colon is thickened and a portion of it is distended.  There is a small amounts of fat stranding and vascular engorgement.  Beyond this area there appears to be a stricture with some tortuous distal sigmoid colon.  It is difficult to see healthy rectum.  The remainder of the more proximal colon is distended.  The small bowel appears relatively normal without evidence for small bowel obstruction.        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  The risks including perforation, bleeding, anastomotic leak, injury to the ureters and other potential problems were all discussed with the patient.  She understands that the plan will be to attempt a laparoscopic approach but that open laparotomy may also be necessary.  She understands that essentially colostomy ordered diverting ileostomy could be indicated as well.  She understands the nature of the risks  of wound infection, possible hernia formation and other problems.  -Type 2 diabetes, complicating above, not ideally controlled at this time, but I do not think we have the luxury to wait until this is perfectly controlled in order to deal with her obstruction  -Tobacco abuse, patient is making efforts to cut down and for better or for worse her chronic illness has made smoking more difficult on her.  She understands that her tobacco use does increase her risk of postoperative complications including bleeding, hernia formation, anastomotic failure and other problems.  She is going to make an effort to continue to cut down.  -We are efforting to get a hold of her colonoscopy records, but given the findings we have so far I think we are unsafe ground scheduling her for sigmoid resection.        Roderick Arnold MD, FACS  General, Minimally Invasive and Endoscopic Surgery  [facility]     [address], [address]  [place], [place], [address]  P: [phone]  F: [phone]

## 2019-07-09 ENCOUNTER — APPOINTMENT (OUTPATIENT)
Dept: PREADMISSION TESTING | Facility: HOSPITAL | Age: 34
End: 2019-07-09

## 2019-07-09 VITALS
HEIGHT: 66 IN | BODY MASS INDEX: 27.35 KG/M2 | RESPIRATION RATE: 16 BRPM | TEMPERATURE: 98.3 F | WEIGHT: 170.19 LBS | SYSTOLIC BLOOD PRESSURE: 121 MMHG | OXYGEN SATURATION: 99 % | HEART RATE: 86 BPM | DIASTOLIC BLOOD PRESSURE: 83 MMHG

## 2019-07-09 DIAGNOSIS — K56.699 STRICTURE OF SIGMOID COLON (HCC): ICD-10-CM

## 2019-07-09 DIAGNOSIS — K56.609 LARGE BOWEL OBSTRUCTION (HCC): ICD-10-CM

## 2019-07-09 LAB
ANION GAP SERPL CALCULATED.3IONS-SCNC: 13.8 MMOL/L (ref 5–15)
BASOPHILS # BLD AUTO: 0.04 10*3/MM3 (ref 0–0.2)
BASOPHILS NFR BLD AUTO: 0.4 % (ref 0–1.5)
BUN BLD-MCNC: 7 MG/DL (ref 6–20)
BUN/CREAT SERPL: 12.3 (ref 7–25)
CALCIUM SPEC-SCNC: 9.5 MG/DL (ref 8.6–10.5)
CEA SERPL-MCNC: 2.18 NG/ML
CHLORIDE SERPL-SCNC: 105 MMOL/L (ref 98–107)
CO2 SERPL-SCNC: 21.2 MMOL/L (ref 22–29)
CREAT BLD-MCNC: 0.57 MG/DL (ref 0.57–1)
DEPRECATED RDW RBC AUTO: 43.4 FL (ref 37–54)
EOSINOPHIL # BLD AUTO: 0.15 10*3/MM3 (ref 0–0.4)
EOSINOPHIL NFR BLD AUTO: 1.5 % (ref 0.3–6.2)
ERYTHROCYTE [DISTWIDTH] IN BLOOD BY AUTOMATED COUNT: 12.8 % (ref 12.3–15.4)
GFR SERPL CREATININE-BSD FRML MDRD: 120 ML/MIN/1.73
GLUCOSE BLD-MCNC: 187 MG/DL (ref 65–99)
HCG SERPL QL: NEGATIVE
HCT VFR BLD AUTO: 45 % (ref 34–46.6)
HGB BLD-MCNC: 14.7 G/DL (ref 12–15.9)
IMM GRANULOCYTES # BLD AUTO: 0.04 10*3/MM3 (ref 0–0.05)
IMM GRANULOCYTES NFR BLD AUTO: 0.4 % (ref 0–0.5)
LYMPHOCYTES # BLD AUTO: 2.66 10*3/MM3 (ref 0.7–3.1)
LYMPHOCYTES NFR BLD AUTO: 27.3 % (ref 19.6–45.3)
MCH RBC QN AUTO: 29.9 PG (ref 26.6–33)
MCHC RBC AUTO-ENTMCNC: 32.7 G/DL (ref 31.5–35.7)
MCV RBC AUTO: 91.6 FL (ref 79–97)
MONOCYTES # BLD AUTO: 0.54 10*3/MM3 (ref 0.1–0.9)
MONOCYTES NFR BLD AUTO: 5.5 % (ref 5–12)
NEUTROPHILS # BLD AUTO: 6.31 10*3/MM3 (ref 1.7–7)
NEUTROPHILS NFR BLD AUTO: 64.9 % (ref 42.7–76)
NRBC BLD AUTO-RTO: 0 /100 WBC (ref 0–0.2)
PLATELET # BLD AUTO: 313 10*3/MM3 (ref 140–450)
PMV BLD AUTO: 9.2 FL (ref 6–12)
POTASSIUM BLD-SCNC: 4.2 MMOL/L (ref 3.5–5.2)
RBC # BLD AUTO: 4.91 10*6/MM3 (ref 3.77–5.28)
SODIUM BLD-SCNC: 140 MMOL/L (ref 136–145)
WBC NRBC COR # BLD: 9.74 10*3/MM3 (ref 3.4–10.8)

## 2019-07-09 PROCEDURE — 36415 COLL VENOUS BLD VENIPUNCTURE: CPT

## 2019-07-09 PROCEDURE — 80048 BASIC METABOLIC PNL TOTAL CA: CPT | Performed by: SURGERY

## 2019-07-09 PROCEDURE — 93010 ELECTROCARDIOGRAM REPORT: CPT | Performed by: INTERNAL MEDICINE

## 2019-07-09 PROCEDURE — 84703 CHORIONIC GONADOTROPIN ASSAY: CPT | Performed by: SURGERY

## 2019-07-09 PROCEDURE — 82378 CARCINOEMBRYONIC ANTIGEN: CPT | Performed by: SURGERY

## 2019-07-09 PROCEDURE — 93005 ELECTROCARDIOGRAM TRACING: CPT

## 2019-07-09 PROCEDURE — 85025 COMPLETE CBC W/AUTO DIFF WBC: CPT | Performed by: SURGERY

## 2019-07-09 RX ORDER — DOCUSATE SODIUM 100 MG/1
100 CAPSULE, LIQUID FILLED ORAL 2 TIMES DAILY PRN
COMMUNITY
End: 2019-07-26 | Stop reason: HOSPADM

## 2019-07-09 RX ORDER — LEVOTHYROXINE SODIUM 300 UG/1
200 TABLET ORAL DAILY
COMMUNITY
End: 2020-02-12

## 2019-07-10 ENCOUNTER — ANESTHESIA (OUTPATIENT)
Dept: PERIOP | Facility: HOSPITAL | Age: 34
End: 2019-07-10

## 2019-07-10 ENCOUNTER — HOSPITAL ENCOUNTER (INPATIENT)
Facility: HOSPITAL | Age: 34
LOS: 16 days | Discharge: HOME HEALTH | End: 2019-07-26
Attending: SURGERY | Admitting: SURGERY

## 2019-07-10 ENCOUNTER — ANESTHESIA EVENT (OUTPATIENT)
Dept: PERIOP | Facility: HOSPITAL | Age: 34
End: 2019-07-10

## 2019-07-10 DIAGNOSIS — Z74.09 IMPAIRED FUNCTIONAL MOBILITY AND ACTIVITY TOLERANCE: ICD-10-CM

## 2019-07-10 DIAGNOSIS — K56.699 STRICTURE OF SIGMOID COLON (HCC): ICD-10-CM

## 2019-07-10 DIAGNOSIS — K56.609 LARGE BOWEL OBSTRUCTION (HCC): ICD-10-CM

## 2019-07-10 DIAGNOSIS — K91.89 LARGE BOWEL ANASTOMOTIC LEAK: Primary | ICD-10-CM

## 2019-07-10 DIAGNOSIS — K65.9 PERITONITIS (HCC): ICD-10-CM

## 2019-07-10 LAB
GLUCOSE BLDC GLUCOMTR-MCNC: 223 MG/DL (ref 70–130)
GLUCOSE BLDC GLUCOMTR-MCNC: 233 MG/DL (ref 70–130)
GLUCOSE BLDC GLUCOMTR-MCNC: 254 MG/DL (ref 70–130)

## 2019-07-10 PROCEDURE — P9041 ALBUMIN (HUMAN),5%, 50ML: HCPCS | Performed by: ANESTHESIOLOGY

## 2019-07-10 PROCEDURE — 25010000002 KETOROLAC TROMETHAMINE PER 15 MG: Performed by: SURGERY

## 2019-07-10 PROCEDURE — 25010000002 MIDAZOLAM PER 1 MG: Performed by: ANESTHESIOLOGY

## 2019-07-10 PROCEDURE — 25010000002 PROPOFOL 10 MG/ML EMULSION: Performed by: ANESTHESIOLOGY

## 2019-07-10 PROCEDURE — 25010000002 ONDANSETRON PER 1 MG: Performed by: SURGERY

## 2019-07-10 PROCEDURE — 25010000002 FENTANYL CITRATE (PF) 100 MCG/2ML SOLUTION: Performed by: ANESTHESIOLOGY

## 2019-07-10 PROCEDURE — 25010000002 ERTAPENEM 1 GM/100ML SOLUTION: Performed by: SURGERY

## 2019-07-10 PROCEDURE — 63710000001 INSULIN REGULAR HUMAN PER 5 UNITS: Performed by: SURGERY

## 2019-07-10 PROCEDURE — 82962 GLUCOSE BLOOD TEST: CPT

## 2019-07-10 PROCEDURE — 25010000002 HYDROMORPHONE PER 4 MG: Performed by: ANESTHESIOLOGY

## 2019-07-10 PROCEDURE — 44310 ILEOSTOMY/JEJUNOSTOMY: CPT | Performed by: SURGERY

## 2019-07-10 PROCEDURE — 88305 TISSUE EXAM BY PATHOLOGIST: CPT | Performed by: SURGERY

## 2019-07-10 PROCEDURE — 88307 TISSUE EXAM BY PATHOLOGIST: CPT | Performed by: SURGERY

## 2019-07-10 PROCEDURE — 25010000002 HYDROMORPHONE PER 4 MG: Performed by: SURGERY

## 2019-07-10 PROCEDURE — 25010000002 ALBUMIN HUMAN 5% PER 50 ML: Performed by: ANESTHESIOLOGY

## 2019-07-10 PROCEDURE — 44207 L COLECTOMY/COLOPROCTOSTOMY: CPT | Performed by: SURGERY

## 2019-07-10 PROCEDURE — 0DBN4ZZ EXCISION OF SIGMOID COLON, PERCUTANEOUS ENDOSCOPIC APPROACH: ICD-10-PCS | Performed by: SURGERY

## 2019-07-10 PROCEDURE — 25010000002 KETOROLAC TROMETHAMINE PER 15 MG: Performed by: ANESTHESIOLOGY

## 2019-07-10 PROCEDURE — 0D1B4Z4 BYPASS ILEUM TO CUTANEOUS, PERCUTANEOUS ENDOSCOPIC APPROACH: ICD-10-PCS | Performed by: SURGERY

## 2019-07-10 PROCEDURE — 25010000002 ONDANSETRON PER 1 MG: Performed by: ANESTHESIOLOGY

## 2019-07-10 DEVICE — THE ECHELON, ECHELON ENDOPATH™ AND ECHELON FLEX™ FAMILIES OF ENDOSCOPIC LINEAR CUTTERS AND RELOADS ARE STERILE, SINGLE PATIENT USE INSTRUMENTS THAT SIMULTANEOUSLY CUT AND STAPLE TISSUE. THERE ARE SIX STAGGERED ROWS OF STAPLES, THREE ON EITHER SIDE OF THE CUT LINE. THE 45 MM INSTRUMENTS HAVE A STAPLE LINE THATIS APPROXIMATELY 45 MM LONG AND A CUT LINE THAT IS APPROXIMATELY 42 MM LONG. THE SHAFT CAN ROTATE FREELY IN BOTH DIRECTIONS AND AN ARTICULATION MECHANISM ON ARTICULATING INSTRUMENTS ENABLES BENDING THE DISTAL PORTIONOF THE SHAFT TO FACILITATE LATERAL ACCESS OF THE OPERATIVE SITE.THE INSTRUMENTS ARE SHIPPED WITHOUT A RELOAD AND MUST BE LOADED PRIOR TO USE. A STAPLE RETAINING CAP ON THE RELOAD PROTECTS THE STAPLE LEG POINTS DURING SHIPPING AND TRANSPORTATION. THE INSTRUMENTS’ LOCK-OUT FEATURE IS DESIGNED TO PREVENT A USED RELOAD FROM BEING REFIRED.
Type: IMPLANTABLE DEVICE | Site: ABDOMEN | Status: FUNCTIONAL
Brand: ECHELON ENDOPATH

## 2019-07-10 RX ORDER — FAMOTIDINE 10 MG/ML
20 INJECTION, SOLUTION INTRAVENOUS
Status: COMPLETED | OUTPATIENT
Start: 2019-07-10 | End: 2019-07-10

## 2019-07-10 RX ORDER — MAGNESIUM HYDROXIDE 1200 MG/15ML
LIQUID ORAL AS NEEDED
Status: DISCONTINUED | OUTPATIENT
Start: 2019-07-10 | End: 2019-07-10 | Stop reason: HOSPADM

## 2019-07-10 RX ORDER — HYDROCODONE BITARTRATE AND ACETAMINOPHEN 7.5; 325 MG/1; MG/1
1 TABLET ORAL EVERY 4 HOURS PRN
Status: DISCONTINUED | OUTPATIENT
Start: 2019-07-10 | End: 2019-07-26 | Stop reason: HOSPADM

## 2019-07-10 RX ORDER — HYDRALAZINE HYDROCHLORIDE 20 MG/ML
5 INJECTION INTRAMUSCULAR; INTRAVENOUS
Status: DISCONTINUED | OUTPATIENT
Start: 2019-07-10 | End: 2019-07-10 | Stop reason: HOSPADM

## 2019-07-10 RX ORDER — LEVOTHYROXINE SODIUM 0.15 MG/1
300 TABLET ORAL
Status: DISCONTINUED | OUTPATIENT
Start: 2019-07-11 | End: 2019-07-11

## 2019-07-10 RX ORDER — ACETAMINOPHEN 325 MG/1
650 TABLET ORAL ONCE AS NEEDED
Status: DISCONTINUED | OUTPATIENT
Start: 2019-07-10 | End: 2019-07-10 | Stop reason: HOSPADM

## 2019-07-10 RX ORDER — PROPOFOL 10 MG/ML
VIAL (ML) INTRAVENOUS AS NEEDED
Status: DISCONTINUED | OUTPATIENT
Start: 2019-07-10 | End: 2019-07-10 | Stop reason: SURG

## 2019-07-10 RX ORDER — ONDANSETRON 4 MG/1
4 TABLET, FILM COATED ORAL EVERY 6 HOURS PRN
Status: DISCONTINUED | OUTPATIENT
Start: 2019-07-10 | End: 2019-07-12

## 2019-07-10 RX ORDER — SODIUM CHLORIDE, SODIUM LACTATE, POTASSIUM CHLORIDE, CALCIUM CHLORIDE 600; 310; 30; 20 MG/100ML; MG/100ML; MG/100ML; MG/100ML
9 INJECTION, SOLUTION INTRAVENOUS CONTINUOUS PRN
Status: DISCONTINUED | OUTPATIENT
Start: 2019-07-10 | End: 2019-07-10 | Stop reason: HOSPADM

## 2019-07-10 RX ORDER — NICOTINE POLACRILEX 4 MG
15 LOZENGE BUCCAL
Status: DISCONTINUED | OUTPATIENT
Start: 2019-07-10 | End: 2019-07-12

## 2019-07-10 RX ORDER — HYDROCODONE BITARTRATE AND ACETAMINOPHEN 7.5; 325 MG/1; MG/1
1 TABLET ORAL ONCE AS NEEDED
Status: DISCONTINUED | OUTPATIENT
Start: 2019-07-10 | End: 2019-07-10 | Stop reason: HOSPADM

## 2019-07-10 RX ORDER — ONDANSETRON 2 MG/ML
INJECTION INTRAMUSCULAR; INTRAVENOUS AS NEEDED
Status: DISCONTINUED | OUTPATIENT
Start: 2019-07-10 | End: 2019-07-10 | Stop reason: SURG

## 2019-07-10 RX ORDER — ROCURONIUM BROMIDE 10 MG/ML
INJECTION, SOLUTION INTRAVENOUS AS NEEDED
Status: DISCONTINUED | OUTPATIENT
Start: 2019-07-10 | End: 2019-07-10 | Stop reason: SURG

## 2019-07-10 RX ORDER — ACETAMINOPHEN 325 MG/1
650 TABLET ORAL EVERY 4 HOURS PRN
Status: DISCONTINUED | OUTPATIENT
Start: 2019-07-10 | End: 2019-07-12

## 2019-07-10 RX ORDER — HYDROMORPHONE HCL 110MG/55ML
PATIENT CONTROLLED ANALGESIA SYRINGE INTRAVENOUS AS NEEDED
Status: DISCONTINUED | OUTPATIENT
Start: 2019-07-10 | End: 2019-07-10 | Stop reason: SURG

## 2019-07-10 RX ORDER — KETOROLAC TROMETHAMINE 30 MG/ML
INJECTION, SOLUTION INTRAMUSCULAR; INTRAVENOUS AS NEEDED
Status: DISCONTINUED | OUTPATIENT
Start: 2019-07-10 | End: 2019-07-10 | Stop reason: SURG

## 2019-07-10 RX ORDER — MONTELUKAST SODIUM 10 MG/1
10 TABLET ORAL DAILY
COMMUNITY

## 2019-07-10 RX ORDER — EPHEDRINE SULFATE 50 MG/ML
5 INJECTION, SOLUTION INTRAVENOUS ONCE AS NEEDED
Status: DISCONTINUED | OUTPATIENT
Start: 2019-07-10 | End: 2019-07-10 | Stop reason: HOSPADM

## 2019-07-10 RX ORDER — PROMETHAZINE HYDROCHLORIDE 25 MG/1
25 SUPPOSITORY RECTAL ONCE AS NEEDED
Status: DISCONTINUED | OUTPATIENT
Start: 2019-07-10 | End: 2019-07-10 | Stop reason: HOSPADM

## 2019-07-10 RX ORDER — NALOXONE HCL 0.4 MG/ML
0.1 VIAL (ML) INJECTION
Status: DISCONTINUED | OUTPATIENT
Start: 2019-07-10 | End: 2019-07-12

## 2019-07-10 RX ORDER — PROMETHAZINE HYDROCHLORIDE 25 MG/ML
12.5 INJECTION, SOLUTION INTRAMUSCULAR; INTRAVENOUS ONCE AS NEEDED
Status: DISCONTINUED | OUTPATIENT
Start: 2019-07-10 | End: 2019-07-10 | Stop reason: HOSPADM

## 2019-07-10 RX ORDER — DEXTROSE MONOHYDRATE 25 G/50ML
25 INJECTION, SOLUTION INTRAVENOUS
Status: DISCONTINUED | OUTPATIENT
Start: 2019-07-10 | End: 2019-07-26 | Stop reason: HOSPADM

## 2019-07-10 RX ORDER — ALVIMOPAN 12 MG/1
12 CAPSULE ORAL ONCE
Status: COMPLETED | OUTPATIENT
Start: 2019-07-10 | End: 2019-07-10

## 2019-07-10 RX ORDER — SODIUM CHLORIDE AND POTASSIUM CHLORIDE 150; 900 MG/100ML; MG/100ML
75 INJECTION, SOLUTION INTRAVENOUS CONTINUOUS
Status: DISCONTINUED | OUTPATIENT
Start: 2019-07-10 | End: 2019-07-11

## 2019-07-10 RX ORDER — SODIUM CHLORIDE 0.9 % (FLUSH) 0.9 %
3-10 SYRINGE (ML) INJECTION AS NEEDED
Status: DISCONTINUED | OUTPATIENT
Start: 2019-07-10 | End: 2019-07-10 | Stop reason: HOSPADM

## 2019-07-10 RX ORDER — MIDAZOLAM HYDROCHLORIDE 1 MG/ML
1 INJECTION INTRAMUSCULAR; INTRAVENOUS
Status: DISCONTINUED | OUTPATIENT
Start: 2019-07-10 | End: 2019-07-10 | Stop reason: HOSPADM

## 2019-07-10 RX ORDER — FENTANYL CITRATE 50 UG/ML
50 INJECTION, SOLUTION INTRAMUSCULAR; INTRAVENOUS
Status: DISCONTINUED | OUTPATIENT
Start: 2019-07-10 | End: 2019-07-10 | Stop reason: HOSPADM

## 2019-07-10 RX ORDER — DIPHENHYDRAMINE HCL 25 MG
25 CAPSULE ORAL
Status: DISCONTINUED | OUTPATIENT
Start: 2019-07-10 | End: 2019-07-10 | Stop reason: HOSPADM

## 2019-07-10 RX ORDER — DIPHENHYDRAMINE HCL 25 MG
50 CAPSULE ORAL EVERY 6 HOURS PRN
Status: DISCONTINUED | OUTPATIENT
Start: 2019-07-10 | End: 2019-07-10 | Stop reason: HOSPADM

## 2019-07-10 RX ORDER — ACETAMINOPHEN 500 MG
500 TABLET ORAL EVERY 6 HOURS PRN
COMMUNITY
End: 2019-07-26 | Stop reason: HOSPADM

## 2019-07-10 RX ORDER — FLUTICASONE PROPIONATE 50 MCG
1 SPRAY, SUSPENSION (ML) NASAL DAILY PRN
Status: DISCONTINUED | OUTPATIENT
Start: 2019-07-10 | End: 2019-07-26 | Stop reason: HOSPADM

## 2019-07-10 RX ORDER — FENTANYL CITRATE 50 UG/ML
25 INJECTION, SOLUTION INTRAMUSCULAR; INTRAVENOUS
Status: DISCONTINUED | OUTPATIENT
Start: 2019-07-10 | End: 2019-07-10 | Stop reason: HOSPADM

## 2019-07-10 RX ORDER — PROMETHAZINE HYDROCHLORIDE 25 MG/1
25 TABLET ORAL ONCE AS NEEDED
Status: DISCONTINUED | OUTPATIENT
Start: 2019-07-10 | End: 2019-07-10 | Stop reason: HOSPADM

## 2019-07-10 RX ORDER — ALBUMIN, HUMAN INJ 5% 5 %
SOLUTION INTRAVENOUS CONTINUOUS PRN
Status: DISCONTINUED | OUTPATIENT
Start: 2019-07-10 | End: 2019-07-10 | Stop reason: SURG

## 2019-07-10 RX ORDER — ONDANSETRON 2 MG/ML
4 INJECTION INTRAMUSCULAR; INTRAVENOUS ONCE AS NEEDED
Status: COMPLETED | OUTPATIENT
Start: 2019-07-10 | End: 2019-07-10

## 2019-07-10 RX ORDER — KETOROLAC TROMETHAMINE 15 MG/ML
15 INJECTION, SOLUTION INTRAMUSCULAR; INTRAVENOUS EVERY 6 HOURS
Status: DISCONTINUED | OUTPATIENT
Start: 2019-07-10 | End: 2019-07-11

## 2019-07-10 RX ORDER — OXYCODONE AND ACETAMINOPHEN 7.5; 325 MG/1; MG/1
1 TABLET ORAL ONCE AS NEEDED
Status: DISCONTINUED | OUTPATIENT
Start: 2019-07-10 | End: 2019-07-10 | Stop reason: HOSPADM

## 2019-07-10 RX ORDER — MONTELUKAST SODIUM 10 MG/1
10 TABLET ORAL DAILY
Status: DISCONTINUED | OUTPATIENT
Start: 2019-07-10 | End: 2019-07-11

## 2019-07-10 RX ORDER — MIDAZOLAM HYDROCHLORIDE 1 MG/ML
2 INJECTION INTRAMUSCULAR; INTRAVENOUS
Status: DISCONTINUED | OUTPATIENT
Start: 2019-07-10 | End: 2019-07-10 | Stop reason: HOSPADM

## 2019-07-10 RX ORDER — NALOXONE HCL 0.4 MG/ML
0.2 VIAL (ML) INJECTION AS NEEDED
Status: DISCONTINUED | OUTPATIENT
Start: 2019-07-10 | End: 2019-07-10 | Stop reason: HOSPADM

## 2019-07-10 RX ORDER — ACETAMINOPHEN 160 MG/5ML
650 SOLUTION ORAL EVERY 4 HOURS PRN
Status: DISCONTINUED | OUTPATIENT
Start: 2019-07-10 | End: 2019-07-12

## 2019-07-10 RX ORDER — ACETAMINOPHEN 650 MG/1
650 SUPPOSITORY RECTAL EVERY 4 HOURS PRN
Status: DISCONTINUED | OUTPATIENT
Start: 2019-07-10 | End: 2019-07-12

## 2019-07-10 RX ORDER — GABAPENTIN 300 MG/1
300 CAPSULE ORAL 3 TIMES DAILY
Status: DISCONTINUED | OUTPATIENT
Start: 2019-07-10 | End: 2019-07-11

## 2019-07-10 RX ORDER — BUPIVACAINE HYDROCHLORIDE 2.5 MG/ML
INJECTION, SOLUTION EPIDURAL; INFILTRATION; INTRACAUDAL AS NEEDED
Status: DISCONTINUED | OUTPATIENT
Start: 2019-07-10 | End: 2019-07-10 | Stop reason: HOSPADM

## 2019-07-10 RX ORDER — HYDROMORPHONE HYDROCHLORIDE 1 MG/ML
0.5 INJECTION, SOLUTION INTRAMUSCULAR; INTRAVENOUS; SUBCUTANEOUS
Status: DISCONTINUED | OUTPATIENT
Start: 2019-07-10 | End: 2019-07-10 | Stop reason: HOSPADM

## 2019-07-10 RX ORDER — SODIUM CHLORIDE 0.9 % (FLUSH) 0.9 %
3 SYRINGE (ML) INJECTION EVERY 12 HOURS SCHEDULED
Status: DISCONTINUED | OUTPATIENT
Start: 2019-07-10 | End: 2019-07-10 | Stop reason: HOSPADM

## 2019-07-10 RX ORDER — PROMETHAZINE HYDROCHLORIDE 25 MG/ML
6.25 INJECTION, SOLUTION INTRAMUSCULAR; INTRAVENOUS
Status: DISCONTINUED | OUTPATIENT
Start: 2019-07-10 | End: 2019-07-10 | Stop reason: HOSPADM

## 2019-07-10 RX ORDER — ONDANSETRON 2 MG/ML
4 INJECTION INTRAMUSCULAR; INTRAVENOUS EVERY 6 HOURS PRN
Status: DISCONTINUED | OUTPATIENT
Start: 2019-07-10 | End: 2019-07-12

## 2019-07-10 RX ORDER — FLUMAZENIL 0.1 MG/ML
0.2 INJECTION INTRAVENOUS AS NEEDED
Status: DISCONTINUED | OUTPATIENT
Start: 2019-07-10 | End: 2019-07-10 | Stop reason: HOSPADM

## 2019-07-10 RX ORDER — HYDROMORPHONE HYDROCHLORIDE 1 MG/ML
0.5 INJECTION, SOLUTION INTRAMUSCULAR; INTRAVENOUS; SUBCUTANEOUS
Status: DISCONTINUED | OUTPATIENT
Start: 2019-07-10 | End: 2019-07-12

## 2019-07-10 RX ORDER — LIDOCAINE HYDROCHLORIDE 20 MG/ML
INJECTION, SOLUTION INFILTRATION; PERINEURAL AS NEEDED
Status: DISCONTINUED | OUTPATIENT
Start: 2019-07-10 | End: 2019-07-10 | Stop reason: SURG

## 2019-07-10 RX ADMIN — HYDROMORPHONE HYDROCHLORIDE 0.5 MG: 1 INJECTION, SOLUTION INTRAMUSCULAR; INTRAVENOUS; SUBCUTANEOUS at 20:26

## 2019-07-10 RX ADMIN — ROCURONIUM BROMIDE 10 MG: 10 INJECTION INTRAVENOUS at 13:19

## 2019-07-10 RX ADMIN — ROCURONIUM BROMIDE 20 MG: 10 INJECTION INTRAVENOUS at 14:06

## 2019-07-10 RX ADMIN — FENTANYL CITRATE 50 MCG: 50 INJECTION INTRAMUSCULAR; INTRAVENOUS at 16:41

## 2019-07-10 RX ADMIN — ALVIMOPAN 12 MG: 12 CAPSULE ORAL at 11:19

## 2019-07-10 RX ADMIN — KETOROLAC TROMETHAMINE 30 MG: 30 INJECTION, SOLUTION INTRAMUSCULAR; INTRAVENOUS at 16:01

## 2019-07-10 RX ADMIN — FENTANYL CITRATE 50 MCG: 50 INJECTION INTRAMUSCULAR; INTRAVENOUS at 17:14

## 2019-07-10 RX ADMIN — HYDROMORPHONE HYDROCHLORIDE 0.5 MG: 2 INJECTION INTRAMUSCULAR; INTRAVENOUS; SUBCUTANEOUS at 15:58

## 2019-07-10 RX ADMIN — FENTANYL CITRATE 50 MCG: 50 INJECTION INTRAMUSCULAR; INTRAVENOUS at 13:10

## 2019-07-10 RX ADMIN — FENTANYL CITRATE 25 MCG: 50 INJECTION INTRAMUSCULAR; INTRAVENOUS at 11:19

## 2019-07-10 RX ADMIN — ERTAPENEM SODIUM 1 G: 1 INJECTION, POWDER, LYOPHILIZED, FOR SOLUTION INTRAMUSCULAR; INTRAVENOUS at 12:33

## 2019-07-10 RX ADMIN — FENTANYL CITRATE 50 MCG: 50 INJECTION INTRAMUSCULAR; INTRAVENOUS at 13:29

## 2019-07-10 RX ADMIN — SODIUM CHLORIDE, POTASSIUM CHLORIDE, SODIUM LACTATE AND CALCIUM CHLORIDE 9 ML/HR: 600; 310; 30; 20 INJECTION, SOLUTION INTRAVENOUS at 11:23

## 2019-07-10 RX ADMIN — PROPOFOL 150 MG: 10 INJECTION, EMULSION INTRAVENOUS at 12:34

## 2019-07-10 RX ADMIN — HYDROMORPHONE HYDROCHLORIDE 0.5 MG: 1 INJECTION, SOLUTION INTRAMUSCULAR; INTRAVENOUS; SUBCUTANEOUS at 18:05

## 2019-07-10 RX ADMIN — FENTANYL CITRATE 50 MCG: 50 INJECTION INTRAMUSCULAR; INTRAVENOUS at 14:26

## 2019-07-10 RX ADMIN — ONDANSETRON 4 MG: 2 INJECTION INTRAMUSCULAR; INTRAVENOUS at 15:02

## 2019-07-10 RX ADMIN — FENTANYL CITRATE 50 MCG: 50 INJECTION INTRAMUSCULAR; INTRAVENOUS at 15:37

## 2019-07-10 RX ADMIN — GABAPENTIN 300 MG: 300 CAPSULE ORAL at 20:26

## 2019-07-10 RX ADMIN — FENTANYL CITRATE 100 MCG: 50 INJECTION INTRAMUSCULAR; INTRAVENOUS at 12:33

## 2019-07-10 RX ADMIN — ONDANSETRON 4 MG: 2 INJECTION INTRAMUSCULAR; INTRAVENOUS at 17:00

## 2019-07-10 RX ADMIN — PROPOFOL 50 MG: 10 INJECTION, EMULSION INTRAVENOUS at 12:38

## 2019-07-10 RX ADMIN — ROCURONIUM BROMIDE 10 MG: 10 INJECTION INTRAVENOUS at 15:26

## 2019-07-10 RX ADMIN — FAMOTIDINE 20 MG: 10 INJECTION INTRAVENOUS at 11:19

## 2019-07-10 RX ADMIN — SODIUM CHLORIDE, POTASSIUM CHLORIDE, SODIUM LACTATE AND CALCIUM CHLORIDE: 600; 310; 30; 20 INJECTION, SOLUTION INTRAVENOUS at 16:33

## 2019-07-10 RX ADMIN — INSULIN HUMAN 4 UNITS: 100 INJECTION, SOLUTION PARENTERAL at 23:23

## 2019-07-10 RX ADMIN — ONDANSETRON HYDROCHLORIDE 4 MG: 2 SOLUTION INTRAMUSCULAR; INTRAVENOUS at 21:20

## 2019-07-10 RX ADMIN — ALBUMIN (HUMAN): 0.05 SOLUTION INTRAVENOUS at 14:38

## 2019-07-10 RX ADMIN — SUGAMMADEX 300 MG: 100 INJECTION, SOLUTION INTRAVENOUS at 16:08

## 2019-07-10 RX ADMIN — HYDROMORPHONE HYDROCHLORIDE 0.5 MG: 1 INJECTION, SOLUTION INTRAMUSCULAR; INTRAVENOUS; SUBCUTANEOUS at 17:40

## 2019-07-10 RX ADMIN — HYDROMORPHONE HYDROCHLORIDE 0.5 MG: 2 INJECTION INTRAMUSCULAR; INTRAVENOUS; SUBCUTANEOUS at 15:55

## 2019-07-10 RX ADMIN — MIDAZOLAM 2 MG: 1 INJECTION INTRAMUSCULAR; INTRAVENOUS at 11:19

## 2019-07-10 RX ADMIN — KETOROLAC TROMETHAMINE 15 MG: 15 INJECTION, SOLUTION INTRAMUSCULAR; INTRAVENOUS at 21:16

## 2019-07-10 RX ADMIN — LIDOCAINE HYDROCHLORIDE 50 MG: 20 INJECTION, SOLUTION INFILTRATION; PERINEURAL at 12:34

## 2019-07-10 RX ADMIN — HYDROMORPHONE HYDROCHLORIDE 0.5 MG: 1 INJECTION, SOLUTION INTRAMUSCULAR; INTRAVENOUS; SUBCUTANEOUS at 18:30

## 2019-07-10 RX ADMIN — HYDROMORPHONE HYDROCHLORIDE 0.5 MG: 2 INJECTION INTRAMUSCULAR; INTRAVENOUS; SUBCUTANEOUS at 13:55

## 2019-07-10 RX ADMIN — HYDROMORPHONE HYDROCHLORIDE 0.5 MG: 2 INJECTION INTRAMUSCULAR; INTRAVENOUS; SUBCUTANEOUS at 15:02

## 2019-07-10 RX ADMIN — HYDROMORPHONE HYDROCHLORIDE 0.5 MG: 1 INJECTION, SOLUTION INTRAMUSCULAR; INTRAVENOUS; SUBCUTANEOUS at 22:24

## 2019-07-10 RX ADMIN — FENTANYL CITRATE 50 MCG: 50 INJECTION INTRAMUSCULAR; INTRAVENOUS at 17:04

## 2019-07-10 RX ADMIN — ROCURONIUM BROMIDE 40 MG: 10 INJECTION INTRAVENOUS at 12:34

## 2019-07-10 NOTE — ANESTHESIA PREPROCEDURE EVALUATION
Anesthesia Evaluation     Patient summary reviewed   NPO Solid Status: > 8 hours             Airway   Mallampati: II  No difficulty expected  Dental      Pulmonary    (+) a smoker Current,   Cardiovascular     ECG reviewed  Rhythm: regular        Neuro/Psych  GI/Hepatic/Renal/Endo    (+)   diabetes mellitus,     Musculoskeletal     Abdominal    Substance History      OB/GYN          Other                        Anesthesia Plan    ASA 3     general     Anesthetic plan, all risks, benefits, and alternatives have been provided, discussed and informed consent has been obtained with: patient.  Use of blood products discussed with patient .

## 2019-07-10 NOTE — ANESTHESIA PROCEDURE NOTES
Airway  Urgency: elective      General Information and Staff    Patient location during procedure: OR  Anesthesiologist: Roosevelt Andrew MD    Indications and Patient Condition  Indications for airway management: airway protection    Preoxygenated: yes  MILS maintained throughout  Mask difficulty assessment: 1 - vent by mask    Final Airway Details  Final airway type: endotracheal airway      Successful airway: ETT  Cuffed: yes   Successful intubation technique: direct laryngoscopy  Endotracheal tube insertion site: oral  Blade: Geoff  Blade size: 3  ETT size (mm): 7.0  Cormack-Lehane Classification: grade I - full view of glottis  Placement verified by: chest auscultation   Measured from: lips  ETT to lips (cm): 21  Number of attempts at approach: 1

## 2019-07-10 NOTE — ANESTHESIA POSTPROCEDURE EVALUATION
Patient: Edie Camacho    Procedure Summary     Date:  07/10/19 Room / Location:  [facility] OR 09 / [facility] MAIN OR    Anesthesia Start:  1229 Anesthesia Stop:  1647    Procedure:  LAPAROSCOPIC LOW ANTERIOR  COLON RESECTION WITH DIVERTING LOOP ILEOSTOMY (N/A Abdomen) Diagnosis:       Large bowel obstruction (CMS/HCC)      Stricture of sigmoid colon (CMS/HCC)      (Large bowel obstruction (CMS/HCC) [K56.609])      (Stricture of sigmoid colon (CMS/HCC) [K56.699])    Surgeon:  Roderick Arnold MD Provider:  Jesse Wisdom MD    Anesthesia Type:  general ASA Status:  3          Anesthesia Type: general  Last vitals  BP   131/77 (07/10/19 1730)   Temp   37.1 °C (98.8 °F) (07/10/19 1640)   Pulse   (!) 121 (07/10/19 1730)   Resp   16 (07/10/19 1730)     SpO2   97 % (07/10/19 1730)     Post Anesthesia Care and Evaluation    Patient location during evaluation: bedside  Patient participation: complete - patient participated  Level of consciousness: awake  Pain score: 1  Pain management: adequate  Airway patency: patent  Anesthetic complications: No anesthetic complications  PONV Status: controlled  Cardiovascular status: acceptable  Respiratory status: acceptable  Hydration status: acceptable    Comments: --------------------            07/10/19               1730     --------------------   BP:       131/77     Pulse:   (!) 121     Resp:       16       Temp:                SpO2:      97%      --------------------

## 2019-07-11 ENCOUNTER — APPOINTMENT (OUTPATIENT)
Dept: GENERAL RADIOLOGY | Facility: HOSPITAL | Age: 34
End: 2019-07-11

## 2019-07-11 ENCOUNTER — ANESTHESIA (OUTPATIENT)
Dept: PERIOP | Facility: HOSPITAL | Age: 34
End: 2019-07-11

## 2019-07-11 ENCOUNTER — ANESTHESIA EVENT (OUTPATIENT)
Dept: PERIOP | Facility: HOSPITAL | Age: 34
End: 2019-07-11

## 2019-07-11 LAB
ABO GROUP BLD: NORMAL
ALBUMIN SERPL-MCNC: 3.1 G/DL (ref 3.5–5.2)
ALBUMIN/GLOB SERPL: 1.8 G/DL
ALP SERPL-CCNC: 43 U/L (ref 39–117)
ALT SERPL W P-5'-P-CCNC: 38 U/L (ref 1–33)
ANION GAP SERPL CALCULATED.3IONS-SCNC: 13 MMOL/L (ref 5–15)
ANION GAP SERPL CALCULATED.3IONS-SCNC: 14.3 MMOL/L (ref 5–15)
ANISOCYTOSIS BLD QL: ABNORMAL
ARTERIAL PATENCY WRIST A: ABNORMAL
AST SERPL-CCNC: 54 U/L (ref 1–32)
ATMOSPHERIC PRESS: 744.5 MMHG
BASE EXCESS BLDA CALC-SCNC: -9.3 MMOL/L (ref 0–2)
BASOPHILS # BLD AUTO: 0.01 10*3/MM3 (ref 0–0.2)
BASOPHILS NFR BLD AUTO: 0.4 % (ref 0–1.5)
BDY SITE: ABNORMAL
BILIRUB SERPL-MCNC: 1.1 MG/DL (ref 0.2–1.2)
BLD GP AB SCN SERPL QL: NEGATIVE
BUN BLD-MCNC: 10 MG/DL (ref 6–20)
BUN BLD-MCNC: 9 MG/DL (ref 6–20)
BUN/CREAT SERPL: 18.4 (ref 7–25)
BUN/CREAT SERPL: 20 (ref 7–25)
CALCIUM SPEC-SCNC: 7.8 MG/DL (ref 8.6–10.5)
CALCIUM SPEC-SCNC: 8.7 MG/DL (ref 8.6–10.5)
CHLORIDE SERPL-SCNC: 102 MMOL/L (ref 98–107)
CHLORIDE SERPL-SCNC: 107 MMOL/L (ref 98–107)
CO2 SERPL-SCNC: 17 MMOL/L (ref 22–29)
CO2 SERPL-SCNC: 19.7 MMOL/L (ref 22–29)
CREAT BLD-MCNC: 0.49 MG/DL (ref 0.57–1)
CREAT BLD-MCNC: 0.5 MG/DL (ref 0.57–1)
DEPRECATED RDW RBC AUTO: 43.9 FL (ref 37–54)
DEPRECATED RDW RBC AUTO: 44.6 FL (ref 37–54)
EOSINOPHIL # BLD AUTO: 0 10*3/MM3 (ref 0–0.4)
EOSINOPHIL NFR BLD AUTO: 0 % (ref 0.3–6.2)
ERYTHROCYTE [DISTWIDTH] IN BLOOD BY AUTOMATED COUNT: 12.8 % (ref 12.3–15.4)
ERYTHROCYTE [DISTWIDTH] IN BLOOD BY AUTOMATED COUNT: 13 % (ref 12.3–15.4)
GFR SERPL CREATININE-BSD FRML MDRD: 140 ML/MIN/1.73
GFR SERPL CREATININE-BSD FRML MDRD: 143 ML/MIN/1.73
GLOBULIN UR ELPH-MCNC: 1.7 GM/DL
GLUCOSE BLD-MCNC: 244 MG/DL (ref 65–99)
GLUCOSE BLD-MCNC: 257 MG/DL (ref 65–99)
GLUCOSE BLDC GLUCOMTR-MCNC: 208 MG/DL (ref 70–130)
GLUCOSE BLDC GLUCOMTR-MCNC: 227 MG/DL (ref 70–130)
GLUCOSE BLDC GLUCOMTR-MCNC: 231 MG/DL (ref 70–130)
GLUCOSE BLDC GLUCOMTR-MCNC: 238 MG/DL (ref 70–130)
HCO3 BLDA-SCNC: 17.4 MMOL/L (ref 22–28)
HCT VFR BLD AUTO: 42.5 % (ref 34–46.6)
HCT VFR BLD AUTO: 42.8 % (ref 34–46.6)
HGB BLD-MCNC: 13.8 G/DL (ref 12–15.9)
HGB BLD-MCNC: 14 G/DL (ref 12–15.9)
HOROWITZ INDEX BLD+IHG-RTO: 60 %
LYMPHOCYTES # BLD AUTO: 0.35 10*3/MM3 (ref 0.7–3.1)
LYMPHOCYTES # BLD MANUAL: 0.4 10*3/MM3 (ref 0.7–3.1)
LYMPHOCYTES NFR BLD AUTO: 13.3 % (ref 19.6–45.3)
LYMPHOCYTES NFR BLD MANUAL: 15.2 % (ref 19.6–45.3)
LYMPHOCYTES NFR BLD MANUAL: 6.1 % (ref 5–12)
MCH RBC QN AUTO: 30.3 PG (ref 26.6–33)
MCH RBC QN AUTO: 30.7 PG (ref 26.6–33)
MCHC RBC AUTO-ENTMCNC: 32.5 G/DL (ref 31.5–35.7)
MCHC RBC AUTO-ENTMCNC: 32.7 G/DL (ref 31.5–35.7)
MCV RBC AUTO: 93.2 FL (ref 79–97)
MCV RBC AUTO: 93.9 FL (ref 79–97)
MICROCYTES BLD QL: ABNORMAL
MODALITY: ABNORMAL
MONOCYTES # BLD AUTO: 0.12 10*3/MM3 (ref 0.1–0.9)
MONOCYTES # BLD AUTO: 0.16 10*3/MM3 (ref 0.1–0.9)
MONOCYTES NFR BLD AUTO: 4.5 % (ref 5–12)
NEUTROPHILS # BLD AUTO: 2.07 10*3/MM3 (ref 1.7–7)
NEUTROPHILS # BLD AUTO: 2.14 10*3/MM3 (ref 1.7–7)
NEUTROPHILS NFR BLD AUTO: 81 % (ref 42.7–76)
NEUTROPHILS NFR BLD MANUAL: 78.8 % (ref 42.7–76)
O2 A-A PPRESDIFF RESPIRATORY: 0.2 MMHG
PCO2 BLDA: 39.9 MM HG (ref 35–45)
PEEP RESPIRATORY: 5 CM[H2O]
PH BLDA: 7.25 PH UNITS (ref 7.35–7.45)
PLAT MORPH BLD: NORMAL
PLATELET # BLD AUTO: 225 10*3/MM3 (ref 140–450)
PLATELET # BLD AUTO: 247 10*3/MM3 (ref 140–450)
PMV BLD AUTO: 9.2 FL (ref 6–12)
PMV BLD AUTO: 9.3 FL (ref 6–12)
PO2 BLDA: 73.7 MM HG (ref 80–100)
POTASSIUM BLD-SCNC: 3.1 MMOL/L (ref 3.5–5.2)
POTASSIUM BLD-SCNC: 3.6 MMOL/L (ref 3.5–5.2)
POTASSIUM BLD-SCNC: 4 MMOL/L (ref 3.5–5.2)
PROCALCITONIN SERPL-MCNC: 11.9 NG/ML (ref 0.1–0.25)
PROT SERPL-MCNC: 4.8 G/DL (ref 6–8.5)
RBC # BLD AUTO: 4.56 10*6/MM3 (ref 3.77–5.28)
RBC # BLD AUTO: 4.56 10*6/MM3 (ref 3.77–5.28)
RH BLD: POSITIVE
SAO2 % BLDCOA: 92 % (ref 92–99)
SET MECH RESP RATE: 16
SODIUM BLD-SCNC: 136 MMOL/L (ref 136–145)
SODIUM BLD-SCNC: 137 MMOL/L (ref 136–145)
T&S EXPIRATION DATE: NORMAL
TOTAL RATE: 16 BREATHS/MINUTE
VENTILATOR MODE: ABNORMAL
VT ON VENT VENT: 500 ML
WBC MORPH BLD: NORMAL
WBC NRBC COR # BLD: 2.63 10*3/MM3 (ref 3.4–10.8)
WBC NRBC COR # BLD: 2.64 10*3/MM3 (ref 3.4–10.8)

## 2019-07-11 PROCEDURE — 84145 PROCALCITONIN (PCT): CPT | Performed by: SURGERY

## 2019-07-11 PROCEDURE — 0DJD4ZZ INSPECTION OF LOWER INTESTINAL TRACT, PERCUTANEOUS ENDOSCOPIC APPROACH: ICD-10-PCS | Performed by: SURGERY

## 2019-07-11 PROCEDURE — 87186 SC STD MICRODIL/AGAR DIL: CPT | Performed by: SURGERY

## 2019-07-11 PROCEDURE — 88307 TISSUE EXAM BY PATHOLOGIST: CPT | Performed by: SURGERY

## 2019-07-11 PROCEDURE — 25010000002 ONDANSETRON PER 1 MG: Performed by: NURSE ANESTHETIST, CERTIFIED REGISTERED

## 2019-07-11 PROCEDURE — 0D1M0Z4 BYPASS DESCENDING COLON TO CUTANEOUS, OPEN APPROACH: ICD-10-PCS | Performed by: SURGERY

## 2019-07-11 PROCEDURE — 71045 X-RAY EXAM CHEST 1 VIEW: CPT

## 2019-07-11 PROCEDURE — 94799 UNLISTED PULMONARY SVC/PX: CPT

## 2019-07-11 PROCEDURE — 99024 POSTOP FOLLOW-UP VISIT: CPT | Performed by: SURGERY

## 2019-07-11 PROCEDURE — 86900 BLOOD TYPING SEROLOGIC ABO: CPT | Performed by: SURGERY

## 2019-07-11 PROCEDURE — 25010000002 HYDROMORPHONE PER 4 MG: Performed by: NURSE ANESTHETIST, CERTIFIED REGISTERED

## 2019-07-11 PROCEDURE — 25010000002 PROPOFOL 1000 MG/ML EMULSION: Performed by: ANESTHESIOLOGY

## 2019-07-11 PROCEDURE — 93005 ELECTROCARDIOGRAM TRACING: CPT | Performed by: SURGERY

## 2019-07-11 PROCEDURE — 25010000002 FENTANYL CITRATE (PF) 100 MCG/2ML SOLUTION: Performed by: ANESTHESIOLOGY

## 2019-07-11 PROCEDURE — 25010000002 HYDROMORPHONE PER 4 MG: Performed by: SURGERY

## 2019-07-11 PROCEDURE — 25010000002 CEFOXITIN PER 1 G: Performed by: SURGERY

## 2019-07-11 PROCEDURE — 5A1945Z RESPIRATORY VENTILATION, 24-96 CONSECUTIVE HOURS: ICD-10-PCS | Performed by: INTERNAL MEDICINE

## 2019-07-11 PROCEDURE — 63710000001 INSULIN REGULAR HUMAN PER 5 UNITS: Performed by: SURGERY

## 2019-07-11 PROCEDURE — P9041 ALBUMIN (HUMAN),5%, 50ML: HCPCS | Performed by: NURSE ANESTHETIST, CERTIFIED REGISTERED

## 2019-07-11 PROCEDURE — 25010000003 POTASSIUM CHLORIDE 10 MEQ/100ML SOLUTION: Performed by: SURGERY

## 2019-07-11 PROCEDURE — 84132 ASSAY OF SERUM POTASSIUM: CPT | Performed by: SURGERY

## 2019-07-11 PROCEDURE — 25010000002 SUCCINYLCHOLINE PER 20 MG: Performed by: NURSE ANESTHETIST, CERTIFIED REGISTERED

## 2019-07-11 PROCEDURE — 82962 GLUCOSE BLOOD TEST: CPT

## 2019-07-11 PROCEDURE — C1751 CATH, INF, PER/CENT/MIDLINE: HCPCS | Performed by: ANESTHESIOLOGY

## 2019-07-11 PROCEDURE — 0DBB0ZZ EXCISION OF ILEUM, OPEN APPROACH: ICD-10-PCS | Performed by: SURGERY

## 2019-07-11 PROCEDURE — 87205 SMEAR GRAM STAIN: CPT | Performed by: SURGERY

## 2019-07-11 PROCEDURE — 87070 CULTURE OTHR SPECIMN AEROBIC: CPT | Performed by: SURGERY

## 2019-07-11 PROCEDURE — 85007 BL SMEAR W/DIFF WBC COUNT: CPT | Performed by: SURGERY

## 2019-07-11 PROCEDURE — 25010000002 KETOROLAC TROMETHAMINE PER 15 MG: Performed by: SURGERY

## 2019-07-11 PROCEDURE — 25010000002 MIDAZOLAM PER 1 MG: Performed by: ANESTHESIOLOGY

## 2019-07-11 PROCEDURE — 25010000002 PROPOFOL 10 MG/ML EMULSION: Performed by: NURSE ANESTHETIST, CERTIFIED REGISTERED

## 2019-07-11 PROCEDURE — 80053 COMPREHEN METABOLIC PANEL: CPT | Performed by: SURGERY

## 2019-07-11 PROCEDURE — 25010000002 ENOXAPARIN PER 10 MG: Performed by: SURGERY

## 2019-07-11 PROCEDURE — 87075 CULTR BACTERIA EXCEPT BLOOD: CPT | Performed by: SURGERY

## 2019-07-11 PROCEDURE — 25010000002 FENTANYL CITRATE (PF) 100 MCG/2ML SOLUTION: Performed by: NURSE ANESTHETIST, CERTIFIED REGISTERED

## 2019-07-11 PROCEDURE — 25010000002 ALBUMIN HUMAN 5% PER 50 ML: Performed by: NURSE ANESTHETIST, CERTIFIED REGISTERED

## 2019-07-11 PROCEDURE — 86850 RBC ANTIBODY SCREEN: CPT | Performed by: SURGERY

## 2019-07-11 PROCEDURE — 85025 COMPLETE CBC W/AUTO DIFF WBC: CPT | Performed by: SURGERY

## 2019-07-11 PROCEDURE — 87176 TISSUE HOMOGENIZATION CULTR: CPT | Performed by: SURGERY

## 2019-07-11 PROCEDURE — 93010 ELECTROCARDIOGRAM REPORT: CPT | Performed by: INTERNAL MEDICINE

## 2019-07-11 PROCEDURE — 94002 VENT MGMT INPAT INIT DAY: CPT

## 2019-07-11 PROCEDURE — 82803 BLOOD GASES ANY COMBINATION: CPT

## 2019-07-11 PROCEDURE — 86901 BLOOD TYPING SEROLOGIC RH(D): CPT | Performed by: SURGERY

## 2019-07-11 PROCEDURE — 44143 PARTIAL REMOVAL OF COLON: CPT | Performed by: SURGERY

## 2019-07-11 PROCEDURE — 25010000002 PIPERACILLIN SOD-TAZOBACTAM PER 1 G: Performed by: INTERNAL MEDICINE

## 2019-07-11 RX ORDER — ACETAMINOPHEN 325 MG/1
650 TABLET ORAL ONCE AS NEEDED
Status: DISCONTINUED | OUTPATIENT
Start: 2019-07-11 | End: 2019-07-11

## 2019-07-11 RX ORDER — ONDANSETRON 2 MG/ML
4 INJECTION INTRAMUSCULAR; INTRAVENOUS ONCE AS NEEDED
Status: DISCONTINUED | OUTPATIENT
Start: 2019-07-11 | End: 2019-07-11

## 2019-07-11 RX ORDER — ACETAMINOPHEN 160 MG/5ML
650 SOLUTION ORAL EVERY 4 HOURS PRN
Status: DISCONTINUED | OUTPATIENT
Start: 2019-07-11 | End: 2019-07-26 | Stop reason: HOSPADM

## 2019-07-11 RX ORDER — MIDAZOLAM HYDROCHLORIDE 1 MG/ML
2 INJECTION INTRAMUSCULAR; INTRAVENOUS
Status: DISCONTINUED | OUTPATIENT
Start: 2019-07-11 | End: 2019-07-11 | Stop reason: HOSPADM

## 2019-07-11 RX ORDER — FENTANYL CITRATE 50 UG/ML
50 INJECTION, SOLUTION INTRAMUSCULAR; INTRAVENOUS
Status: DISCONTINUED | OUTPATIENT
Start: 2019-07-11 | End: 2019-07-11 | Stop reason: HOSPADM

## 2019-07-11 RX ORDER — SODIUM CHLORIDE 0.9 % (FLUSH) 0.9 %
3 SYRINGE (ML) INJECTION EVERY 12 HOURS SCHEDULED
Status: DISCONTINUED | OUTPATIENT
Start: 2019-07-11 | End: 2019-07-13 | Stop reason: SDUPTHER

## 2019-07-11 RX ORDER — ONDANSETRON 4 MG/1
4 TABLET, FILM COATED ORAL EVERY 6 HOURS PRN
Status: DISCONTINUED | OUTPATIENT
Start: 2019-07-11 | End: 2019-07-26 | Stop reason: HOSPADM

## 2019-07-11 RX ORDER — POTASSIUM CHLORIDE 1.5 G/1.77G
40 POWDER, FOR SOLUTION ORAL AS NEEDED
Status: DISCONTINUED | OUTPATIENT
Start: 2019-07-11 | End: 2019-07-13

## 2019-07-11 RX ORDER — POTASSIUM CHLORIDE 7.45 MG/ML
10 INJECTION INTRAVENOUS
Status: DISCONTINUED | OUTPATIENT
Start: 2019-07-11 | End: 2019-07-13

## 2019-07-11 RX ORDER — MORPHINE SULFATE 2 MG/ML
4 INJECTION, SOLUTION INTRAMUSCULAR; INTRAVENOUS
Status: DISCONTINUED | OUTPATIENT
Start: 2019-07-11 | End: 2019-07-12

## 2019-07-11 RX ORDER — OXYCODONE AND ACETAMINOPHEN 10; 325 MG/1; MG/1
1 TABLET ORAL EVERY 4 HOURS PRN
Status: DISCONTINUED | OUTPATIENT
Start: 2019-07-11 | End: 2019-07-26 | Stop reason: HOSPADM

## 2019-07-11 RX ORDER — ONDANSETRON 2 MG/ML
INJECTION INTRAMUSCULAR; INTRAVENOUS AS NEEDED
Status: DISCONTINUED | OUTPATIENT
Start: 2019-07-11 | End: 2019-07-11 | Stop reason: SURG

## 2019-07-11 RX ORDER — SUCCINYLCHOLINE CHLORIDE 20 MG/ML
INJECTION INTRAMUSCULAR; INTRAVENOUS AS NEEDED
Status: DISCONTINUED | OUTPATIENT
Start: 2019-07-11 | End: 2019-07-11 | Stop reason: SURG

## 2019-07-11 RX ORDER — SODIUM CHLORIDE 9 MG/ML
INJECTION, SOLUTION INTRAVENOUS CONTINUOUS PRN
Status: DISCONTINUED | OUTPATIENT
Start: 2019-07-11 | End: 2019-07-11 | Stop reason: SURG

## 2019-07-11 RX ORDER — HYDRALAZINE HYDROCHLORIDE 20 MG/ML
5 INJECTION INTRAMUSCULAR; INTRAVENOUS
Status: DISCONTINUED | OUTPATIENT
Start: 2019-07-11 | End: 2019-07-11

## 2019-07-11 RX ORDER — FLUMAZENIL 0.1 MG/ML
0.2 INJECTION INTRAVENOUS AS NEEDED
Status: DISCONTINUED | OUTPATIENT
Start: 2019-07-11 | End: 2019-07-11

## 2019-07-11 RX ORDER — PROPOFOL 10 MG/ML
VIAL (ML) INTRAVENOUS AS NEEDED
Status: DISCONTINUED | OUTPATIENT
Start: 2019-07-11 | End: 2019-07-11 | Stop reason: SURG

## 2019-07-11 RX ORDER — FAMOTIDINE 10 MG/ML
20 INJECTION, SOLUTION INTRAVENOUS ONCE
Status: COMPLETED | OUTPATIENT
Start: 2019-07-11 | End: 2019-07-11

## 2019-07-11 RX ORDER — BUPIVACAINE HYDROCHLORIDE AND EPINEPHRINE 5; 5 MG/ML; UG/ML
INJECTION, SOLUTION PERINEURAL AS NEEDED
Status: DISCONTINUED | OUTPATIENT
Start: 2019-07-11 | End: 2019-07-11 | Stop reason: HOSPADM

## 2019-07-11 RX ORDER — GLYCOPYRROLATE 0.2 MG/ML
INJECTION INTRAMUSCULAR; INTRAVENOUS AS NEEDED
Status: DISCONTINUED | OUTPATIENT
Start: 2019-07-11 | End: 2019-07-11 | Stop reason: SURG

## 2019-07-11 RX ORDER — SODIUM CHLORIDE 9 MG/ML
INJECTION, SOLUTION INTRAVENOUS AS NEEDED
Status: DISCONTINUED | OUTPATIENT
Start: 2019-07-11 | End: 2019-07-11 | Stop reason: HOSPADM

## 2019-07-11 RX ORDER — KETOROLAC TROMETHAMINE 30 MG/ML
15 INJECTION, SOLUTION INTRAMUSCULAR; INTRAVENOUS EVERY 6 HOURS
Status: DISPENSED | OUTPATIENT
Start: 2019-07-11 | End: 2019-07-12

## 2019-07-11 RX ORDER — HYDROMORPHONE HCL IN 0.9% NACL 10 MG/50ML
PATIENT CONTROLLED ANALGESIA SYRINGE INTRAVENOUS CONTINUOUS
Status: DISCONTINUED | OUTPATIENT
Start: 2019-07-11 | End: 2019-07-11

## 2019-07-11 RX ORDER — SODIUM CHLORIDE, SODIUM LACTATE, POTASSIUM CHLORIDE, CALCIUM CHLORIDE 600; 310; 30; 20 MG/100ML; MG/100ML; MG/100ML; MG/100ML
150 INJECTION, SOLUTION INTRAVENOUS CONTINUOUS
Status: DISCONTINUED | OUTPATIENT
Start: 2019-07-11 | End: 2019-07-11

## 2019-07-11 RX ORDER — MIDAZOLAM HYDROCHLORIDE 1 MG/ML
1 INJECTION INTRAMUSCULAR; INTRAVENOUS
Status: DISCONTINUED | OUTPATIENT
Start: 2019-07-11 | End: 2019-07-11

## 2019-07-11 RX ORDER — NALOXONE HCL 0.4 MG/ML
0.4 VIAL (ML) INJECTION
Status: DISCONTINUED | OUTPATIENT
Start: 2019-07-11 | End: 2019-07-12

## 2019-07-11 RX ORDER — PROMETHAZINE HYDROCHLORIDE 25 MG/ML
12.5 INJECTION, SOLUTION INTRAMUSCULAR; INTRAVENOUS ONCE AS NEEDED
Status: DISCONTINUED | OUTPATIENT
Start: 2019-07-11 | End: 2019-07-11

## 2019-07-11 RX ORDER — MIDAZOLAM HYDROCHLORIDE 1 MG/ML
1 INJECTION INTRAMUSCULAR; INTRAVENOUS
Status: DISCONTINUED | OUTPATIENT
Start: 2019-07-11 | End: 2019-07-11 | Stop reason: HOSPADM

## 2019-07-11 RX ORDER — HYDROCODONE BITARTRATE AND ACETAMINOPHEN 7.5; 325 MG/1; MG/1
1 TABLET ORAL ONCE AS NEEDED
Status: DISCONTINUED | OUTPATIENT
Start: 2019-07-11 | End: 2019-07-11

## 2019-07-11 RX ORDER — EPHEDRINE SULFATE 50 MG/ML
5 INJECTION, SOLUTION INTRAVENOUS ONCE AS NEEDED
Status: DISCONTINUED | OUTPATIENT
Start: 2019-07-11 | End: 2019-07-11

## 2019-07-11 RX ORDER — NALOXONE HCL 0.4 MG/ML
0.1 VIAL (ML) INJECTION
Status: DISCONTINUED | OUTPATIENT
Start: 2019-07-11 | End: 2019-07-12 | Stop reason: SDUPTHER

## 2019-07-11 RX ORDER — SODIUM CHLORIDE 0.9 % (FLUSH) 0.9 %
1-10 SYRINGE (ML) INJECTION AS NEEDED
Status: DISCONTINUED | OUTPATIENT
Start: 2019-07-11 | End: 2019-07-11 | Stop reason: HOSPADM

## 2019-07-11 RX ORDER — HYDROMORPHONE HYDROCHLORIDE 1 MG/ML
0.5 INJECTION, SOLUTION INTRAMUSCULAR; INTRAVENOUS; SUBCUTANEOUS
Status: DISCONTINUED | OUTPATIENT
Start: 2019-07-11 | End: 2019-07-11

## 2019-07-11 RX ORDER — DIPHENHYDRAMINE HCL 25 MG
25 CAPSULE ORAL
Status: DISCONTINUED | OUTPATIENT
Start: 2019-07-11 | End: 2019-07-11

## 2019-07-11 RX ORDER — SODIUM CHLORIDE 0.9 % (FLUSH) 0.9 %
3-10 SYRINGE (ML) INJECTION AS NEEDED
Status: DISCONTINUED | OUTPATIENT
Start: 2019-07-11 | End: 2019-07-26 | Stop reason: HOSPADM

## 2019-07-11 RX ORDER — SODIUM CHLORIDE 9 MG/ML
200 INJECTION, SOLUTION INTRAVENOUS CONTINUOUS
Status: ACTIVE | OUTPATIENT
Start: 2019-07-11 | End: 2019-07-12

## 2019-07-11 RX ORDER — MEPERIDINE HYDROCHLORIDE 25 MG/ML
12.5 INJECTION INTRAMUSCULAR; INTRAVENOUS; SUBCUTANEOUS
Status: DISCONTINUED | OUTPATIENT
Start: 2019-07-11 | End: 2019-07-11

## 2019-07-11 RX ORDER — POTASSIUM CHLORIDE 750 MG/1
40 CAPSULE, EXTENDED RELEASE ORAL AS NEEDED
Status: DISCONTINUED | OUTPATIENT
Start: 2019-07-11 | End: 2019-07-13

## 2019-07-11 RX ORDER — OXYCODONE AND ACETAMINOPHEN 7.5; 325 MG/1; MG/1
1 TABLET ORAL ONCE AS NEEDED
Status: DISCONTINUED | OUTPATIENT
Start: 2019-07-11 | End: 2019-07-11

## 2019-07-11 RX ORDER — ALBUTEROL SULFATE 2.5 MG/3ML
2.5 SOLUTION RESPIRATORY (INHALATION) ONCE AS NEEDED
Status: DISCONTINUED | OUTPATIENT
Start: 2019-07-11 | End: 2019-07-11

## 2019-07-11 RX ORDER — FENTANYL CITRATE 50 UG/ML
INJECTION, SOLUTION INTRAMUSCULAR; INTRAVENOUS AS NEEDED
Status: DISCONTINUED | OUTPATIENT
Start: 2019-07-11 | End: 2019-07-11 | Stop reason: SURG

## 2019-07-11 RX ORDER — ALBUMIN, HUMAN INJ 5% 5 %
SOLUTION INTRAVENOUS CONTINUOUS PRN
Status: DISCONTINUED | OUTPATIENT
Start: 2019-07-11 | End: 2019-07-11 | Stop reason: SURG

## 2019-07-11 RX ORDER — LIDOCAINE HYDROCHLORIDE 10 MG/ML
0.5 INJECTION, SOLUTION EPIDURAL; INFILTRATION; INTRACAUDAL; PERINEURAL ONCE AS NEEDED
Status: DISCONTINUED | OUTPATIENT
Start: 2019-07-11 | End: 2019-07-11 | Stop reason: HOSPADM

## 2019-07-11 RX ORDER — MAGNESIUM HYDROXIDE 1200 MG/15ML
LIQUID ORAL AS NEEDED
Status: DISCONTINUED | OUTPATIENT
Start: 2019-07-11 | End: 2019-07-11 | Stop reason: HOSPADM

## 2019-07-11 RX ORDER — HYDROMORPHONE HCL 110MG/55ML
PATIENT CONTROLLED ANALGESIA SYRINGE INTRAVENOUS AS NEEDED
Status: DISCONTINUED | OUTPATIENT
Start: 2019-07-11 | End: 2019-07-11 | Stop reason: SURG

## 2019-07-11 RX ORDER — SODIUM CHLORIDE, SODIUM LACTATE, POTASSIUM CHLORIDE, CALCIUM CHLORIDE 600; 310; 30; 20 MG/100ML; MG/100ML; MG/100ML; MG/100ML
9 INJECTION, SOLUTION INTRAVENOUS CONTINUOUS
Status: DISCONTINUED | OUTPATIENT
Start: 2019-07-11 | End: 2019-07-11

## 2019-07-11 RX ORDER — PROMETHAZINE HYDROCHLORIDE 25 MG/ML
6.25 INJECTION, SOLUTION INTRAMUSCULAR; INTRAVENOUS
Status: DISCONTINUED | OUTPATIENT
Start: 2019-07-11 | End: 2019-07-11

## 2019-07-11 RX ORDER — ACETAMINOPHEN 325 MG/1
650 TABLET ORAL EVERY 4 HOURS PRN
Status: DISCONTINUED | OUTPATIENT
Start: 2019-07-11 | End: 2019-07-26 | Stop reason: HOSPADM

## 2019-07-11 RX ORDER — ACETAMINOPHEN 650 MG/1
650 SUPPOSITORY RECTAL EVERY 4 HOURS PRN
Status: DISCONTINUED | OUTPATIENT
Start: 2019-07-11 | End: 2019-07-26 | Stop reason: HOSPADM

## 2019-07-11 RX ORDER — NALOXONE HCL 0.4 MG/ML
0.1 VIAL (ML) INJECTION
Status: DISCONTINUED | OUTPATIENT
Start: 2019-07-11 | End: 2019-07-26 | Stop reason: HOSPADM

## 2019-07-11 RX ORDER — PROMETHAZINE HYDROCHLORIDE 25 MG/1
25 SUPPOSITORY RECTAL ONCE AS NEEDED
Status: DISCONTINUED | OUTPATIENT
Start: 2019-07-11 | End: 2019-07-11

## 2019-07-11 RX ORDER — HYDROMORPHONE HYDROCHLORIDE 1 MG/ML
0.5 INJECTION, SOLUTION INTRAMUSCULAR; INTRAVENOUS; SUBCUTANEOUS
Status: DISCONTINUED | OUTPATIENT
Start: 2019-07-11 | End: 2019-07-26 | Stop reason: HOSPADM

## 2019-07-11 RX ORDER — LIDOCAINE HYDROCHLORIDE 20 MG/ML
INJECTION, SOLUTION INFILTRATION; PERINEURAL AS NEEDED
Status: DISCONTINUED | OUTPATIENT
Start: 2019-07-11 | End: 2019-07-11 | Stop reason: SURG

## 2019-07-11 RX ORDER — PROMETHAZINE HYDROCHLORIDE 25 MG/1
25 TABLET ORAL ONCE AS NEEDED
Status: DISCONTINUED | OUTPATIENT
Start: 2019-07-11 | End: 2019-07-11

## 2019-07-11 RX ORDER — ONDANSETRON 2 MG/ML
4 INJECTION INTRAMUSCULAR; INTRAVENOUS EVERY 6 HOURS PRN
Status: DISCONTINUED | OUTPATIENT
Start: 2019-07-11 | End: 2019-07-26 | Stop reason: HOSPADM

## 2019-07-11 RX ORDER — FENTANYL CITRATE 50 UG/ML
50 INJECTION, SOLUTION INTRAMUSCULAR; INTRAVENOUS
Status: DISCONTINUED | OUTPATIENT
Start: 2019-07-11 | End: 2019-07-11

## 2019-07-11 RX ORDER — ROCURONIUM BROMIDE 10 MG/ML
INJECTION, SOLUTION INTRAVENOUS AS NEEDED
Status: DISCONTINUED | OUTPATIENT
Start: 2019-07-11 | End: 2019-07-11 | Stop reason: SURG

## 2019-07-11 RX ORDER — NALOXONE HCL 0.4 MG/ML
0.2 VIAL (ML) INJECTION AS NEEDED
Status: DISCONTINUED | OUTPATIENT
Start: 2019-07-11 | End: 2019-07-11

## 2019-07-11 RX ORDER — LABETALOL HYDROCHLORIDE 5 MG/ML
5 INJECTION, SOLUTION INTRAVENOUS
Status: DISCONTINUED | OUTPATIENT
Start: 2019-07-11 | End: 2019-07-11

## 2019-07-11 RX ADMIN — SODIUM CHLORIDE: 9 INJECTION, SOLUTION INTRAVENOUS at 15:07

## 2019-07-11 RX ADMIN — LEVOTHYROXINE SODIUM 300 MCG: 150 TABLET ORAL at 06:09

## 2019-07-11 RX ADMIN — HYDROMORPHONE HYDROCHLORIDE 0.5 MG: 2 INJECTION INTRAMUSCULAR; INTRAVENOUS; SUBCUTANEOUS at 14:43

## 2019-07-11 RX ADMIN — SODIUM CHLORIDE, POTASSIUM CHLORIDE, SODIUM LACTATE AND CALCIUM CHLORIDE 9 ML/HR: 600; 310; 30; 20 INJECTION, SOLUTION INTRAVENOUS at 11:42

## 2019-07-11 RX ADMIN — GABAPENTIN 300 MG: 300 CAPSULE ORAL at 09:23

## 2019-07-11 RX ADMIN — POTASSIUM CHLORIDE 10 MEQ: 7.46 INJECTION, SOLUTION INTRAVENOUS at 09:26

## 2019-07-11 RX ADMIN — SUCCINYLCHOLINE CHLORIDE 100 MG: 20 INJECTION, SOLUTION INTRAMUSCULAR; INTRAVENOUS; PARENTERAL at 12:38

## 2019-07-11 RX ADMIN — HYDROMORPHONE HYDROCHLORIDE 0.5 MG: 1 INJECTION, SOLUTION INTRAMUSCULAR; INTRAVENOUS; SUBCUTANEOUS at 00:22

## 2019-07-11 RX ADMIN — CEFOXITIN SODIUM 2 G: 1 POWDER, FOR SOLUTION INTRAVENOUS at 12:45

## 2019-07-11 RX ADMIN — PROPOFOL 30 MCG/KG/MIN: 10 INJECTION, EMULSION INTRAVENOUS at 21:47

## 2019-07-11 RX ADMIN — ROCURONIUM BROMIDE 5 MG: 10 INJECTION INTRAVENOUS at 12:38

## 2019-07-11 RX ADMIN — HYDROMORPHONE HYDROCHLORIDE 0.5 MG: 1 INJECTION, SOLUTION INTRAMUSCULAR; INTRAVENOUS; SUBCUTANEOUS at 23:56

## 2019-07-11 RX ADMIN — SODIUM CHLORIDE, PRESERVATIVE FREE 3 ML: 5 INJECTION INTRAVENOUS at 21:51

## 2019-07-11 RX ADMIN — HYDROMORPHONE HYDROCHLORIDE 0.5 MG: 1 INJECTION, SOLUTION INTRAMUSCULAR; INTRAVENOUS; SUBCUTANEOUS at 19:25

## 2019-07-11 RX ADMIN — SODIUM CHLORIDE, POTASSIUM CHLORIDE, SODIUM LACTATE AND CALCIUM CHLORIDE: 600; 310; 30; 20 INJECTION, SOLUTION INTRAVENOUS at 14:36

## 2019-07-11 RX ADMIN — ROCURONIUM BROMIDE 30 MG: 10 INJECTION INTRAVENOUS at 15:17

## 2019-07-11 RX ADMIN — SODIUM CHLORIDE 125 ML/HR: 9 INJECTION, SOLUTION INTRAVENOUS at 01:14

## 2019-07-11 RX ADMIN — FENTANYL CITRATE 50 MCG: 50 INJECTION, SOLUTION INTRAMUSCULAR; INTRAVENOUS at 12:58

## 2019-07-11 RX ADMIN — FENTANYL CITRATE 50 MCG: 50 INJECTION INTRAMUSCULAR; INTRAVENOUS at 16:37

## 2019-07-11 RX ADMIN — ROCURONIUM BROMIDE 20 MG: 10 INJECTION INTRAVENOUS at 13:27

## 2019-07-11 RX ADMIN — MIDAZOLAM HYDROCHLORIDE 1 MG: 1 INJECTION, SOLUTION INTRAMUSCULAR; INTRAVENOUS at 12:10

## 2019-07-11 RX ADMIN — HYDROMORPHONE HYDROCHLORIDE: 10 INJECTION INTRAMUSCULAR; INTRAVENOUS; SUBCUTANEOUS at 01:05

## 2019-07-11 RX ADMIN — KETOROLAC TROMETHAMINE 15 MG: 30 INJECTION, SOLUTION INTRAMUSCULAR at 21:48

## 2019-07-11 RX ADMIN — TAZOBACTAM SODIUM AND PIPERACILLIN SODIUM 3.38 G: 375; 3 INJECTION, SOLUTION INTRAVENOUS at 23:56

## 2019-07-11 RX ADMIN — HYDROMORPHONE HYDROCHLORIDE 0.5 MG: 1 INJECTION, SOLUTION INTRAMUSCULAR; INTRAVENOUS; SUBCUTANEOUS at 21:33

## 2019-07-11 RX ADMIN — ONDANSETRON 4 MG: 2 INJECTION INTRAMUSCULAR; INTRAVENOUS at 15:08

## 2019-07-11 RX ADMIN — ROCURONIUM BROMIDE 20 MG: 10 INJECTION INTRAVENOUS at 13:59

## 2019-07-11 RX ADMIN — ROCURONIUM BROMIDE 45 MG: 10 INJECTION INTRAVENOUS at 12:50

## 2019-07-11 RX ADMIN — SODIUM CHLORIDE: 9 INJECTION, SOLUTION INTRAVENOUS at 12:50

## 2019-07-11 RX ADMIN — LIDOCAINE HYDROCHLORIDE 100 MG: 20 INJECTION, SOLUTION INFILTRATION; PERINEURAL at 12:38

## 2019-07-11 RX ADMIN — HYDROMORPHONE HYDROCHLORIDE 0.5 MG: 1 INJECTION, SOLUTION INTRAMUSCULAR; INTRAVENOUS; SUBCUTANEOUS at 17:30

## 2019-07-11 RX ADMIN — FENTANYL CITRATE 50 MCG: 50 INJECTION INTRAMUSCULAR; INTRAVENOUS at 16:05

## 2019-07-11 RX ADMIN — HYDROMORPHONE HYDROCHLORIDE 0.5 MG: 2 INJECTION INTRAMUSCULAR; INTRAVENOUS; SUBCUTANEOUS at 15:19

## 2019-07-11 RX ADMIN — FENTANYL CITRATE 50 MCG: 50 INJECTION, SOLUTION INTRAMUSCULAR; INTRAVENOUS at 12:47

## 2019-07-11 RX ADMIN — KETOROLAC TROMETHAMINE 15 MG: 15 INJECTION, SOLUTION INTRAMUSCULAR; INTRAVENOUS at 09:28

## 2019-07-11 RX ADMIN — FENTANYL CITRATE 50 MCG: 50 INJECTION, SOLUTION INTRAMUSCULAR; INTRAVENOUS at 14:31

## 2019-07-11 RX ADMIN — HYDROMORPHONE HYDROCHLORIDE 0.5 MG: 1 INJECTION, SOLUTION INTRAMUSCULAR; INTRAVENOUS; SUBCUTANEOUS at 16:13

## 2019-07-11 RX ADMIN — FENTANYL CITRATE 50 MCG: 50 INJECTION, SOLUTION INTRAMUSCULAR; INTRAVENOUS at 13:22

## 2019-07-11 RX ADMIN — MIDAZOLAM HYDROCHLORIDE 1 MG: 1 INJECTION, SOLUTION INTRAMUSCULAR; INTRAVENOUS at 11:56

## 2019-07-11 RX ADMIN — PROPOFOL 180 MG: 10 INJECTION, EMULSION INTRAVENOUS at 12:38

## 2019-07-11 RX ADMIN — TAZOBACTAM SODIUM AND PIPERACILLIN SODIUM 3.38 G: 375; 3 INJECTION, SOLUTION INTRAVENOUS at 18:15

## 2019-07-11 RX ADMIN — FAMOTIDINE 20 MG: 10 INJECTION INTRAVENOUS at 11:42

## 2019-07-11 RX ADMIN — FENTANYL CITRATE 50 MCG: 50 INJECTION, SOLUTION INTRAMUSCULAR; INTRAVENOUS at 12:35

## 2019-07-11 RX ADMIN — SODIUM CHLORIDE 1000 ML: 9 INJECTION, SOLUTION INTRAVENOUS at 04:21

## 2019-07-11 RX ADMIN — ALBUMIN (HUMAN): 0.05 SOLUTION INTRAVENOUS at 13:28

## 2019-07-11 RX ADMIN — GLYCOPYRROLATE 0.2 MG: 0.2 INJECTION INTRAMUSCULAR; INTRAVENOUS at 12:35

## 2019-07-11 RX ADMIN — ALBUMIN (HUMAN): 0.05 SOLUTION INTRAVENOUS at 13:37

## 2019-07-11 RX ADMIN — FENTANYL CITRATE 50 MCG: 50 INJECTION INTRAMUSCULAR; INTRAVENOUS at 11:42

## 2019-07-11 RX ADMIN — INSULIN HUMAN 4 UNITS: 100 INJECTION, SOLUTION PARENTERAL at 18:15

## 2019-07-11 RX ADMIN — PROPOFOL 25 MCG/KG/MIN: 10 INJECTION, EMULSION INTRAVENOUS at 16:00

## 2019-07-11 NOTE — ANESTHESIA POSTPROCEDURE EVALUATION
"Patient: Edie Camacho    Procedure Summary     Date:  07/11/19 Room / Location:  [facility] OR  / [facility] MAIN OR    Anesthesia Start:  1228 Anesthesia Stop:  1545    Procedures:       DIAGNOSTIC LAPAROSCOPY (N/A Abdomen)      COLON RESECTION WITH WAYNE'S PROCEDURE WITH FLEXIBLE SIGMOIDSCOPE (N/A Abdomen) Diagnosis:      Surgeon:  Radha Loera MD Provider:  Aron Patrick MD    Anesthesia Type:  general ASA Status:  5 - Emergent          Anesthesia Type: general  Last vitals  BP   115/84 (07/11/19 1700)   Temp   37.1 °C (98.8 °F) (07/11/19 1700)   Pulse   (!) 124 (07/11/19 1700)   Resp   18 (07/11/19 1700)     SpO2   94 % (07/11/19 1700)     Post Anesthesia Care and Evaluation    Patient location during evaluation: PACU  Patient participation: complete - patient participated  Level of consciousness: awake  Pain management: adequate  Airway patency: patent  Anesthetic complications: No anesthetic complications    Cardiovascular status: acceptable  Respiratory status: acceptable  Hydration status: acceptable    Comments: /84   Pulse (!) 124   Temp 37.1 °C (98.8 °F) (Oral)   Resp 18   Ht 167.6 cm (66\")   Wt 76 kg (167 lb 8 oz)   LMP 06/22/2019 Comment: DEPO INJECTIONS  SpO2 94%   BMI 27.04 kg/m²       "

## 2019-07-11 NOTE — ANESTHESIA PROCEDURE NOTES
Airway  Urgency: elective    Airway not difficult    General Information and Staff    Patient location during procedure: OR  Anesthesiologist: Aron Patrick MD  CRNA: Rodolfo Rivas CRNA    Indications and Patient Condition  Indications for airway management: airway protection    Preoxygenated: yes  MILS maintained throughout  Mask difficulty assessment: 1 - vent by mask    Final Airway Details  Final airway type: endotracheal airway      Successful airway: ETT  Cuffed: yes   Successful intubation technique: direct laryngoscopy  Facilitating devices/methods: cricoid pressure  Endotracheal tube insertion site: oral  Blade: Geoff  Blade size: 3  ETT size (mm): 7.0  Cormack-Lehane Classification: grade I - full view of glottis  Placement verified by: chest auscultation and capnometry   Inital cuff pressure (cm H2O): 22  Cuff volume (mL): 7  Measured from: lips  ETT to lips (cm): 21  Number of attempts at approach: 1

## 2019-07-11 NOTE — ANESTHESIA PREPROCEDURE EVALUATION
Anesthesia Evaluation     Patient summary reviewed and Nursing notes reviewed                Airway   Mallampati: II  No difficulty expected  Dental      Pulmonary - negative pulmonary ROS   Cardiovascular - negative cardio ROS    ECG reviewed  Rhythm: regular  Rate: abnormal        Neuro/Psych- negative ROS  GI/Hepatic/Renal/Endo    (+)  GERD,  diabetes mellitus,     ROS Comment: S/p lap sigmoid with loop ileostomy 7/10    Musculoskeletal (-) negative ROS    Abdominal    Substance History - negative use     OB/GYN negative ob/gyn ROS         Other                      Anesthesia Plan    ASA 5 - emergent     general   (Emergent acute abdomen s/p surgery yesterday  Possible sepsis  Tachycardic and hypotensive  Art line/central line consideration)  intravenous induction   Anesthetic plan, all risks, benefits, and alternatives have been provided, discussed and informed consent has been obtained with: patient.

## 2019-07-11 NOTE — ANESTHESIA PROCEDURE NOTES
Central Line    Pre-sedation assessment completed: 7/11/2019 12:40 PM    Patient reassessed immediately prior to procedure    Patient location during procedure: OR  Start time: 7/11/2019 12:40 PM  Stop Time:7/11/2019 12:55 PM  Indications: vascular access  Staff  Anesthesiologist: Aron Patrick MD  Preanesthetic Checklist  Completed: patient identified, site marked, surgical consent, pre-op evaluation, timeout performed, IV checked, risks and benefits discussed and monitors and equipment checked  Central Line Prep  Sterile Tech:cap, gloves, gown, mask and sterile barriers  Prep: Betadine  Patient monitoring: blood pressure monitoring, continuous pulse oximetry and EKG  Central Line Procedure  Laterality:right  Location:internal jugular  Catheter Type:triple lumen  Catheter Size:9 Fr  Guidance:ultrasound guided  PROCEDURE NOTE/ULTRASOUND INTERPRETATION.  Using ultrasound guidance the potential vascular sites for insertion of the catheter were visualized to determine the patency of the vessel to be used for vascular access.  After selecting the appropriate site for insertion, the needle was visualized under ultrasound being inserted into the internal jugular vein, followed by ultrasound confirmation of wire and catheter placement. There were no abnormalities seen on ultrasound; an image was taken; and the patient tolerated the procedure with no complications.   Assessment  Post procedure:biopatch applied, line sutured and occlusive dressing applied  Assessement:blood return through all ports, free fluid flow and chest x-ray ordered  Complications:no  Patient Tolerance:patient tolerated the procedure well with no apparent complications

## 2019-07-11 NOTE — ANESTHESIA PROCEDURE NOTES
Arterial Line    Pre-sedation assessment completed: 7/11/2019 12:10 PM    Patient reassessed immediately prior to procedure    Patient location during procedure: holding area  Start time: 7/11/2019 12:10 PM  Stop Time:7/11/2019 12:20 PM       Line placed for hemodynamic monitoring, ABGs/Labs/ISTAT and MD/Surgeon request.  Performed By   Anesthesiologist: Aron Patrick MD  Preanesthetic Checklist  Completed: patient identified, surgical consent, pre-op evaluation, timeout performed, IV checked, risks and benefits discussed and monitors and equipment checked  Arterial Line Prep   Sterile Tech: cap, gloves, gown, mask and sterile barriers  Prep: ChloraPrep  Patient monitoring: blood pressure monitoring, continuous pulse oximetry and EKG  Arterial Line Procedure   Laterality:right  Location:  radial artery  Catheter size: 20 G   PROCEDURE NOTE/ULTRASOUND INTERPRETATION.  Using ultrasound guidance the potential vascular sites for insertion of the catheter were visualized to determine the patency of the vessel to be used for vascular access.  After selecting the appropriate site for insertion, the needle was visualized under ultrasound being inserted into the radial artery, followed by ultrasound confirmation of wire and catheter placement. There were no abnormalities seen on ultrasound; an image was taken; and the patient tolerated the procedure with no complications.   Number of attempts: 1  Successful placement: yes  Post Assessment   Dressing Type: occlusive dressing applied, secured with tape and wrist guard applied.   Complications no  Circ/Move/Sens Assessment: normal.   Patient Tolerance: patient tolerated the procedure well with no apparent complications

## 2019-07-12 LAB
ALBUMIN SERPL-MCNC: 2.3 G/DL (ref 3.5–5.2)
ALBUMIN SERPL-MCNC: 2.3 G/DL (ref 3.5–5.2)
ALBUMIN/GLOB SERPL: 1 G/DL
ALBUMIN/GLOB SERPL: 1.1 G/DL
ALP SERPL-CCNC: 39 U/L (ref 39–117)
ALP SERPL-CCNC: 43 U/L (ref 39–117)
ALT SERPL W P-5'-P-CCNC: 25 U/L (ref 1–33)
ALT SERPL W P-5'-P-CCNC: 26 U/L (ref 1–33)
ANION GAP SERPL CALCULATED.3IONS-SCNC: 10.7 MMOL/L (ref 5–15)
ANION GAP SERPL CALCULATED.3IONS-SCNC: 11.3 MMOL/L (ref 5–15)
ARTERIAL PATENCY WRIST A: ABNORMAL
AST SERPL-CCNC: 25 U/L (ref 1–32)
AST SERPL-CCNC: 29 U/L (ref 1–32)
ATMOSPHERIC PRESS: 746.8 MMHG
BASE EXCESS BLDA CALC-SCNC: -7.7 MMOL/L (ref 0–2)
BASOPHILS # BLD AUTO: 0.05 10*3/MM3 (ref 0–0.2)
BASOPHILS NFR BLD AUTO: 0.7 % (ref 0–1.5)
BDY SITE: ABNORMAL
BILIRUB SERPL-MCNC: 0.9 MG/DL (ref 0.2–1.2)
BILIRUB SERPL-MCNC: 1 MG/DL (ref 0.2–1.2)
BUN BLD-MCNC: 14 MG/DL (ref 6–20)
BUN BLD-MCNC: 14 MG/DL (ref 6–20)
BUN/CREAT SERPL: 18.4 (ref 7–25)
BUN/CREAT SERPL: 21.9 (ref 7–25)
CALCIUM SPEC-SCNC: 7.7 MG/DL (ref 8.6–10.5)
CALCIUM SPEC-SCNC: 7.9 MG/DL (ref 8.6–10.5)
CHLORIDE SERPL-SCNC: 108 MMOL/L (ref 98–107)
CHLORIDE SERPL-SCNC: 108 MMOL/L (ref 98–107)
CO2 SERPL-SCNC: 18.3 MMOL/L (ref 22–29)
CO2 SERPL-SCNC: 18.7 MMOL/L (ref 22–29)
CREAT BLD-MCNC: 0.64 MG/DL (ref 0.57–1)
CREAT BLD-MCNC: 0.76 MG/DL (ref 0.57–1)
CYTO UR: NORMAL
D-LACTATE SERPL-SCNC: 2.3 MMOL/L (ref 0.5–2)
D-LACTATE SERPL-SCNC: 2.6 MMOL/L (ref 0.5–2)
DEPRECATED RDW RBC AUTO: 44.4 FL (ref 37–54)
DEPRECATED RDW RBC AUTO: 45.1 FL (ref 37–54)
EOSINOPHIL # BLD AUTO: 0.01 10*3/MM3 (ref 0–0.4)
EOSINOPHIL NFR BLD AUTO: 0.1 % (ref 0.3–6.2)
ERYTHROCYTE [DISTWIDTH] IN BLOOD BY AUTOMATED COUNT: 13 % (ref 12.3–15.4)
ERYTHROCYTE [DISTWIDTH] IN BLOOD BY AUTOMATED COUNT: 13 % (ref 12.3–15.4)
GFR SERPL CREATININE-BSD FRML MDRD: 105 ML/MIN/1.73
GFR SERPL CREATININE-BSD FRML MDRD: 86 ML/MIN/1.73
GLOBULIN UR ELPH-MCNC: 2.1 GM/DL
GLOBULIN UR ELPH-MCNC: 2.4 GM/DL
GLUCOSE BLD-MCNC: 207 MG/DL (ref 65–99)
GLUCOSE BLD-MCNC: 244 MG/DL (ref 65–99)
GLUCOSE BLDC GLUCOMTR-MCNC: 150 MG/DL (ref 70–130)
GLUCOSE BLDC GLUCOMTR-MCNC: 171 MG/DL (ref 70–130)
GLUCOSE BLDC GLUCOMTR-MCNC: 183 MG/DL (ref 70–130)
GLUCOSE BLDC GLUCOMTR-MCNC: 238 MG/DL (ref 70–130)
HCO3 BLDA-SCNC: 16.4 MMOL/L (ref 22–28)
HCT VFR BLD AUTO: 40 % (ref 34–46.6)
HCT VFR BLD AUTO: 40.2 % (ref 34–46.6)
HGB BLD-MCNC: 12.8 G/DL (ref 12–15.9)
HGB BLD-MCNC: 12.9 G/DL (ref 12–15.9)
HOLD SPECIMEN: NORMAL
HOROWITZ INDEX BLD+IHG-RTO: 50 %
IMM GRANULOCYTES # BLD AUTO: 0.22 10*3/MM3 (ref 0–0.05)
IMM GRANULOCYTES NFR BLD AUTO: 3.1 % (ref 0–0.5)
LAB AP CASE REPORT: NORMAL
LYMPHOCYTES # BLD AUTO: 0.54 10*3/MM3 (ref 0.7–3.1)
LYMPHOCYTES # BLD MANUAL: 0.52 10*3/MM3 (ref 0.7–3.1)
LYMPHOCYTES NFR BLD AUTO: 7.7 % (ref 19.6–45.3)
LYMPHOCYTES NFR BLD MANUAL: 10 % (ref 19.6–45.3)
LYMPHOCYTES NFR BLD MANUAL: 4 % (ref 5–12)
MCH RBC QN AUTO: 30.1 PG (ref 26.6–33)
MCH RBC QN AUTO: 30.2 PG (ref 26.6–33)
MCHC RBC AUTO-ENTMCNC: 32 G/DL (ref 31.5–35.7)
MCHC RBC AUTO-ENTMCNC: 32.1 G/DL (ref 31.5–35.7)
MCV RBC AUTO: 93.7 FL (ref 79–97)
MCV RBC AUTO: 94.3 FL (ref 79–97)
MODALITY: ABNORMAL
MONOCYTES # BLD AUTO: 0.12 10*3/MM3 (ref 0.1–0.9)
MONOCYTES # BLD AUTO: 0.21 10*3/MM3 (ref 0.1–0.9)
MONOCYTES NFR BLD AUTO: 1.7 % (ref 5–12)
NEUTROPHILS # BLD AUTO: 4.51 10*3/MM3 (ref 1.7–7)
NEUTROPHILS # BLD AUTO: 6.08 10*3/MM3 (ref 1.7–7)
NEUTROPHILS NFR BLD AUTO: 86.7 % (ref 42.7–76)
NEUTROPHILS NFR BLD MANUAL: 86 % (ref 42.7–76)
NRBC BLD AUTO-RTO: 0 /100 WBC (ref 0–0.2)
NRBC BLD AUTO-RTO: 0 /100 WBC (ref 0–0.2)
O2 A-A PPRESDIFF RESPIRATORY: 0.3 MMHG
PATH REPORT.FINAL DX SPEC: NORMAL
PATH REPORT.GROSS SPEC: NORMAL
PCO2 BLDA: 28.7 MM HG (ref 35–45)
PEEP RESPIRATORY: 5 CM[H2O]
PH BLDA: 7.37 PH UNITS (ref 7.35–7.45)
PHOSPHATE SERPL-MCNC: 2 MG/DL (ref 2.5–4.5)
PLAT MORPH BLD: NORMAL
PLAT MORPH BLD: NORMAL
PLATELET # BLD AUTO: 178 10*3/MM3 (ref 140–450)
PLATELET # BLD AUTO: 190 10*3/MM3 (ref 140–450)
PMV BLD AUTO: 9.3 FL (ref 6–12)
PMV BLD AUTO: 9.5 FL (ref 6–12)
PO2 BLDA: 95.2 MM HG (ref 80–100)
POTASSIUM BLD-SCNC: 3.6 MMOL/L (ref 3.5–5.2)
POTASSIUM BLD-SCNC: 3.8 MMOL/L (ref 3.5–5.2)
PROT SERPL-MCNC: 4.4 G/DL (ref 6–8.5)
PROT SERPL-MCNC: 4.7 G/DL (ref 6–8.5)
RBC # BLD AUTO: 4.24 10*6/MM3 (ref 3.77–5.28)
RBC # BLD AUTO: 4.29 10*6/MM3 (ref 3.77–5.28)
RBC MORPH BLD: NORMAL
RBC MORPH BLD: NORMAL
SAO2 % BLDCOA: 97.3 % (ref 92–99)
SET MECH RESP RATE: 18
SODIUM BLD-SCNC: 137 MMOL/L (ref 136–145)
SODIUM BLD-SCNC: 138 MMOL/L (ref 136–145)
TOTAL RATE: 21 BREATHS/MINUTE
VENTILATOR MODE: AC
VT ON VENT VENT: 500 ML
WBC MORPH BLD: NORMAL
WBC MORPH BLD: NORMAL
WBC NRBC COR # BLD: 5.24 10*3/MM3 (ref 3.4–10.8)
WBC NRBC COR # BLD: 7.02 10*3/MM3 (ref 3.4–10.8)

## 2019-07-12 PROCEDURE — 80053 COMPREHEN METABOLIC PANEL: CPT | Performed by: SURGERY

## 2019-07-12 PROCEDURE — 82962 GLUCOSE BLOOD TEST: CPT

## 2019-07-12 PROCEDURE — 94640 AIRWAY INHALATION TREATMENT: CPT

## 2019-07-12 PROCEDURE — 25010000002 HYDROMORPHONE PER 4 MG: Performed by: SURGERY

## 2019-07-12 PROCEDURE — 80053 COMPREHEN METABOLIC PANEL: CPT | Performed by: INTERNAL MEDICINE

## 2019-07-12 PROCEDURE — 25010000002 FENTANYL CITRATE (PF) 100 MCG/2ML SOLUTION: Performed by: INTERNAL MEDICINE

## 2019-07-12 PROCEDURE — 94003 VENT MGMT INPAT SUBQ DAY: CPT

## 2019-07-12 PROCEDURE — 25010000002 PIPERACILLIN SOD-TAZOBACTAM PER 1 G: Performed by: INTERNAL MEDICINE

## 2019-07-12 PROCEDURE — 94799 UNLISTED PULMONARY SVC/PX: CPT

## 2019-07-12 PROCEDURE — 63710000001 INSULIN REGULAR HUMAN PER 5 UNITS: Performed by: SURGERY

## 2019-07-12 PROCEDURE — 85007 BL SMEAR W/DIFF WBC COUNT: CPT | Performed by: INTERNAL MEDICINE

## 2019-07-12 PROCEDURE — 83605 ASSAY OF LACTIC ACID: CPT | Performed by: INTERNAL MEDICINE

## 2019-07-12 PROCEDURE — 85025 COMPLETE CBC W/AUTO DIFF WBC: CPT | Performed by: INTERNAL MEDICINE

## 2019-07-12 PROCEDURE — 99024 POSTOP FOLLOW-UP VISIT: CPT | Performed by: SURGERY

## 2019-07-12 PROCEDURE — 25010000002 PROPOFOL 1000 MG/ML EMULSION: Performed by: ANESTHESIOLOGY

## 2019-07-12 PROCEDURE — 85025 COMPLETE CBC W/AUTO DIFF WBC: CPT | Performed by: SURGERY

## 2019-07-12 PROCEDURE — 85007 BL SMEAR W/DIFF WBC COUNT: CPT | Performed by: SURGERY

## 2019-07-12 PROCEDURE — 63710000001 INSULIN LISPRO (HUMAN) PER 5 UNITS: Performed by: INTERNAL MEDICINE

## 2019-07-12 PROCEDURE — 82803 BLOOD GASES ANY COMBINATION: CPT

## 2019-07-12 PROCEDURE — 84100 ASSAY OF PHOSPHORUS: CPT | Performed by: SURGERY

## 2019-07-12 RX ORDER — FENTANYL CITRATE 50 UG/ML
50 INJECTION, SOLUTION INTRAMUSCULAR; INTRAVENOUS
Status: DISCONTINUED | OUTPATIENT
Start: 2019-07-12 | End: 2019-07-16

## 2019-07-12 RX ORDER — FENTANYL CITRATE 50 UG/ML
25 INJECTION, SOLUTION INTRAMUSCULAR; INTRAVENOUS
Status: DISCONTINUED | OUTPATIENT
Start: 2019-07-12 | End: 2019-07-12

## 2019-07-12 RX ORDER — CHLORHEXIDINE GLUCONATE 0.12 MG/ML
15 RINSE ORAL EVERY 12 HOURS SCHEDULED
Status: DISCONTINUED | OUTPATIENT
Start: 2019-07-12 | End: 2019-07-14

## 2019-07-12 RX ORDER — PANTOPRAZOLE SODIUM 40 MG/10ML
40 INJECTION, POWDER, LYOPHILIZED, FOR SOLUTION INTRAVENOUS EVERY 24 HOURS
Status: DISCONTINUED | OUTPATIENT
Start: 2019-07-12 | End: 2019-07-26 | Stop reason: HOSPADM

## 2019-07-12 RX ORDER — NICOTINE POLACRILEX 4 MG
15 LOZENGE BUCCAL
Status: DISCONTINUED | OUTPATIENT
Start: 2019-07-12 | End: 2019-07-26 | Stop reason: HOSPADM

## 2019-07-12 RX ORDER — ALBUTEROL SULFATE 90 UG/1
6 AEROSOL, METERED RESPIRATORY (INHALATION)
Status: DISCONTINUED | OUTPATIENT
Start: 2019-07-12 | End: 2019-07-13

## 2019-07-12 RX ORDER — PANTOPRAZOLE SODIUM 40 MG/1
40 TABLET, DELAYED RELEASE ORAL DAILY
COMMUNITY
End: 2020-02-12

## 2019-07-12 RX ORDER — SODIUM CHLORIDE 9 MG/ML
9 INJECTION, SOLUTION INTRAVENOUS CONTINUOUS
Status: DISCONTINUED | OUTPATIENT
Start: 2019-07-12 | End: 2019-07-17

## 2019-07-12 RX ORDER — SODIUM CHLORIDE 9 MG/ML
125 INJECTION, SOLUTION INTRAVENOUS CONTINUOUS
Status: DISCONTINUED | OUTPATIENT
Start: 2019-07-12 | End: 2019-07-15

## 2019-07-12 RX ORDER — ALBUTEROL SULFATE 90 UG/1
AEROSOL, METERED RESPIRATORY (INHALATION)
Status: COMPLETED
Start: 2019-07-12 | End: 2019-07-12

## 2019-07-12 RX ORDER — DEXTROSE MONOHYDRATE 25 G/50ML
25 INJECTION, SOLUTION INTRAVENOUS
Status: DISCONTINUED | OUTPATIENT
Start: 2019-07-12 | End: 2019-07-12

## 2019-07-12 RX ADMIN — ALBUTEROL SULFATE 6 PUFF: 108 AEROSOL, METERED RESPIRATORY (INHALATION) at 12:00

## 2019-07-12 RX ADMIN — FENTANYL CITRATE 25 MCG: 50 INJECTION INTRAMUSCULAR; INTRAVENOUS at 13:52

## 2019-07-12 RX ADMIN — INSULIN LISPRO 4 UNITS: 100 INJECTION, SOLUTION INTRAVENOUS; SUBCUTANEOUS at 11:44

## 2019-07-12 RX ADMIN — SODIUM CHLORIDE 1000 ML: 9 INJECTION, SOLUTION INTRAVENOUS at 01:26

## 2019-07-12 RX ADMIN — FENTANYL CITRATE 25 MCG: 50 INJECTION INTRAMUSCULAR; INTRAVENOUS at 06:13

## 2019-07-12 RX ADMIN — PROPOFOL 50 MCG/KG/MIN: 10 INJECTION, EMULSION INTRAVENOUS at 18:02

## 2019-07-12 RX ADMIN — FENTANYL CITRATE 50 MCG: 50 INJECTION INTRAMUSCULAR; INTRAVENOUS at 22:30

## 2019-07-12 RX ADMIN — HYDROMORPHONE HYDROCHLORIDE 0.5 MG: 1 INJECTION, SOLUTION INTRAMUSCULAR; INTRAVENOUS; SUBCUTANEOUS at 12:36

## 2019-07-12 RX ADMIN — ALBUTEROL SULFATE 6 PUFF: 90 AEROSOL, METERED RESPIRATORY (INHALATION) at 14:36

## 2019-07-12 RX ADMIN — HYDROMORPHONE HYDROCHLORIDE 0.5 MG: 1 INJECTION, SOLUTION INTRAMUSCULAR; INTRAVENOUS; SUBCUTANEOUS at 09:50

## 2019-07-12 RX ADMIN — INSULIN LISPRO 2 UNITS: 100 INJECTION, SOLUTION INTRAVENOUS; SUBCUTANEOUS at 17:24

## 2019-07-12 RX ADMIN — SODIUM CHLORIDE 9 ML/HR: 9 INJECTION, SOLUTION INTRAVENOUS at 07:58

## 2019-07-12 RX ADMIN — TAZOBACTAM SODIUM AND PIPERACILLIN SODIUM 3.38 G: 375; 3 INJECTION, SOLUTION INTRAVENOUS at 07:54

## 2019-07-12 RX ADMIN — FENTANYL CITRATE 50 MCG: 50 INJECTION INTRAMUSCULAR; INTRAVENOUS at 20:33

## 2019-07-12 RX ADMIN — CHLORHEXIDINE GLUCONATE 15 ML: 1.2 RINSE ORAL at 09:32

## 2019-07-12 RX ADMIN — PROPOFOL 50 MCG/KG/MIN: 10 INJECTION, EMULSION INTRAVENOUS at 13:50

## 2019-07-12 RX ADMIN — SODIUM CHLORIDE 200 ML/HR: 9 INJECTION, SOLUTION INTRAVENOUS at 12:17

## 2019-07-12 RX ADMIN — CHLORHEXIDINE GLUCONATE 15 ML: 1.2 RINSE ORAL at 20:40

## 2019-07-12 RX ADMIN — PROPOFOL 40 MCG/KG/MIN: 10 INJECTION, EMULSION INTRAVENOUS at 08:50

## 2019-07-12 RX ADMIN — HYDROMORPHONE HYDROCHLORIDE 0.5 MG: 1 INJECTION, SOLUTION INTRAMUSCULAR; INTRAVENOUS; SUBCUTANEOUS at 15:53

## 2019-07-12 RX ADMIN — TAZOBACTAM SODIUM AND PIPERACILLIN SODIUM 4.5 G: 500; 4 INJECTION, SOLUTION INTRAVENOUS at 16:32

## 2019-07-12 RX ADMIN — SODIUM CHLORIDE 200 ML/HR: 9 INJECTION, SOLUTION INTRAVENOUS at 07:20

## 2019-07-12 RX ADMIN — SODIUM CHLORIDE, PRESERVATIVE FREE 3 ML: 5 INJECTION INTRAVENOUS at 20:41

## 2019-07-12 RX ADMIN — HYDROMORPHONE HYDROCHLORIDE 0.5 MG: 1 INJECTION, SOLUTION INTRAMUSCULAR; INTRAVENOUS; SUBCUTANEOUS at 07:54

## 2019-07-12 RX ADMIN — INSULIN HUMAN 2 UNITS: 100 INJECTION, SOLUTION PARENTERAL at 06:22

## 2019-07-12 RX ADMIN — INSULIN HUMAN 4 UNITS: 100 INJECTION, SOLUTION PARENTERAL at 00:51

## 2019-07-12 RX ADMIN — POTASSIUM PHOSPHATE, MONOBASIC AND POTASSIUM PHOSPHATE, DIBASIC 20 MMOL: 224; 236 INJECTION, SOLUTION, CONCENTRATE INTRAVENOUS at 09:32

## 2019-07-12 RX ADMIN — FENTANYL CITRATE 25 MCG: 50 INJECTION INTRAMUSCULAR; INTRAVENOUS at 03:29

## 2019-07-12 RX ADMIN — ALBUTEROL SULFATE 6 PUFF: 90 AEROSOL, METERED RESPIRATORY (INHALATION) at 19:22

## 2019-07-12 RX ADMIN — PANTOPRAZOLE SODIUM 40 MG: 40 INJECTION, POWDER, FOR SOLUTION INTRAVENOUS at 09:32

## 2019-07-12 RX ADMIN — HYDROMORPHONE HYDROCHLORIDE 0.5 MG: 1 INJECTION, SOLUTION INTRAMUSCULAR; INTRAVENOUS; SUBCUTANEOUS at 17:49

## 2019-07-12 RX ADMIN — SODIUM CHLORIDE 150 ML/HR: 9 INJECTION, SOLUTION INTRAVENOUS at 00:52

## 2019-07-12 RX ADMIN — HYDROMORPHONE HYDROCHLORIDE 0.5 MG: 1 INJECTION, SOLUTION INTRAMUSCULAR; INTRAVENOUS; SUBCUTANEOUS at 04:00

## 2019-07-12 RX ADMIN — PROPOFOL 50 MCG/KG/MIN: 10 INJECTION, EMULSION INTRAVENOUS at 21:40

## 2019-07-12 RX ADMIN — FENTANYL CITRATE 25 MCG: 50 INJECTION INTRAMUSCULAR; INTRAVENOUS at 11:02

## 2019-07-12 RX ADMIN — ALBUTEROL SULFATE 6 PUFF: 90 AEROSOL, METERED RESPIRATORY (INHALATION) at 12:00

## 2019-07-12 RX ADMIN — ALBUTEROL SULFATE 6 PUFF: 90 AEROSOL, METERED RESPIRATORY (INHALATION) at 23:43

## 2019-07-12 RX ADMIN — HYDROMORPHONE HYDROCHLORIDE 0.5 MG: 1 INJECTION, SOLUTION INTRAMUSCULAR; INTRAVENOUS; SUBCUTANEOUS at 21:42

## 2019-07-12 RX ADMIN — PROPOFOL 35 MCG/KG/MIN: 10 INJECTION, EMULSION INTRAVENOUS at 02:41

## 2019-07-12 RX ADMIN — SODIUM CHLORIDE 125 ML/HR: 9 INJECTION, SOLUTION INTRAVENOUS at 18:02

## 2019-07-12 RX ADMIN — SODIUM CHLORIDE, PRESERVATIVE FREE 3 ML: 5 INJECTION INTRAVENOUS at 09:31

## 2019-07-12 RX ADMIN — HYDROMORPHONE HYDROCHLORIDE 0.5 MG: 1 INJECTION, SOLUTION INTRAMUSCULAR; INTRAVENOUS; SUBCUTANEOUS at 01:59

## 2019-07-12 RX ADMIN — FENTANYL CITRATE 25 MCG: 50 INJECTION INTRAMUSCULAR; INTRAVENOUS at 16:30

## 2019-07-13 LAB
ANION GAP SERPL CALCULATED.3IONS-SCNC: 7.1 MMOL/L (ref 5–15)
ARTERIAL PATENCY WRIST A: ABNORMAL
ATMOSPHERIC PRESS: 751.6 MMHG
BASE EXCESS BLDA CALC-SCNC: -7 MMOL/L (ref 0–2)
BASOPHILS # BLD AUTO: 0.05 10*3/MM3 (ref 0–0.2)
BASOPHILS NFR BLD AUTO: 0.5 % (ref 0–1.5)
BDY SITE: ABNORMAL
BUN BLD-MCNC: 10 MG/DL (ref 6–20)
BUN/CREAT SERPL: 25.6 (ref 7–25)
CALCIUM SPEC-SCNC: 7.9 MG/DL (ref 8.6–10.5)
CHLORIDE SERPL-SCNC: 106 MMOL/L (ref 98–107)
CO2 SERPL-SCNC: 20.9 MMOL/L (ref 22–29)
CREAT BLD-MCNC: 0.39 MG/DL (ref 0.57–1)
D-LACTATE SERPL-SCNC: 1.1 MMOL/L (ref 0.5–2)
DEPRECATED RDW RBC AUTO: 45.1 FL (ref 37–54)
EOSINOPHIL # BLD AUTO: 0.21 10*3/MM3 (ref 0–0.4)
EOSINOPHIL NFR BLD AUTO: 2 % (ref 0.3–6.2)
ERYTHROCYTE [DISTWIDTH] IN BLOOD BY AUTOMATED COUNT: 13.2 % (ref 12.3–15.4)
GFR SERPL CREATININE-BSD FRML MDRD: >150 ML/MIN/1.73
GLUCOSE BLD-MCNC: 135 MG/DL (ref 65–99)
GLUCOSE BLDC GLUCOMTR-MCNC: 128 MG/DL (ref 70–130)
GLUCOSE BLDC GLUCOMTR-MCNC: 137 MG/DL (ref 70–130)
GLUCOSE BLDC GLUCOMTR-MCNC: 141 MG/DL (ref 70–130)
GLUCOSE BLDC GLUCOMTR-MCNC: 153 MG/DL (ref 70–130)
HCO3 BLDA-SCNC: 17 MMOL/L (ref 22–28)
HCT VFR BLD AUTO: 34.3 % (ref 34–46.6)
HGB BLD-MCNC: 11.1 G/DL (ref 12–15.9)
HOROWITZ INDEX BLD+IHG-RTO: 30 %
LYMPHOCYTES # BLD AUTO: 0.75 10*3/MM3 (ref 0.7–3.1)
LYMPHOCYTES NFR BLD AUTO: 7.1 % (ref 19.6–45.3)
MAGNESIUM SERPL-MCNC: 1.8 MG/DL (ref 1.6–2.6)
MCH RBC QN AUTO: 30.6 PG (ref 26.6–33)
MCHC RBC AUTO-ENTMCNC: 32.4 G/DL (ref 31.5–35.7)
MCV RBC AUTO: 94.5 FL (ref 79–97)
MODALITY: ABNORMAL
MONOCYTES # BLD AUTO: 0.26 10*3/MM3 (ref 0.1–0.9)
MONOCYTES NFR BLD AUTO: 2.5 % (ref 5–12)
NEUTROPHILS # BLD AUTO: 9.2 10*3/MM3 (ref 1.7–7)
NEUTROPHILS NFR BLD AUTO: 87.2 % (ref 42.7–76)
O2 A-A PPRESDIFF RESPIRATORY: 0.5 MMHG
PCO2 BLDA: 28.9 MM HG (ref 35–45)
PEEP RESPIRATORY: 5 CM[H2O]
PH BLDA: 7.38 PH UNITS (ref 7.35–7.45)
PHOSPHATE SERPL-MCNC: 1.7 MG/DL (ref 2.5–4.5)
PLATELET # BLD AUTO: 200 10*3/MM3 (ref 140–450)
PMV BLD AUTO: 9.9 FL (ref 6–12)
PO2 BLDA: 99.3 MM HG (ref 80–100)
POTASSIUM BLD-SCNC: 2.9 MMOL/L (ref 3.5–5.2)
PSV: 8 CMH2O
RBC # BLD AUTO: 3.63 10*6/MM3 (ref 3.77–5.28)
SAO2 % BLDCOA: 97.7 % (ref 92–99)
SET MECH RESP RATE: 16
SODIUM BLD-SCNC: 134 MMOL/L (ref 136–145)
TOTAL RATE: 16 BREATHS/MINUTE
TRIGL SERPL-MCNC: 1127 MG/DL (ref 0–150)
VENTILATOR MODE: ABNORMAL
VT ON VENT VENT: 493 ML
WBC NRBC COR # BLD: 10.54 10*3/MM3 (ref 3.4–10.8)

## 2019-07-13 PROCEDURE — 82962 GLUCOSE BLOOD TEST: CPT

## 2019-07-13 PROCEDURE — 25010000003 POTASSIUM CHLORIDE PER 2 MEQ: Performed by: INTERNAL MEDICINE

## 2019-07-13 PROCEDURE — 25010000002 PIPERACILLIN SOD-TAZOBACTAM PER 1 G: Performed by: INTERNAL MEDICINE

## 2019-07-13 PROCEDURE — 25010000002 HYDROMORPHONE PER 4 MG: Performed by: SURGERY

## 2019-07-13 PROCEDURE — 84100 ASSAY OF PHOSPHORUS: CPT | Performed by: INTERNAL MEDICINE

## 2019-07-13 PROCEDURE — 63710000001 INSULIN LISPRO (HUMAN) PER 5 UNITS: Performed by: INTERNAL MEDICINE

## 2019-07-13 PROCEDURE — 25010000002 FENTANYL CITRATE (PF) 100 MCG/2ML SOLUTION: Performed by: INTERNAL MEDICINE

## 2019-07-13 PROCEDURE — 99024 POSTOP FOLLOW-UP VISIT: CPT | Performed by: SURGERY

## 2019-07-13 PROCEDURE — 25010000002 ENOXAPARIN PER 10 MG: Performed by: SURGERY

## 2019-07-13 PROCEDURE — 80048 BASIC METABOLIC PNL TOTAL CA: CPT | Performed by: SURGERY

## 2019-07-13 PROCEDURE — 94799 UNLISTED PULMONARY SVC/PX: CPT

## 2019-07-13 PROCEDURE — 94003 VENT MGMT INPAT SUBQ DAY: CPT

## 2019-07-13 PROCEDURE — 84478 ASSAY OF TRIGLYCERIDES: CPT | Performed by: INTERNAL MEDICINE

## 2019-07-13 PROCEDURE — 83735 ASSAY OF MAGNESIUM: CPT | Performed by: INTERNAL MEDICINE

## 2019-07-13 PROCEDURE — 25010000002 PROPOFOL 1000 MG/ML EMULSION: Performed by: ANESTHESIOLOGY

## 2019-07-13 PROCEDURE — 36600 WITHDRAWAL OF ARTERIAL BLOOD: CPT

## 2019-07-13 PROCEDURE — 82803 BLOOD GASES ANY COMBINATION: CPT

## 2019-07-13 PROCEDURE — 85025 COMPLETE CBC W/AUTO DIFF WBC: CPT | Performed by: SURGERY

## 2019-07-13 PROCEDURE — 83605 ASSAY OF LACTIC ACID: CPT | Performed by: SURGERY

## 2019-07-13 RX ORDER — IPRATROPIUM BROMIDE AND ALBUTEROL SULFATE 2.5; .5 MG/3ML; MG/3ML
3 SOLUTION RESPIRATORY (INHALATION) EVERY 4 HOURS PRN
Status: DISCONTINUED | OUTPATIENT
Start: 2019-07-13 | End: 2019-07-26 | Stop reason: HOSPADM

## 2019-07-13 RX ORDER — POTASSIUM CHLORIDE 29.8 MG/ML
20 INJECTION INTRAVENOUS
Status: DISCONTINUED | OUTPATIENT
Start: 2019-07-13 | End: 2019-07-16

## 2019-07-13 RX ADMIN — INSULIN LISPRO 2 UNITS: 100 INJECTION, SOLUTION INTRAVENOUS; SUBCUTANEOUS at 11:39

## 2019-07-13 RX ADMIN — CHLORHEXIDINE GLUCONATE 15 ML: 1.2 RINSE ORAL at 21:00

## 2019-07-13 RX ADMIN — FENTANYL CITRATE 50 MCG: 50 INJECTION INTRAMUSCULAR; INTRAVENOUS at 03:04

## 2019-07-13 RX ADMIN — POTASSIUM CHLORIDE 20 MEQ: 29.8 INJECTION, SOLUTION INTRAVENOUS at 15:57

## 2019-07-13 RX ADMIN — HYDROMORPHONE HYDROCHLORIDE 0.5 MG: 1 INJECTION, SOLUTION INTRAMUSCULAR; INTRAVENOUS; SUBCUTANEOUS at 07:15

## 2019-07-13 RX ADMIN — POTASSIUM PHOSPHATE, MONOBASIC AND POTASSIUM PHOSPHATE, DIBASIC 30 MMOL: 224; 236 INJECTION, SOLUTION, CONCENTRATE INTRAVENOUS at 09:23

## 2019-07-13 RX ADMIN — TAZOBACTAM SODIUM AND PIPERACILLIN SODIUM 4.5 G: 500; 4 INJECTION, SOLUTION INTRAVENOUS at 08:10

## 2019-07-13 RX ADMIN — HYDROMORPHONE HYDROCHLORIDE 0.5 MG: 1 INJECTION, SOLUTION INTRAMUSCULAR; INTRAVENOUS; SUBCUTANEOUS at 15:57

## 2019-07-13 RX ADMIN — HYDROMORPHONE HYDROCHLORIDE 0.5 MG: 1 INJECTION, SOLUTION INTRAMUSCULAR; INTRAVENOUS; SUBCUTANEOUS at 18:42

## 2019-07-13 RX ADMIN — TAZOBACTAM SODIUM AND PIPERACILLIN SODIUM 4.5 G: 500; 4 INJECTION, SOLUTION INTRAVENOUS at 00:52

## 2019-07-13 RX ADMIN — ALBUTEROL SULFATE 6 PUFF: 90 AEROSOL, METERED RESPIRATORY (INHALATION) at 11:09

## 2019-07-13 RX ADMIN — FENTANYL CITRATE 50 MCG: 50 INJECTION INTRAMUSCULAR; INTRAVENOUS at 20:19

## 2019-07-13 RX ADMIN — TAZOBACTAM SODIUM AND PIPERACILLIN SODIUM 4.5 G: 500; 4 INJECTION, SOLUTION INTRAVENOUS at 16:02

## 2019-07-13 RX ADMIN — HYDROMORPHONE HYDROCHLORIDE 0.5 MG: 1 INJECTION, SOLUTION INTRAMUSCULAR; INTRAVENOUS; SUBCUTANEOUS at 03:41

## 2019-07-13 RX ADMIN — HYDROMORPHONE HYDROCHLORIDE 0.5 MG: 1 INJECTION, SOLUTION INTRAMUSCULAR; INTRAVENOUS; SUBCUTANEOUS at 13:32

## 2019-07-13 RX ADMIN — FENTANYL CITRATE 50 MCG: 50 INJECTION INTRAMUSCULAR; INTRAVENOUS at 08:10

## 2019-07-13 RX ADMIN — POTASSIUM CHLORIDE 20 MEQ: 29.8 INJECTION, SOLUTION INTRAVENOUS at 08:10

## 2019-07-13 RX ADMIN — CHLORHEXIDINE GLUCONATE 15 ML: 1.2 RINSE ORAL at 08:19

## 2019-07-13 RX ADMIN — SODIUM CHLORIDE, PRESERVATIVE FREE 3 ML: 5 INJECTION INTRAVENOUS at 08:11

## 2019-07-13 RX ADMIN — ENOXAPARIN SODIUM 40 MG: 40 INJECTION SUBCUTANEOUS at 11:34

## 2019-07-13 RX ADMIN — SODIUM CHLORIDE 125 ML/HR: 9 INJECTION, SOLUTION INTRAVENOUS at 01:54

## 2019-07-13 RX ADMIN — HYDROMORPHONE HYDROCHLORIDE 0.5 MG: 1 INJECTION, SOLUTION INTRAMUSCULAR; INTRAVENOUS; SUBCUTANEOUS at 21:52

## 2019-07-13 RX ADMIN — HYDROMORPHONE HYDROCHLORIDE 0.5 MG: 1 INJECTION, SOLUTION INTRAMUSCULAR; INTRAVENOUS; SUBCUTANEOUS at 11:34

## 2019-07-13 RX ADMIN — SODIUM CHLORIDE 9 ML/HR: 9 INJECTION, SOLUTION INTRAVENOUS at 07:15

## 2019-07-13 RX ADMIN — ALBUTEROL SULFATE 6 PUFF: 90 AEROSOL, METERED RESPIRATORY (INHALATION) at 08:03

## 2019-07-13 RX ADMIN — HYDROMORPHONE HYDROCHLORIDE 0.5 MG: 1 INJECTION, SOLUTION INTRAMUSCULAR; INTRAVENOUS; SUBCUTANEOUS at 09:27

## 2019-07-13 RX ADMIN — INSULIN LISPRO 1 UNITS: 100 INJECTION, SOLUTION INTRAVENOUS; SUBCUTANEOUS at 00:26

## 2019-07-13 RX ADMIN — PROPOFOL 50 MCG/KG/MIN: 10 INJECTION, EMULSION INTRAVENOUS at 06:26

## 2019-07-13 RX ADMIN — FENTANYL CITRATE 50 MCG: 50 INJECTION INTRAMUSCULAR; INTRAVENOUS at 22:17

## 2019-07-13 RX ADMIN — DEXMEDETOMIDINE HYDROCHLORIDE 0.4 MCG/KG/HR: 100 INJECTION, SOLUTION, CONCENTRATE INTRAVENOUS at 08:45

## 2019-07-13 RX ADMIN — PANTOPRAZOLE SODIUM 40 MG: 40 INJECTION, POWDER, FOR SOLUTION INTRAVENOUS at 08:48

## 2019-07-13 RX ADMIN — SODIUM CHLORIDE 125 ML/HR: 9 INJECTION, SOLUTION INTRAVENOUS at 18:12

## 2019-07-13 RX ADMIN — ALBUTEROL SULFATE 6 PUFF: 90 AEROSOL, METERED RESPIRATORY (INHALATION) at 03:25

## 2019-07-13 RX ADMIN — HYDROMORPHONE HYDROCHLORIDE 0.5 MG: 1 INJECTION, SOLUTION INTRAMUSCULAR; INTRAVENOUS; SUBCUTANEOUS at 00:23

## 2019-07-13 RX ADMIN — PROPOFOL 50 MCG/KG/MIN: 10 INJECTION, EMULSION INTRAVENOUS at 01:54

## 2019-07-14 PROBLEM — K91.89 LARGE BOWEL ANASTOMOTIC LEAK: Status: ACTIVE | Noted: 2019-07-14

## 2019-07-14 LAB
ALBUMIN SERPL-MCNC: 1.9 G/DL (ref 3.5–5.2)
ALBUMIN/GLOB SERPL: 0.7 G/DL
ALP SERPL-CCNC: 82 U/L (ref 39–117)
ALT SERPL W P-5'-P-CCNC: 13 U/L (ref 1–33)
ANION GAP SERPL CALCULATED.3IONS-SCNC: 13.3 MMOL/L (ref 5–15)
AST SERPL-CCNC: 16 U/L (ref 1–32)
BACTERIA SPEC AEROBE CULT: ABNORMAL
BACTERIA SPEC ANAEROBE CULT: ABNORMAL
BASOPHILS # BLD AUTO: 0.05 10*3/MM3 (ref 0–0.2)
BASOPHILS NFR BLD AUTO: 0.4 % (ref 0–1.5)
BILIRUB SERPL-MCNC: 0.9 MG/DL (ref 0.2–1.2)
BUN BLD-MCNC: 10 MG/DL (ref 6–20)
BUN/CREAT SERPL: 28.6 (ref 7–25)
CALCIUM SPEC-SCNC: 7.8 MG/DL (ref 8.6–10.5)
CHLORIDE SERPL-SCNC: 112 MMOL/L (ref 98–107)
CO2 SERPL-SCNC: 17.7 MMOL/L (ref 22–29)
CREAT BLD-MCNC: 0.35 MG/DL (ref 0.57–1)
DEPRECATED RDW RBC AUTO: 45.8 FL (ref 37–54)
EOSINOPHIL # BLD AUTO: 0.21 10*3/MM3 (ref 0–0.4)
EOSINOPHIL NFR BLD AUTO: 1.7 % (ref 0.3–6.2)
ERYTHROCYTE [DISTWIDTH] IN BLOOD BY AUTOMATED COUNT: 13.3 % (ref 12.3–15.4)
GFR SERPL CREATININE-BSD FRML MDRD: >150 ML/MIN/1.73
GLOBULIN UR ELPH-MCNC: 2.7 GM/DL
GLUCOSE BLD-MCNC: 142 MG/DL (ref 65–99)
GLUCOSE BLDC GLUCOMTR-MCNC: 126 MG/DL (ref 70–130)
GLUCOSE BLDC GLUCOMTR-MCNC: 129 MG/DL (ref 70–130)
GLUCOSE BLDC GLUCOMTR-MCNC: 134 MG/DL (ref 70–130)
GLUCOSE BLDC GLUCOMTR-MCNC: 137 MG/DL (ref 70–130)
GRAM STN SPEC: ABNORMAL
GRAM STN SPEC: ABNORMAL
HCT VFR BLD AUTO: 34.4 % (ref 34–46.6)
HGB BLD-MCNC: 10.8 G/DL (ref 12–15.9)
LYMPHOCYTES # BLD AUTO: 1.1 10*3/MM3 (ref 0.7–3.1)
LYMPHOCYTES NFR BLD AUTO: 8.8 % (ref 19.6–45.3)
MAGNESIUM SERPL-MCNC: 1.9 MG/DL (ref 1.6–2.6)
MCH RBC QN AUTO: 29.4 PG (ref 26.6–33)
MCHC RBC AUTO-ENTMCNC: 31.4 G/DL (ref 31.5–35.7)
MCV RBC AUTO: 93.7 FL (ref 79–97)
MONOCYTES # BLD AUTO: 0.59 10*3/MM3 (ref 0.1–0.9)
MONOCYTES NFR BLD AUTO: 4.7 % (ref 5–12)
NEUTROPHILS # BLD AUTO: 10.43 10*3/MM3 (ref 1.7–7)
NEUTROPHILS NFR BLD AUTO: 83 % (ref 42.7–76)
PHOSPHATE SERPL-MCNC: 2.3 MG/DL (ref 2.5–4.5)
PHOSPHATE SERPL-MCNC: 2.9 MG/DL (ref 2.5–4.5)
PLATELET # BLD AUTO: 229 10*3/MM3 (ref 140–450)
PMV BLD AUTO: 10.1 FL (ref 6–12)
POTASSIUM BLD-SCNC: 3.4 MMOL/L (ref 3.5–5.2)
POTASSIUM BLD-SCNC: 3.7 MMOL/L (ref 3.5–5.2)
PROT SERPL-MCNC: 4.6 G/DL (ref 6–8.5)
RBC # BLD AUTO: 3.67 10*6/MM3 (ref 3.77–5.28)
SODIUM BLD-SCNC: 143 MMOL/L (ref 136–145)
TRIGL SERPL-MCNC: 825 MG/DL (ref 0–150)
WBC NRBC COR # BLD: 12.56 10*3/MM3 (ref 3.4–10.8)

## 2019-07-14 PROCEDURE — 25010000002 FENTANYL CITRATE (PF) 100 MCG/2ML SOLUTION: Performed by: INTERNAL MEDICINE

## 2019-07-14 PROCEDURE — 99024 POSTOP FOLLOW-UP VISIT: CPT | Performed by: SURGERY

## 2019-07-14 PROCEDURE — 84100 ASSAY OF PHOSPHORUS: CPT | Performed by: INTERNAL MEDICINE

## 2019-07-14 PROCEDURE — 84478 ASSAY OF TRIGLYCERIDES: CPT | Performed by: INTERNAL MEDICINE

## 2019-07-14 PROCEDURE — 94799 UNLISTED PULMONARY SVC/PX: CPT

## 2019-07-14 PROCEDURE — 25010000003 POTASSIUM CHLORIDE PER 2 MEQ: Performed by: INTERNAL MEDICINE

## 2019-07-14 PROCEDURE — 84132 ASSAY OF SERUM POTASSIUM: CPT | Performed by: INTERNAL MEDICINE

## 2019-07-14 PROCEDURE — 25010000002 ENOXAPARIN PER 10 MG: Performed by: SURGERY

## 2019-07-14 PROCEDURE — 25010000002 PIPERACILLIN SOD-TAZOBACTAM PER 1 G: Performed by: INTERNAL MEDICINE

## 2019-07-14 PROCEDURE — 82962 GLUCOSE BLOOD TEST: CPT

## 2019-07-14 PROCEDURE — 80053 COMPREHEN METABOLIC PANEL: CPT | Performed by: INTERNAL MEDICINE

## 2019-07-14 PROCEDURE — 25010000002 HYDROMORPHONE PER 4 MG: Performed by: SURGERY

## 2019-07-14 PROCEDURE — 85025 COMPLETE CBC W/AUTO DIFF WBC: CPT | Performed by: INTERNAL MEDICINE

## 2019-07-14 PROCEDURE — 83735 ASSAY OF MAGNESIUM: CPT | Performed by: INTERNAL MEDICINE

## 2019-07-14 RX ADMIN — POTASSIUM CHLORIDE 20 MEQ: 29.8 INJECTION, SOLUTION INTRAVENOUS at 08:33

## 2019-07-14 RX ADMIN — HYDROMORPHONE HYDROCHLORIDE 0.5 MG: 1 INJECTION, SOLUTION INTRAMUSCULAR; INTRAVENOUS; SUBCUTANEOUS at 23:03

## 2019-07-14 RX ADMIN — FENTANYL CITRATE 50 MCG: 50 INJECTION INTRAMUSCULAR; INTRAVENOUS at 22:04

## 2019-07-14 RX ADMIN — HYDROMORPHONE HYDROCHLORIDE 0.5 MG: 1 INJECTION, SOLUTION INTRAMUSCULAR; INTRAVENOUS; SUBCUTANEOUS at 16:35

## 2019-07-14 RX ADMIN — TAZOBACTAM SODIUM AND PIPERACILLIN SODIUM 4.5 G: 500; 4 INJECTION, SOLUTION INTRAVENOUS at 08:33

## 2019-07-14 RX ADMIN — TAZOBACTAM SODIUM AND PIPERACILLIN SODIUM 4.5 G: 500; 4 INJECTION, SOLUTION INTRAVENOUS at 16:33

## 2019-07-14 RX ADMIN — HYDROMORPHONE HYDROCHLORIDE 0.5 MG: 1 INJECTION, SOLUTION INTRAMUSCULAR; INTRAVENOUS; SUBCUTANEOUS at 00:01

## 2019-07-14 RX ADMIN — FENTANYL CITRATE 50 MCG: 50 INJECTION INTRAMUSCULAR; INTRAVENOUS at 17:08

## 2019-07-14 RX ADMIN — FENTANYL CITRATE 50 MCG: 50 INJECTION INTRAMUSCULAR; INTRAVENOUS at 14:01

## 2019-07-14 RX ADMIN — TAZOBACTAM SODIUM AND PIPERACILLIN SODIUM 4.5 G: 500; 4 INJECTION, SOLUTION INTRAVENOUS at 00:40

## 2019-07-14 RX ADMIN — HYDROMORPHONE HYDROCHLORIDE 0.5 MG: 1 INJECTION, SOLUTION INTRAMUSCULAR; INTRAVENOUS; SUBCUTANEOUS at 18:46

## 2019-07-14 RX ADMIN — SODIUM CHLORIDE 125 ML/HR: 9 INJECTION, SOLUTION INTRAVENOUS at 18:47

## 2019-07-14 RX ADMIN — HYDROMORPHONE HYDROCHLORIDE 0.5 MG: 1 INJECTION, SOLUTION INTRAMUSCULAR; INTRAVENOUS; SUBCUTANEOUS at 01:56

## 2019-07-14 RX ADMIN — HYDROMORPHONE HYDROCHLORIDE 0.5 MG: 1 INJECTION, SOLUTION INTRAMUSCULAR; INTRAVENOUS; SUBCUTANEOUS at 14:48

## 2019-07-14 RX ADMIN — POTASSIUM PHOSPHATE, MONOBASIC AND POTASSIUM PHOSPHATE, DIBASIC 15 MMOL: 224; 236 INJECTION, SOLUTION, CONCENTRATE INTRAVENOUS at 09:18

## 2019-07-14 RX ADMIN — HYDROMORPHONE HYDROCHLORIDE 0.5 MG: 1 INJECTION, SOLUTION INTRAMUSCULAR; INTRAVENOUS; SUBCUTANEOUS at 08:31

## 2019-07-14 RX ADMIN — HYDROMORPHONE HYDROCHLORIDE 0.5 MG: 1 INJECTION, SOLUTION INTRAMUSCULAR; INTRAVENOUS; SUBCUTANEOUS at 12:36

## 2019-07-14 RX ADMIN — SODIUM CHLORIDE 125 ML/HR: 9 INJECTION, SOLUTION INTRAVENOUS at 10:48

## 2019-07-14 RX ADMIN — HYDROMORPHONE HYDROCHLORIDE 0.5 MG: 1 INJECTION, SOLUTION INTRAMUSCULAR; INTRAVENOUS; SUBCUTANEOUS at 10:48

## 2019-07-14 RX ADMIN — FENTANYL CITRATE 50 MCG: 50 INJECTION INTRAMUSCULAR; INTRAVENOUS at 08:51

## 2019-07-14 RX ADMIN — PANTOPRAZOLE SODIUM 40 MG: 40 INJECTION, POWDER, FOR SOLUTION INTRAVENOUS at 11:13

## 2019-07-14 RX ADMIN — ENOXAPARIN SODIUM 40 MG: 40 INJECTION SUBCUTANEOUS at 10:48

## 2019-07-14 RX ADMIN — SODIUM CHLORIDE 125 ML/HR: 9 INJECTION, SOLUTION INTRAVENOUS at 02:39

## 2019-07-14 RX ADMIN — FENTANYL CITRATE 50 MCG: 50 INJECTION INTRAMUSCULAR; INTRAVENOUS at 18:10

## 2019-07-14 RX ADMIN — POTASSIUM CHLORIDE 20 MEQ: 29.8 INJECTION, SOLUTION INTRAVENOUS at 12:36

## 2019-07-14 RX ADMIN — HYDROMORPHONE HYDROCHLORIDE 0.5 MG: 1 INJECTION, SOLUTION INTRAMUSCULAR; INTRAVENOUS; SUBCUTANEOUS at 06:40

## 2019-07-14 RX ADMIN — HYDROMORPHONE HYDROCHLORIDE 0.5 MG: 1 INJECTION, SOLUTION INTRAMUSCULAR; INTRAVENOUS; SUBCUTANEOUS at 20:45

## 2019-07-14 RX ADMIN — HYDROMORPHONE HYDROCHLORIDE 0.5 MG: 1 INJECTION, SOLUTION INTRAMUSCULAR; INTRAVENOUS; SUBCUTANEOUS at 04:05

## 2019-07-15 ENCOUNTER — APPOINTMENT (OUTPATIENT)
Dept: GENERAL RADIOLOGY | Facility: HOSPITAL | Age: 34
End: 2019-07-15

## 2019-07-15 LAB
ALBUMIN SERPL-MCNC: 2 G/DL (ref 3.5–5.2)
ALBUMIN/GLOB SERPL: 0.7 G/DL
ALP SERPL-CCNC: 86 U/L (ref 39–117)
ALT SERPL W P-5'-P-CCNC: 13 U/L (ref 1–33)
ANION GAP SERPL CALCULATED.3IONS-SCNC: 15.5 MMOL/L (ref 5–15)
AST SERPL-CCNC: 10 U/L (ref 1–32)
BILIRUB SERPL-MCNC: 0.8 MG/DL (ref 0.2–1.2)
BUN BLD-MCNC: 8 MG/DL (ref 6–20)
BUN/CREAT SERPL: 22.9 (ref 7–25)
BURR CELLS BLD QL SMEAR: ABNORMAL
CALCIUM SPEC-SCNC: 7.9 MG/DL (ref 8.6–10.5)
CHLORIDE SERPL-SCNC: 110 MMOL/L (ref 98–107)
CO2 SERPL-SCNC: 16.5 MMOL/L (ref 22–29)
CREAT BLD-MCNC: 0.35 MG/DL (ref 0.57–1)
CYTO UR: NORMAL
DEPRECATED RDW RBC AUTO: 45.8 FL (ref 37–54)
EOSINOPHIL # BLD MANUAL: 0.12 10*3/MM3 (ref 0–0.4)
EOSINOPHIL NFR BLD MANUAL: 1 % (ref 0.3–6.2)
ERYTHROCYTE [DISTWIDTH] IN BLOOD BY AUTOMATED COUNT: 13.3 % (ref 12.3–15.4)
GFR SERPL CREATININE-BSD FRML MDRD: >150 ML/MIN/1.73
GIANT PLATELETS: ABNORMAL
GLOBULIN UR ELPH-MCNC: 2.8 GM/DL
GLUCOSE BLD-MCNC: 143 MG/DL (ref 65–99)
GLUCOSE BLDC GLUCOMTR-MCNC: 135 MG/DL (ref 70–130)
GLUCOSE BLDC GLUCOMTR-MCNC: 139 MG/DL (ref 70–130)
GLUCOSE BLDC GLUCOMTR-MCNC: 152 MG/DL (ref 70–130)
GLUCOSE BLDC GLUCOMTR-MCNC: 156 MG/DL (ref 70–130)
HCT VFR BLD AUTO: 36.2 % (ref 34–46.6)
HGB BLD-MCNC: 11.4 G/DL (ref 12–15.9)
LAB AP CASE REPORT: NORMAL
LYMPHOCYTES # BLD MANUAL: 1.05 10*3/MM3 (ref 0.7–3.1)
LYMPHOCYTES NFR BLD MANUAL: 5 % (ref 5–12)
LYMPHOCYTES NFR BLD MANUAL: 9 % (ref 19.6–45.3)
MAGNESIUM SERPL-MCNC: 1.7 MG/DL (ref 1.6–2.6)
MCH RBC QN AUTO: 29.3 PG (ref 26.6–33)
MCHC RBC AUTO-ENTMCNC: 31.5 G/DL (ref 31.5–35.7)
MCV RBC AUTO: 93.1 FL (ref 79–97)
MONOCYTES # BLD AUTO: 0.59 10*3/MM3 (ref 0.1–0.9)
MYELOCYTES NFR BLD MANUAL: 1 % (ref 0–0)
NEUTROPHILS # BLD AUTO: 9.83 10*3/MM3 (ref 1.7–7)
NEUTROPHILS NFR BLD MANUAL: 84 % (ref 42.7–76)
PATH REPORT.FINAL DX SPEC: NORMAL
PATH REPORT.GROSS SPEC: NORMAL
PHOSPHATE SERPL-MCNC: 3.1 MG/DL (ref 2.5–4.5)
PLATELET # BLD AUTO: 279 10*3/MM3 (ref 140–450)
PMV BLD AUTO: 10.2 FL (ref 6–12)
POIKILOCYTOSIS BLD QL SMEAR: ABNORMAL
POTASSIUM BLD-SCNC: 3.4 MMOL/L (ref 3.5–5.2)
PROT SERPL-MCNC: 4.8 G/DL (ref 6–8.5)
RBC # BLD AUTO: 3.89 10*6/MM3 (ref 3.77–5.28)
SODIUM BLD-SCNC: 142 MMOL/L (ref 136–145)
WBC MORPH BLD: NORMAL
WBC NRBC COR # BLD: 11.7 10*3/MM3 (ref 3.4–10.8)

## 2019-07-15 PROCEDURE — 25010000002 FUROSEMIDE PER 20 MG: Performed by: INTERNAL MEDICINE

## 2019-07-15 PROCEDURE — 97162 PT EVAL MOD COMPLEX 30 MIN: CPT

## 2019-07-15 PROCEDURE — 25010000002 ENOXAPARIN PER 10 MG: Performed by: SURGERY

## 2019-07-15 PROCEDURE — 83735 ASSAY OF MAGNESIUM: CPT | Performed by: INTERNAL MEDICINE

## 2019-07-15 PROCEDURE — 84100 ASSAY OF PHOSPHORUS: CPT | Performed by: INTERNAL MEDICINE

## 2019-07-15 PROCEDURE — 99024 POSTOP FOLLOW-UP VISIT: CPT | Performed by: SURGERY

## 2019-07-15 PROCEDURE — 25010000002 PIPERACILLIN SOD-TAZOBACTAM PER 1 G: Performed by: INTERNAL MEDICINE

## 2019-07-15 PROCEDURE — 80053 COMPREHEN METABOLIC PANEL: CPT | Performed by: INTERNAL MEDICINE

## 2019-07-15 PROCEDURE — 71045 X-RAY EXAM CHEST 1 VIEW: CPT

## 2019-07-15 PROCEDURE — 85025 COMPLETE CBC W/AUTO DIFF WBC: CPT | Performed by: INTERNAL MEDICINE

## 2019-07-15 PROCEDURE — 25010000002 HYDROMORPHONE PER 4 MG: Performed by: SURGERY

## 2019-07-15 PROCEDURE — 82962 GLUCOSE BLOOD TEST: CPT

## 2019-07-15 PROCEDURE — 94799 UNLISTED PULMONARY SVC/PX: CPT

## 2019-07-15 PROCEDURE — 85007 BL SMEAR W/DIFF WBC COUNT: CPT | Performed by: INTERNAL MEDICINE

## 2019-07-15 PROCEDURE — 25010000002 MAGNESIUM SULFATE IN D5W 1G/100ML (PREMIX) 1-5 GM/100ML-% SOLUTION: Performed by: INTERNAL MEDICINE

## 2019-07-15 PROCEDURE — 25010000002 FENTANYL CITRATE (PF) 100 MCG/2ML SOLUTION: Performed by: INTERNAL MEDICINE

## 2019-07-15 PROCEDURE — 97110 THERAPEUTIC EXERCISES: CPT

## 2019-07-15 RX ORDER — FUROSEMIDE 10 MG/ML
40 INJECTION INTRAMUSCULAR; INTRAVENOUS EVERY 6 HOURS
Status: COMPLETED | OUTPATIENT
Start: 2019-07-15 | End: 2019-07-15

## 2019-07-15 RX ORDER — MAGNESIUM SULFATE 1 G/100ML
1 INJECTION INTRAVENOUS
Status: COMPLETED | OUTPATIENT
Start: 2019-07-15 | End: 2019-07-15

## 2019-07-15 RX ADMIN — HYDROMORPHONE HYDROCHLORIDE 0.5 MG: 1 INJECTION, SOLUTION INTRAMUSCULAR; INTRAVENOUS; SUBCUTANEOUS at 16:23

## 2019-07-15 RX ADMIN — TAZOBACTAM SODIUM AND PIPERACILLIN SODIUM 4.5 G: 500; 4 INJECTION, SOLUTION INTRAVENOUS at 00:38

## 2019-07-15 RX ADMIN — MAGNESIUM SULFATE HEPTAHYDRATE 1 G: 1 INJECTION, SOLUTION INTRAVENOUS at 11:50

## 2019-07-15 RX ADMIN — HYDROMORPHONE HYDROCHLORIDE 0.5 MG: 1 INJECTION, SOLUTION INTRAMUSCULAR; INTRAVENOUS; SUBCUTANEOUS at 23:13

## 2019-07-15 RX ADMIN — HYDROMORPHONE HYDROCHLORIDE 0.5 MG: 1 INJECTION, SOLUTION INTRAMUSCULAR; INTRAVENOUS; SUBCUTANEOUS at 10:40

## 2019-07-15 RX ADMIN — HYDROMORPHONE HYDROCHLORIDE 0.5 MG: 1 INJECTION, SOLUTION INTRAMUSCULAR; INTRAVENOUS; SUBCUTANEOUS at 14:03

## 2019-07-15 RX ADMIN — MAGNESIUM SULFATE HEPTAHYDRATE 1 G: 1 INJECTION, SOLUTION INTRAVENOUS at 10:42

## 2019-07-15 RX ADMIN — HYDROMORPHONE HYDROCHLORIDE 0.5 MG: 1 INJECTION, SOLUTION INTRAMUSCULAR; INTRAVENOUS; SUBCUTANEOUS at 07:42

## 2019-07-15 RX ADMIN — FENTANYL CITRATE 50 MCG: 50 INJECTION INTRAMUSCULAR; INTRAVENOUS at 11:50

## 2019-07-15 RX ADMIN — TAZOBACTAM SODIUM AND PIPERACILLIN SODIUM 4.5 G: 500; 4 INJECTION, SOLUTION INTRAVENOUS at 18:15

## 2019-07-15 RX ADMIN — HYDROMORPHONE HYDROCHLORIDE 0.5 MG: 1 INJECTION, SOLUTION INTRAMUSCULAR; INTRAVENOUS; SUBCUTANEOUS at 01:49

## 2019-07-15 RX ADMIN — HYDROMORPHONE HYDROCHLORIDE 0.5 MG: 1 INJECTION, SOLUTION INTRAMUSCULAR; INTRAVENOUS; SUBCUTANEOUS at 04:35

## 2019-07-15 RX ADMIN — TAZOBACTAM SODIUM AND PIPERACILLIN SODIUM 4.5 G: 500; 4 INJECTION, SOLUTION INTRAVENOUS at 08:21

## 2019-07-15 RX ADMIN — FENTANYL CITRATE 50 MCG: 50 INJECTION INTRAMUSCULAR; INTRAVENOUS at 17:40

## 2019-07-15 RX ADMIN — PANTOPRAZOLE SODIUM 40 MG: 40 INJECTION, POWDER, FOR SOLUTION INTRAVENOUS at 10:04

## 2019-07-15 RX ADMIN — FENTANYL CITRATE 50 MCG: 50 INJECTION INTRAMUSCULAR; INTRAVENOUS at 15:13

## 2019-07-15 RX ADMIN — HYDROMORPHONE HYDROCHLORIDE 0.5 MG: 1 INJECTION, SOLUTION INTRAMUSCULAR; INTRAVENOUS; SUBCUTANEOUS at 20:12

## 2019-07-15 RX ADMIN — FENTANYL CITRATE 50 MCG: 50 INJECTION INTRAMUSCULAR; INTRAVENOUS at 03:01

## 2019-07-15 RX ADMIN — FUROSEMIDE 40 MG: 10 INJECTION, SOLUTION INTRAMUSCULAR; INTRAVENOUS at 18:15

## 2019-07-15 RX ADMIN — FUROSEMIDE 40 MG: 10 INJECTION, SOLUTION INTRAMUSCULAR; INTRAVENOUS at 12:03

## 2019-07-15 RX ADMIN — SODIUM CHLORIDE 125 ML/HR: 9 INJECTION, SOLUTION INTRAVENOUS at 03:02

## 2019-07-15 RX ADMIN — ENOXAPARIN SODIUM 40 MG: 40 INJECTION SUBCUTANEOUS at 10:42

## 2019-07-16 ENCOUNTER — APPOINTMENT (OUTPATIENT)
Dept: GENERAL RADIOLOGY | Facility: HOSPITAL | Age: 34
End: 2019-07-16

## 2019-07-16 LAB
ANION GAP SERPL CALCULATED.3IONS-SCNC: 11.4 MMOL/L (ref 5–15)
BUN BLD-MCNC: 9 MG/DL (ref 6–20)
BUN/CREAT SERPL: 30 (ref 7–25)
CALCIUM SPEC-SCNC: 8.2 MG/DL (ref 8.6–10.5)
CHLORIDE SERPL-SCNC: 97 MMOL/L (ref 98–107)
CO2 SERPL-SCNC: 22.6 MMOL/L (ref 22–29)
CREAT BLD-MCNC: 0.3 MG/DL (ref 0.57–1)
DEPRECATED RDW RBC AUTO: 43.3 FL (ref 37–54)
ERYTHROCYTE [DISTWIDTH] IN BLOOD BY AUTOMATED COUNT: 13.2 % (ref 12.3–15.4)
GFR SERPL CREATININE-BSD FRML MDRD: >150 ML/MIN/1.73
GLUCOSE BLD-MCNC: 164 MG/DL (ref 65–99)
GLUCOSE BLDC GLUCOMTR-MCNC: 162 MG/DL (ref 70–130)
GLUCOSE BLDC GLUCOMTR-MCNC: 172 MG/DL (ref 70–130)
GLUCOSE BLDC GLUCOMTR-MCNC: 191 MG/DL (ref 70–130)
GLUCOSE BLDC GLUCOMTR-MCNC: 209 MG/DL (ref 70–130)
HCT VFR BLD AUTO: 35.1 % (ref 34–46.6)
HGB BLD-MCNC: 11.9 G/DL (ref 12–15.9)
MAGNESIUM SERPL-MCNC: 1.8 MG/DL (ref 1.6–2.6)
MCH RBC QN AUTO: 30.3 PG (ref 26.6–33)
MCHC RBC AUTO-ENTMCNC: 33.9 G/DL (ref 31.5–35.7)
MCV RBC AUTO: 89.3 FL (ref 79–97)
PLATELET # BLD AUTO: 259 10*3/MM3 (ref 140–450)
PMV BLD AUTO: 10.4 FL (ref 6–12)
POTASSIUM BLD-SCNC: 2.6 MMOL/L (ref 3.5–5.2)
RBC # BLD AUTO: 3.93 10*6/MM3 (ref 3.77–5.28)
SODIUM BLD-SCNC: 131 MMOL/L (ref 136–145)
WBC NRBC COR # BLD: 12.92 10*3/MM3 (ref 3.4–10.8)

## 2019-07-16 PROCEDURE — 82962 GLUCOSE BLOOD TEST: CPT

## 2019-07-16 PROCEDURE — 83735 ASSAY OF MAGNESIUM: CPT | Performed by: INTERNAL MEDICINE

## 2019-07-16 PROCEDURE — 85027 COMPLETE CBC AUTOMATED: CPT | Performed by: INTERNAL MEDICINE

## 2019-07-16 PROCEDURE — 97110 THERAPEUTIC EXERCISES: CPT

## 2019-07-16 PROCEDURE — 25010000002 PIPERACILLIN SOD-TAZOBACTAM PER 1 G: Performed by: INTERNAL MEDICINE

## 2019-07-16 PROCEDURE — 80048 BASIC METABOLIC PNL TOTAL CA: CPT | Performed by: INTERNAL MEDICINE

## 2019-07-16 PROCEDURE — 99024 POSTOP FOLLOW-UP VISIT: CPT | Performed by: SURGERY

## 2019-07-16 PROCEDURE — 25010000002 ENOXAPARIN PER 10 MG: Performed by: SURGERY

## 2019-07-16 PROCEDURE — 25010000002 HYDROMORPHONE PER 4 MG: Performed by: SURGERY

## 2019-07-16 PROCEDURE — 25010000002 FENTANYL CITRATE (PF) 100 MCG/2ML SOLUTION: Performed by: INTERNAL MEDICINE

## 2019-07-16 PROCEDURE — 63710000001 INSULIN LISPRO (HUMAN) PER 5 UNITS: Performed by: INTERNAL MEDICINE

## 2019-07-16 PROCEDURE — 94799 UNLISTED PULMONARY SVC/PX: CPT

## 2019-07-16 PROCEDURE — 71045 X-RAY EXAM CHEST 1 VIEW: CPT

## 2019-07-16 RX ORDER — POTASSIUM CHLORIDE 1.5 G/1.77G
40 POWDER, FOR SOLUTION ORAL AS NEEDED
Status: DISCONTINUED | OUTPATIENT
Start: 2019-07-16 | End: 2019-07-26 | Stop reason: HOSPADM

## 2019-07-16 RX ORDER — POTASSIUM CHLORIDE 7.45 MG/ML
10 INJECTION INTRAVENOUS
Status: DISCONTINUED | OUTPATIENT
Start: 2019-07-16 | End: 2019-07-26 | Stop reason: HOSPADM

## 2019-07-16 RX ORDER — POTASSIUM CHLORIDE 750 MG/1
40 CAPSULE, EXTENDED RELEASE ORAL AS NEEDED
Status: DISCONTINUED | OUTPATIENT
Start: 2019-07-16 | End: 2019-07-26 | Stop reason: HOSPADM

## 2019-07-16 RX ADMIN — TAZOBACTAM SODIUM AND PIPERACILLIN SODIUM 4.5 G: 500; 4 INJECTION, SOLUTION INTRAVENOUS at 08:00

## 2019-07-16 RX ADMIN — HYDROMORPHONE HYDROCHLORIDE 0.5 MG: 1 INJECTION, SOLUTION INTRAMUSCULAR; INTRAVENOUS; SUBCUTANEOUS at 10:46

## 2019-07-16 RX ADMIN — POTASSIUM CHLORIDE 40 MEQ: 1.5 POWDER, FOR SOLUTION ORAL at 15:30

## 2019-07-16 RX ADMIN — HYDROMORPHONE HYDROCHLORIDE 0.5 MG: 1 INJECTION, SOLUTION INTRAMUSCULAR; INTRAVENOUS; SUBCUTANEOUS at 22:28

## 2019-07-16 RX ADMIN — HYDROCODONE BITARTRATE AND ACETAMINOPHEN 1 TABLET: 7.5; 325 TABLET ORAL at 15:31

## 2019-07-16 RX ADMIN — HYDROCODONE BITARTRATE AND ACETAMINOPHEN 1 TABLET: 7.5; 325 TABLET ORAL at 23:34

## 2019-07-16 RX ADMIN — TAZOBACTAM SODIUM AND PIPERACILLIN SODIUM 4.5 G: 500; 4 INJECTION, SOLUTION INTRAVENOUS at 01:04

## 2019-07-16 RX ADMIN — INSULIN LISPRO 4 UNITS: 100 INJECTION, SOLUTION INTRAVENOUS; SUBCUTANEOUS at 18:04

## 2019-07-16 RX ADMIN — HYDROMORPHONE HYDROCHLORIDE 0.5 MG: 1 INJECTION, SOLUTION INTRAMUSCULAR; INTRAVENOUS; SUBCUTANEOUS at 18:37

## 2019-07-16 RX ADMIN — HYDROMORPHONE HYDROCHLORIDE 0.5 MG: 1 INJECTION, SOLUTION INTRAMUSCULAR; INTRAVENOUS; SUBCUTANEOUS at 02:22

## 2019-07-16 RX ADMIN — POTASSIUM CHLORIDE 40 MEQ: 1.5 POWDER, FOR SOLUTION ORAL at 11:46

## 2019-07-16 RX ADMIN — FENTANYL CITRATE 50 MCG: 50 INJECTION INTRAMUSCULAR; INTRAVENOUS at 09:44

## 2019-07-16 RX ADMIN — FENTANYL CITRATE 50 MCG: 50 INJECTION INTRAMUSCULAR; INTRAVENOUS at 00:29

## 2019-07-16 RX ADMIN — INSULIN LISPRO 6 UNITS: 100 INJECTION, SOLUTION INTRAVENOUS; SUBCUTANEOUS at 12:00

## 2019-07-16 RX ADMIN — PANTOPRAZOLE SODIUM 40 MG: 40 INJECTION, POWDER, FOR SOLUTION INTRAVENOUS at 10:49

## 2019-07-16 RX ADMIN — INSULIN LISPRO 7 UNITS: 100 INJECTION, SOLUTION INTRAVENOUS; SUBCUTANEOUS at 01:04

## 2019-07-16 RX ADMIN — HYDROCODONE BITARTRATE AND ACETAMINOPHEN 1 TABLET: 7.5; 325 TABLET ORAL at 19:40

## 2019-07-16 RX ADMIN — INSULIN LISPRO 3 UNITS: 100 INJECTION, SOLUTION INTRAVENOUS; SUBCUTANEOUS at 06:27

## 2019-07-16 RX ADMIN — FENTANYL CITRATE 50 MCG: 50 INJECTION INTRAMUSCULAR; INTRAVENOUS at 14:04

## 2019-07-16 RX ADMIN — TAZOBACTAM SODIUM AND PIPERACILLIN SODIUM 4.5 G: 500; 4 INJECTION, SOLUTION INTRAVENOUS at 17:10

## 2019-07-16 RX ADMIN — FENTANYL CITRATE 50 MCG: 50 INJECTION INTRAMUSCULAR; INTRAVENOUS at 11:59

## 2019-07-16 RX ADMIN — HYDROMORPHONE HYDROCHLORIDE 0.5 MG: 1 INJECTION, SOLUTION INTRAMUSCULAR; INTRAVENOUS; SUBCUTANEOUS at 05:42

## 2019-07-16 RX ADMIN — HYDROMORPHONE HYDROCHLORIDE 0.5 MG: 1 INJECTION, SOLUTION INTRAMUSCULAR; INTRAVENOUS; SUBCUTANEOUS at 08:00

## 2019-07-16 RX ADMIN — HYDROMORPHONE HYDROCHLORIDE 0.5 MG: 1 INJECTION, SOLUTION INTRAMUSCULAR; INTRAVENOUS; SUBCUTANEOUS at 13:03

## 2019-07-16 RX ADMIN — FENTANYL CITRATE 50 MCG: 50 INJECTION INTRAMUSCULAR; INTRAVENOUS at 04:11

## 2019-07-16 RX ADMIN — ENOXAPARIN SODIUM 40 MG: 40 INJECTION SUBCUTANEOUS at 10:49

## 2019-07-17 LAB
ANION GAP SERPL CALCULATED.3IONS-SCNC: 10.9 MMOL/L (ref 5–15)
BUN BLD-MCNC: 7 MG/DL (ref 6–20)
BUN/CREAT SERPL: 23.3 (ref 7–25)
CALCIUM SPEC-SCNC: 7.9 MG/DL (ref 8.6–10.5)
CHLORIDE SERPL-SCNC: 101 MMOL/L (ref 98–107)
CO2 SERPL-SCNC: 22.1 MMOL/L (ref 22–29)
CREAT BLD-MCNC: 0.3 MG/DL (ref 0.57–1)
DEPRECATED RDW RBC AUTO: 47.8 FL (ref 37–54)
ERYTHROCYTE [DISTWIDTH] IN BLOOD BY AUTOMATED COUNT: 13.7 % (ref 12.3–15.4)
GFR SERPL CREATININE-BSD FRML MDRD: >150 ML/MIN/1.73
GLUCOSE BLD-MCNC: 134 MG/DL (ref 65–99)
GLUCOSE BLDC GLUCOMTR-MCNC: 137 MG/DL (ref 70–130)
GLUCOSE BLDC GLUCOMTR-MCNC: 154 MG/DL (ref 70–130)
GLUCOSE BLDC GLUCOMTR-MCNC: 162 MG/DL (ref 70–130)
GLUCOSE BLDC GLUCOMTR-MCNC: 166 MG/DL (ref 70–130)
GLUCOSE BLDC GLUCOMTR-MCNC: 175 MG/DL (ref 70–130)
HCT VFR BLD AUTO: 37.2 % (ref 34–46.6)
HGB BLD-MCNC: 11.6 G/DL (ref 12–15.9)
MCH RBC QN AUTO: 29.5 PG (ref 26.6–33)
MCHC RBC AUTO-ENTMCNC: 31.2 G/DL (ref 31.5–35.7)
MCV RBC AUTO: 94.7 FL (ref 79–97)
PLATELET # BLD AUTO: 275 10*3/MM3 (ref 140–450)
PMV BLD AUTO: 10.2 FL (ref 6–12)
POTASSIUM BLD-SCNC: 3 MMOL/L (ref 3.5–5.2)
RBC # BLD AUTO: 3.93 10*6/MM3 (ref 3.77–5.28)
SODIUM BLD-SCNC: 134 MMOL/L (ref 136–145)
WBC NRBC COR # BLD: 15.1 10*3/MM3 (ref 3.4–10.8)

## 2019-07-17 PROCEDURE — 82962 GLUCOSE BLOOD TEST: CPT

## 2019-07-17 PROCEDURE — 99024 POSTOP FOLLOW-UP VISIT: CPT | Performed by: SURGERY

## 2019-07-17 PROCEDURE — 85027 COMPLETE CBC AUTOMATED: CPT | Performed by: INTERNAL MEDICINE

## 2019-07-17 PROCEDURE — 63710000001 INSULIN LISPRO (HUMAN) PER 5 UNITS: Performed by: INTERNAL MEDICINE

## 2019-07-17 PROCEDURE — 25010000002 PIPERACILLIN SOD-TAZOBACTAM PER 1 G: Performed by: INTERNAL MEDICINE

## 2019-07-17 PROCEDURE — 25010000002 HYDROMORPHONE PER 4 MG: Performed by: SURGERY

## 2019-07-17 PROCEDURE — 97110 THERAPEUTIC EXERCISES: CPT

## 2019-07-17 PROCEDURE — 25010000002 FUROSEMIDE PER 20 MG: Performed by: INTERNAL MEDICINE

## 2019-07-17 PROCEDURE — 25010000002 ENOXAPARIN PER 10 MG: Performed by: SURGERY

## 2019-07-17 PROCEDURE — 80048 BASIC METABOLIC PNL TOTAL CA: CPT | Performed by: INTERNAL MEDICINE

## 2019-07-17 RX ORDER — FUROSEMIDE 10 MG/ML
40 INJECTION INTRAMUSCULAR; INTRAVENOUS ONCE
Status: COMPLETED | OUTPATIENT
Start: 2019-07-17 | End: 2019-07-17

## 2019-07-17 RX ADMIN — INSULIN LISPRO 3 UNITS: 100 INJECTION, SOLUTION INTRAVENOUS; SUBCUTANEOUS at 17:47

## 2019-07-17 RX ADMIN — HYDROCODONE BITARTRATE AND ACETAMINOPHEN 1 TABLET: 7.5; 325 TABLET ORAL at 15:10

## 2019-07-17 RX ADMIN — HYDROMORPHONE HYDROCHLORIDE 0.5 MG: 1 INJECTION, SOLUTION INTRAMUSCULAR; INTRAVENOUS; SUBCUTANEOUS at 09:40

## 2019-07-17 RX ADMIN — HYDROMORPHONE HYDROCHLORIDE 0.5 MG: 1 INJECTION, SOLUTION INTRAMUSCULAR; INTRAVENOUS; SUBCUTANEOUS at 21:57

## 2019-07-17 RX ADMIN — TAZOBACTAM SODIUM AND PIPERACILLIN SODIUM 4.5 G: 500; 4 INJECTION, SOLUTION INTRAVENOUS at 10:34

## 2019-07-17 RX ADMIN — INSULIN LISPRO 3 UNITS: 100 INJECTION, SOLUTION INTRAVENOUS; SUBCUTANEOUS at 00:44

## 2019-07-17 RX ADMIN — PANTOPRAZOLE SODIUM 40 MG: 40 INJECTION, POWDER, FOR SOLUTION INTRAVENOUS at 09:15

## 2019-07-17 RX ADMIN — HYDROMORPHONE HYDROCHLORIDE 0.5 MG: 1 INJECTION, SOLUTION INTRAMUSCULAR; INTRAVENOUS; SUBCUTANEOUS at 02:20

## 2019-07-17 RX ADMIN — POTASSIUM CHLORIDE 40 MEQ: 1.5 POWDER, FOR SOLUTION ORAL at 12:17

## 2019-07-17 RX ADMIN — FUROSEMIDE 40 MG: 10 INJECTION, SOLUTION INTRAMUSCULAR; INTRAVENOUS at 15:58

## 2019-07-17 RX ADMIN — HYDROMORPHONE HYDROCHLORIDE 0.5 MG: 1 INJECTION, SOLUTION INTRAMUSCULAR; INTRAVENOUS; SUBCUTANEOUS at 16:56

## 2019-07-17 RX ADMIN — TAZOBACTAM SODIUM AND PIPERACILLIN SODIUM 4.5 G: 500; 4 INJECTION, SOLUTION INTRAVENOUS at 02:16

## 2019-07-17 RX ADMIN — HYDROCODONE BITARTRATE AND ACETAMINOPHEN 1 TABLET: 7.5; 325 TABLET ORAL at 19:36

## 2019-07-17 RX ADMIN — TAZOBACTAM SODIUM AND PIPERACILLIN SODIUM 4.5 G: 500; 4 INJECTION, SOLUTION INTRAVENOUS at 15:58

## 2019-07-17 RX ADMIN — HYDROCODONE BITARTRATE AND ACETAMINOPHEN 1 TABLET: 7.5; 325 TABLET ORAL at 06:53

## 2019-07-17 RX ADMIN — ENOXAPARIN SODIUM 40 MG: 40 INJECTION SUBCUTANEOUS at 11:12

## 2019-07-17 RX ADMIN — INSULIN LISPRO 4 UNITS: 100 INJECTION, SOLUTION INTRAVENOUS; SUBCUTANEOUS at 12:17

## 2019-07-17 RX ADMIN — HYDROCODONE BITARTRATE AND ACETAMINOPHEN 1 TABLET: 7.5; 325 TABLET ORAL at 11:12

## 2019-07-17 RX ADMIN — POTASSIUM CHLORIDE 40 MEQ: 1.5 POWDER, FOR SOLUTION ORAL at 15:58

## 2019-07-17 RX ADMIN — POTASSIUM CHLORIDE 40 MEQ: 1.5 POWDER, FOR SOLUTION ORAL at 21:57

## 2019-07-17 RX ADMIN — HYDROCODONE BITARTRATE AND ACETAMINOPHEN 1 TABLET: 7.5; 325 TABLET ORAL at 23:28

## 2019-07-17 RX ADMIN — HYDROMORPHONE HYDROCHLORIDE 0.5 MG: 1 INJECTION, SOLUTION INTRAMUSCULAR; INTRAVENOUS; SUBCUTANEOUS at 04:45

## 2019-07-18 ENCOUNTER — APPOINTMENT (OUTPATIENT)
Dept: CT IMAGING | Facility: HOSPITAL | Age: 34
End: 2019-07-18

## 2019-07-18 ENCOUNTER — APPOINTMENT (OUTPATIENT)
Dept: GENERAL RADIOLOGY | Facility: HOSPITAL | Age: 34
End: 2019-07-18

## 2019-07-18 LAB
ALBUMIN SERPL-MCNC: 2.1 G/DL (ref 3.5–5.2)
ALBUMIN/GLOB SERPL: 0.6 G/DL
ALP SERPL-CCNC: 91 U/L (ref 39–117)
ALT SERPL W P-5'-P-CCNC: 9 U/L (ref 1–33)
ANION GAP SERPL CALCULATED.3IONS-SCNC: 11.1 MMOL/L (ref 5–15)
AST SERPL-CCNC: 14 U/L (ref 1–32)
BASOPHILS # BLD MANUAL: 0.14 10*3/MM3 (ref 0–0.2)
BASOPHILS NFR BLD AUTO: 1 % (ref 0–1.5)
BILIRUB SERPL-MCNC: 0.5 MG/DL (ref 0.2–1.2)
BUN BLD-MCNC: 5 MG/DL (ref 6–20)
BUN/CREAT SERPL: 15.2 (ref 7–25)
CALCIUM SPEC-SCNC: 8.1 MG/DL (ref 8.6–10.5)
CHLORIDE SERPL-SCNC: 97 MMOL/L (ref 98–107)
CO2 SERPL-SCNC: 24.9 MMOL/L (ref 22–29)
CREAT BLD-MCNC: 0.33 MG/DL (ref 0.57–1)
DEPRECATED RDW RBC AUTO: 45.2 FL (ref 37–54)
EOSINOPHIL # BLD MANUAL: 0.14 10*3/MM3 (ref 0–0.4)
EOSINOPHIL NFR BLD MANUAL: 1 % (ref 0.3–6.2)
ERYTHROCYTE [DISTWIDTH] IN BLOOD BY AUTOMATED COUNT: 13.8 % (ref 12.3–15.4)
GFR SERPL CREATININE-BSD FRML MDRD: >150 ML/MIN/1.73
GLOBULIN UR ELPH-MCNC: 3.3 GM/DL
GLUCOSE BLD-MCNC: 149 MG/DL (ref 65–99)
GLUCOSE BLDC GLUCOMTR-MCNC: 146 MG/DL (ref 70–130)
GLUCOSE BLDC GLUCOMTR-MCNC: 157 MG/DL (ref 70–130)
GLUCOSE BLDC GLUCOMTR-MCNC: 166 MG/DL (ref 70–130)
GLUCOSE BLDC GLUCOMTR-MCNC: 202 MG/DL (ref 70–130)
GLUCOSE BLDC GLUCOMTR-MCNC: 210 MG/DL (ref 70–130)
HCT VFR BLD AUTO: 35.3 % (ref 34–46.6)
HGB BLD-MCNC: 11.5 G/DL (ref 12–15.9)
LYMPHOCYTES # BLD MANUAL: 0.57 10*3/MM3 (ref 0.7–3.1)
LYMPHOCYTES NFR BLD MANUAL: 4 % (ref 19.6–45.3)
LYMPHOCYTES NFR BLD MANUAL: 5 % (ref 5–12)
MAGNESIUM SERPL-MCNC: 1.7 MG/DL (ref 1.6–2.6)
MCH RBC QN AUTO: 29.4 PG (ref 26.6–33)
MCHC RBC AUTO-ENTMCNC: 32.6 G/DL (ref 31.5–35.7)
MCV RBC AUTO: 90.3 FL (ref 79–97)
MONOCYTES # BLD AUTO: 0.71 10*3/MM3 (ref 0.1–0.9)
MYELOCYTES NFR BLD MANUAL: 1 % (ref 0–0)
NEUTROPHILS # BLD AUTO: 12.4 10*3/MM3 (ref 1.7–7)
NEUTROPHILS NFR BLD MANUAL: 87 % (ref 42.7–76)
PHOSPHATE SERPL-MCNC: 2.7 MG/DL (ref 2.5–4.5)
PLAT MORPH BLD: NORMAL
PLATELET # BLD AUTO: 312 10*3/MM3 (ref 140–450)
PMV BLD AUTO: 10 FL (ref 6–12)
POTASSIUM BLD-SCNC: 3.5 MMOL/L (ref 3.5–5.2)
PROT SERPL-MCNC: 5.4 G/DL (ref 6–8.5)
RBC # BLD AUTO: 3.91 10*6/MM3 (ref 3.77–5.28)
RBC MORPH BLD: NORMAL
SODIUM BLD-SCNC: 133 MMOL/L (ref 136–145)
VARIANT LYMPHS NFR BLD MANUAL: 1 % (ref 0–5)
WBC MORPH BLD: NORMAL
WBC NRBC COR # BLD: 14.25 10*3/MM3 (ref 3.4–10.8)

## 2019-07-18 PROCEDURE — 25010000002 PIPERACILLIN SOD-TAZOBACTAM PER 1 G: Performed by: INTERNAL MEDICINE

## 2019-07-18 PROCEDURE — 63710000001 INSULIN LISPRO (HUMAN) PER 5 UNITS: Performed by: INTERNAL MEDICINE

## 2019-07-18 PROCEDURE — 99024 POSTOP FOLLOW-UP VISIT: CPT | Performed by: SURGERY

## 2019-07-18 PROCEDURE — 85025 COMPLETE CBC W/AUTO DIFF WBC: CPT | Performed by: SURGERY

## 2019-07-18 PROCEDURE — 80053 COMPREHEN METABOLIC PANEL: CPT | Performed by: SURGERY

## 2019-07-18 PROCEDURE — 94799 UNLISTED PULMONARY SVC/PX: CPT

## 2019-07-18 PROCEDURE — 85007 BL SMEAR W/DIFF WBC COUNT: CPT | Performed by: SURGERY

## 2019-07-18 PROCEDURE — 83735 ASSAY OF MAGNESIUM: CPT | Performed by: SURGERY

## 2019-07-18 PROCEDURE — 25010000002 ENOXAPARIN PER 10 MG: Performed by: SURGERY

## 2019-07-18 PROCEDURE — 82962 GLUCOSE BLOOD TEST: CPT

## 2019-07-18 PROCEDURE — 0 DIATRIZOATE MEGLUMINE & SODIUM PER 1 ML: Performed by: SURGERY

## 2019-07-18 PROCEDURE — 25010000002 HYDROMORPHONE PER 4 MG: Performed by: SURGERY

## 2019-07-18 PROCEDURE — 74177 CT ABD & PELVIS W/CONTRAST: CPT

## 2019-07-18 PROCEDURE — 84100 ASSAY OF PHOSPHORUS: CPT | Performed by: SURGERY

## 2019-07-18 PROCEDURE — 25010000002 IOPAMIDOL 61 % SOLUTION: Performed by: SURGERY

## 2019-07-18 PROCEDURE — 71046 X-RAY EXAM CHEST 2 VIEWS: CPT

## 2019-07-18 RX ORDER — LIDOCAINE 50 MG/G
1 PATCH TOPICAL
Status: DISCONTINUED | OUTPATIENT
Start: 2019-07-18 | End: 2019-07-26 | Stop reason: HOSPADM

## 2019-07-18 RX ADMIN — IOPAMIDOL 85 ML: 612 INJECTION, SOLUTION INTRAVENOUS at 14:00

## 2019-07-18 RX ADMIN — INSULIN LISPRO 2 UNITS: 100 INJECTION, SOLUTION INTRAVENOUS; SUBCUTANEOUS at 00:38

## 2019-07-18 RX ADMIN — HYDROCODONE BITARTRATE AND ACETAMINOPHEN 1 TABLET: 7.5; 325 TABLET ORAL at 05:48

## 2019-07-18 RX ADMIN — DIATRIZOATE MEGLUMINE AND DIATRIZOATE SODIUM 30 ML: 600; 100 SOLUTION ORAL; RECTAL at 10:19

## 2019-07-18 RX ADMIN — LIDOCAINE 1 PATCH: 50 PATCH CUTANEOUS at 20:55

## 2019-07-18 RX ADMIN — INSULIN LISPRO 8 UNITS: 100 INJECTION, SOLUTION INTRAVENOUS; SUBCUTANEOUS at 12:35

## 2019-07-18 RX ADMIN — HYDROMORPHONE HYDROCHLORIDE 0.5 MG: 1 INJECTION, SOLUTION INTRAMUSCULAR; INTRAVENOUS; SUBCUTANEOUS at 09:05

## 2019-07-18 RX ADMIN — PANTOPRAZOLE SODIUM 40 MG: 40 INJECTION, POWDER, FOR SOLUTION INTRAVENOUS at 09:07

## 2019-07-18 RX ADMIN — INSULIN LISPRO 2 UNITS: 100 INJECTION, SOLUTION INTRAVENOUS; SUBCUTANEOUS at 06:37

## 2019-07-18 RX ADMIN — TAZOBACTAM SODIUM AND PIPERACILLIN SODIUM 4.5 G: 500; 4 INJECTION, SOLUTION INTRAVENOUS at 09:07

## 2019-07-18 RX ADMIN — TAZOBACTAM SODIUM AND PIPERACILLIN SODIUM 4.5 G: 500; 4 INJECTION, SOLUTION INTRAVENOUS at 00:31

## 2019-07-18 RX ADMIN — HYDROMORPHONE HYDROCHLORIDE 0.5 MG: 1 INJECTION, SOLUTION INTRAMUSCULAR; INTRAVENOUS; SUBCUTANEOUS at 03:36

## 2019-07-18 RX ADMIN — OXYCODONE HYDROCHLORIDE AND ACETAMINOPHEN 1 TABLET: 10; 325 TABLET ORAL at 17:59

## 2019-07-18 RX ADMIN — OXYCODONE HYDROCHLORIDE AND ACETAMINOPHEN 1 TABLET: 10; 325 TABLET ORAL at 10:19

## 2019-07-18 RX ADMIN — OXYCODONE HYDROCHLORIDE AND ACETAMINOPHEN 1 TABLET: 10; 325 TABLET ORAL at 22:26

## 2019-07-18 RX ADMIN — POTASSIUM CHLORIDE 40 MEQ: 1.5 POWDER, FOR SOLUTION ORAL at 22:26

## 2019-07-18 RX ADMIN — TAZOBACTAM SODIUM AND PIPERACILLIN SODIUM 4.5 G: 500; 4 INJECTION, SOLUTION INTRAVENOUS at 15:45

## 2019-07-18 RX ADMIN — ENOXAPARIN SODIUM 40 MG: 40 INJECTION SUBCUTANEOUS at 12:35

## 2019-07-18 RX ADMIN — OXYCODONE HYDROCHLORIDE AND ACETAMINOPHEN 1 TABLET: 10; 325 TABLET ORAL at 13:46

## 2019-07-19 LAB
ANION GAP SERPL CALCULATED.3IONS-SCNC: 11.7 MMOL/L (ref 5–15)
BUN BLD-MCNC: 4 MG/DL (ref 6–20)
BUN/CREAT SERPL: 12.5 (ref 7–25)
CALCIUM SPEC-SCNC: 8.4 MG/DL (ref 8.6–10.5)
CHLORIDE SERPL-SCNC: 99 MMOL/L (ref 98–107)
CO2 SERPL-SCNC: 26.3 MMOL/L (ref 22–29)
CREAT BLD-MCNC: 0.32 MG/DL (ref 0.57–1)
DEPRECATED RDW RBC AUTO: 47.9 FL (ref 37–54)
ERYTHROCYTE [DISTWIDTH] IN BLOOD BY AUTOMATED COUNT: 14 % (ref 12.3–15.4)
GFR SERPL CREATININE-BSD FRML MDRD: >150 ML/MIN/1.73
GLUCOSE BLD-MCNC: 117 MG/DL (ref 65–99)
GLUCOSE BLDC GLUCOMTR-MCNC: 129 MG/DL (ref 70–130)
GLUCOSE BLDC GLUCOMTR-MCNC: 145 MG/DL (ref 70–130)
GLUCOSE BLDC GLUCOMTR-MCNC: 207 MG/DL (ref 70–130)
HCT VFR BLD AUTO: 35 % (ref 34–46.6)
HGB BLD-MCNC: 11.1 G/DL (ref 12–15.9)
MCH RBC QN AUTO: 29.5 PG (ref 26.6–33)
MCHC RBC AUTO-ENTMCNC: 31.7 G/DL (ref 31.5–35.7)
MCV RBC AUTO: 93.1 FL (ref 79–97)
PLATELET # BLD AUTO: 341 10*3/MM3 (ref 140–450)
PMV BLD AUTO: 10.2 FL (ref 6–12)
POTASSIUM BLD-SCNC: 3.5 MMOL/L (ref 3.5–5.2)
POTASSIUM BLD-SCNC: 4.6 MMOL/L (ref 3.5–5.2)
RBC # BLD AUTO: 3.76 10*6/MM3 (ref 3.77–5.28)
SODIUM BLD-SCNC: 137 MMOL/L (ref 136–145)
WBC NRBC COR # BLD: 12.62 10*3/MM3 (ref 3.4–10.8)

## 2019-07-19 PROCEDURE — 80048 BASIC METABOLIC PNL TOTAL CA: CPT | Performed by: INTERNAL MEDICINE

## 2019-07-19 PROCEDURE — 25010000002 ENOXAPARIN PER 10 MG: Performed by: SURGERY

## 2019-07-19 PROCEDURE — 97110 THERAPEUTIC EXERCISES: CPT

## 2019-07-19 PROCEDURE — 84132 ASSAY OF SERUM POTASSIUM: CPT | Performed by: SURGERY

## 2019-07-19 PROCEDURE — 82962 GLUCOSE BLOOD TEST: CPT

## 2019-07-19 PROCEDURE — 99024 POSTOP FOLLOW-UP VISIT: CPT | Performed by: SURGERY

## 2019-07-19 PROCEDURE — 63710000001 INSULIN LISPRO (HUMAN) PER 5 UNITS: Performed by: INTERNAL MEDICINE

## 2019-07-19 PROCEDURE — 85027 COMPLETE CBC AUTOMATED: CPT | Performed by: INTERNAL MEDICINE

## 2019-07-19 PROCEDURE — 25010000002 HYDROMORPHONE PER 4 MG: Performed by: SURGERY

## 2019-07-19 RX ORDER — MONTELUKAST SODIUM 10 MG/1
10 TABLET ORAL DAILY
Status: DISCONTINUED | OUTPATIENT
Start: 2019-07-19 | End: 2019-07-26 | Stop reason: HOSPADM

## 2019-07-19 RX ORDER — GABAPENTIN 300 MG/1
300 CAPSULE ORAL 3 TIMES DAILY
Status: DISCONTINUED | OUTPATIENT
Start: 2019-07-19 | End: 2019-07-26 | Stop reason: HOSPADM

## 2019-07-19 RX ORDER — LEVOTHYROXINE SODIUM 0.15 MG/1
300 TABLET ORAL
Status: DISCONTINUED | OUTPATIENT
Start: 2019-07-19 | End: 2019-07-26 | Stop reason: HOSPADM

## 2019-07-19 RX ADMIN — INSULIN LISPRO 7 UNITS: 100 INJECTION, SOLUTION INTRAVENOUS; SUBCUTANEOUS at 01:04

## 2019-07-19 RX ADMIN — MONTELUKAST SODIUM 10 MG: 10 TABLET, FILM COATED ORAL at 11:54

## 2019-07-19 RX ADMIN — INSULIN LISPRO 7 UNITS: 100 INJECTION, SOLUTION INTRAVENOUS; SUBCUTANEOUS at 12:16

## 2019-07-19 RX ADMIN — INSULIN LISPRO 2 UNITS: 100 INJECTION, SOLUTION INTRAVENOUS; SUBCUTANEOUS at 06:35

## 2019-07-19 RX ADMIN — ENOXAPARIN SODIUM 40 MG: 40 INJECTION SUBCUTANEOUS at 12:12

## 2019-07-19 RX ADMIN — LIDOCAINE 1 PATCH: 50 PATCH CUTANEOUS at 09:24

## 2019-07-19 RX ADMIN — GABAPENTIN 300 MG: 300 CAPSULE ORAL at 16:07

## 2019-07-19 RX ADMIN — POTASSIUM CHLORIDE 40 MEQ: 1.5 POWDER, FOR SOLUTION ORAL at 16:08

## 2019-07-19 RX ADMIN — LINAGLIPTIN 5 MG: 5 TABLET, FILM COATED ORAL at 11:54

## 2019-07-19 RX ADMIN — OXYCODONE HYDROCHLORIDE AND ACETAMINOPHEN 1 TABLET: 10; 325 TABLET ORAL at 07:04

## 2019-07-19 RX ADMIN — OXYCODONE HYDROCHLORIDE AND ACETAMINOPHEN 1 TABLET: 10; 325 TABLET ORAL at 20:43

## 2019-07-19 RX ADMIN — HYDROMORPHONE HYDROCHLORIDE 0.5 MG: 1 INJECTION, SOLUTION INTRAMUSCULAR; INTRAVENOUS; SUBCUTANEOUS at 20:00

## 2019-07-19 RX ADMIN — HYDROMORPHONE HYDROCHLORIDE 0.5 MG: 1 INJECTION, SOLUTION INTRAMUSCULAR; INTRAVENOUS; SUBCUTANEOUS at 13:19

## 2019-07-19 RX ADMIN — LEVOTHYROXINE SODIUM 300 MCG: 150 TABLET ORAL at 11:54

## 2019-07-19 RX ADMIN — POTASSIUM CHLORIDE 40 MEQ: 1.5 POWDER, FOR SOLUTION ORAL at 11:54

## 2019-07-19 RX ADMIN — OXYCODONE HYDROCHLORIDE AND ACETAMINOPHEN 1 TABLET: 10; 325 TABLET ORAL at 02:53

## 2019-07-19 RX ADMIN — SODIUM CHLORIDE, PRESERVATIVE FREE 10 ML: 5 INJECTION INTRAVENOUS at 20:44

## 2019-07-19 RX ADMIN — GABAPENTIN 300 MG: 300 CAPSULE ORAL at 20:43

## 2019-07-19 RX ADMIN — OXYCODONE HYDROCHLORIDE AND ACETAMINOPHEN 1 TABLET: 10; 325 TABLET ORAL at 16:07

## 2019-07-19 RX ADMIN — OXYCODONE HYDROCHLORIDE AND ACETAMINOPHEN 1 TABLET: 10; 325 TABLET ORAL at 12:12

## 2019-07-19 RX ADMIN — PANTOPRAZOLE SODIUM 40 MG: 40 INJECTION, POWDER, FOR SOLUTION INTRAVENOUS at 09:21

## 2019-07-20 LAB
ANION GAP SERPL CALCULATED.3IONS-SCNC: 9.5 MMOL/L (ref 5–15)
BUN BLD-MCNC: 5 MG/DL (ref 6–20)
BUN/CREAT SERPL: 13.9 (ref 7–25)
CALCIUM SPEC-SCNC: 8.2 MG/DL (ref 8.6–10.5)
CHLORIDE SERPL-SCNC: 97 MMOL/L (ref 98–107)
CO2 SERPL-SCNC: 23.5 MMOL/L (ref 22–29)
CREAT BLD-MCNC: 0.36 MG/DL (ref 0.57–1)
DEPRECATED RDW RBC AUTO: 48.8 FL (ref 37–54)
ERYTHROCYTE [DISTWIDTH] IN BLOOD BY AUTOMATED COUNT: 14.2 % (ref 12.3–15.4)
GFR SERPL CREATININE-BSD FRML MDRD: >150 ML/MIN/1.73
GLUCOSE BLD-MCNC: 204 MG/DL (ref 65–99)
GLUCOSE BLDC GLUCOMTR-MCNC: 115 MG/DL (ref 70–130)
GLUCOSE BLDC GLUCOMTR-MCNC: 188 MG/DL (ref 70–130)
GLUCOSE BLDC GLUCOMTR-MCNC: 204 MG/DL (ref 70–130)
GLUCOSE BLDC GLUCOMTR-MCNC: 221 MG/DL (ref 70–130)
HCT VFR BLD AUTO: 36.5 % (ref 34–46.6)
HGB BLD-MCNC: 11.4 G/DL (ref 12–15.9)
MCH RBC QN AUTO: 29.5 PG (ref 26.6–33)
MCHC RBC AUTO-ENTMCNC: 31.2 G/DL (ref 31.5–35.7)
MCV RBC AUTO: 94.3 FL (ref 79–97)
PLATELET # BLD AUTO: 424 10*3/MM3 (ref 140–450)
PMV BLD AUTO: 9.9 FL (ref 6–12)
POTASSIUM BLD-SCNC: 3.8 MMOL/L (ref 3.5–5.2)
RBC # BLD AUTO: 3.87 10*6/MM3 (ref 3.77–5.28)
SODIUM BLD-SCNC: 130 MMOL/L (ref 136–145)
WBC NRBC COR # BLD: 12.39 10*3/MM3 (ref 3.4–10.8)

## 2019-07-20 PROCEDURE — 80048 BASIC METABOLIC PNL TOTAL CA: CPT | Performed by: INTERNAL MEDICINE

## 2019-07-20 PROCEDURE — 82962 GLUCOSE BLOOD TEST: CPT

## 2019-07-20 PROCEDURE — 63710000001 INSULIN LISPRO (HUMAN) PER 5 UNITS: Performed by: INTERNAL MEDICINE

## 2019-07-20 PROCEDURE — 94799 UNLISTED PULMONARY SVC/PX: CPT

## 2019-07-20 PROCEDURE — 25010000002 HYDROMORPHONE PER 4 MG: Performed by: SURGERY

## 2019-07-20 PROCEDURE — 85027 COMPLETE CBC AUTOMATED: CPT | Performed by: INTERNAL MEDICINE

## 2019-07-20 PROCEDURE — 25010000002 ENOXAPARIN PER 10 MG: Performed by: SURGERY

## 2019-07-20 RX ADMIN — OXYCODONE HYDROCHLORIDE AND ACETAMINOPHEN 1 TABLET: 10; 325 TABLET ORAL at 05:03

## 2019-07-20 RX ADMIN — GABAPENTIN 300 MG: 300 CAPSULE ORAL at 21:06

## 2019-07-20 RX ADMIN — ENOXAPARIN SODIUM 40 MG: 40 INJECTION SUBCUTANEOUS at 10:25

## 2019-07-20 RX ADMIN — INSULIN LISPRO 7 UNITS: 100 INJECTION, SOLUTION INTRAVENOUS; SUBCUTANEOUS at 06:36

## 2019-07-20 RX ADMIN — OXYCODONE HYDROCHLORIDE AND ACETAMINOPHEN 1 TABLET: 10; 325 TABLET ORAL at 21:05

## 2019-07-20 RX ADMIN — LEVOTHYROXINE SODIUM 300 MCG: 150 TABLET ORAL at 05:03

## 2019-07-20 RX ADMIN — GABAPENTIN 300 MG: 300 CAPSULE ORAL at 15:06

## 2019-07-20 RX ADMIN — HYDROMORPHONE HYDROCHLORIDE 0.5 MG: 1 INJECTION, SOLUTION INTRAMUSCULAR; INTRAVENOUS; SUBCUTANEOUS at 23:34

## 2019-07-20 RX ADMIN — OXYCODONE HYDROCHLORIDE AND ACETAMINOPHEN 1 TABLET: 10; 325 TABLET ORAL at 01:06

## 2019-07-20 RX ADMIN — MONTELUKAST SODIUM 10 MG: 10 TABLET, FILM COATED ORAL at 09:09

## 2019-07-20 RX ADMIN — LIDOCAINE 1 PATCH: 50 PATCH CUTANEOUS at 16:33

## 2019-07-20 RX ADMIN — OXYCODONE HYDROCHLORIDE AND ACETAMINOPHEN 1 TABLET: 10; 325 TABLET ORAL at 09:09

## 2019-07-20 RX ADMIN — OXYCODONE HYDROCHLORIDE AND ACETAMINOPHEN 1 TABLET: 10; 325 TABLET ORAL at 17:03

## 2019-07-20 RX ADMIN — INSULIN LISPRO 9 UNITS: 100 INJECTION, SOLUTION INTRAVENOUS; SUBCUTANEOUS at 13:06

## 2019-07-20 RX ADMIN — LINAGLIPTIN 5 MG: 5 TABLET, FILM COATED ORAL at 09:09

## 2019-07-20 RX ADMIN — GABAPENTIN 300 MG: 300 CAPSULE ORAL at 09:09

## 2019-07-20 RX ADMIN — PANTOPRAZOLE SODIUM 40 MG: 40 INJECTION, POWDER, FOR SOLUTION INTRAVENOUS at 09:09

## 2019-07-20 RX ADMIN — INSULIN LISPRO 5 UNITS: 100 INJECTION, SOLUTION INTRAVENOUS; SUBCUTANEOUS at 00:49

## 2019-07-20 RX ADMIN — METFORMIN HYDROCHLORIDE 500 MG: 500 TABLET ORAL at 18:18

## 2019-07-20 RX ADMIN — OXYCODONE HYDROCHLORIDE AND ACETAMINOPHEN 1 TABLET: 10; 325 TABLET ORAL at 13:06

## 2019-07-21 ENCOUNTER — APPOINTMENT (OUTPATIENT)
Dept: GENERAL RADIOLOGY | Facility: HOSPITAL | Age: 34
End: 2019-07-21

## 2019-07-21 LAB
ANION GAP SERPL CALCULATED.3IONS-SCNC: 13 MMOL/L (ref 5–15)
BUN BLD-MCNC: 6 MG/DL (ref 6–20)
BUN/CREAT SERPL: 15.8 (ref 7–25)
CALCIUM SPEC-SCNC: 9 MG/DL (ref 8.6–10.5)
CHLORIDE SERPL-SCNC: 100 MMOL/L (ref 98–107)
CO2 SERPL-SCNC: 23 MMOL/L (ref 22–29)
CREAT BLD-MCNC: 0.38 MG/DL (ref 0.57–1)
DEPRECATED RDW RBC AUTO: 47.4 FL (ref 37–54)
ERYTHROCYTE [DISTWIDTH] IN BLOOD BY AUTOMATED COUNT: 13.8 % (ref 12.3–15.4)
GFR SERPL CREATININE-BSD FRML MDRD: >150 ML/MIN/1.73
GLUCOSE BLD-MCNC: 160 MG/DL (ref 65–99)
GLUCOSE BLDC GLUCOMTR-MCNC: 162 MG/DL (ref 70–130)
GLUCOSE BLDC GLUCOMTR-MCNC: 169 MG/DL (ref 70–130)
GLUCOSE BLDC GLUCOMTR-MCNC: 181 MG/DL (ref 70–130)
GLUCOSE BLDC GLUCOMTR-MCNC: 201 MG/DL (ref 70–130)
HCT VFR BLD AUTO: 40.1 % (ref 34–46.6)
HGB BLD-MCNC: 13 G/DL (ref 12–15.9)
MCH RBC QN AUTO: 30.2 PG (ref 26.6–33)
MCHC RBC AUTO-ENTMCNC: 32.4 G/DL (ref 31.5–35.7)
MCV RBC AUTO: 93 FL (ref 79–97)
PLATELET # BLD AUTO: 555 10*3/MM3 (ref 140–450)
PMV BLD AUTO: 9.8 FL (ref 6–12)
POTASSIUM BLD-SCNC: 4 MMOL/L (ref 3.5–5.2)
POTASSIUM BLD-SCNC: 4 MMOL/L (ref 3.5–5.2)
RBC # BLD AUTO: 4.31 10*6/MM3 (ref 3.77–5.28)
SODIUM BLD-SCNC: 136 MMOL/L (ref 136–145)
WBC NRBC COR # BLD: 16.77 10*3/MM3 (ref 3.4–10.8)

## 2019-07-21 PROCEDURE — 25010000002 HYDROMORPHONE PER 4 MG: Performed by: SURGERY

## 2019-07-21 PROCEDURE — 63710000001 INSULIN LISPRO (HUMAN) PER 5 UNITS: Performed by: INTERNAL MEDICINE

## 2019-07-21 PROCEDURE — 71046 X-RAY EXAM CHEST 2 VIEWS: CPT

## 2019-07-21 PROCEDURE — 82962 GLUCOSE BLOOD TEST: CPT

## 2019-07-21 PROCEDURE — 25010000002 PIPERACILLIN SOD-TAZOBACTAM PER 1 G: Performed by: SURGERY

## 2019-07-21 PROCEDURE — 85027 COMPLETE CBC AUTOMATED: CPT | Performed by: SURGERY

## 2019-07-21 PROCEDURE — 80048 BASIC METABOLIC PNL TOTAL CA: CPT | Performed by: SURGERY

## 2019-07-21 PROCEDURE — 84132 ASSAY OF SERUM POTASSIUM: CPT | Performed by: SURGERY

## 2019-07-21 PROCEDURE — 25010000002 ENOXAPARIN PER 10 MG: Performed by: SURGERY

## 2019-07-21 PROCEDURE — 97110 THERAPEUTIC EXERCISES: CPT

## 2019-07-21 RX ORDER — PROMETHAZINE HYDROCHLORIDE 25 MG/ML
6.25 INJECTION, SOLUTION INTRAMUSCULAR; INTRAVENOUS EVERY 4 HOURS PRN
Status: DISCONTINUED | OUTPATIENT
Start: 2019-07-21 | End: 2019-07-26 | Stop reason: HOSPADM

## 2019-07-21 RX ORDER — PROMETHAZINE HYDROCHLORIDE 25 MG/ML
12.5 INJECTION, SOLUTION INTRAMUSCULAR; INTRAVENOUS EVERY 6 HOURS PRN
Status: DISCONTINUED | OUTPATIENT
Start: 2019-07-21 | End: 2019-07-26 | Stop reason: HOSPADM

## 2019-07-21 RX ADMIN — INSULIN LISPRO 3 UNITS: 100 INJECTION, SOLUTION INTRAVENOUS; SUBCUTANEOUS at 06:18

## 2019-07-21 RX ADMIN — MONTELUKAST SODIUM 10 MG: 10 TABLET, FILM COATED ORAL at 08:50

## 2019-07-21 RX ADMIN — OXYCODONE HYDROCHLORIDE AND ACETAMINOPHEN 1 TABLET: 10; 325 TABLET ORAL at 22:28

## 2019-07-21 RX ADMIN — TAZOBACTAM SODIUM AND PIPERACILLIN SODIUM 3.38 G: 375; 3 INJECTION, SOLUTION INTRAVENOUS at 12:23

## 2019-07-21 RX ADMIN — METFORMIN HYDROCHLORIDE 500 MG: 500 TABLET ORAL at 18:22

## 2019-07-21 RX ADMIN — TAZOBACTAM SODIUM AND PIPERACILLIN SODIUM 3.38 G: 375; 3 INJECTION, SOLUTION INTRAVENOUS at 20:15

## 2019-07-21 RX ADMIN — LINAGLIPTIN 5 MG: 5 TABLET, FILM COATED ORAL at 08:50

## 2019-07-21 RX ADMIN — OXYCODONE HYDROCHLORIDE AND ACETAMINOPHEN 1 TABLET: 10; 325 TABLET ORAL at 02:05

## 2019-07-21 RX ADMIN — LIDOCAINE 1 PATCH: 50 PATCH CUTANEOUS at 20:14

## 2019-07-21 RX ADMIN — INSULIN LISPRO 3 UNITS: 100 INJECTION, SOLUTION INTRAVENOUS; SUBCUTANEOUS at 12:51

## 2019-07-21 RX ADMIN — HYDROMORPHONE HYDROCHLORIDE 0.5 MG: 1 INJECTION, SOLUTION INTRAMUSCULAR; INTRAVENOUS; SUBCUTANEOUS at 05:07

## 2019-07-21 RX ADMIN — PANTOPRAZOLE SODIUM 40 MG: 40 INJECTION, POWDER, FOR SOLUTION INTRAVENOUS at 08:50

## 2019-07-21 RX ADMIN — HYDROMORPHONE HYDROCHLORIDE 0.5 MG: 1 INJECTION, SOLUTION INTRAMUSCULAR; INTRAVENOUS; SUBCUTANEOUS at 14:11

## 2019-07-21 RX ADMIN — GABAPENTIN 300 MG: 300 CAPSULE ORAL at 20:15

## 2019-07-21 RX ADMIN — HYDROMORPHONE HYDROCHLORIDE 0.5 MG: 1 INJECTION, SOLUTION INTRAMUSCULAR; INTRAVENOUS; SUBCUTANEOUS at 02:21

## 2019-07-21 RX ADMIN — HYDROMORPHONE HYDROCHLORIDE 0.5 MG: 1 INJECTION, SOLUTION INTRAMUSCULAR; INTRAVENOUS; SUBCUTANEOUS at 11:06

## 2019-07-21 RX ADMIN — HYDROMORPHONE HYDROCHLORIDE 0.5 MG: 1 INJECTION, SOLUTION INTRAMUSCULAR; INTRAVENOUS; SUBCUTANEOUS at 08:50

## 2019-07-21 RX ADMIN — ENOXAPARIN SODIUM 40 MG: 40 INJECTION SUBCUTANEOUS at 12:23

## 2019-07-21 RX ADMIN — HYDROMORPHONE HYDROCHLORIDE 0.5 MG: 1 INJECTION, SOLUTION INTRAMUSCULAR; INTRAVENOUS; SUBCUTANEOUS at 16:18

## 2019-07-21 RX ADMIN — LEVOTHYROXINE SODIUM 300 MCG: 150 TABLET ORAL at 05:07

## 2019-07-21 RX ADMIN — INSULIN LISPRO 7 UNITS: 100 INJECTION, SOLUTION INTRAVENOUS; SUBCUTANEOUS at 00:50

## 2019-07-21 RX ADMIN — GABAPENTIN 300 MG: 300 CAPSULE ORAL at 16:18

## 2019-07-21 RX ADMIN — METFORMIN HYDROCHLORIDE 500 MG: 500 TABLET ORAL at 06:18

## 2019-07-21 RX ADMIN — GABAPENTIN 300 MG: 300 CAPSULE ORAL at 08:50

## 2019-07-21 RX ADMIN — INSULIN LISPRO 5 UNITS: 100 INJECTION, SOLUTION INTRAVENOUS; SUBCUTANEOUS at 18:22

## 2019-07-21 RX ADMIN — METFORMIN HYDROCHLORIDE 500 MG: 500 TABLET ORAL at 12:23

## 2019-07-21 RX ADMIN — OXYCODONE HYDROCHLORIDE AND ACETAMINOPHEN 1 TABLET: 10; 325 TABLET ORAL at 10:04

## 2019-07-21 RX ADMIN — OXYCODONE HYDROCHLORIDE AND ACETAMINOPHEN 1 TABLET: 10; 325 TABLET ORAL at 06:18

## 2019-07-21 RX ADMIN — OXYCODONE HYDROCHLORIDE AND ACETAMINOPHEN 1 TABLET: 10; 325 TABLET ORAL at 18:22

## 2019-07-22 LAB
GLUCOSE BLDC GLUCOMTR-MCNC: 140 MG/DL (ref 70–130)
GLUCOSE BLDC GLUCOMTR-MCNC: 162 MG/DL (ref 70–130)
GLUCOSE BLDC GLUCOMTR-MCNC: 164 MG/DL (ref 70–130)
GLUCOSE BLDC GLUCOMTR-MCNC: 175 MG/DL (ref 70–130)
GLUCOSE BLDC GLUCOMTR-MCNC: 179 MG/DL (ref 70–130)
GLUCOSE BLDC GLUCOMTR-MCNC: 189 MG/DL (ref 70–130)
POTASSIUM BLD-SCNC: 4.1 MMOL/L (ref 3.5–5.2)

## 2019-07-22 PROCEDURE — 25010000002 ENOXAPARIN PER 10 MG: Performed by: SURGERY

## 2019-07-22 PROCEDURE — 25010000002 HYDROMORPHONE PER 4 MG: Performed by: SURGERY

## 2019-07-22 PROCEDURE — 99024 POSTOP FOLLOW-UP VISIT: CPT | Performed by: SURGERY

## 2019-07-22 PROCEDURE — 25010000002 PIPERACILLIN SOD-TAZOBACTAM PER 1 G: Performed by: SURGERY

## 2019-07-22 PROCEDURE — 63710000001 INSULIN LISPRO (HUMAN) PER 5 UNITS: Performed by: INTERNAL MEDICINE

## 2019-07-22 PROCEDURE — 84132 ASSAY OF SERUM POTASSIUM: CPT | Performed by: SURGERY

## 2019-07-22 PROCEDURE — 94799 UNLISTED PULMONARY SVC/PX: CPT

## 2019-07-22 PROCEDURE — 82962 GLUCOSE BLOOD TEST: CPT

## 2019-07-22 RX ADMIN — HYDROMORPHONE HYDROCHLORIDE 0.5 MG: 1 INJECTION, SOLUTION INTRAMUSCULAR; INTRAVENOUS; SUBCUTANEOUS at 18:15

## 2019-07-22 RX ADMIN — HYDROMORPHONE HYDROCHLORIDE 0.5 MG: 1 INJECTION, SOLUTION INTRAMUSCULAR; INTRAVENOUS; SUBCUTANEOUS at 10:00

## 2019-07-22 RX ADMIN — ENOXAPARIN SODIUM 40 MG: 40 INJECTION SUBCUTANEOUS at 10:27

## 2019-07-22 RX ADMIN — INSULIN LISPRO 4 UNITS: 100 INJECTION, SOLUTION INTRAVENOUS; SUBCUTANEOUS at 12:19

## 2019-07-22 RX ADMIN — LIDOCAINE 1 PATCH: 50 PATCH CUTANEOUS at 15:12

## 2019-07-22 RX ADMIN — TAZOBACTAM SODIUM AND PIPERACILLIN SODIUM 3.38 G: 375; 3 INJECTION, SOLUTION INTRAVENOUS at 03:46

## 2019-07-22 RX ADMIN — HYDROCODONE BITARTRATE AND ACETAMINOPHEN 1 TABLET: 7.5; 325 TABLET ORAL at 15:59

## 2019-07-22 RX ADMIN — GABAPENTIN 300 MG: 300 CAPSULE ORAL at 15:12

## 2019-07-22 RX ADMIN — HYDROMORPHONE HYDROCHLORIDE 0.5 MG: 1 INJECTION, SOLUTION INTRAMUSCULAR; INTRAVENOUS; SUBCUTANEOUS at 23:46

## 2019-07-22 RX ADMIN — METFORMIN HYDROCHLORIDE 500 MG: 500 TABLET ORAL at 18:13

## 2019-07-22 RX ADMIN — OXYCODONE HYDROCHLORIDE AND ACETAMINOPHEN 1 TABLET: 10; 325 TABLET ORAL at 21:40

## 2019-07-22 RX ADMIN — HYDROCODONE BITARTRATE AND ACETAMINOPHEN 1 TABLET: 7.5; 325 TABLET ORAL at 12:18

## 2019-07-22 RX ADMIN — HYDROMORPHONE HYDROCHLORIDE 0.5 MG: 1 INJECTION, SOLUTION INTRAMUSCULAR; INTRAVENOUS; SUBCUTANEOUS at 05:06

## 2019-07-22 RX ADMIN — INSULIN LISPRO 4 UNITS: 100 INJECTION, SOLUTION INTRAVENOUS; SUBCUTANEOUS at 00:46

## 2019-07-22 RX ADMIN — OXYCODONE HYDROCHLORIDE AND ACETAMINOPHEN 1 TABLET: 10; 325 TABLET ORAL at 02:30

## 2019-07-22 RX ADMIN — METFORMIN HYDROCHLORIDE 500 MG: 500 TABLET ORAL at 06:50

## 2019-07-22 RX ADMIN — TAZOBACTAM SODIUM AND PIPERACILLIN SODIUM 3.38 G: 375; 3 INJECTION, SOLUTION INTRAVENOUS at 12:19

## 2019-07-22 RX ADMIN — OXYCODONE HYDROCHLORIDE AND ACETAMINOPHEN 1 TABLET: 10; 325 TABLET ORAL at 06:54

## 2019-07-22 RX ADMIN — GABAPENTIN 300 MG: 300 CAPSULE ORAL at 08:16

## 2019-07-22 RX ADMIN — TAZOBACTAM SODIUM AND PIPERACILLIN SODIUM 3.38 G: 375; 3 INJECTION, SOLUTION INTRAVENOUS at 20:00

## 2019-07-22 RX ADMIN — LEVOTHYROXINE SODIUM 300 MCG: 150 TABLET ORAL at 06:50

## 2019-07-22 RX ADMIN — METFORMIN HYDROCHLORIDE 500 MG: 500 TABLET ORAL at 10:27

## 2019-07-22 RX ADMIN — INSULIN LISPRO 3 UNITS: 100 INJECTION, SOLUTION INTRAVENOUS; SUBCUTANEOUS at 06:11

## 2019-07-22 RX ADMIN — GABAPENTIN 300 MG: 300 CAPSULE ORAL at 21:40

## 2019-07-22 RX ADMIN — PANTOPRAZOLE SODIUM 40 MG: 40 INJECTION, POWDER, FOR SOLUTION INTRAVENOUS at 10:27

## 2019-07-22 RX ADMIN — MONTELUKAST SODIUM 10 MG: 10 TABLET, FILM COATED ORAL at 08:16

## 2019-07-22 RX ADMIN — LINAGLIPTIN 5 MG: 5 TABLET, FILM COATED ORAL at 08:16

## 2019-07-23 LAB
ANION GAP SERPL CALCULATED.3IONS-SCNC: 12.3 MMOL/L (ref 5–15)
BASOPHILS # BLD AUTO: 0.04 10*3/MM3 (ref 0–0.2)
BASOPHILS NFR BLD AUTO: 0.4 % (ref 0–1.5)
BUN BLD-MCNC: 8 MG/DL (ref 6–20)
BUN/CREAT SERPL: 18.2 (ref 7–25)
CALCIUM SPEC-SCNC: 8.9 MG/DL (ref 8.6–10.5)
CHLORIDE SERPL-SCNC: 96 MMOL/L (ref 98–107)
CO2 SERPL-SCNC: 24.7 MMOL/L (ref 22–29)
CREAT BLD-MCNC: 0.44 MG/DL (ref 0.57–1)
DEPRECATED RDW RBC AUTO: 48.2 FL (ref 37–54)
EOSINOPHIL # BLD AUTO: 0.21 10*3/MM3 (ref 0–0.4)
EOSINOPHIL NFR BLD AUTO: 1.9 % (ref 0.3–6.2)
ERYTHROCYTE [DISTWIDTH] IN BLOOD BY AUTOMATED COUNT: 13.9 % (ref 12.3–15.4)
GFR SERPL CREATININE-BSD FRML MDRD: >150 ML/MIN/1.73
GLUCOSE BLD-MCNC: 195 MG/DL (ref 65–99)
GLUCOSE BLDC GLUCOMTR-MCNC: 150 MG/DL (ref 70–130)
GLUCOSE BLDC GLUCOMTR-MCNC: 211 MG/DL (ref 70–130)
GLUCOSE BLDC GLUCOMTR-MCNC: 92 MG/DL (ref 70–130)
HCT VFR BLD AUTO: 36.3 % (ref 34–46.6)
HGB BLD-MCNC: 11.2 G/DL (ref 12–15.9)
IMM GRANULOCYTES # BLD AUTO: 0.32 10*3/MM3 (ref 0–0.05)
IMM GRANULOCYTES NFR BLD AUTO: 2.9 % (ref 0–0.5)
LYMPHOCYTES # BLD AUTO: 2.34 10*3/MM3 (ref 0.7–3.1)
LYMPHOCYTES NFR BLD AUTO: 21.4 % (ref 19.6–45.3)
MCH RBC QN AUTO: 29.1 PG (ref 26.6–33)
MCHC RBC AUTO-ENTMCNC: 30.9 G/DL (ref 31.5–35.7)
MCV RBC AUTO: 94.3 FL (ref 79–97)
MONOCYTES # BLD AUTO: 0.99 10*3/MM3 (ref 0.1–0.9)
MONOCYTES NFR BLD AUTO: 9 % (ref 5–12)
NEUTROPHILS # BLD AUTO: 7.05 10*3/MM3 (ref 1.7–7)
NEUTROPHILS NFR BLD AUTO: 64.4 % (ref 42.7–76)
NRBC BLD AUTO-RTO: 0 /100 WBC (ref 0–0.2)
PLATELET # BLD AUTO: 700 10*3/MM3 (ref 140–450)
PMV BLD AUTO: 9.6 FL (ref 6–12)
POTASSIUM BLD-SCNC: 3.8 MMOL/L (ref 3.5–5.2)
RBC # BLD AUTO: 3.85 10*6/MM3 (ref 3.77–5.28)
SODIUM BLD-SCNC: 133 MMOL/L (ref 136–145)
WBC NRBC COR # BLD: 10.95 10*3/MM3 (ref 3.4–10.8)

## 2019-07-23 PROCEDURE — 63710000001 INSULIN LISPRO (HUMAN) PER 5 UNITS: Performed by: INTERNAL MEDICINE

## 2019-07-23 PROCEDURE — 25010000002 HYDROMORPHONE PER 4 MG: Performed by: SURGERY

## 2019-07-23 PROCEDURE — 80048 BASIC METABOLIC PNL TOTAL CA: CPT | Performed by: SURGERY

## 2019-07-23 PROCEDURE — 82962 GLUCOSE BLOOD TEST: CPT

## 2019-07-23 PROCEDURE — 97110 THERAPEUTIC EXERCISES: CPT

## 2019-07-23 PROCEDURE — 25010000002 ENOXAPARIN PER 10 MG: Performed by: SURGERY

## 2019-07-23 PROCEDURE — 25010000002 PIPERACILLIN SOD-TAZOBACTAM PER 1 G: Performed by: SURGERY

## 2019-07-23 PROCEDURE — 99024 POSTOP FOLLOW-UP VISIT: CPT | Performed by: SURGERY

## 2019-07-23 PROCEDURE — 85025 COMPLETE CBC W/AUTO DIFF WBC: CPT | Performed by: SURGERY

## 2019-07-23 RX ADMIN — INSULIN LISPRO 2 UNITS: 100 INJECTION, SOLUTION INTRAVENOUS; SUBCUTANEOUS at 06:35

## 2019-07-23 RX ADMIN — TAZOBACTAM SODIUM AND PIPERACILLIN SODIUM 3.38 G: 375; 3 INJECTION, SOLUTION INTRAVENOUS at 12:17

## 2019-07-23 RX ADMIN — HYDROMORPHONE HYDROCHLORIDE 0.5 MG: 1 INJECTION, SOLUTION INTRAMUSCULAR; INTRAVENOUS; SUBCUTANEOUS at 03:14

## 2019-07-23 RX ADMIN — ENOXAPARIN SODIUM 40 MG: 40 INJECTION SUBCUTANEOUS at 12:18

## 2019-07-23 RX ADMIN — GABAPENTIN 300 MG: 300 CAPSULE ORAL at 20:50

## 2019-07-23 RX ADMIN — OXYCODONE HYDROCHLORIDE AND ACETAMINOPHEN 1 TABLET: 10; 325 TABLET ORAL at 23:27

## 2019-07-23 RX ADMIN — INSULIN LISPRO 9 UNITS: 100 INJECTION, SOLUTION INTRAVENOUS; SUBCUTANEOUS at 12:18

## 2019-07-23 RX ADMIN — GABAPENTIN 300 MG: 300 CAPSULE ORAL at 08:57

## 2019-07-23 RX ADMIN — PANTOPRAZOLE SODIUM 40 MG: 40 INJECTION, POWDER, FOR SOLUTION INTRAVENOUS at 08:57

## 2019-07-23 RX ADMIN — TAZOBACTAM SODIUM AND PIPERACILLIN SODIUM 3.38 G: 375; 3 INJECTION, SOLUTION INTRAVENOUS at 20:50

## 2019-07-23 RX ADMIN — OXYCODONE HYDROCHLORIDE AND ACETAMINOPHEN 1 TABLET: 10; 325 TABLET ORAL at 17:55

## 2019-07-23 RX ADMIN — OXYCODONE HYDROCHLORIDE AND ACETAMINOPHEN 1 TABLET: 10; 325 TABLET ORAL at 08:57

## 2019-07-23 RX ADMIN — INSULIN LISPRO 3 UNITS: 100 INJECTION, SOLUTION INTRAVENOUS; SUBCUTANEOUS at 00:01

## 2019-07-23 RX ADMIN — LIDOCAINE 1 PATCH: 50 PATCH CUTANEOUS at 20:50

## 2019-07-23 RX ADMIN — TAZOBACTAM SODIUM AND PIPERACILLIN SODIUM 3.38 G: 375; 3 INJECTION, SOLUTION INTRAVENOUS at 03:15

## 2019-07-23 RX ADMIN — METFORMIN HYDROCHLORIDE 500 MG: 500 TABLET ORAL at 06:35

## 2019-07-23 RX ADMIN — GABAPENTIN 300 MG: 300 CAPSULE ORAL at 16:54

## 2019-07-23 RX ADMIN — MONTELUKAST SODIUM 10 MG: 10 TABLET, FILM COATED ORAL at 08:57

## 2019-07-23 RX ADMIN — OXYCODONE HYDROCHLORIDE AND ACETAMINOPHEN 1 TABLET: 10; 325 TABLET ORAL at 14:07

## 2019-07-23 RX ADMIN — HYDROCODONE BITARTRATE AND ACETAMINOPHEN 1 TABLET: 7.5; 325 TABLET ORAL at 20:57

## 2019-07-23 RX ADMIN — METFORMIN HYDROCHLORIDE 500 MG: 500 TABLET ORAL at 16:54

## 2019-07-23 RX ADMIN — METFORMIN HYDROCHLORIDE 500 MG: 500 TABLET ORAL at 12:18

## 2019-07-23 RX ADMIN — LINAGLIPTIN 5 MG: 5 TABLET, FILM COATED ORAL at 08:57

## 2019-07-23 RX ADMIN — LEVOTHYROXINE SODIUM 300 MCG: 150 TABLET ORAL at 05:48

## 2019-07-23 RX ADMIN — HYDROMORPHONE HYDROCHLORIDE 0.5 MG: 1 INJECTION, SOLUTION INTRAMUSCULAR; INTRAVENOUS; SUBCUTANEOUS at 05:48

## 2019-07-24 LAB
GLUCOSE BLDC GLUCOMTR-MCNC: 139 MG/DL (ref 70–130)
GLUCOSE BLDC GLUCOMTR-MCNC: 159 MG/DL (ref 70–130)
GLUCOSE BLDC GLUCOMTR-MCNC: 168 MG/DL (ref 70–130)
GLUCOSE BLDC GLUCOMTR-MCNC: 191 MG/DL (ref 70–130)
POTASSIUM BLD-SCNC: 3.8 MMOL/L (ref 3.5–5.2)

## 2019-07-24 PROCEDURE — 84132 ASSAY OF SERUM POTASSIUM: CPT | Performed by: SURGERY

## 2019-07-24 PROCEDURE — 99024 POSTOP FOLLOW-UP VISIT: CPT | Performed by: SURGERY

## 2019-07-24 PROCEDURE — 25010000002 ENOXAPARIN PER 10 MG: Performed by: SURGERY

## 2019-07-24 PROCEDURE — 25010000002 PIPERACILLIN SOD-TAZOBACTAM PER 1 G: Performed by: SURGERY

## 2019-07-24 PROCEDURE — 25010000002 HYDROMORPHONE PER 4 MG: Performed by: SURGERY

## 2019-07-24 PROCEDURE — 82962 GLUCOSE BLOOD TEST: CPT

## 2019-07-24 PROCEDURE — 63710000001 INSULIN LISPRO (HUMAN) PER 5 UNITS: Performed by: INTERNAL MEDICINE

## 2019-07-24 RX ADMIN — GABAPENTIN 300 MG: 300 CAPSULE ORAL at 17:03

## 2019-07-24 RX ADMIN — OXYCODONE HYDROCHLORIDE AND ACETAMINOPHEN 1 TABLET: 10; 325 TABLET ORAL at 21:13

## 2019-07-24 RX ADMIN — INSULIN LISPRO 3 UNITS: 100 INJECTION, SOLUTION INTRAVENOUS; SUBCUTANEOUS at 12:53

## 2019-07-24 RX ADMIN — HYDROCODONE BITARTRATE AND ACETAMINOPHEN 1 TABLET: 7.5; 325 TABLET ORAL at 18:49

## 2019-07-24 RX ADMIN — TAZOBACTAM SODIUM AND PIPERACILLIN SODIUM 3.38 G: 375; 3 INJECTION, SOLUTION INTRAVENOUS at 21:06

## 2019-07-24 RX ADMIN — TAZOBACTAM SODIUM AND PIPERACILLIN SODIUM 3.38 G: 375; 3 INJECTION, SOLUTION INTRAVENOUS at 12:56

## 2019-07-24 RX ADMIN — SODIUM CHLORIDE, PRESERVATIVE FREE 10 ML: 5 INJECTION INTRAVENOUS at 08:54

## 2019-07-24 RX ADMIN — PANTOPRAZOLE SODIUM 40 MG: 40 INJECTION, POWDER, FOR SOLUTION INTRAVENOUS at 08:53

## 2019-07-24 RX ADMIN — OXYCODONE HYDROCHLORIDE AND ACETAMINOPHEN 1 TABLET: 10; 325 TABLET ORAL at 17:03

## 2019-07-24 RX ADMIN — GABAPENTIN 300 MG: 300 CAPSULE ORAL at 21:07

## 2019-07-24 RX ADMIN — METFORMIN HYDROCHLORIDE 500 MG: 500 TABLET ORAL at 17:03

## 2019-07-24 RX ADMIN — METFORMIN HYDROCHLORIDE 500 MG: 500 TABLET ORAL at 06:23

## 2019-07-24 RX ADMIN — GABAPENTIN 300 MG: 300 CAPSULE ORAL at 08:53

## 2019-07-24 RX ADMIN — ENOXAPARIN SODIUM 40 MG: 40 INJECTION SUBCUTANEOUS at 12:53

## 2019-07-24 RX ADMIN — INSULIN LISPRO 2 UNITS: 100 INJECTION, SOLUTION INTRAVENOUS; SUBCUTANEOUS at 18:43

## 2019-07-24 RX ADMIN — OXYCODONE HYDROCHLORIDE AND ACETAMINOPHEN 1 TABLET: 10; 325 TABLET ORAL at 04:50

## 2019-07-24 RX ADMIN — LINAGLIPTIN 5 MG: 5 TABLET, FILM COATED ORAL at 08:53

## 2019-07-24 RX ADMIN — MONTELUKAST SODIUM 10 MG: 10 TABLET, FILM COATED ORAL at 08:53

## 2019-07-24 RX ADMIN — OXYCODONE HYDROCHLORIDE AND ACETAMINOPHEN 1 TABLET: 10; 325 TABLET ORAL at 08:53

## 2019-07-24 RX ADMIN — LEVOTHYROXINE SODIUM 300 MCG: 150 TABLET ORAL at 06:23

## 2019-07-24 RX ADMIN — TAZOBACTAM SODIUM AND PIPERACILLIN SODIUM 3.38 G: 375; 3 INJECTION, SOLUTION INTRAVENOUS at 03:44

## 2019-07-24 RX ADMIN — METFORMIN HYDROCHLORIDE 500 MG: 500 TABLET ORAL at 12:52

## 2019-07-24 RX ADMIN — INSULIN LISPRO 6 UNITS: 100 INJECTION, SOLUTION INTRAVENOUS; SUBCUTANEOUS at 00:29

## 2019-07-24 RX ADMIN — HYDROMORPHONE HYDROCHLORIDE 0.5 MG: 1 INJECTION, SOLUTION INTRAMUSCULAR; INTRAVENOUS; SUBCUTANEOUS at 15:01

## 2019-07-24 RX ADMIN — OXYCODONE HYDROCHLORIDE AND ACETAMINOPHEN 1 TABLET: 10; 325 TABLET ORAL at 12:52

## 2019-07-25 LAB
BASOPHILS # BLD AUTO: 0.08 10*3/MM3 (ref 0–0.2)
BASOPHILS NFR BLD AUTO: 0.9 % (ref 0–1.5)
DEPRECATED RDW RBC AUTO: 46.3 FL (ref 37–54)
EOSINOPHIL # BLD AUTO: 0.16 10*3/MM3 (ref 0–0.4)
EOSINOPHIL NFR BLD AUTO: 1.7 % (ref 0.3–6.2)
ERYTHROCYTE [DISTWIDTH] IN BLOOD BY AUTOMATED COUNT: 13.6 % (ref 12.3–15.4)
GLUCOSE BLDC GLUCOMTR-MCNC: 118 MG/DL (ref 70–130)
GLUCOSE BLDC GLUCOMTR-MCNC: 135 MG/DL (ref 70–130)
GLUCOSE BLDC GLUCOMTR-MCNC: 140 MG/DL (ref 70–130)
GLUCOSE BLDC GLUCOMTR-MCNC: 180 MG/DL (ref 70–130)
GLUCOSE BLDC GLUCOMTR-MCNC: 330 MG/DL (ref 70–130)
HCT VFR BLD AUTO: 35.9 % (ref 34–46.6)
HGB BLD-MCNC: 11.3 G/DL (ref 12–15.9)
IMM GRANULOCYTES # BLD AUTO: 0.18 10*3/MM3 (ref 0–0.05)
IMM GRANULOCYTES NFR BLD AUTO: 1.9 % (ref 0–0.5)
LYMPHOCYTES # BLD AUTO: 2.12 10*3/MM3 (ref 0.7–3.1)
LYMPHOCYTES NFR BLD AUTO: 22.7 % (ref 19.6–45.3)
MCH RBC QN AUTO: 29.2 PG (ref 26.6–33)
MCHC RBC AUTO-ENTMCNC: 31.5 G/DL (ref 31.5–35.7)
MCV RBC AUTO: 92.8 FL (ref 79–97)
MONOCYTES # BLD AUTO: 0.91 10*3/MM3 (ref 0.1–0.9)
MONOCYTES NFR BLD AUTO: 9.7 % (ref 5–12)
NEUTROPHILS # BLD AUTO: 5.89 10*3/MM3 (ref 1.7–7)
NEUTROPHILS NFR BLD AUTO: 63.1 % (ref 42.7–76)
NRBC BLD AUTO-RTO: 0 /100 WBC (ref 0–0.2)
PLATELET # BLD AUTO: 796 10*3/MM3 (ref 140–450)
PMV BLD AUTO: 9.4 FL (ref 6–12)
POTASSIUM BLD-SCNC: 3.6 MMOL/L (ref 3.5–5.2)
RBC # BLD AUTO: 3.87 10*6/MM3 (ref 3.77–5.28)
WBC NRBC COR # BLD: 9.34 10*3/MM3 (ref 3.4–10.8)

## 2019-07-25 PROCEDURE — 82962 GLUCOSE BLOOD TEST: CPT

## 2019-07-25 PROCEDURE — 63710000001 INSULIN LISPRO (HUMAN) PER 5 UNITS: Performed by: INTERNAL MEDICINE

## 2019-07-25 PROCEDURE — 25010000002 ENOXAPARIN PER 10 MG: Performed by: SURGERY

## 2019-07-25 PROCEDURE — 99024 POSTOP FOLLOW-UP VISIT: CPT | Performed by: SURGERY

## 2019-07-25 PROCEDURE — 25010000002 PIPERACILLIN SOD-TAZOBACTAM PER 1 G: Performed by: SURGERY

## 2019-07-25 PROCEDURE — 84132 ASSAY OF SERUM POTASSIUM: CPT | Performed by: SURGERY

## 2019-07-25 PROCEDURE — 85025 COMPLETE CBC W/AUTO DIFF WBC: CPT | Performed by: SURGERY

## 2019-07-25 RX ADMIN — LIDOCAINE 1 PATCH: 50 PATCH CUTANEOUS at 21:00

## 2019-07-25 RX ADMIN — MONTELUKAST SODIUM 10 MG: 10 TABLET, FILM COATED ORAL at 08:02

## 2019-07-25 RX ADMIN — METFORMIN HYDROCHLORIDE 500 MG: 500 TABLET ORAL at 17:51

## 2019-07-25 RX ADMIN — TAZOBACTAM SODIUM AND PIPERACILLIN SODIUM 3.38 G: 375; 3 INJECTION, SOLUTION INTRAVENOUS at 21:00

## 2019-07-25 RX ADMIN — GABAPENTIN 300 MG: 300 CAPSULE ORAL at 21:01

## 2019-07-25 RX ADMIN — GABAPENTIN 300 MG: 300 CAPSULE ORAL at 17:51

## 2019-07-25 RX ADMIN — OXYCODONE HYDROCHLORIDE AND ACETAMINOPHEN 1 TABLET: 10; 325 TABLET ORAL at 12:17

## 2019-07-25 RX ADMIN — METFORMIN HYDROCHLORIDE 500 MG: 500 TABLET ORAL at 12:17

## 2019-07-25 RX ADMIN — OXYCODONE HYDROCHLORIDE AND ACETAMINOPHEN 1 TABLET: 10; 325 TABLET ORAL at 17:54

## 2019-07-25 RX ADMIN — LEVOTHYROXINE SODIUM 300 MCG: 150 TABLET ORAL at 06:39

## 2019-07-25 RX ADMIN — INSULIN LISPRO 5 UNITS: 100 INJECTION, SOLUTION INTRAVENOUS; SUBCUTANEOUS at 00:37

## 2019-07-25 RX ADMIN — INSULIN LISPRO 20 UNITS: 100 INJECTION, SOLUTION INTRAVENOUS; SUBCUTANEOUS at 06:38

## 2019-07-25 RX ADMIN — GABAPENTIN 300 MG: 300 CAPSULE ORAL at 08:02

## 2019-07-25 RX ADMIN — OXYCODONE HYDROCHLORIDE AND ACETAMINOPHEN 1 TABLET: 10; 325 TABLET ORAL at 04:29

## 2019-07-25 RX ADMIN — ENOXAPARIN SODIUM 40 MG: 40 INJECTION SUBCUTANEOUS at 10:53

## 2019-07-25 RX ADMIN — LINAGLIPTIN 5 MG: 5 TABLET, FILM COATED ORAL at 08:02

## 2019-07-25 RX ADMIN — TAZOBACTAM SODIUM AND PIPERACILLIN SODIUM 3.38 G: 375; 3 INJECTION, SOLUTION INTRAVENOUS at 12:17

## 2019-07-25 RX ADMIN — TAZOBACTAM SODIUM AND PIPERACILLIN SODIUM 3.38 G: 375; 3 INJECTION, SOLUTION INTRAVENOUS at 04:08

## 2019-07-25 RX ADMIN — PANTOPRAZOLE SODIUM 40 MG: 40 INJECTION, POWDER, FOR SOLUTION INTRAVENOUS at 10:53

## 2019-07-25 RX ADMIN — METFORMIN HYDROCHLORIDE 500 MG: 500 TABLET ORAL at 06:39

## 2019-07-26 VITALS
HEIGHT: 66 IN | WEIGHT: 163 LBS | DIASTOLIC BLOOD PRESSURE: 88 MMHG | SYSTOLIC BLOOD PRESSURE: 127 MMHG | OXYGEN SATURATION: 96 % | TEMPERATURE: 98.9 F | RESPIRATION RATE: 16 BRPM | BODY MASS INDEX: 26.2 KG/M2 | HEART RATE: 86 BPM

## 2019-07-26 LAB
GLUCOSE BLDC GLUCOMTR-MCNC: 122 MG/DL (ref 70–130)
GLUCOSE BLDC GLUCOMTR-MCNC: 162 MG/DL (ref 70–130)
GLUCOSE BLDC GLUCOMTR-MCNC: 163 MG/DL (ref 70–130)
POTASSIUM BLD-SCNC: 3.9 MMOL/L (ref 3.5–5.2)

## 2019-07-26 PROCEDURE — 25010000002 PIPERACILLIN SOD-TAZOBACTAM PER 1 G: Performed by: SURGERY

## 2019-07-26 PROCEDURE — 63710000001 INSULIN LISPRO (HUMAN) PER 5 UNITS: Performed by: INTERNAL MEDICINE

## 2019-07-26 PROCEDURE — 97110 THERAPEUTIC EXERCISES: CPT

## 2019-07-26 PROCEDURE — 84132 ASSAY OF SERUM POTASSIUM: CPT | Performed by: SURGERY

## 2019-07-26 PROCEDURE — 82962 GLUCOSE BLOOD TEST: CPT

## 2019-07-26 PROCEDURE — 25010000002 ENOXAPARIN PER 10 MG: Performed by: SURGERY

## 2019-07-26 RX ORDER — OXYCODONE AND ACETAMINOPHEN 10; 325 MG/1; MG/1
1 TABLET ORAL EVERY 4 HOURS PRN
Qty: 35 TABLET | Refills: 0 | Status: SHIPPED | OUTPATIENT
Start: 2019-07-26 | End: 2019-07-30

## 2019-07-26 RX ADMIN — GABAPENTIN 300 MG: 300 CAPSULE ORAL at 18:06

## 2019-07-26 RX ADMIN — PANTOPRAZOLE SODIUM 40 MG: 40 INJECTION, POWDER, FOR SOLUTION INTRAVENOUS at 08:46

## 2019-07-26 RX ADMIN — METFORMIN HYDROCHLORIDE 500 MG: 500 TABLET ORAL at 08:36

## 2019-07-26 RX ADMIN — TAZOBACTAM SODIUM AND PIPERACILLIN SODIUM 3.38 G: 375; 3 INJECTION, SOLUTION INTRAVENOUS at 12:32

## 2019-07-26 RX ADMIN — ENOXAPARIN SODIUM 40 MG: 40 INJECTION SUBCUTANEOUS at 10:27

## 2019-07-26 RX ADMIN — INSULIN LISPRO 3 UNITS: 100 INJECTION, SOLUTION INTRAVENOUS; SUBCUTANEOUS at 12:32

## 2019-07-26 RX ADMIN — METFORMIN HYDROCHLORIDE 500 MG: 500 TABLET ORAL at 18:06

## 2019-07-26 RX ADMIN — OXYCODONE HYDROCHLORIDE AND ACETAMINOPHEN 1 TABLET: 10; 325 TABLET ORAL at 10:27

## 2019-07-26 RX ADMIN — MONTELUKAST SODIUM 10 MG: 10 TABLET, FILM COATED ORAL at 08:36

## 2019-07-26 RX ADMIN — OXYCODONE HYDROCHLORIDE AND ACETAMINOPHEN 1 TABLET: 10; 325 TABLET ORAL at 14:28

## 2019-07-26 RX ADMIN — TAZOBACTAM SODIUM AND PIPERACILLIN SODIUM 3.38 G: 375; 3 INJECTION, SOLUTION INTRAVENOUS at 04:11

## 2019-07-26 RX ADMIN — OXYCODONE HYDROCHLORIDE AND ACETAMINOPHEN 1 TABLET: 10; 325 TABLET ORAL at 04:15

## 2019-07-26 RX ADMIN — METFORMIN HYDROCHLORIDE 500 MG: 500 TABLET ORAL at 12:32

## 2019-07-26 RX ADMIN — GABAPENTIN 300 MG: 300 CAPSULE ORAL at 08:43

## 2019-07-26 RX ADMIN — LINAGLIPTIN 5 MG: 5 TABLET, FILM COATED ORAL at 08:36

## 2019-07-27 ENCOUNTER — READMISSION MANAGEMENT (OUTPATIENT)
Dept: CALL CENTER | Facility: HOSPITAL | Age: 34
End: 2019-07-27

## 2019-07-27 NOTE — OUTREACH NOTE
Prep Survey      Responses   Facility patient discharged from?  [facility]   Is patient eligible?  Yes   Discharge diagnosis  Large bowel obstruction,    Laparoscopic low anterior resection with diverting loop ileostomy followed by exploratory laparotomy with formation of Archie's pouch and colostomy   Does the patient have one of the following disease processes/diagnoses(primary or secondary)?  General Surgery   Does the patient have Home health ordered?  Yes   What is the Home health agency?   [facility] through [facility]   Is there a DME ordered?  No   Prep survey completed?  Yes          Claudia Naidu RN

## 2019-07-29 ENCOUNTER — READMISSION MANAGEMENT (OUTPATIENT)
Dept: CALL CENTER | Facility: HOSPITAL | Age: 34
End: 2019-07-29

## 2019-07-29 NOTE — OUTREACH NOTE
General Surgery Week 1 Survey      Responses   Facility patient discharged from?  [facility]   Does the patient have one of the following disease processes/diagnoses(primary or secondary)?  General Surgery   Is there a successful TCM telephone encounter documented?  No   Week 1 attempt successful?  Yes   Call start time  1529   Call end time  1538   Discharge diagnosis  Large bowel obstruction,    Laparoscopic low anterior resection with diverting loop ileostomy followed by exploratory laparotomy with formation of Archie's pouch and colostomy   Is patient permission given to speak with other caregiver?  No   Meds reviewed with patient/caregiver?  Yes   Is the patient having any side effects they believe may be caused by any medication additions or changes?  No   Does the patient have all medications related to this admission filled (includes all antibiotics, pain medications, etc.)  Yes   Is the patient taking all medications as directed (includes completed medication regime)?  Yes   Does the patient have a follow up appointment scheduled with their surgeon?  No   What is preventing the patient from scheduling follow up appointments?  Haven't had time   Nursing Interventions  Educated patient on importance of making appointment, Advised patient to make appointment   Has the patient kept scheduled appointments due by today?  N/A   What is the Home health agency?   [facility]    Has home health visited the patient within 72 hours of discharge?  Yes   Has all DME been delivered?  Yes   Psychosocial issues?  Yes   Nursing interventions  Other [Encouraged patient to make f/u dr appcory and to talk to  . ]   Psychosocial comments  Patient states feels overwhelmed. States that she started smoking again today.    Nursing interventions  Reviewed instructions with patient   What is the patient's perception of their health status since discharge?  Improving   Nursing interventions  Nurse provided patient  education   Is the patient /caregiver able to teach back basic post-op care?  Continue use of incentive spirometry at least 1 week post discharge, Practice 'cough and deep breath', No tub bath, swimming, or hot tub until instructed by MD, Keep incision areas clean,dry and protected, Lifting as instructed by MD in discharge instructions   Is the patient/caregiver able to teach back signs and symptoms of incisional infection?  Increased redness, swelling or pain at the incisonal site, Increased drainage or bleeding, Incisional warmth, Pus or odor from incision, Fever   Is the patient/caregiver able to teach back steps to recovery at home?  Set small, achievable goals for return to baseline health, Rest and rebuild strength, gradually increase activity   If the patient is a current smoker, are they able to teach back resources for cessation?  Smoking cessation support groups, [id number]   Is the patient/caregiver able to teach back the hierarchy of who to call/visit for symptoms/problems? PCP, Specialist, Home health nurse, Urgent Care, ED, 911  Yes   Week 1 call completed?  Yes          Luda Echols RN

## 2019-08-04 ENCOUNTER — APPOINTMENT (OUTPATIENT)
Dept: CT IMAGING | Facility: HOSPITAL | Age: 34
End: 2019-08-04

## 2019-08-04 ENCOUNTER — HOSPITAL ENCOUNTER (EMERGENCY)
Facility: HOSPITAL | Age: 34
Discharge: HOME | End: 2019-08-04
Attending: EMERGENCY MEDICINE | Admitting: EMERGENCY MEDICINE

## 2019-08-04 ENCOUNTER — APPOINTMENT (OUTPATIENT)
Dept: GENERAL RADIOLOGY | Facility: HOSPITAL | Age: 34
End: 2019-08-04

## 2019-08-04 VITALS
DIASTOLIC BLOOD PRESSURE: 82 MMHG | RESPIRATION RATE: 16 BRPM | HEIGHT: 66 IN | SYSTOLIC BLOOD PRESSURE: 131 MMHG | TEMPERATURE: 97.4 F | BODY MASS INDEX: 26.2 KG/M2 | HEART RATE: 100 BPM | OXYGEN SATURATION: 98 % | WEIGHT: 163 LBS

## 2019-08-04 DIAGNOSIS — R10.84 GENERALIZED ABDOMINAL PAIN: Primary | ICD-10-CM

## 2019-08-04 DIAGNOSIS — Z93.3: ICD-10-CM

## 2019-08-04 DIAGNOSIS — R07.89 ATYPICAL CHEST PAIN: ICD-10-CM

## 2019-08-04 DIAGNOSIS — Z90.49 S/P COLECTOMY: ICD-10-CM

## 2019-08-04 LAB
ABO GROUP BLD: NORMAL
ALBUMIN SERPL-MCNC: 4.4 G/DL (ref 3.5–5.2)
ALBUMIN/GLOB SERPL: 1.1 G/DL
ALP SERPL-CCNC: 142 U/L (ref 39–117)
ALT SERPL W P-5'-P-CCNC: 25 U/L (ref 1–33)
ANION GAP SERPL CALCULATED.3IONS-SCNC: 15.8 MMOL/L (ref 5–15)
AST SERPL-CCNC: 24 U/L (ref 1–32)
BASOPHILS # BLD AUTO: 0.04 10*3/MM3 (ref 0–0.2)
BASOPHILS NFR BLD AUTO: 0.4 % (ref 0–1.5)
BILIRUB SERPL-MCNC: 0.3 MG/DL (ref 0.2–1.2)
BLD GP AB SCN SERPL QL: NEGATIVE
BUN BLD-MCNC: 10 MG/DL (ref 6–20)
BUN/CREAT SERPL: 18.2 (ref 7–25)
CALCIUM SPEC-SCNC: 10.3 MG/DL (ref 8.6–10.5)
CHLORIDE SERPL-SCNC: 97 MMOL/L (ref 98–107)
CO2 SERPL-SCNC: 24.2 MMOL/L (ref 22–29)
CREAT BLD-MCNC: 0.55 MG/DL (ref 0.57–1)
DEPRECATED RDW RBC AUTO: 44.5 FL (ref 37–54)
EOSINOPHIL # BLD AUTO: 0.23 10*3/MM3 (ref 0–0.4)
EOSINOPHIL NFR BLD AUTO: 2.3 % (ref 0.3–6.2)
ERYTHROCYTE [DISTWIDTH] IN BLOOD BY AUTOMATED COUNT: 13.2 % (ref 12.3–15.4)
GFR SERPL CREATININE-BSD FRML MDRD: 125 ML/MIN/1.73
GLOBULIN UR ELPH-MCNC: 3.9 GM/DL
GLUCOSE BLD-MCNC: 111 MG/DL (ref 65–99)
HCG SERPL QL: NEGATIVE
HCT VFR BLD AUTO: 41 % (ref 34–46.6)
HGB BLD-MCNC: 13.2 G/DL (ref 12–15.9)
IMM GRANULOCYTES # BLD AUTO: 0.04 10*3/MM3 (ref 0–0.05)
IMM GRANULOCYTES NFR BLD AUTO: 0.4 % (ref 0–0.5)
INR PPP: 0.99 (ref 0.9–1.1)
LIPASE SERPL-CCNC: 80 U/L (ref 13–60)
LYMPHOCYTES # BLD AUTO: 3.35 10*3/MM3 (ref 0.7–3.1)
LYMPHOCYTES NFR BLD AUTO: 33.8 % (ref 19.6–45.3)
MCH RBC QN AUTO: 29.5 PG (ref 26.6–33)
MCHC RBC AUTO-ENTMCNC: 32.2 G/DL (ref 31.5–35.7)
MCV RBC AUTO: 91.5 FL (ref 79–97)
MONOCYTES # BLD AUTO: 0.6 10*3/MM3 (ref 0.1–0.9)
MONOCYTES NFR BLD AUTO: 6 % (ref 5–12)
NEUTROPHILS # BLD AUTO: 5.66 10*3/MM3 (ref 1.7–7)
NEUTROPHILS NFR BLD AUTO: 57.1 % (ref 42.7–76)
NRBC BLD AUTO-RTO: 0 /100 WBC (ref 0–0.2)
NT-PROBNP SERPL-MCNC: <5 PG/ML (ref 5–450)
PLATELET # BLD AUTO: 464 10*3/MM3 (ref 140–450)
PMV BLD AUTO: 8.8 FL (ref 6–12)
POTASSIUM BLD-SCNC: 3.8 MMOL/L (ref 3.5–5.2)
PROT SERPL-MCNC: 8.3 G/DL (ref 6–8.5)
PROTHROMBIN TIME: 12.7 SECONDS (ref 11.7–14.2)
RBC # BLD AUTO: 4.48 10*6/MM3 (ref 3.77–5.28)
RH BLD: POSITIVE
SODIUM BLD-SCNC: 137 MMOL/L (ref 136–145)
T&S EXPIRATION DATE: NORMAL
TROPONIN T SERPL-MCNC: <0.01 NG/ML (ref 0–0.03)
WBC NRBC COR # BLD: 9.92 10*3/MM3 (ref 3.4–10.8)

## 2019-08-04 PROCEDURE — 96376 TX/PRO/DX INJ SAME DRUG ADON: CPT

## 2019-08-04 PROCEDURE — 71046 X-RAY EXAM CHEST 2 VIEWS: CPT

## 2019-08-04 PROCEDURE — 84703 CHORIONIC GONADOTROPIN ASSAY: CPT | Performed by: EMERGENCY MEDICINE

## 2019-08-04 PROCEDURE — 94770: CPT

## 2019-08-04 PROCEDURE — 99284 EMERGENCY DEPT VISIT MOD MDM: CPT

## 2019-08-04 PROCEDURE — 86901 BLOOD TYPING SEROLOGIC RH(D): CPT | Performed by: EMERGENCY MEDICINE

## 2019-08-04 PROCEDURE — 0 IOPAMIDOL PER 1 ML: Performed by: EMERGENCY MEDICINE

## 2019-08-04 PROCEDURE — 85025 COMPLETE CBC W/AUTO DIFF WBC: CPT | Performed by: EMERGENCY MEDICINE

## 2019-08-04 PROCEDURE — 71275 CT ANGIOGRAPHY CHEST: CPT

## 2019-08-04 PROCEDURE — 80053 COMPREHEN METABOLIC PANEL: CPT | Performed by: EMERGENCY MEDICINE

## 2019-08-04 PROCEDURE — 93005 ELECTROCARDIOGRAM TRACING: CPT | Performed by: EMERGENCY MEDICINE

## 2019-08-04 PROCEDURE — 86850 RBC ANTIBODY SCREEN: CPT | Performed by: EMERGENCY MEDICINE

## 2019-08-04 PROCEDURE — 83880 ASSAY OF NATRIURETIC PEPTIDE: CPT | Performed by: EMERGENCY MEDICINE

## 2019-08-04 PROCEDURE — 83690 ASSAY OF LIPASE: CPT | Performed by: EMERGENCY MEDICINE

## 2019-08-04 PROCEDURE — 96375 TX/PRO/DX INJ NEW DRUG ADDON: CPT

## 2019-08-04 PROCEDURE — 25010000002 ONDANSETRON PER 1 MG: Performed by: EMERGENCY MEDICINE

## 2019-08-04 PROCEDURE — 86900 BLOOD TYPING SEROLOGIC ABO: CPT | Performed by: EMERGENCY MEDICINE

## 2019-08-04 PROCEDURE — 85610 PROTHROMBIN TIME: CPT | Performed by: EMERGENCY MEDICINE

## 2019-08-04 PROCEDURE — 25010000002 MORPHINE PER 10 MG: Performed by: EMERGENCY MEDICINE

## 2019-08-04 PROCEDURE — 84484 ASSAY OF TROPONIN QUANT: CPT | Performed by: EMERGENCY MEDICINE

## 2019-08-04 PROCEDURE — 74177 CT ABD & PELVIS W/CONTRAST: CPT

## 2019-08-04 PROCEDURE — 93010 ELECTROCARDIOGRAM REPORT: CPT | Performed by: INTERNAL MEDICINE

## 2019-08-04 PROCEDURE — 96374 THER/PROPH/DIAG INJ IV PUSH: CPT

## 2019-08-04 RX ORDER — SODIUM CHLORIDE 0.9 % (FLUSH) 0.9 %
10 SYRINGE (ML) INJECTION AS NEEDED
Status: DISCONTINUED | OUTPATIENT
Start: 2019-08-04 | End: 2019-08-04 | Stop reason: HOSPADM

## 2019-08-04 RX ORDER — MORPHINE SULFATE 2 MG/ML
4 INJECTION, SOLUTION INTRAMUSCULAR; INTRAVENOUS ONCE
Status: COMPLETED | OUTPATIENT
Start: 2019-08-04 | End: 2019-08-04

## 2019-08-04 RX ORDER — ONDANSETRON 2 MG/ML
4 INJECTION INTRAMUSCULAR; INTRAVENOUS ONCE
Status: COMPLETED | OUTPATIENT
Start: 2019-08-04 | End: 2019-08-04

## 2019-08-04 RX ADMIN — ONDANSETRON HYDROCHLORIDE 4 MG: 2 SOLUTION INTRAMUSCULAR; INTRAVENOUS at 16:04

## 2019-08-04 RX ADMIN — MORPHINE SULFATE 4 MG: 2 INJECTION, SOLUTION INTRAMUSCULAR; INTRAVENOUS at 17:22

## 2019-08-04 RX ADMIN — SODIUM CHLORIDE, POTASSIUM CHLORIDE, SODIUM LACTATE AND CALCIUM CHLORIDE 1000 ML: 600; 310; 30; 20 INJECTION, SOLUTION INTRAVENOUS at 16:04

## 2019-08-04 RX ADMIN — IOPAMIDOL 95 ML: 755 INJECTION, SOLUTION INTRAVENOUS at 17:12

## 2019-08-04 RX ADMIN — MORPHINE SULFATE 4 MG: 2 INJECTION, SOLUTION INTRAMUSCULAR; INTRAVENOUS at 16:04

## 2019-08-04 NOTE — ED PROVIDER NOTES
" EMERGENCY DEPARTMENT ENCOUNTER    Room Number:  33/33  Date seen:  8/4/2019  Time seen: 3:24 PM  PCP: Layla Monique APRN  Historian: Patient      HPI:  Chief Complaint: Rectal bleeding    Context: Edie Camacho is a 34 y.o. female who presents to the ED c/o intermittent rectal bleeding that she noticed last night. She describes the color of her stool as a normal brown color, and reddish tones with a \"gel-like\" coating. Pt reports associated LUQ 'stabbing' sharp abdominal pain, and nausea. Pt reports she recently had surgery on 7/10/19 by Dr. Clayton MD for a large bowel obstruction, and she has a colostomy bag placed.     Pain Location: GI  Radiation: none  Quality: reddish  Intensity/Severity: moderate  Duration: 2 days  Onset quality: gradual  Timing: intermittent  Progression: unchanged  Aggravating Factors: none  Alleviating Factors: none  Previous Episodes: no  Treatment before arrival: none  Associated Symptoms: nausea, LUQ pain    PAST MEDICAL HISTORY  Active Ambulatory Problems     Diagnosis Date Noted   • Colitis presumed infectious 05/12/2019   • Type 2 diabetes mellitus (CMS/HCC) 05/12/2019   • Suprapubic pain, acute 05/12/2019   • Hyponatremia 05/12/2019   • Sepsis (CMS/HCC) 05/12/2019   • Metabolic acidosis 05/13/2019   • Chronic constipation 05/13/2019   • Other microscopic hematuria 05/14/2019   • Lower abdominal pain 06/18/2019   • Colitis 06/18/2019   • Large bowel obstruction (CMS/HCC) 06/20/2019   • Stricture of sigmoid colon (CMS/HCC) 07/08/2019   • Large bowel anastomotic leak 07/14/2019     Resolved Ambulatory Problems     Diagnosis Date Noted   • Dehydration 05/12/2019     Past Medical History:   Diagnosis Date   • Abdominal bloating with cramps    • Diabetes mellitus (CMS/HCC)    • Disease of thyroid gland    • Gastritis    • GERD (gastroesophageal reflux disease)    • H/O being hospitalized    • History of kidney stones    • History of staph infection 2009   • Kidney stone    • " Large bowel obstruction (CMS/HCC)    • Neuropathy    • Pregnancy    • Seasonal allergies    • Stricture of colon determined by endoscopy (CMS/HCC)          PAST SURGICAL HISTORY  Past Surgical History:   Procedure Laterality Date   •  SECTION N/A    • COLON RESECTION N/A 7/10/2019    Procedure: LAPAROSCOPIC LOW ANTERIOR  COLON RESECTION WITH DIVERTING LOOP ILEOSTOMY;  Surgeon: Roderick Arnold MD;  Location: [facility];  Service: General   • COLON RESECTION WITH GARRIDO POUCH     • COLONOSCOPY W/ BIOPSIES AND POLYPECTOMY     • DIAGNOSTIC LAPAROSCOPY N/A 2019    Procedure: DIAGNOSTIC LAPAROSCOPY;  Surgeon: Radha Loera MD;  Location: [facility];  Service: General   • EXPLORATORY LAPAROTOMY N/A 2019    Procedure: COLON RESECTION WITH WAYNE'S PROCEDURE WITH FLEXIBLE SIGMOIDSCOPE;  Surgeon: Radha Loera MD;  Location: [facility];  Service: General   • NEPHROSTOMY TRACT DILATATION W/ LITHOTRIPSY           FAMILY HISTORY  Family History   Problem Relation Age of Onset   • Heart disease Father    • Malig Hyperthermia Neg Hx          SOCIAL HISTORY  Social History     Socioeconomic History   • Marital status:      Spouse name: Not on file   • Number of children: Not on file   • Years of education: Not on file   • Highest education level: Not on file   Tobacco Use   • Smoking status: Current Every Day Smoker     Packs/day: 0.50     Years: 21.00     Pack years: 10.50     Types: Cigarettes     Start date:    • Smokeless tobacco: Never Used   Substance and Sexual Activity   • Alcohol use: No   • Drug use: No   • Sexual activity: Defer         ALLERGIES  Bactrim [sulfamethoxazole-trimethoprim]; Ciprofloxacin; Sulfa antibiotics; and Toradol [ketorolac tromethamine]        REVIEW OF SYSTEMS  Review of Systems   Constitutional: Negative for fever.   HENT: Negative for sore throat.    Respiratory: Negative for shortness of breath.    Cardiovascular: Negative for chest pain.    Gastrointestinal: Positive for abdominal pain, anal bleeding and nausea.   Endocrine: Negative for polyuria.   Genitourinary: Negative for dysuria.   Musculoskeletal: Negative for neck pain.   Skin: Negative for rash.   Neurological: Negative for headaches.   All other systems reviewed and are negative.           PHYSICAL EXAM  ED Triage Vitals [08/04/19 1402]   Temp Heart Rate Resp BP SpO2   97.4 °F (36.3 °C) 112 16 117/94 98 %      Temp src Heart Rate Source Patient Position BP Location FiO2 (%)   Tympanic -- -- -- --         GENERAL: not distressed  HENT: nares patent  EYES: no scleral icterus  CV: regular rhythm, regular rate; 2+ radial pulses bilaterally  RESPIRATORY: normal effort; CTAB  ABDOMEN: soft, non-tender; left CVA tenderness; right sided ostomy that is pink; midline wound vac; left-sided endostomy; diffuse abdominal tenderness  RECTAL EXAM: External hemorrhoids, brown stool with mucus discharge - hemoccult positive  MUSCULOSKELETAL: no deformity  NEURO: alert, moves all extremities, follows commands  SKIN: warm, dry    Vital signs and nursing notes reviewed.          LAB RESULTS  Recent Results (from the past 24 hour(s))   Comprehensive Metabolic Panel    Collection Time: 08/04/19  4:04 PM   Result Value Ref Range    Glucose 111 (H) 65 - 99 mg/dL    BUN 10 6 - 20 mg/dL    Creatinine 0.55 (L) 0.57 - 1.00 mg/dL    Sodium 137 136 - 145 mmol/L    Potassium 3.8 3.5 - 5.2 mmol/L    Chloride 97 (L) 98 - 107 mmol/L    CO2 24.2 22.0 - 29.0 mmol/L    Calcium 10.3 8.6 - 10.5 mg/dL    Total Protein 8.3 6.0 - 8.5 g/dL    Albumin 4.40 3.50 - 5.20 g/dL    ALT (SGPT) 25 1 - 33 U/L    AST (SGOT) 24 1 - 32 U/L    Alkaline Phosphatase 142 (H) 39 - 117 U/L    Total Bilirubin 0.3 0.2 - 1.2 mg/dL    eGFR Non African Amer 125 >60 mL/min/1.73    Globulin 3.9 gm/dL    A/G Ratio 1.1 g/dL    BUN/Creatinine Ratio 18.2 7.0 - 25.0    Anion Gap 15.8 (H) 5.0 - 15.0 mmol/L   Protime-INR    Collection Time: 08/04/19  4:04 PM    Result Value Ref Range    Protime 12.7 11.7 - 14.2 Seconds    INR 0.99 0.90 - 1.10   Type & Screen    Collection Time: 08/04/19  4:04 PM   Result Value Ref Range    ABO Type A     RH type Positive     Antibody Screen Negative     T&S Expiration Date 8/7/2019 11:59:59 PM    Lipase    Collection Time: 08/04/19  4:04 PM   Result Value Ref Range    Lipase 80 (H) 13 - 60 U/L   hCG, Serum, Qualitative    Collection Time: 08/04/19  4:04 PM   Result Value Ref Range    HCG Qualitative Negative Negative   BNP    Collection Time: 08/04/19  4:04 PM   Result Value Ref Range    proBNP <5.0 (L) 5.0 - 450.0 pg/mL   Troponin    Collection Time: 08/04/19  4:04 PM   Result Value Ref Range    Troponin T <0.010 0.000 - 0.030 ng/mL   CBC Auto Differential    Collection Time: 08/04/19  4:04 PM   Result Value Ref Range    WBC 9.92 3.40 - 10.80 10*3/mm3    RBC 4.48 3.77 - 5.28 10*6/mm3    Hemoglobin 13.2 12.0 - 15.9 g/dL    Hematocrit 41.0 34.0 - 46.6 %    MCV 91.5 79.0 - 97.0 fL    MCH 29.5 26.6 - 33.0 pg    MCHC 32.2 31.5 - 35.7 g/dL    RDW 13.2 12.3 - 15.4 %    RDW-SD 44.5 37.0 - 54.0 fl    MPV 8.8 6.0 - 12.0 fL    Platelets 464 (H) 140 - 450 10*3/mm3    Neutrophil % 57.1 42.7 - 76.0 %    Lymphocyte % 33.8 19.6 - 45.3 %    Monocyte % 6.0 5.0 - 12.0 %    Eosinophil % 2.3 0.3 - 6.2 %    Basophil % 0.4 0.0 - 1.5 %    Immature Grans % 0.4 0.0 - 0.5 %    Neutrophils, Absolute 5.66 1.70 - 7.00 10*3/mm3    Lymphocytes, Absolute 3.35 (H) 0.70 - 3.10 10*3/mm3    Monocytes, Absolute 0.60 0.10 - 0.90 10*3/mm3    Eosinophils, Absolute 0.23 0.00 - 0.40 10*3/mm3    Basophils, Absolute 0.04 0.00 - 0.20 10*3/mm3    Immature Grans, Absolute 0.04 0.00 - 0.05 10*3/mm3    nRBC 0.0 0.0 - 0.2 /100 WBC       Ordered the above labs and reviewed the results.        RADIOLOGY  CT Angiogram Chest With Contrast   Preliminary Result   1. Numerous tiny subcentimeter nodules in the bilateral lungs. These are   nonspecific and could conceivably be inflammatory.  Possible tiny   metastatic nodules are difficult to entirely exclude. Suggest   correlation to any prior outside CT scans of the chest. If none are   available short-term followup CT of the chest in approximately 2 months   to 3 months recommended.   2. Small right pleural effusion with minimal right basilar atelectasis.   3. Small oval-shaped masslike density in the lateral aspect of the left   lower chest may represent atelectasis or inflammatory change. This is   not seen on the previous study of 06/20/2019. This can also be   reassessed on a followup CT of the chest.   4. Bilateral ostomy sites are seen from previous colon resection. No   abscess is seen.   5. Nonobstructing bilateral renal stones.   6. Findings were discussed with Dr. Melgoza.       Radiation dose reduction techniques were utilized, including automated   exposure control and exposure modulation based on body size.              CT Abdomen Pelvis With Contrast   Preliminary Result   1. Numerous tiny subcentimeter nodules in the bilateral lungs. These are   nonspecific and could conceivably be inflammatory. Possible tiny   metastatic nodules are difficult to entirely exclude. Suggest   correlation to any prior outside CT scans of the chest. If none are   available short-term followup CT of the chest in approximately 2 months   to 3 months recommended.   2. Small right pleural effusion with minimal right basilar atelectasis.   3. Small oval-shaped masslike density in the lateral aspect of the left   lower chest may represent atelectasis or inflammatory change. This is   not seen on the previous study of 06/20/2019. This can also be   reassessed on a followup CT of the chest.   4. Bilateral ostomy sites are seen from previous colon resection. No   abscess is seen.   5. Nonobstructing bilateral renal stones.   6. Findings were discussed with Dr. Melgoza.       Radiation dose reduction techniques were utilized, including automated   exposure control and  exposure modulation based on body size.              XR Chest 2 View   Preliminary Result   1. Possible minimal pleural effusions with blunting of the costophrenic   angles. There appears to be minimal atelectasis or scarring in the left   lung base   2. These findings appear slightly improved from 07/21/2019 study.   3. No pneumothorax or acute infiltrates are seen.                       PROCEDURES  Procedures    EKG:           EKG time: 1654  Rhythm/Rate: NSR, 91  P waves and CA: normal  QRS, axis: normal   ST and T waves: normal     Interpreted Contemporaneously by me, independently viewed  changed compared to prior 7/11/19 the rate is now slower      MEDICATIONS GIVEN IN ER  Medications   sodium chloride 0.9 % flush 10 mL (not administered)   morphine injection 4 mg (4 mg Intravenous Given 8/4/19 1604)   ondansetron (ZOFRAN) injection 4 mg (4 mg Intravenous Given 8/4/19 1604)   lactated ringers bolus 1,000 mL (1,000 mL Intravenous New Bag 8/4/19 1604)   iopamidol (ISOVUE-370) 76 % injection 100 mL (95 mL Intravenous Given by Other 8/4/19 1712)   morphine injection 4 mg (4 mg Intravenous Given 8/4/19 1722)       PROGRESS AND CONSULTS  ED Course as of Aug 04 1848   Sun Aug 04, 2019   1535 Discharge summary from 7/26/19:    The patient is a  34 y.o. female that was admitted to the hospital with a large bowel obstruction.  She underwent laparoscopic low anterior resection with diverting loop ileostomy.  The next day she had evidence of catastrophic colorectal anastomotic failure which prompted return to the operating room for laparotomy, washout and formation of Archie's pouch and colostomy.  Postoperatively the patient's course included about a day or so on a ventilator and some time in the ICU.  After that her hospital course was long but relatively unremarkable.  Antibiotics were discontinued.  Patient's diet was gradually advanced and tolerated.  Ileostomy was functioning well.  Wound VAC was applied and at  the time of discharge she was feeling quite a bit better.  [TD]   1706 Hemoglobin: 13.2 [TD]      ED Course User Index  [TD] Jose Melgoza II, MD     1810 Reviewed the patient's case with Dr. Clayton MD who agrees with the plan for discharge and will f/u with the patient in the office tomorrow.     1847 Rechecked the patient. Informed the patient of consult with Dr. Arnold, and negative labs and CT. Discussed the plan to discharge home with instructions to f/u with Dr. Arnold at her appointment tomorrow. Pt understands and agrees with the plan, all questions answered.      MEDICAL DECISION MAKING      MDM  Number of Diagnoses or Management Options  Atypical chest pain:   Generalized abdominal pain:   Archie's pouch of intestine (CMS/HCC):   S/P colectomy:   Diagnosis management comments: Patient presents with abdominal pain.  I am most concerned about complication following surgery such as fluid leak, perforation, abscess.  Less likely would be UTI, pancreatitis, hepatobiliary pathology.  Lipase is barely above the upper limits of normal.  Her pain is not consistent with pancreatitis nor does she have any CT evidence of such.  Furthermore, patient also notes very atypical chest pain.  It is to the lateral left chest wall.  It is sharp.  CT shows no evidence of pulmonary embolism.  I do not believe it is consistent with ACS or thoracic aortic dissection.  Also low suspicion for esophageal rupture.  Pt will see Dr. Arnold tomorrow.       Amount and/or Complexity of Data Reviewed  Clinical lab tests: ordered and reviewed  Tests in the radiology section of CPT®: ordered and reviewed (CT abdomen: negative)  Tests in the medicine section of CPT®: ordered and reviewed  Discussion of test results with the performing providers: yes (Dr. Anuj MD)  Decide to obtain previous medical records or to obtain history from someone other than the patient: yes  Review and summarize past medical records: yes  Discuss  the patient with other providers: yes (Dr. Clayton MD)  Independent visualization of images, tracings, or specimens: yes    Patient Progress  Patient progress: stable             DIAGNOSIS  Final diagnoses:   Generalized abdominal pain   Atypical chest pain   Archie's pouch of intestine (CMS/HCC)   S/P colectomy         DISPOSITION  DISCHARGE    Patient discharged in stable condition.    Reviewed implications of results, diagnosis, meds, responsibility to follow up, warning signs and symptoms of possible worsening, potential complications and reasons to return to ER, including any new or worsening symptoms.    Patient/Family voiced understanding of above instructions.    Discussed plan for discharge, as there is no emergent indication for admission. Patient referred to primary care provider for BP management due to today's BP. Pt/family is agreeable and understands need for follow up and repeat testing.  Pt is aware that discharge does not mean that nothing is wrong but it indicates no emergency is present that requires admission and they must continue care with follow-up as given below or physician of their choice.     FOLLOW-UP  Layla Monique, APRN  [address] [id number] [phone]    Schedule an appointment as soon as possible for a visit   As needed    Roderick Arnold MD  [address] [id number] [phone]    Go in 1 day          Latest Documented Vital Signs:  As of 6:48 PM  BP- 131/82 HR- 100 Temp- 97.4 °F (36.3 °C) (Tympanic) O2 sat- 98%        --  Documentation assistance provided by martinez Bowen for Dr. Abad MD.  Information recorded by the scribe was done at my direction and has been verified and validated by me.                           Shawanda Bowen  08/04/19 1842       Jose Melgoza II, MD  08/04/19 5875

## 2019-08-05 ENCOUNTER — READMISSION MANAGEMENT (OUTPATIENT)
Dept: CALL CENTER | Facility: HOSPITAL | Age: 34
End: 2019-08-05

## 2019-08-05 ENCOUNTER — OFFICE VISIT (OUTPATIENT)
Dept: SURGERY | Facility: CLINIC | Age: 34
End: 2019-08-05

## 2019-08-05 VITALS — BODY MASS INDEX: 25.5 KG/M2 | WEIGHT: 158 LBS

## 2019-08-05 DIAGNOSIS — K56.699 STRICTURE OF SIGMOID COLON (HCC): Primary | ICD-10-CM

## 2019-08-05 PROCEDURE — 99024 POSTOP FOLLOW-UP VISIT: CPT | Performed by: SURGERY

## 2019-08-05 RX ORDER — OXYCODONE AND ACETAMINOPHEN 10; 325 MG/1; MG/1
1 TABLET ORAL EVERY 6 HOURS PRN
Qty: 40 TABLET | Refills: 0 | Status: SHIPPED | OUTPATIENT
Start: 2019-08-05 | End: 2019-08-19 | Stop reason: SDUPTHER

## 2019-08-05 NOTE — OUTREACH NOTE
General Surgery Week 2 Survey      Responses   Facility patient discharged from?  [place]   Does the patient have one of the following disease processes/diagnoses(primary or secondary)?  General Surgery   Week 2 attempt successful?  No   Unsuccessful attempts  Attempt 1          Sosa Galvan RN

## 2019-08-05 NOTE — PROGRESS NOTES
Follow-up colon Resection    Subjective:  Complains of pain, but does admit she is gradually improving.  Eating reasonably well.  She reports her sugars have been reasonably controlled.  Ileostomy functioning.    Objective:  Weight was 158 pounds today  General: Awake and alert, no distress  Abdomen: Soft, appropriately tender, colostomy viable, nothing in the appliance  Ileostomy is viable with moderate amount of liquid succus  Midline wound is healing, wound VAC is in position    Assessment and plan:  -Large bowel obstruction with sigmoid resection, now status post leak with subsequent colon resection, colostomy and ileostomy  -Continue wound VAC and wound care  -Nutritional supplementation to try to get her in better shape  -Follow-up in 2 weeks  -Tentatively plan for evaluation next year for consideration of colostomy reversal.  Ileostomy reversal will have to be done subsequent to that.    Roderick Arnold MD  General and Endoscopic Surgery  [facility]    [address], [address]  [place], [place], [address]  P: [phone]  F: [phone]

## 2019-08-06 ENCOUNTER — READMISSION MANAGEMENT (OUTPATIENT)
Dept: CALL CENTER | Facility: HOSPITAL | Age: 34
End: 2019-08-06

## 2019-08-06 ENCOUNTER — TELEPHONE (OUTPATIENT)
Dept: SURGERY | Facility: CLINIC | Age: 34
End: 2019-08-06

## 2019-08-06 NOTE — OUTREACH NOTE
General Surgery Week 2 Survey      Responses   Facility patient discharged from?  [facility]   Does the patient have one of the following disease processes/diagnoses(primary or secondary)?  General Surgery   Week 2 attempt successful?  Yes   Call start time  1447   Call end time  1450   Discharge diagnosis  Large bowel obstruction,    Laparoscopic low anterior resection with diverting loop ileostomy followed by exploratory laparotomy with formation of Archie's pouch and colostomy   Is patient permission given to speak with other caregiver?  No   Meds reviewed with patient/caregiver?  Yes   Is the patient having any side effects they believe may be caused by any medication additions or changes?  No   Does the patient have all medications related to this admission filled (includes all antibiotics, pain medications, etc.)  Yes   Is the patient taking all medications as directed (includes completed medication regime)?  Yes   Does the patient have a follow up appointment scheduled with their surgeon?  Yes   Comments  He is wanting her to be on [employer]. She is unable to pay for them. She is trying to get assistance.    Did the patient receive a copy of their discharge instructions?  Yes   Nursing interventions  Reviewed instructions with patient   What is the patient's perception of their health status since discharge?  Improving   Nursing interventions  Nurse provided patient education   Is the patient /caregiver able to teach back basic post-op care?  Continue use of incentive spirometry at least 1 week post discharge, Practice 'cough and deep breath', No tub bath, swimming, or hot tub until instructed by MD, Keep incision areas clean,dry and protected, Lifting as instructed by MD in discharge instructions   Is the patient/caregiver able to teach back signs and symptoms of incisional infection?  Increased redness, swelling or pain at the incisonal site, Increased drainage or bleeding, Incisional warmth, Pus or odor from  incision, Fever   Is the patient/caregiver able to teach back steps to recovery at home?  Set small, achievable goals for return to baseline health, Rest and rebuild strength, gradually increase activity   If the patient is a current smoker, are they able to teach back resources for cessation?  Smoking cessation support groups, 2-411-HwbyUwr   Is the patient/caregiver able to teach back the hierarchy of who to call/visit for symptoms/problems? PCP, Specialist, Home health nurse, Urgent Care, ED, 911  Yes   Week 2 call completed?  Yes          Sosa Galvan RN

## 2019-08-06 NOTE — TELEPHONE ENCOUNTER
She means per rectum? Or via ostomy?  Hard so say how long it would come via rectum, but unlikely for much longer.

## 2019-08-06 NOTE — TELEPHONE ENCOUNTER
Patient called stating that she since discharged home she has had 3 BM's mixed with what she describes as dark brownish old blood. She would like to know how long she can expect this.

## 2019-08-13 ENCOUNTER — READMISSION MANAGEMENT (OUTPATIENT)
Dept: CALL CENTER | Facility: HOSPITAL | Age: 34
End: 2019-08-13

## 2019-08-13 NOTE — OUTREACH NOTE
General Surgery Week 3 Survey      Responses   Facility patient discharged from?  [facility]   Does the patient have one of the following disease processes/diagnoses(primary or secondary)?  General Surgery   Week 3 attempt successful?  Yes   Call start time  1420   Call end time  1426   Discharge diagnosis  Large bowel obstruction,    Laparoscopic low anterior resection with diverting loop ileostomy followed by exploratory laparotomy with formation of Archie's pouch and colostomy   Meds reviewed with patient/caregiver?  Yes   Is the patient taking all medications as directed (includes completed medication regime)?  Yes   Has the patient kept scheduled appointments due by today?  Yes   What is the Home health agency?   [facility]    Psychosocial comments  Patient caring for her colostomy. Doing much better. She says she couldn't even look at it at first.   Comments  Appetite improving. Has protein drinks and bars.   What is the patient's perception of their health status since discharge?  Improving   Week 3 call completed?  Yes          Amna Riggins RN

## 2019-08-19 ENCOUNTER — OFFICE VISIT (OUTPATIENT)
Dept: SURGERY | Facility: CLINIC | Age: 34
End: 2019-08-19

## 2019-08-19 DIAGNOSIS — K56.699 STRICTURE OF SIGMOID COLON (HCC): Primary | ICD-10-CM

## 2019-08-19 PROCEDURE — 99024 POSTOP FOLLOW-UP VISIT: CPT | Performed by: SURGERY

## 2019-08-19 RX ORDER — OXYCODONE AND ACETAMINOPHEN 10; 325 MG/1; MG/1
1 TABLET ORAL EVERY 6 HOURS PRN
Qty: 30 TABLET | Refills: 0 | Status: SHIPPED | OUTPATIENT
Start: 2019-08-19 | End: 2019-10-14

## 2019-08-19 NOTE — PROGRESS NOTES
Follow-up colectomy, colostomy, loop ileostomy    Subjective:  Overall feeling better.  Pain is mostly now just with dressing changes.  Appetite is slightly better.  Strength is improving.    Objective:  Midline wound is healing.  Ileostomy and colostomy look good.    Assessment and plan:  -Large bowel obstruction status post sigmoid resection  -Continue wound VAC  -In the future patient will require colostomy takedown and subsequently will need ileostomy takedown, but for now continued local wound care and improvement of diet is important.  -Follow-up in 4 weeks      Roderick Arnold MD  General and Endoscopic Surgery  [facility]    [address], [address]  [place], [place], [address]  P: [phone]  F: [phone]

## 2019-08-20 ENCOUNTER — READMISSION MANAGEMENT (OUTPATIENT)
Dept: CALL CENTER | Facility: HOSPITAL | Age: 34
End: 2019-08-20

## 2019-08-20 NOTE — OUTREACH NOTE
General Surgery Week 4 Survey      Responses   Facility patient discharged from?  [place]   Does the patient have one of the following disease processes/diagnoses(primary or secondary)?  General Surgery   Week 4 attempt successful?  No          Ny Edwards RN

## 2019-08-28 ENCOUNTER — TELEPHONE (OUTPATIENT)
Dept: SURGERY | Facility: CLINIC | Age: 34
End: 2019-08-28

## 2019-08-28 RX ORDER — OXYCODONE AND ACETAMINOPHEN 7.5; 325 MG/1; MG/1
1 TABLET ORAL EVERY 6 HOURS PRN
Qty: 30 TABLET | Refills: 0 | Status: SHIPPED | OUTPATIENT
Start: 2019-08-28 | End: 2019-10-14

## 2019-08-28 NOTE — TELEPHONE ENCOUNTER
· Laparoscopic low anterior resection with diverting loop ileostomy 7/10/19    · Diagnostic laparoscopy   · Open Saldana's resection 7/10/19    Requests more pain med- has been on Percocet.  Having lot of pain still - next po 9/16/19

## 2019-09-16 ENCOUNTER — OFFICE VISIT (OUTPATIENT)
Dept: SURGERY | Facility: CLINIC | Age: 34
End: 2019-09-16

## 2019-09-16 VITALS — BODY MASS INDEX: 26.36 KG/M2 | HEIGHT: 66 IN | WEIGHT: 164 LBS

## 2019-09-16 DIAGNOSIS — K56.699 STRICTURE OF SIGMOID COLON (HCC): Primary | ICD-10-CM

## 2019-09-16 PROCEDURE — 99024 POSTOP FOLLOW-UP VISIT: CPT | Performed by: SURGERY

## 2019-09-16 RX ORDER — ONDANSETRON 4 MG/1
TABLET, FILM COATED ORAL
Refills: 0 | COMMUNITY
Start: 2019-09-10 | End: 2020-02-12

## 2019-09-16 RX ORDER — METHOCARBAMOL 750 MG/1
TABLET, FILM COATED ORAL
Refills: 1 | Status: ON HOLD | COMMUNITY
Start: 2019-09-10 | End: 2020-05-21

## 2019-09-16 NOTE — PROGRESS NOTES
Follow-up colon resection, colostomy, ileostomy    Subjective:  Feeling considerably better.  Eating well.  Minimal pain at this time.  Wound VAC removed and opening is quite shallow and small.  Anxious to return to work.    Objective:  Abdomen is soft and benign, midline wound is nearly flat at this point and there is only a small opening which certainly is not appropriate for wound VAC at this time.  Stoma on the left is viable with no drainage.  Ileostomy on the right is functioning well.    Assessment and plan:  -Benign stricture of the colon status post colectomy with subsequent leak, colostomy and diverting ileostomy  -Finally seems to be well  -Return to work  -Follow-up in 4 weeks, anticipate wounds will be healed  -Eventually will need colonoscopy and evaluation of rectal stump  -Probably wise to wait longer than the normal 6 months, may be aim for more like 10 to 12 months and consider colostomy reversal sometime next summer and if that is successful then eventual ileostomy closure.    Roderick Arnold MD  General and Endoscopic Surgery  [facility]    [address], [address]  [place], [place], [address]  P: [phone]  F: [phone]

## 2019-09-19 ENCOUNTER — TELEPHONE (OUTPATIENT)
Dept: SURGERY | Facility: CLINIC | Age: 34
End: 2019-09-19

## 2019-09-19 NOTE — TELEPHONE ENCOUNTER
The cause of the obstruction is benign, probably from diverticular disease, but no reason to think that this puts her children at risk.

## 2019-09-19 NOTE — TELEPHONE ENCOUNTER
Pt called in and had forgot to ask you if you had seen what caused her obstruction?  Is it hereditary to where she needs to watch her children for symptoms?    pts phone: [phone]

## 2019-10-14 ENCOUNTER — OFFICE VISIT (OUTPATIENT)
Dept: SURGERY | Facility: CLINIC | Age: 34
End: 2019-10-14

## 2019-10-14 DIAGNOSIS — K56.699 STRICTURE OF SIGMOID COLON (HCC): Primary | ICD-10-CM

## 2019-10-14 PROCEDURE — 99213 OFFICE O/P EST LOW 20 MIN: CPT | Performed by: SURGERY

## 2019-10-14 NOTE — PROGRESS NOTES
Patient is now around 3 months out from her colectomy followed by exploration with colostomy and diverting ileostomy    Subjective:  She feels quite strong, is back at work and overall feels she is getting back to her normal self    Objective:  Abdomen is completely healed.  Wound is closed completely.  Colostomy is viable.  Ileostomy functioning.    Assessment and plan:  -Diverticular stricture status post excision with complication of anastomotic failure requiring colostomy and subsequent diverting ileostomy  -She is finally seemed to recover  -We will plan to see her back in about 3 months time at which time it would be reasonable to consider colonoscopy via her stoma and rectum  -She says that she is leaning towards waiting until at least the summer to consider being put back together and I think that is reasonable given her severity of disease.  It would be nice to let things cool off as much as possible.    Roderick Arnold MD  General and Endoscopic Surgery  [facility]    [address], [address]  [place], [place], [address]  P: [phone]  F: [phone]

## 2019-10-21 ENCOUNTER — TELEPHONE (OUTPATIENT)
Dept: SURGERY | Facility: CLINIC | Age: 34
End: 2019-10-21

## 2019-10-21 NOTE — TELEPHONE ENCOUNTER
Edie calls concerned she has a pinching feeling in her lower part of incision. No redness or draiange of incision.  Advised her it was probably an internal suture that hasnt disolved completely and should resolve with more time.

## 2020-01-13 ENCOUNTER — OFFICE VISIT (OUTPATIENT)
Dept: SURGERY | Facility: CLINIC | Age: 35
End: 2020-01-13

## 2020-01-13 VITALS — OXYGEN SATURATION: 98 % | HEIGHT: 66 IN | HEART RATE: 96 BPM | BODY MASS INDEX: 31.34 KG/M2 | WEIGHT: 195 LBS

## 2020-01-13 DIAGNOSIS — K56.699 STRICTURE OF SIGMOID COLON (HCC): Primary | ICD-10-CM

## 2020-01-13 PROCEDURE — 99213 OFFICE O/P EST LOW 20 MIN: CPT | Performed by: SURGERY

## 2020-01-13 NOTE — PROGRESS NOTES
Chief complaint: Follow-up complicated diverticulitis    Subjective:  Overall feeling pretty well.  She has some minimal and intermittent abdominal pain which is largely well controlled.  Ileostomy functioning well.  Strength is returned.  She has been eating better and actually has put on about 20 pounds over the last couple of months.  She continues to smoke.    Review of systems:  Constitutional: Negative for fever or chills, negative for decreased appetite, positive for unintentional weight loss  Respiratory: Negative for cough or shortness of air  Cardiovascular: Negative for chest pain or peripheral edema    Physical exam:  Body mass index 31.5  General: Awake alert and oriented, no distress  Eyes: Extraocular movements are intact, no scleral icterus  Neck: Supple, no lymphadenopathy  Extremities: No peripheral edema  Gastrointestinal: Abdomen is soft, obese, no evidence of hernia.  Midline wound is healed.  Ileostomy viable and functioning with liquid stool in the appliance.  Colostomy is viable but there is no output.    Assessment and plan:  -History of colonic stricture secondary to complicated diverticulitis, now that is post sigmoid resection and ileostomy  -She had quite a difficult recovery and will eventually require reconstruction which I think will be quite challenging.  She is not ready to undergo this at this time.  We will plan to see her back in about 3 months at which time we can consider colonoscopy.  -I have asked the patient to attempt weight loss and also asked her for smoking cessation and explained that these will be critical to success in the future.    Roderick Arnold MD  General and Endoscopic Surgery  [facility]    [address], [address]  [place], [place], [address]  P: [phone]  F: [phone]

## 2020-02-12 ENCOUNTER — OFFICE VISIT (OUTPATIENT)
Dept: SURGERY | Facility: CLINIC | Age: 35
End: 2020-02-12

## 2020-02-12 VITALS — WEIGHT: 199 LBS | BODY MASS INDEX: 31.98 KG/M2 | HEIGHT: 66 IN | OXYGEN SATURATION: 97 % | HEART RATE: 99 BPM

## 2020-02-12 DIAGNOSIS — K56.699 STRICTURE OF SIGMOID COLON (HCC): Primary | ICD-10-CM

## 2020-02-12 PROCEDURE — 99213 OFFICE O/P EST LOW 20 MIN: CPT | Performed by: SURGERY

## 2020-02-12 RX ORDER — BLOOD-GLUCOSE METER
KIT MISCELLANEOUS
COMMUNITY
Start: 2020-01-17 | End: 2022-07-21

## 2020-02-12 RX ORDER — LEVOTHYROXINE SODIUM 0.2 MG/1
200 TABLET ORAL EVERY MORNING
COMMUNITY
Start: 2020-01-16 | End: 2020-05-25 | Stop reason: HOSPADM

## 2020-02-12 RX ORDER — BUSPIRONE HYDROCHLORIDE 7.5 MG/1
7.5 TABLET ORAL 3 TIMES DAILY
COMMUNITY
Start: 2020-01-17 | End: 2022-07-21

## 2020-02-12 RX ORDER — ERGOCALCIFEROL 1.25 MG/1
50000 CAPSULE ORAL
Status: ON HOLD | COMMUNITY
Start: 2020-01-16 | End: 2020-05-21

## 2020-02-12 NOTE — PROGRESS NOTES
Chief complaint: Bulge at ileostomy    History of presenting illness: After doing some lifting last week she felt that she noticed some bulge near her ileostomy.  It is continued to function well.  She still has some intermittent discomfort, but overall she is feeling stronger.    Review of systems:  Constitutional: Negative for fever, chills or decreased appetite, positive for weight gain  Respiratory: Negative for cough or shortness of air  Cardiovascular: Negative for chest pain or peripheral edema    Body mass index 32.1 (31.5 last visit), weight 199  General: Awake and alert, no distress  Eyes: Extraocular movements intact, no scleral icterus  Gastrointestinal: Abdomen is soft, obese, there does appear to be some swelling near her ileostomy, but I am not able to clearly appreciate a fascial defect.  Extremities: No peripheral edema, no deformity    Assessment and plan:  -Complicated diverticulitis with sigmoid stricture, status post sigmoid resection, end colostomy and diverting ileostomy  -Possible stomal hernia, but I am going to obtain a CT to rule out another process  -I will contact her once the CT is completed  -Colonoscopy can be considered in a couple of months  -Eventually she will need her colostomy closed, and then the ileostomy.    Roderick Arnold MD  General and Endoscopic Surgery  [facility]    [address], [address]  [place], [place], [address]  P: [phone]  F: [phone]

## 2020-02-26 ENCOUNTER — APPOINTMENT (OUTPATIENT)
Dept: CT IMAGING | Facility: HOSPITAL | Age: 35
End: 2020-02-26

## 2020-03-20 ENCOUNTER — APPOINTMENT (OUTPATIENT)
Dept: CT IMAGING | Facility: HOSPITAL | Age: 35
End: 2020-03-20

## 2020-05-11 ENCOUNTER — OFFICE VISIT (OUTPATIENT)
Dept: SURGERY | Facility: CLINIC | Age: 35
End: 2020-05-11

## 2020-05-11 VITALS — WEIGHT: 196.2 LBS | HEIGHT: 66 IN | BODY MASS INDEX: 31.53 KG/M2

## 2020-05-11 DIAGNOSIS — K56.699 STRICTURE OF SIGMOID COLON (HCC): Primary | ICD-10-CM

## 2020-05-11 PROCEDURE — 99214 OFFICE O/P EST MOD 30 MIN: CPT | Performed by: SURGERY

## 2020-05-11 NOTE — PROGRESS NOTES
Cc:   Reflux, abdominal pain     History of presenting illness:   This is a nice, 35-year-old lady known to me from sigmoid resection for diverticular stricture done last summer who suffered significant complications with leak requiring colostomy and diverting ileostomy.  Overall she has recovered okay, but currently is having more upper abdominal discomfort and reflux which has not improved with administration of Protonix.  She also has the sensation that something is stuck in the upper abdomen.  In addition she is having some gas and lower abdominal discomfort.  Her weight has been stable.  Her ileostomy functions.  She also describes some occasional rectal spasms.     Past Medical History: Type 2 diabetes, hypothyroidism, kidney stones, gastroesophageal reflux disease     Past Surgical History: Significant for  section, lithotripsy     Medications: MiraLAX, hyoscyamine, insulin, metformin, Januvia     Allergies: Ciprofloxacin, Bactrim, sulfa     Social History: Patient denies alcohol use.  She is working, although she has to leave work early from time to time.  She continues to smoke between 1/2 pack of cigarettes a day and 1 pack.  She has made efforts to quit but has been unsuccessful to this point.  Discussed risks of surgery with patient and in particular increased risk of wound infection, poor wound healing, hernias (with abdominal surgery) and post-operative pulmonary complications associated with smoking.      Family History: Negative for colorectal cancer     Review of Systems:  Constitutional: Positive for weakness, decreased appetite, negative for weight loss or fever  Neck: no swollen glands or dysphagia or odynophagia  Respiratory: negative for SOB, cough, hemoptysis or wheezing  Cardiovascular: negative for chest pain, palpitations or peripheral edema  Gastrointestinal: Positive for abdominal pain, bloating, nausea and constipation        Physical Exam:   Body mass index 31.7  General: alert  and oriented, appropriate, no acute distress  Neck: Supple without lymphadenopathy or thyromegaly, trachea is in the midline  Respiratory: Lungs are clear bilaterally without wheezing, no use of accessory muscles is noted  Cardiovascular: Regular rate and rhythm without murmur, no peripheral edema  Gastrointestinal:  .  Mild diffuse tenderness.  Obese.  Midline scar is healed although chronically scarred.  Colostomy is viable with no output.  Ileostomy is viable with liquid stool in the appliance.  There is a likely parastomal hernia around the ileostomy.  There is no palpable midline hernia.     Laboratory data:   No recent relevant data     Imaging data: No recent relevant data        Assessment and plan:   -Stricture of the sigmoid colon status post sigmoid resection and subsequent anastomotic leak.  Currently doing reasonably well.  She has gained weight, but over the last couple months it has been stable.  She is anxious about reversal.  She is going to require colonoscopy via her rectum as well as via her defunctionalized stoma.  In addition, due to her reflux and upper abdominal discomfort I have recommended proceeding with upper endoscopy as well.  Further, we had ordered a CT to evaluate for stomal hernia but this has not been completed.  We will make sure this gets done soon.  -Type 2 diabetes, complicating above, not ideally controlled at this time  -Tobacco abuse, patient is making efforts to cut down and for better or for worse her chronic illness has made smoking more difficult on her.  She understands that her tobacco use does increase her risk of postoperative complications including bleeding, hernia formation, anastomotic failure and other problems.  She is going to make an effort to continue to cut down.        Roderick Arnold MD, FACS  General, Minimally Invasive and Endoscopic Surgery  [facility]     [facility], [address]  [place], [place], [address]  P: [phone]  F:  [phone]

## 2020-05-11 NOTE — H&P (VIEW-ONLY)
Cc:   Reflux, abdominal pain     History of presenting illness:   This is a nice, 35-year-old lady known to me from sigmoid resection for diverticular stricture done last summer who suffered significant complications with leak requiring colostomy and diverting ileostomy.  Overall she has recovered okay, but currently is having more upper abdominal discomfort and reflux which has not improved with administration of Protonix.  She also has the sensation that something is stuck in the upper abdomen.  In addition she is having some gas and lower abdominal discomfort.  Her weight has been stable.  Her ileostomy functions.  She also describes some occasional rectal spasms.     Past Medical History: Type 2 diabetes, hypothyroidism, kidney stones, gastroesophageal reflux disease     Past Surgical History: Significant for  section, lithotripsy     Medications: MiraLAX, hyoscyamine, insulin, metformin, Januvia     Allergies: Ciprofloxacin, Bactrim, sulfa     Social History: Patient denies alcohol use.  She is working, although she has to leave work early from time to time.  She continues to smoke between 1/2 pack of cigarettes a day and 1 pack.  She has made efforts to quit but has been unsuccessful to this point.  Discussed risks of surgery with patient and in particular increased risk of wound infection, poor wound healing, hernias (with abdominal surgery) and post-operative pulmonary complications associated with smoking.      Family History: Negative for colorectal cancer     Review of Systems:  Constitutional: Positive for weakness, decreased appetite, negative for weight loss or fever  Neck: no swollen glands or dysphagia or odynophagia  Respiratory: negative for SOB, cough, hemoptysis or wheezing  Cardiovascular: negative for chest pain, palpitations or peripheral edema  Gastrointestinal: Positive for abdominal pain, bloating, nausea and constipation        Physical Exam:   Body mass index 31.7  General: alert  and oriented, appropriate, no acute distress  Neck: Supple without lymphadenopathy or thyromegaly, trachea is in the midline  Respiratory: Lungs are clear bilaterally without wheezing, no use of accessory muscles is noted  Cardiovascular: Regular rate and rhythm without murmur, no peripheral edema  Gastrointestinal:  .  Mild diffuse tenderness.  Obese.  Midline scar is healed although chronically scarred.  Colostomy is viable with no output.  Ileostomy is viable with liquid stool in the appliance.  There is a likely parastomal hernia around the ileostomy.  There is no palpable midline hernia.     Laboratory data:   No recent relevant data     Imaging data: No recent relevant data        Assessment and plan:   -Stricture of the sigmoid colon status post sigmoid resection and subsequent anastomotic leak.  Currently doing reasonably well.  She has gained weight, but over the last couple months it has been stable.  She is anxious about reversal.  She is going to require colonoscopy via her rectum as well as via her defunctionalized stoma.  In addition, due to her reflux and upper abdominal discomfort I have recommended proceeding with upper endoscopy as well.  Further, we had ordered a CT to evaluate for stomal hernia but this has not been completed.  We will make sure this gets done soon.  -Type 2 diabetes, complicating above, not ideally controlled at this time  -Tobacco abuse, patient is making efforts to cut down and for better or for worse her chronic illness has made smoking more difficult on her.  She understands that her tobacco use does increase her risk of postoperative complications including bleeding, hernia formation, anastomotic failure and other problems.  She is going to make an effort to continue to cut down.        Roderick Arnold MD, FACS  General, Minimally Invasive and Endoscopic Surgery  [facility]     [facility] [facility], [address]  [place], [place], [address]  P: [phone]  F:  [phone]

## 2020-05-13 ENCOUNTER — HOSPITAL ENCOUNTER (OUTPATIENT)
Dept: CT IMAGING | Facility: HOSPITAL | Age: 35
Discharge: HOME | End: 2020-05-13
Admitting: SURGERY

## 2020-05-13 ENCOUNTER — TRANSCRIBE ORDERS (OUTPATIENT)
Dept: SLEEP MEDICINE | Facility: HOSPITAL | Age: 35
End: 2020-05-13

## 2020-05-13 DIAGNOSIS — Z01.818 OTHER SPECIFIED PRE-OPERATIVE EXAMINATION: Primary | ICD-10-CM

## 2020-05-13 DIAGNOSIS — K56.699 STRICTURE OF SIGMOID COLON (HCC): ICD-10-CM

## 2020-05-13 PROCEDURE — 74176 CT ABD & PELVIS W/O CONTRAST: CPT

## 2020-05-14 DIAGNOSIS — N20.0 KIDNEY STONE: Primary | ICD-10-CM

## 2020-05-16 ENCOUNTER — LAB (OUTPATIENT)
Dept: LAB | Facility: HOSPITAL | Age: 35
End: 2020-05-16

## 2020-05-16 DIAGNOSIS — Z01.818 OTHER SPECIFIED PRE-OPERATIVE EXAMINATION: ICD-10-CM

## 2020-05-16 PROCEDURE — U0004 COV-19 TEST NON-CDC HGH THRU: HCPCS | Performed by: INTERNAL MEDICINE

## 2020-05-18 LAB
REF LAB TEST METHOD: NORMAL
SARS-COV-2 RNA RESP QL NAA+PROBE: NOT DETECTED

## 2020-05-18 RX ORDER — PANTOPRAZOLE SODIUM 40 MG/1
40 TABLET, DELAYED RELEASE ORAL DAILY
COMMUNITY
Start: 2020-05-12

## 2020-05-18 RX ORDER — SUCRALFATE 1 G/1
1 TABLET ORAL
Status: ON HOLD | COMMUNITY
Start: 2020-05-12 | End: 2023-01-13

## 2020-05-18 RX ORDER — POTASSIUM CHLORIDE 750 MG/1
10 TABLET, FILM COATED, EXTENDED RELEASE ORAL 2 TIMES DAILY
Status: ON HOLD | COMMUNITY
End: 2020-05-25 | Stop reason: SDUPTHER

## 2020-05-19 ENCOUNTER — ANESTHESIA EVENT (OUTPATIENT)
Dept: GASTROENTEROLOGY | Facility: HOSPITAL | Age: 35
End: 2020-05-19

## 2020-05-19 ENCOUNTER — HOSPITAL ENCOUNTER (OUTPATIENT)
Facility: HOSPITAL | Age: 35
Setting detail: HOSPITAL OUTPATIENT SURGERY
Discharge: HOME | End: 2020-05-19
Attending: SURGERY | Admitting: SURGERY

## 2020-05-19 ENCOUNTER — ANESTHESIA (OUTPATIENT)
Dept: GASTROENTEROLOGY | Facility: HOSPITAL | Age: 35
End: 2020-05-19

## 2020-05-19 VITALS
TEMPERATURE: 98.2 F | SYSTOLIC BLOOD PRESSURE: 143 MMHG | RESPIRATION RATE: 20 BRPM | OXYGEN SATURATION: 98 % | WEIGHT: 192.6 LBS | DIASTOLIC BLOOD PRESSURE: 95 MMHG | HEART RATE: 89 BPM | BODY MASS INDEX: 32.88 KG/M2 | HEIGHT: 64 IN

## 2020-05-19 DIAGNOSIS — K56.699 STRICTURE OF SIGMOID COLON (HCC): ICD-10-CM

## 2020-05-19 LAB
B-HCG UR QL: NEGATIVE
GLUCOSE BLDC GLUCOMTR-MCNC: 274 MG/DL (ref 70–130)
GLUCOSE BLDC GLUCOMTR-MCNC: 299 MG/DL (ref 70–130)
GLUCOSE BLDC GLUCOMTR-MCNC: 354 MG/DL (ref 70–130)
INTERNAL NEGATIVE CONTROL: NEGATIVE
INTERNAL POSITIVE CONTROL: POSITIVE
Lab: NORMAL

## 2020-05-19 PROCEDURE — 81025 URINE PREGNANCY TEST: CPT | Performed by: SURGERY

## 2020-05-19 PROCEDURE — 45300 PROCTOSIGMOIDOSCOPY DX: CPT | Performed by: SURGERY

## 2020-05-19 PROCEDURE — 44388 COLONOSCOPY THRU STOMA SPX: CPT | Performed by: SURGERY

## 2020-05-19 PROCEDURE — 43239 EGD BIOPSY SINGLE/MULTIPLE: CPT | Performed by: SURGERY

## 2020-05-19 PROCEDURE — 25010000002 MIDAZOLAM PER 1 MG: Performed by: ANESTHESIOLOGY

## 2020-05-19 PROCEDURE — 82962 GLUCOSE BLOOD TEST: CPT

## 2020-05-19 PROCEDURE — 88305 TISSUE EXAM BY PATHOLOGIST: CPT | Performed by: SURGERY

## 2020-05-19 PROCEDURE — 25010000002 PROPOFOL 10 MG/ML EMULSION: Performed by: ANESTHESIOLOGY

## 2020-05-19 PROCEDURE — 63710000001 INSULIN REGULAR HUMAN PER 5 UNITS: Performed by: SURGERY

## 2020-05-19 RX ORDER — SODIUM CHLORIDE, SODIUM LACTATE, POTASSIUM CHLORIDE, CALCIUM CHLORIDE 600; 310; 30; 20 MG/100ML; MG/100ML; MG/100ML; MG/100ML
30 INJECTION, SOLUTION INTRAVENOUS CONTINUOUS PRN
Status: DISCONTINUED | OUTPATIENT
Start: 2020-05-19 | End: 2020-05-19 | Stop reason: HOSPADM

## 2020-05-19 RX ORDER — PROPOFOL 10 MG/ML
VIAL (ML) INTRAVENOUS AS NEEDED
Status: DISCONTINUED | OUTPATIENT
Start: 2020-05-19 | End: 2020-05-19 | Stop reason: SURG

## 2020-05-19 RX ORDER — SODIUM CHLORIDE 0.9 % (FLUSH) 0.9 %
10 SYRINGE (ML) INJECTION AS NEEDED
Status: DISCONTINUED | OUTPATIENT
Start: 2020-05-19 | End: 2020-05-19 | Stop reason: HOSPADM

## 2020-05-19 RX ORDER — MIDAZOLAM HYDROCHLORIDE 1 MG/ML
INJECTION INTRAMUSCULAR; INTRAVENOUS AS NEEDED
Status: DISCONTINUED | OUTPATIENT
Start: 2020-05-19 | End: 2020-05-19 | Stop reason: SURG

## 2020-05-19 RX ORDER — LIDOCAINE HYDROCHLORIDE 20 MG/ML
INJECTION, SOLUTION INFILTRATION; PERINEURAL AS NEEDED
Status: DISCONTINUED | OUTPATIENT
Start: 2020-05-19 | End: 2020-05-19 | Stop reason: SURG

## 2020-05-19 RX ORDER — SODIUM CHLORIDE 0.9 % (FLUSH) 0.9 %
3 SYRINGE (ML) INJECTION EVERY 12 HOURS SCHEDULED
Status: DISCONTINUED | OUTPATIENT
Start: 2020-05-19 | End: 2020-05-19 | Stop reason: HOSPADM

## 2020-05-19 RX ORDER — PROPOFOL 10 MG/ML
VIAL (ML) INTRAVENOUS CONTINUOUS PRN
Status: DISCONTINUED | OUTPATIENT
Start: 2020-05-19 | End: 2020-05-19 | Stop reason: SURG

## 2020-05-19 RX ADMIN — PROPOFOL 50 MG: 10 INJECTION, EMULSION INTRAVENOUS at 13:37

## 2020-05-19 RX ADMIN — MIDAZOLAM 2 MG: 1 INJECTION INTRAMUSCULAR; INTRAVENOUS at 13:51

## 2020-05-19 RX ADMIN — LIDOCAINE HYDROCHLORIDE 50 MG: 20 INJECTION, SOLUTION INFILTRATION; PERINEURAL at 13:32

## 2020-05-19 RX ADMIN — PROPOFOL 50 MG: 10 INJECTION, EMULSION INTRAVENOUS at 13:43

## 2020-05-19 RX ADMIN — PROPOFOL 50 MG: 10 INJECTION, EMULSION INTRAVENOUS at 13:53

## 2020-05-19 RX ADMIN — SODIUM CHLORIDE, POTASSIUM CHLORIDE, SODIUM LACTATE AND CALCIUM CHLORIDE 30 ML/HR: 600; 310; 30; 20 INJECTION, SOLUTION INTRAVENOUS at 11:52

## 2020-05-19 RX ADMIN — PROPOFOL 200 MCG/KG/MIN: 10 INJECTION, EMULSION INTRAVENOUS at 13:36

## 2020-05-19 RX ADMIN — PROPOFOL 50 MG: 10 INJECTION, EMULSION INTRAVENOUS at 13:46

## 2020-05-19 RX ADMIN — PROPOFOL 50 MG: 10 INJECTION, EMULSION INTRAVENOUS at 13:51

## 2020-05-19 RX ADMIN — PROPOFOL 220 MG: 10 INJECTION, EMULSION INTRAVENOUS at 13:32

## 2020-05-19 RX ADMIN — PROPOFOL 50 MG: 10 INJECTION, EMULSION INTRAVENOUS at 13:39

## 2020-05-19 RX ADMIN — INSULIN HUMAN 10 UNITS: 100 INJECTION, SOLUTION PARENTERAL at 12:30

## 2020-05-19 RX ADMIN — PROPOFOL 50 MG: 10 INJECTION, EMULSION INTRAVENOUS at 13:49

## 2020-05-19 RX ADMIN — PROPOFOL 200 MG: 10 INJECTION, EMULSION INTRAVENOUS at 13:57

## 2020-05-19 NOTE — OP NOTE
Operative Note :   MD Milo Spainmercedez CEDENO Janice  1985    Procedure Date: 05/19/20    Pre-op Diagnosis:  · Acid reflux  · Colostomy status    Post-op Diagnosis:  · Mild esophagitis  · Mild duodenitis  · Normal colon    Procedure:   · Esophagogastroduodenoscopy with biopsy of antrum  · Colonoscopy to cecum via rectum and descending colostomy    Surgeon: Roderick Arnold MD      Anesthesia: MAC    Indications:  · Complicated 35-year-old poorly controlled diabetic lady who underwent sigmoid resection last year for stricture complicated by leak necessitating colostomy with Saldana's pouch and loop diverting ileostomy.  She presents at this time for colonoscopy and consideration of possible future reversal.  In addition she has been having reflux problems, and as such is brought for upper endoscopy.    Findings:   · Rectal stump ends at approximately 8 cm  · Mild reflux esophagitis  · Mild duodenitis  · No evidence of any residual diverticula within the remaining colon    Recommendations:   · No specific contraindication to reversal of colostomy once medically stable  · Screening colonoscopy at age 43 or 1 year post colostomy reversal    Description of procedure:    After obtaining informed consent, patient was brought to the endoscopy suite and was sedated.  The flexible endoscope was introduced through the patient's mouth and then passed through the cricopharyngeus into the esophagus, through the GE junction into the stomach and then passed the pylorus and into the duodenum.  The duodenum had some mild inflammatory changes without any evidence of ulceration.  The duodenal bulb and pyloric channel were relatively normal.  The scope was pulled back into the stomach which was unremarkable.  Biopsies of the antrum were taken for H. pylori testing.  Retroflexion of the scope within the stomach was unremarkable.  GE junction showed mild esophagitis.  Remainder of the esophagus was normal on withdrawal of the  scope    Digital rectal exam was undertaken and there was a small amount of old inspissated stool.  The colonoscope was inserted through the rectum and passed up to the end of the blind pouch measured at about 8 cm.  The staple line was visualized there.  There were no diverticula and there were no other abnormalities in the remaining rectum.  The patient was then flipped onto her back and the scope was then introduced through her colostomy and passed around with mild difficulty to the level of the cecum under direct visualization.  The scope was then slowly withdrawn.  The cecum, ascending colon, hepatic flexure, transverse colon, splenic flexure, descending colon were normal.  There were no residual diverticula.  There is a small parastomal hernia at the colostomy which was traversed without much difficulty.  The scope was withdrawn.  The patient tolerated this eventually.    Roderick Arnold MD  General and Endoscopic Surgery  [facility]    [address], [address]  [place], [place], [address]  P: [phone]  F: [phone]

## 2020-05-19 NOTE — ANESTHESIA PREPROCEDURE EVALUATION
Anesthesia Evaluation     Patient summary reviewed and Nursing notes reviewed   no history of anesthetic complications:  NPO Solid Status: > 8 hours  NPO Liquid Status: > 2 hours           Airway   Dental      Pulmonary    (+) a smoker Current Smoked day of surgery,   Cardiovascular         Neuro/Psych- negative ROS    ROS Comment: Peripheral neuropathy  GI/Hepatic/Renal/Endo    (+)  GERD,  renal disease stones, diabetes mellitus, thyroid problem hypothyroidism    Musculoskeletal (-) negative ROS    Abdominal    Substance History - negative use     OB/GYN negative ob/gyn ROS   (-)  Pregnant        Other - negative ROS                     Anesthesia Plan    ASA 3     MAC       Anesthetic plan, all risks, benefits, and alternatives have been provided, discussed and informed consent has been obtained with: patient.

## 2020-05-19 NOTE — NURSING NOTE
Insulin given SQ 10 units per MD order. See MAR. Verified with MEMO Valentin RN. Patient tolerated well.

## 2020-05-19 NOTE — ANESTHESIA POSTPROCEDURE EVALUATION
Patient: Edie Camacho    Procedure Summary     Date:  05/19/20 Room / Location:   [facility] /  [facility]    Anesthesia Start:  1326 Anesthesia Stop:  1410    Procedures:       COLONOSCOPY to Cecum via Colostomy and Rectum (N/A )      ESOPHAGOGASTRODUODENOSCOPY with Bx's (N/A Esophagus) Diagnosis:       Stricture of sigmoid colon (CMS/HCC)      (Stricture of sigmoid colon (CMS/HCC) [K56.699])    Surgeon:  Roderick Arnold MD Provider:  Layla Hartman MD    Anesthesia Type:  MAC ASA Status:  3          Anesthesia Type: MAC    Vitals  Vitals Value Taken Time   /95 5/19/2020  2:46 PM   Temp     Pulse 89 5/19/2020  2:46 PM   Resp 20 5/19/2020  2:46 PM   SpO2 98 % 5/19/2020  2:46 PM           Post Anesthesia Care and Evaluation    Patient location during evaluation: PACU  Patient participation: complete - patient participated  Level of consciousness: awake and alert  Pain management: adequate  Airway patency: patent  Anesthetic complications: No anesthetic complications  PONV Status: none  Cardiovascular status: acceptable  Respiratory status: acceptable  Hydration status: acceptable

## 2020-05-20 ENCOUNTER — HOSPITAL ENCOUNTER (INPATIENT)
Facility: HOSPITAL | Age: 35
LOS: 4 days | Discharge: HOME | End: 2020-05-25
Attending: EMERGENCY MEDICINE | Admitting: INTERNAL MEDICINE

## 2020-05-20 ENCOUNTER — APPOINTMENT (OUTPATIENT)
Dept: CT IMAGING | Facility: HOSPITAL | Age: 35
End: 2020-05-20

## 2020-05-20 DIAGNOSIS — D72.829 LEUKOCYTOSIS, UNSPECIFIED TYPE: ICD-10-CM

## 2020-05-20 DIAGNOSIS — E11.40 TYPE 2 DIABETES MELLITUS WITH DIABETIC NEUROPATHY, WITH LONG-TERM CURRENT USE OF INSULIN (HCC): Primary | ICD-10-CM

## 2020-05-20 DIAGNOSIS — R73.9 HYPERGLYCEMIA: ICD-10-CM

## 2020-05-20 DIAGNOSIS — Z79.4 TYPE 2 DIABETES MELLITUS WITH DIABETIC NEUROPATHY, WITH LONG-TERM CURRENT USE OF INSULIN (HCC): Primary | ICD-10-CM

## 2020-05-20 DIAGNOSIS — K85.90 ACUTE PANCREATITIS WITHOUT INFECTION OR NECROSIS, UNSPECIFIED PANCREATITIS TYPE: ICD-10-CM

## 2020-05-20 DIAGNOSIS — N20.1 RIGHT URETERAL STONE: ICD-10-CM

## 2020-05-20 DIAGNOSIS — R74.8 ELEVATED LIPASE: ICD-10-CM

## 2020-05-20 LAB
ALBUMIN SERPL-MCNC: 4.1 G/DL (ref 3.5–5.2)
ALBUMIN/GLOB SERPL: 1.4 G/DL
ALP SERPL-CCNC: 119 U/L (ref 39–117)
ALT SERPL W P-5'-P-CCNC: 55 U/L (ref 1–33)
ANION GAP SERPL CALCULATED.3IONS-SCNC: 17.1 MMOL/L (ref 5–15)
ANISOCYTOSIS BLD QL: ABNORMAL
AST SERPL-CCNC: 47 U/L (ref 1–32)
ATMOSPHERIC PRESS: 751.7 MMHG
BACTERIA UR QL AUTO: ABNORMAL /HPF
BASE EXCESS BLDV CALC-SCNC: -3.6 MMOL/L
BASOPHILS # BLD MANUAL: 0.11 10*3/MM3 (ref 0–0.2)
BASOPHILS NFR BLD AUTO: 1 % (ref 0–1.5)
BDY SITE: ABNORMAL
BILIRUB SERPL-MCNC: 0.4 MG/DL (ref 0.2–1.2)
BILIRUB UR QL STRIP: NEGATIVE
BUN BLD-MCNC: 8 MG/DL (ref 6–20)
BUN/CREAT SERPL: 12.7 (ref 7–25)
CALCIUM SPEC-SCNC: 9.5 MG/DL (ref 8.6–10.5)
CHLORIDE SERPL-SCNC: 98 MMOL/L (ref 98–107)
CLARITY UR: CLEAR
CO2 SERPL-SCNC: 18.9 MMOL/L (ref 22–29)
COLOR UR: YELLOW
CREAT BLD-MCNC: 0.63 MG/DL (ref 0.57–1)
CYTO UR: NORMAL
EOSINOPHIL # BLD MANUAL: 0.44 10*3/MM3 (ref 0–0.4)
EOSINOPHIL NFR BLD MANUAL: 4 % (ref 0.3–6.2)
GFR SERPL CREATININE-BSD FRML MDRD: 106 ML/MIN/1.73
GLOBULIN UR ELPH-MCNC: 2.9 GM/DL
GLUCOSE BLD-MCNC: 421 MG/DL (ref 65–99)
GLUCOSE UR STRIP-MCNC: ABNORMAL MG/DL
HCG SERPL QL: NEGATIVE
HCO3 BLDV-SCNC: 20.9 MMOL/L (ref 22–28)
HGB UR QL STRIP.AUTO: ABNORMAL
HYALINE CASTS UR QL AUTO: ABNORMAL /LPF
KETONES UR QL STRIP: ABNORMAL
LAB AP CASE REPORT: NORMAL
LDH SERPL-CCNC: 269 U/L (ref 135–214)
LEUKOCYTE ESTERASE UR QL STRIP.AUTO: NEGATIVE
LIPASE SERPL-CCNC: >3000 U/L (ref 13–60)
LYMPHOCYTES # BLD MANUAL: 2.61 10*3/MM3 (ref 0.7–3.1)
LYMPHOCYTES NFR BLD MANUAL: 24 % (ref 19.6–45.3)
LYMPHOCYTES NFR BLD MANUAL: 7 % (ref 5–12)
MICROCYTES BLD QL: ABNORMAL
MODALITY: ABNORMAL
MONOCYTES # BLD AUTO: 0.76 10*3/MM3 (ref 0.1–0.9)
NEUTROPHILS # BLD AUTO: 6.96 10*3/MM3 (ref 1.7–7)
NEUTROPHILS NFR BLD MANUAL: 64 % (ref 42.7–76)
NITRITE UR QL STRIP: NEGATIVE
PATH REPORT.FINAL DX SPEC: NORMAL
PATH REPORT.GROSS SPEC: NORMAL
PCO2 BLDV: 35.4 MM HG (ref 41–51)
PH BLDV: 7.38 PH UNITS (ref 7.31–7.41)
PH UR STRIP.AUTO: <=5 [PH] (ref 5–8)
PLAT MORPH BLD: NORMAL
PO2 BLDV: 57.5 MM HG (ref 35–45)
POTASSIUM BLD-SCNC: 3.9 MMOL/L (ref 3.5–5.2)
PROT SERPL-MCNC: 7 G/DL (ref 6–8.5)
PROT UR QL STRIP: ABNORMAL
RBC # UR: ABNORMAL /HPF
REF LAB TEST METHOD: ABNORMAL
SAO2 % BLDCOA: 89.1 % (ref 92–99)
SODIUM BLD-SCNC: 134 MMOL/L (ref 136–145)
SP GR UR STRIP: >=1.03 (ref 1–1.03)
SQUAMOUS #/AREA URNS HPF: ABNORMAL /HPF
TOTAL RATE: 18 BREATHS/MINUTE
UROBILINOGEN UR QL STRIP: ABNORMAL
WBC MORPH BLD: NORMAL
WBC UR QL AUTO: ABNORMAL /HPF

## 2020-05-20 PROCEDURE — 25010000002 MORPHINE PER 10 MG: Performed by: EMERGENCY MEDICINE

## 2020-05-20 PROCEDURE — 99284 EMERGENCY DEPT VISIT MOD MDM: CPT

## 2020-05-20 PROCEDURE — 25010000002 ONDANSETRON PER 1 MG: Performed by: EMERGENCY MEDICINE

## 2020-05-20 PROCEDURE — 25010000002 IOPAMIDOL 61 % SOLUTION: Performed by: EMERGENCY MEDICINE

## 2020-05-20 PROCEDURE — 74177 CT ABD & PELVIS W/CONTRAST: CPT

## 2020-05-20 PROCEDURE — 83690 ASSAY OF LIPASE: CPT | Performed by: EMERGENCY MEDICINE

## 2020-05-20 PROCEDURE — 84703 CHORIONIC GONADOTROPIN ASSAY: CPT | Performed by: EMERGENCY MEDICINE

## 2020-05-20 PROCEDURE — 63710000001 INSULIN REGULAR HUMAN PER 5 UNITS: Performed by: EMERGENCY MEDICINE

## 2020-05-20 PROCEDURE — 83615 LACTATE (LD) (LDH) ENZYME: CPT | Performed by: EMERGENCY MEDICINE

## 2020-05-20 PROCEDURE — 80053 COMPREHEN METABOLIC PANEL: CPT | Performed by: EMERGENCY MEDICINE

## 2020-05-20 PROCEDURE — 82803 BLOOD GASES ANY COMBINATION: CPT

## 2020-05-20 PROCEDURE — 85007 BL SMEAR W/DIFF WBC COUNT: CPT | Performed by: EMERGENCY MEDICINE

## 2020-05-20 PROCEDURE — 81001 URINALYSIS AUTO W/SCOPE: CPT | Performed by: EMERGENCY MEDICINE

## 2020-05-20 PROCEDURE — 85025 COMPLETE CBC W/AUTO DIFF WBC: CPT | Performed by: EMERGENCY MEDICINE

## 2020-05-20 RX ORDER — MORPHINE SULFATE 2 MG/ML
4 INJECTION, SOLUTION INTRAMUSCULAR; INTRAVENOUS ONCE AS NEEDED
Status: COMPLETED | OUTPATIENT
Start: 2020-05-20 | End: 2020-05-20

## 2020-05-20 RX ORDER — ONDANSETRON 2 MG/ML
8 INJECTION INTRAMUSCULAR; INTRAVENOUS ONCE
Status: COMPLETED | OUTPATIENT
Start: 2020-05-20 | End: 2020-05-20

## 2020-05-20 RX ADMIN — ONDANSETRON 8 MG: 2 INJECTION INTRAMUSCULAR; INTRAVENOUS at 21:03

## 2020-05-20 RX ADMIN — INSULIN HUMAN 10 UNITS: 100 INJECTION, SOLUTION PARENTERAL at 22:54

## 2020-05-20 RX ADMIN — SODIUM CHLORIDE 1000 ML: 9 INJECTION, SOLUTION INTRAVENOUS at 21:03

## 2020-05-20 RX ADMIN — MORPHINE SULFATE 4 MG: 2 INJECTION, SOLUTION INTRAMUSCULAR; INTRAVENOUS at 23:12

## 2020-05-20 RX ADMIN — MORPHINE SULFATE 4 MG: 2 INJECTION, SOLUTION INTRAMUSCULAR; INTRAVENOUS at 22:01

## 2020-05-20 RX ADMIN — IOPAMIDOL 85 ML: 612 INJECTION, SOLUTION INTRAVENOUS at 22:50

## 2020-05-21 PROBLEM — E03.9 HYPOTHYROIDISM (ACQUIRED): Status: ACTIVE | Noted: 2020-05-21

## 2020-05-21 PROBLEM — K85.90 ACUTE PANCREATITIS WITHOUT INFECTION OR NECROSIS: Status: ACTIVE | Noted: 2020-05-21

## 2020-05-21 LAB
ANION GAP SERPL CALCULATED.3IONS-SCNC: 18 MMOL/L (ref 5–15)
ARTICHOKE IGE QN: 47 MG/DL (ref 0–100)
BUN BLD-MCNC: 6 MG/DL (ref 6–20)
BUN/CREAT SERPL: 10.5 (ref 7–25)
CALCIUM SPEC-SCNC: 8.5 MG/DL (ref 8.6–10.5)
CHLORIDE SERPL-SCNC: 107 MMOL/L (ref 98–107)
CHOLEST SERPL-MCNC: 973 MG/DL (ref 0–200)
CO2 SERPL-SCNC: 13 MMOL/L (ref 22–29)
CREAT BLD-MCNC: 0.57 MG/DL (ref 0.57–1)
DEPRECATED RDW RBC AUTO: 40.6 FL (ref 37–54)
DEPRECATED RDW RBC AUTO: 41.3 FL (ref 37–54)
ERYTHROCYTE [DISTWIDTH] IN BLOOD BY AUTOMATED COUNT: 12.4 % (ref 12.3–15.4)
ERYTHROCYTE [DISTWIDTH] IN BLOOD BY AUTOMATED COUNT: 12.7 % (ref 12.3–15.4)
GFR SERPL CREATININE-BSD FRML MDRD: 119 ML/MIN/1.73
GLUCOSE BLD-MCNC: 283 MG/DL (ref 65–99)
GLUCOSE BLDC GLUCOMTR-MCNC: 210 MG/DL (ref 70–130)
GLUCOSE BLDC GLUCOMTR-MCNC: 224 MG/DL (ref 70–130)
GLUCOSE BLDC GLUCOMTR-MCNC: 231 MG/DL (ref 70–130)
GLUCOSE BLDC GLUCOMTR-MCNC: 272 MG/DL (ref 70–130)
GLUCOSE BLDC GLUCOMTR-MCNC: 296 MG/DL (ref 70–130)
HBA1C MFR BLD: 11.7 % (ref 4.8–5.6)
HCT VFR BLD AUTO: 38 % (ref 34–46.6)
HCT VFR BLD AUTO: 40 % (ref 34–46.6)
HDLC SERPL-MCNC: 12 MG/DL (ref 40–60)
HGB BLD-MCNC: 13.4 G/DL (ref 12–15.9)
HGB BLD-MCNC: 13.6 G/DL (ref 12–15.9)
LDLC SERPL CALC-MCNC: ABNORMAL MG/DL
LDLC/HDLC SERPL: ABNORMAL {RATIO}
MCH RBC QN AUTO: 29.4 PG (ref 26.6–33)
MCH RBC QN AUTO: 29.8 PG (ref 26.6–33)
MCHC RBC AUTO-ENTMCNC: 34 G/DL (ref 31.5–35.7)
MCHC RBC AUTO-ENTMCNC: 35.3 G/DL (ref 31.5–35.7)
MCV RBC AUTO: 84.4 FL (ref 79–97)
MCV RBC AUTO: 86.6 FL (ref 79–97)
PLATELET # BLD AUTO: 280 10*3/MM3 (ref 140–450)
PLATELET # BLD AUTO: 292 10*3/MM3 (ref 140–450)
PMV BLD AUTO: 10.6 FL (ref 6–12)
PMV BLD AUTO: 10.6 FL (ref 6–12)
POTASSIUM BLD-SCNC: 4.1 MMOL/L (ref 3.5–5.2)
RBC # BLD AUTO: 4.5 10*6/MM3 (ref 3.77–5.28)
RBC # BLD AUTO: 4.62 10*6/MM3 (ref 3.77–5.28)
SODIUM BLD-SCNC: 138 MMOL/L (ref 136–145)
TRIGL SERPL-MCNC: >4425 MG/DL (ref 0–150)
TSH SERPL DL<=0.05 MIU/L-ACNC: 70.6 UIU/ML (ref 0.27–4.2)
VLDLC SERPL-MCNC: ABNORMAL MG/DL
WBC NRBC COR # BLD: 10.88 10*3/MM3 (ref 3.4–10.8)
WBC NRBC COR # BLD: 11.6 10*3/MM3 (ref 3.4–10.8)

## 2020-05-21 PROCEDURE — 63710000001 INSULIN LISPRO (HUMAN) PER 5 UNITS: Performed by: INTERNAL MEDICINE

## 2020-05-21 PROCEDURE — 85027 COMPLETE CBC AUTOMATED: CPT | Performed by: NURSE PRACTITIONER

## 2020-05-21 PROCEDURE — 63710000001 INSULIN GLARGINE PER 5 UNITS: Performed by: INTERNAL MEDICINE

## 2020-05-21 PROCEDURE — 82962 GLUCOSE BLOOD TEST: CPT

## 2020-05-21 PROCEDURE — 25010000002 HYDROMORPHONE PER 4 MG: Performed by: NURSE PRACTITIONER

## 2020-05-21 PROCEDURE — 83721 ASSAY OF BLOOD LIPOPROTEIN: CPT | Performed by: NURSE PRACTITIONER

## 2020-05-21 PROCEDURE — 99254 IP/OBS CNSLTJ NEW/EST MOD 60: CPT | Performed by: INTERNAL MEDICINE

## 2020-05-21 PROCEDURE — 80048 BASIC METABOLIC PNL TOTAL CA: CPT | Performed by: NURSE PRACTITIONER

## 2020-05-21 PROCEDURE — 25010000002 METOCLOPRAMIDE PER 10 MG: Performed by: EMERGENCY MEDICINE

## 2020-05-21 PROCEDURE — 83036 HEMOGLOBIN GLYCOSYLATED A1C: CPT | Performed by: NURSE PRACTITIONER

## 2020-05-21 PROCEDURE — 36415 COLL VENOUS BLD VENIPUNCTURE: CPT | Performed by: NURSE PRACTITIONER

## 2020-05-21 PROCEDURE — 25010000002 MORPHINE PER 10 MG: Performed by: EMERGENCY MEDICINE

## 2020-05-21 PROCEDURE — 63710000001 INSULIN LISPRO (HUMAN) PER 5 UNITS: Performed by: NURSE PRACTITIONER

## 2020-05-21 PROCEDURE — 99223 1ST HOSP IP/OBS HIGH 75: CPT | Performed by: INTERNAL MEDICINE

## 2020-05-21 PROCEDURE — 25010000002 ONDANSETRON PER 1 MG: Performed by: NURSE PRACTITIONER

## 2020-05-21 PROCEDURE — 80061 LIPID PANEL: CPT | Performed by: NURSE PRACTITIONER

## 2020-05-21 PROCEDURE — 84443 ASSAY THYROID STIM HORMONE: CPT | Performed by: INTERNAL MEDICINE

## 2020-05-21 RX ORDER — NICOTINE POLACRILEX 4 MG
15 LOZENGE BUCCAL
Status: DISCONTINUED | OUTPATIENT
Start: 2020-05-21 | End: 2020-05-25 | Stop reason: HOSPADM

## 2020-05-21 RX ORDER — MORPHINE SULFATE 2 MG/ML
4 INJECTION, SOLUTION INTRAMUSCULAR; INTRAVENOUS ONCE
Status: COMPLETED | OUTPATIENT
Start: 2020-05-21 | End: 2020-05-21

## 2020-05-21 RX ORDER — ACETAMINOPHEN 325 MG/1
650 TABLET ORAL EVERY 4 HOURS PRN
Status: DISCONTINUED | OUTPATIENT
Start: 2020-05-21 | End: 2020-05-25 | Stop reason: HOSPADM

## 2020-05-21 RX ORDER — METOCLOPRAMIDE HYDROCHLORIDE 5 MG/ML
10 INJECTION INTRAMUSCULAR; INTRAVENOUS ONCE
Status: COMPLETED | OUTPATIENT
Start: 2020-05-21 | End: 2020-05-21

## 2020-05-21 RX ORDER — DEXTROSE MONOHYDRATE 25 G/50ML
25 INJECTION, SOLUTION INTRAVENOUS
Status: DISCONTINUED | OUTPATIENT
Start: 2020-05-21 | End: 2020-05-25 | Stop reason: HOSPADM

## 2020-05-21 RX ORDER — HYDROMORPHONE HYDROCHLORIDE 1 MG/ML
0.5 INJECTION, SOLUTION INTRAMUSCULAR; INTRAVENOUS; SUBCUTANEOUS
Status: DISCONTINUED | OUTPATIENT
Start: 2020-05-21 | End: 2020-05-25 | Stop reason: HOSPADM

## 2020-05-21 RX ORDER — ACETAMINOPHEN 650 MG/1
650 SUPPOSITORY RECTAL EVERY 4 HOURS PRN
Status: DISCONTINUED | OUTPATIENT
Start: 2020-05-21 | End: 2020-05-25 | Stop reason: HOSPADM

## 2020-05-21 RX ORDER — SODIUM CHLORIDE 0.9 % (FLUSH) 0.9 %
10 SYRINGE (ML) INJECTION AS NEEDED
Status: DISCONTINUED | OUTPATIENT
Start: 2020-05-21 | End: 2020-05-25 | Stop reason: HOSPADM

## 2020-05-21 RX ORDER — INSULIN GLARGINE 100 [IU]/ML
15 INJECTION, SOLUTION SUBCUTANEOUS EVERY 12 HOURS SCHEDULED
Status: DISCONTINUED | OUTPATIENT
Start: 2020-05-21 | End: 2020-05-22

## 2020-05-21 RX ORDER — ONDANSETRON 2 MG/ML
4 INJECTION INTRAMUSCULAR; INTRAVENOUS EVERY 6 HOURS PRN
Status: DISCONTINUED | OUTPATIENT
Start: 2020-05-21 | End: 2020-05-25 | Stop reason: HOSPADM

## 2020-05-21 RX ORDER — ACETAMINOPHEN 160 MG/5ML
650 SOLUTION ORAL EVERY 4 HOURS PRN
Status: DISCONTINUED | OUTPATIENT
Start: 2020-05-21 | End: 2020-05-25 | Stop reason: HOSPADM

## 2020-05-21 RX ORDER — SODIUM CHLORIDE 0.9 % (FLUSH) 0.9 %
10 SYRINGE (ML) INJECTION EVERY 12 HOURS SCHEDULED
Status: DISCONTINUED | OUTPATIENT
Start: 2020-05-21 | End: 2020-05-25 | Stop reason: HOSPADM

## 2020-05-21 RX ORDER — SODIUM CHLORIDE 9 MG/ML
125 INJECTION, SOLUTION INTRAVENOUS CONTINUOUS
Status: DISCONTINUED | OUTPATIENT
Start: 2020-05-21 | End: 2020-05-24

## 2020-05-21 RX ADMIN — HYDROMORPHONE HYDROCHLORIDE 0.5 MG: 1 INJECTION, SOLUTION INTRAMUSCULAR; INTRAVENOUS; SUBCUTANEOUS at 04:35

## 2020-05-21 RX ADMIN — ONDANSETRON 4 MG: 2 INJECTION INTRAMUSCULAR; INTRAVENOUS at 16:56

## 2020-05-21 RX ADMIN — SODIUM CHLORIDE 200 ML/HR: 9 INJECTION, SOLUTION INTRAVENOUS at 07:54

## 2020-05-21 RX ADMIN — MORPHINE SULFATE 4 MG: 2 INJECTION, SOLUTION INTRAMUSCULAR; INTRAVENOUS at 00:16

## 2020-05-21 RX ADMIN — INSULIN LISPRO 3 UNITS: 100 INJECTION, SOLUTION INTRAVENOUS; SUBCUTANEOUS at 21:45

## 2020-05-21 RX ADMIN — SODIUM CHLORIDE 500 ML: 9 INJECTION, SOLUTION INTRAVENOUS at 00:09

## 2020-05-21 RX ADMIN — HYDROMORPHONE HYDROCHLORIDE 0.5 MG: 1 INJECTION, SOLUTION INTRAMUSCULAR; INTRAVENOUS; SUBCUTANEOUS at 07:03

## 2020-05-21 RX ADMIN — INSULIN LISPRO 4 UNITS: 100 INJECTION, SOLUTION INTRAVENOUS; SUBCUTANEOUS at 16:56

## 2020-05-21 RX ADMIN — SODIUM CHLORIDE 200 ML/HR: 9 INJECTION, SOLUTION INTRAVENOUS at 13:40

## 2020-05-21 RX ADMIN — METOCLOPRAMIDE 10 MG: 5 INJECTION, SOLUTION INTRAMUSCULAR; INTRAVENOUS at 00:15

## 2020-05-21 RX ADMIN — HYDROMORPHONE HYDROCHLORIDE 0.5 MG: 1 INJECTION, SOLUTION INTRAMUSCULAR; INTRAVENOUS; SUBCUTANEOUS at 02:35

## 2020-05-21 RX ADMIN — HYDROMORPHONE HYDROCHLORIDE 0.5 MG: 1 INJECTION, SOLUTION INTRAMUSCULAR; INTRAVENOUS; SUBCUTANEOUS at 09:04

## 2020-05-21 RX ADMIN — INSULIN LISPRO 6 UNITS: 100 INJECTION, SOLUTION INTRAVENOUS; SUBCUTANEOUS at 07:03

## 2020-05-21 RX ADMIN — ONDANSETRON 4 MG: 2 INJECTION INTRAMUSCULAR; INTRAVENOUS at 04:35

## 2020-05-21 RX ADMIN — ONDANSETRON 4 MG: 2 INJECTION INTRAMUSCULAR; INTRAVENOUS at 11:42

## 2020-05-21 RX ADMIN — HYDROMORPHONE HYDROCHLORIDE 0.5 MG: 1 INJECTION, SOLUTION INTRAMUSCULAR; INTRAVENOUS; SUBCUTANEOUS at 14:23

## 2020-05-21 RX ADMIN — INSULIN GLARGINE 15 UNITS: 100 INJECTION, SOLUTION SUBCUTANEOUS at 21:45

## 2020-05-21 RX ADMIN — SODIUM CHLORIDE 200 ML/HR: 9 INJECTION, SOLUTION INTRAVENOUS at 21:45

## 2020-05-21 RX ADMIN — HYDROMORPHONE HYDROCHLORIDE 0.5 MG: 1 INJECTION, SOLUTION INTRAMUSCULAR; INTRAVENOUS; SUBCUTANEOUS at 21:45

## 2020-05-21 RX ADMIN — HYDROMORPHONE HYDROCHLORIDE 0.5 MG: 1 INJECTION, SOLUTION INTRAMUSCULAR; INTRAVENOUS; SUBCUTANEOUS at 16:56

## 2020-05-21 RX ADMIN — SODIUM CHLORIDE 200 ML/HR: 9 INJECTION, SOLUTION INTRAVENOUS at 02:34

## 2020-05-21 RX ADMIN — HYDROMORPHONE HYDROCHLORIDE 0.5 MG: 1 INJECTION, SOLUTION INTRAMUSCULAR; INTRAVENOUS; SUBCUTANEOUS at 11:42

## 2020-05-21 RX ADMIN — INSULIN LISPRO 4 UNITS: 100 INJECTION, SOLUTION INTRAVENOUS; SUBCUTANEOUS at 11:42

## 2020-05-21 NOTE — PROGRESS NOTES
Clinical Pharmacy Services: Medication History    Edie Camacho is a 35 y.o. female presenting to [facility] for Hyperglycemia [R73.9]  Right ureteral stone [N20.1]  Elevated lipase [R74.8]  Leukocytosis, unspecified type [D72.829]  Acute pancreatitis without infection or necrosis, unspecified pancreatitis type [K85.90]    She  has a past medical history of Abdominal bloating with cramps, Bloating symptom, Diabetes mellitus (CMS/[facility]), Disease of thyroid gland, Gastritis, GERD (gastroesophageal reflux disease), H/O being hospitalized, History of kidney stones, History of staph infection (2009), Kidney stone, Large bowel obstruction (CMS/[facility]), Neuropathy, Pregnancy, Seasonal allergies, and Stricture of colon determined by endoscopy (CMS/[facility]).    Allergies as of 05/20/2020 - Reviewed 05/20/2020   Allergen Reaction Noted   • Bactrim [sulfamethoxazole-trimethoprim] Anaphylaxis 05/11/2019   • Ciprofloxacin Anaphylaxis 06/18/2016   • Sulfa antibiotics Anaphylaxis 06/18/2016   • Toradol [ketorolac tromethamine] Anxiety 08/04/2019       Medication information was obtained from: patient and pt's pharmacy  Pharmacy and Phone Number: [facility] - [address] - [phone] PH - [phone] FX    Prior to Admission Medications     Prescriptions Last Dose Informant Patient Reported? Taking?    busPIRone (BUSPAR) 7.5 MG tablet  Pharmacy Yes No    Take 7.5 mg by mouth 3 (Three) Times a Day.    fluticasone (FLONASE) 50 MCG/ACT nasal spray  Pharmacy Yes No    2 sprays into the nostril(s) as directed by provider Daily.    FREESTYLE LITE test strip   Yes No    gabapentin (NEURONTIN) 400 MG capsule  Pharmacy Yes No    Take 400 mg by mouth 3 (Three) Times a Day.    Insulin Glargine (BASAGLAR KWIKPEN) 100 UNIT/ML injection pen  Pharmacy Yes No    Inject 5 Units under the skin into the appropriate area as directed Every Night. Prescribed instructions: Takes 5 units sq every  night, increase to 10 units if Blood glucose >120.    levothyroxine (SYNTHROID, LEVOTHROID) 200 MCG tablet  Pharmacy Yes No    Take 200 mcg by mouth Every Morning.    metFORMIN (GLUCOPHAGE) 500 MG tablet  Pharmacy Yes No    Take 500 mg by mouth 3 (Three) Times a Day.    montelukast (SINGULAIR) 10 MG tablet  Pharmacy Yes No    Take 10 mg by mouth Daily.    pantoprazole (PROTONIX) 40 MG EC tablet  Pharmacy Yes No    Take 40 mg by mouth Daily.    potassium chloride (K-DUR) 10 MEQ CR tablet  Pharmacy Yes No    Take 10 mEq by mouth 2 (Two) Times a Day.    SITagliptin (JANUVIA) 100 MG tablet  Pharmacy Yes No    Take 100 mg by mouth Daily.    sucralfate (CARAFATE) 1 g tablet  Pharmacy Yes No    Take 1 g by mouth 4 (Four) Times a Day Before Meals & at Bedtime.            Medication notes: Based on the information obtained from patient and pt's pharmacy, following changes have been made to patient's PTA medication list.   Called pt's pharmacy to verify dose/frequency of medications.       -- Updated dose/frequency: buspirone, fluticasone, gabapentin, insulin glargine, levothyroxine, pantoprazole, and sucralfate    -- Removed: cefdinir, methocarbamol, and vitamin D    This medication list is complete to the best of my knowledge as of 5/21/2020    Please call pharmacy for any questions.     Stacey Dia, PharmD, BCPS  05/21/20 14:20

## 2020-05-21 NOTE — ED PROVIDER NOTES
EMERGENCY DEPARTMENT ENCOUNTER    Room Number:  23/23  Date of encounter:  5/21/2020  PCP: Layla Monique APRN    HPI:  Context: Edie Camacho is a 35 y.o. female who presents to the ED c/o chief complaint of upper abdominal pain.  Patient reports had EGD and colonoscopy yesterday.  Patient reports she was having some mild upper abdominal pain all day but worsened 1 to 2 hours prior to arrival.  Patient denies any events occurring prior to the worsening of the abdominal pain, lasted eating 3 hours before onset of more severe pain.  Pain is described as twisting.  Patient had one episode of emesis, nonbloody nonbilious.  Patient has right lower quadrant ostomy, has had normal ostomy output, no blood in her stool.  Patient denies any dysuria, no urinary frequency or urgency.  Patient denies any fever shakes chills or night sweats.    MEDICAL HISTORY REVIEW  Reviewed in EPIC    PAST MEDICAL HISTORY  Active Ambulatory Problems     Diagnosis Date Noted   • Colitis presumed infectious 05/12/2019   • Type 2 diabetes mellitus (CMS/[facility]) 05/12/2019   • Suprapubic pain, acute 05/12/2019   • Hyponatremia 05/12/2019   • Sepsis (CMS/HCC) 05/12/2019   • Metabolic acidosis 05/13/2019   • Chronic constipation 05/13/2019   • Other microscopic hematuria 05/14/2019   • Lower abdominal pain 06/18/2019   • Colitis 06/18/2019   • Large bowel obstruction (CMS/HCC) 06/20/2019   • Stricture of sigmoid colon (CMS/[facility]) 07/08/2019   • Large bowel anastomotic leak 07/14/2019     Resolved Ambulatory Problems     Diagnosis Date Noted   • Dehydration 05/12/2019     Past Medical History:   Diagnosis Date   • Abdominal bloating with cramps    • Bloating symptom    • Diabetes mellitus (CMS/[facility])    • Disease of thyroid gland    • Gastritis    • GERD (gastroesophageal reflux disease)    • H/O being hospitalized    • History of kidney stones    • History of staph infection 2009   • Kidney stone    • Neuropathy    • Pregnancy    • Seasonal  allergies    • Stricture of colon determined by endoscopy (CMS/[facility])        PAST SURGICAL HISTORY  Past Surgical History:   Procedure Laterality Date   •  SECTION N/A    • COLON RESECTION N/A 7/10/2019    Procedure: LAPAROSCOPIC LOW ANTERIOR  COLON RESECTION WITH DIVERTING LOOP ILEOSTOMY;  Surgeon: Roderick Arnold MD;  Location: [facility] MAIN OR;  Service: General   • COLON RESECTION WITH GARRIDO POUCH     • COLONOSCOPY N/A 2020    Procedure: COLONOSCOPY to Cecum via Colostomy and Rectum;  Surgeon: Roderick Arnold MD;  Location: [facility] ENDOSCOPY;  Service: General;  Laterality: N/A;  Colostomy Status   Post Surgical Anatomy   • COLONOSCOPY W/ BIOPSIES AND POLYPECTOMY     • COLOSTOMY     • DIAGNOSTIC LAPAROSCOPY N/A 2019    Procedure: DIAGNOSTIC LAPAROSCOPY;  Surgeon: Radha Loera MD;  Location: [facility] OR;  Service: General   • ENDOSCOPY N/A 2020    Procedure: ESOPHAGOGASTRODUODENOSCOPY with Bx's;  Surgeon: Roderick Arnold MD;  Location: [facility] ENDOSCOPY;  Service: General;  Laterality: N/A;  [place]   Reflux Esophagitis, Mild Duodenitis   • EXPLORATORY LAPAROTOMY N/A 2019    Procedure: COLON RESECTION WITH WAYNE'S PROCEDURE WITH FLEXIBLE SIGMOIDSCOPE;  Surgeon: Radha Loera MD;  Location: [facility] OR;  Service: General   • ILEOSTOMY     • NEPHROSTOMY TRACT DILATATION W/ LITHOTRIPSY         FAMILY HISTORY  Family History   Problem Relation Age of Onset   • Heart disease Father    • Malig Hyperthermia Neg Hx        SOCIAL HISTORY  Social History     Socioeconomic History   • Marital status:      Spouse name: Not on file   • Number of children: Not on file   • Years of education: Not on file   • Highest education level: Not on file   Tobacco Use   • Smoking status: Current Every Day Smoker     Packs/day: 0.50     Years: 21.00     Pack years: 10.50     Types: Cigarettes     Start date:    • Smokeless tobacco: Never Used   Substance and Sexual Activity   •  Alcohol use: No   • Drug use: No   • Sexual activity: Defer       ALLERGIES  Bactrim [sulfamethoxazole-trimethoprim]; Ciprofloxacin; Sulfa antibiotics; and Toradol [ketorolac tromethamine]    The patient's allergies have been reviewed    REVIEW OF SYSTEMS  All systems reviewed and negative except for those discussed in HPI.     PHYSICAL EXAM  I have reviewed the triage vital signs and nursing notes.  ED Triage Vitals [05/20/20 2012]   Temp Heart Rate Resp BP SpO2   97.7 °F (36.5 °C) 115 20 -- 97 %      Temp src Heart Rate Source Patient Position BP Location FiO2 (%)   Tympanic Monitor -- -- --     GENERAL: Mild distress secondary to pain  HENT: NCAT, PERRL, Nares patent  EYES: no scleral icterus  NECK: trachea midline, no ROM limitations  CV: regular rhythm, regular rate  RESPIRATORY: normal effort  ABDOMEN: soft, nondistended, upper abdominal tenderness, negative Spence's, right lower quadrant ostomy with normal-appearing output, left lower quadrant ostomy with covering patch in place.  : deferred  MUSCULOSKELETAL: no deformity  NEURO: alert, moves all extremities, follows commands  SKIN: warm, dry    LAB RESULTS  Recent Results (from the past 24 hour(s))   Comprehensive Metabolic Panel    Collection Time: 05/20/20  9:00 PM   Result Value Ref Range    Glucose 421 (C) 65 - 99 mg/dL    BUN 8 6 - 20 mg/dL    Creatinine 0.63 0.57 - 1.00 mg/dL    Sodium 134 (L) 136 - 145 mmol/L    Potassium 3.9 3.5 - 5.2 mmol/L    Chloride 98 98 - 107 mmol/L    CO2 18.9 (L) 22.0 - 29.0 mmol/L    Calcium 9.5 8.6 - 10.5 mg/dL    Total Protein 7.0 6.0 - 8.5 g/dL    Albumin 4.10 3.50 - 5.20 g/dL    ALT (SGPT) 55 (H) 1 - 33 U/L    AST (SGOT) 47 (H) 1 - 32 U/L    Alkaline Phosphatase 119 (H) 39 - 117 U/L    Total Bilirubin 0.4 0.2 - 1.2 mg/dL    eGFR Non African Amer 106 >60 mL/min/1.73    Globulin 2.9 gm/dL    A/G Ratio 1.4 g/dL    BUN/Creatinine Ratio 12.7 7.0 - 25.0    Anion Gap 17.1 (H) 5.0 - 15.0 mmol/L   Lipase    Collection Time:  05/20/20  9:00 PM   Result Value Ref Range    Lipase >3,000 (H) 13 - 60 U/L   hCG, Serum, Qualitative    Collection Time: 05/20/20  9:00 PM   Result Value Ref Range    HCG Qualitative Negative Negative   CBC Auto Differential    Collection Time: 05/20/20  9:00 PM   Result Value Ref Range    WBC 10.88 (H) 3.40 - 10.80 10*3/mm3    RBC 4.62 3.77 - 5.28 10*6/mm3    Hemoglobin 18.2 (H) 12.0 - 15.9 g/dL    Hematocrit 42.0 34.0 - 46.6 %    MCV 90.9 79.0 - 97.0 fL    MCH 39.4 (H) 26.6 - 33.0 pg    MCHC 43.3 (H) 31.5 - 35.7 g/dL    RDW 12.7 12.3 - 15.4 %    RDW-SD 41.3 37.0 - 54.0 fl    MPV 10.6 6.0 - 12.0 fL    Platelets 292 140 - 450 10*3/mm3   Manual Differential    Collection Time: 05/20/20  9:00 PM   Result Value Ref Range    Neutrophil % 64.0 42.7 - 76.0 %    Lymphocyte % 24.0 19.6 - 45.3 %    Monocyte % 7.0 5.0 - 12.0 %    Eosinophil % 4.0 0.3 - 6.2 %    Basophil % 1.0 0.0 - 1.5 %    Neutrophils Absolute 6.96 1.70 - 7.00 10*3/mm3    Lymphocytes Absolute 2.61 0.70 - 3.10 10*3/mm3    Monocytes Absolute 0.76 0.10 - 0.90 10*3/mm3    Eosinophils Absolute 0.44 (H) 0.00 - 0.40 10*3/mm3    Basophils Absolute 0.11 0.00 - 0.20 10*3/mm3    Anisocytosis Slight/1+ None Seen    Microcytes Slight/1+ None Seen    WBC Morphology Normal Normal    Platelet Morphology Normal Normal   Lactate Dehydrogenase    Collection Time: 05/20/20  9:00 PM   Result Value Ref Range     (H) 135 - 214 U/L   Urinalysis With Microscopic If Indicated (No Culture) - Urine, Clean Catch    Collection Time: 05/20/20  9:58 PM   Result Value Ref Range    Color, UA Yellow Yellow, Straw    Appearance, UA Clear Clear    pH, UA <=5.0 5.0 - 8.0    Specific Gravity, UA >=1.030 1.005 - 1.030    Glucose,  mg/dL (2+) (A) Negative    Ketones, UA 80 mg/dL (3+) (A) Negative    Bilirubin, UA Negative Negative    Blood, UA Moderate (2+) (A) Negative    Protein,  mg/dL (2+) (A) Negative    Leuk Esterase, UA Negative Negative    Nitrite, UA Negative Negative     Urobilinogen, UA 0.2 E.U./dL 0.2 - 1.0 E.U./dL   Urinalysis, Microscopic Only - Urine, Clean Catch    Collection Time: 05/20/20  9:58 PM   Result Value Ref Range    RBC, UA 21-30 (A) None Seen, 0-2 /HPF    WBC, UA 0-2 None Seen, 0-2 /HPF    Bacteria, UA None Seen None Seen /HPF    Squamous Epithelial Cells, UA 0-2 None Seen, 0-2 /HPF    Hyaline Casts, UA None Seen None Seen /LPF    Methodology Automated Microscopy    Blood Gas, Venous    Collection Time: 05/20/20 11:09 PM   Result Value Ref Range    Site N/A     pH, Venous 7.378 7.310 - 7.410 pH Units    pCO2, Venous 35.4 (L) 41.0 - 51.0 mm Hg    pO2, Venous 57.5 (H) 35.0 - 45.0 mm Hg    HCO3, Venous 20.9 (L) 22.0 - 28.0 mmol/L    Base Excess, Venous -3.6 mmol/L    O2 Saturation Calculated 89.1 (L) 92.0 - 99.0 %    Barometric Pressure for Blood Gas 751.7 mmHg    Modality Room Air     Rate 18 Breaths/minute       I ordered the above labs and reviewed the results.    RADIOLOGY  Ct Abdomen Pelvis With Contrast    Result Date: 5/20/2020  CT OF THE ABDOMEN AND PELVIS WITH CONTRAST  HISTORY: Upper abdominal pain. Nausea and vomiting.  COMPARISON: 05/13/2020  TECHNIQUE: Axial CT imaging was obtained from the dome the diaphragm to the symphysis. IV contrast was administered.  FINDINGS: Area of presumed scarring is again noted at the left lung base, measuring 1.1 x 1.1 cm. The stomach and duodenum appear unremarkable. The liver is enlarged and steatotic, measuring up to 20.2 cm in craniocaudal dimensions. Inflammatory stranding is seen about the pancreas, concerning for pancreatitis. This is particularly around the body and tail of the pancreas. There is no pancreatic ductal dilatation. There is no biliary dilatation. There is no evidence of gastric outlet obstruction. No peripancreatic collections are seen. The splenic vein is patent. Gallbladder is unremarkable. The adrenal glands and spleen are within normal limits. The kidneys enhance symmetrically. The patient has  mild to moderate right-sided hydroureteronephrosis. This is secondary to a 5 mm stone which is located within the mid to distal ureter. Its position is unchanged when compared to the prior exam. I think the degree of hydronephrosis is increased slightly when compared to prior exam. Additional nonobstructing stones are seen within both kidneys. No hydronephrosis is seen on the left. Urinary bladder appears unremarkable. Uterus is within normal limits. A right ovarian cyst is again noted. It is not significantly changed in size. There is a right lower quadrant ileostomy as was previously described, there is a parastomal hernia, which contains a loop of small bowel, without evidence of obstruction. There is also a left lower quadrant colostomy. There is fat identified within this parastomal hernia, although the patient also has a loop of transverse colon which extends into the hernia, without evidence of obstruction.       1. Pancreatitis involving the body and tail of pancreas. 2. Right lower quadrant ileostomy with bowel containing parastomal hernia, without evidence of obstruction. There is also a left lower quadrant colostomy with associated parastomal hernia. In contrast to prior study, the hernia sac now does contain a loop of transverse colon, without evidence of obstruction. 3. Right-sided hydroureteronephrosis secondary to a 5 mm stone within the mid to distal right ureter. Similar findings were present on prior exam, although the degree of hydronephrosis may have increased slightly.  Radiation dose reduction techniques were utilized, including automated exposure control and exposure modulation based on body size.  This report was finalized on 5/20/2020 11:19 PM by Dr. Marie Jeong M.D.        I ordered the above noted radiological studies. I reviewed the images and results. I agree with the radiologist interpretation.    PROCEDURES  Procedures    MEDICATIONS GIVEN IN ER  Medications   sodium chloride 0.9  % bolus 500 mL (500 mL Intravenous New Bag 5/21/20 0009)   sodium chloride 0.9 % bolus 1,000 mL (0 mL Intravenous Stopped 5/20/20 2248)   ondansetron (ZOFRAN) injection 8 mg (8 mg Intravenous Given 5/20/20 2103)   morphine injection 4 mg (4 mg Intravenous Given 5/20/20 2201)   insulin regular (humuLIN R,novoLIN R) injection 10 Units (10 Units Intravenous Given 5/20/20 2254)   iopamidol (ISOVUE-300) 61 % injection 100 mL (85 mL Intravenous Given by Other 5/20/20 2250)   morphine injection 4 mg (4 mg Intravenous Given 5/20/20 2312)   morphine injection 4 mg (4 mg Intravenous Given 5/21/20 0016)   metoclopramide (REGLAN) injection 10 mg (10 mg Intravenous Given 5/21/20 0015)       PROGRESS, DATA ANALYSIS, CONSULTS, AND MEDICAL DECISION MAKING  A complete history and physical exam have been performed.  All available laboratory and imaging results have been reviewed by myself prior to disposition.  Face mask and gloves were worn throughout the patient encounter, unless additional PPE was worn and specified below. Hand hygiene was performed before entering and after leaving the patient room.  MDM    ED Course as of May 21 0020   Wed May 20, 2020   2022 Discussed pertinent information from history and physical exam with patient.  Discussed differential diagnosis.  Discussed plan for ED evaluation/work-up/treatment.  All questions answered.  Patient is agreeable with plan.        [JG]   2350 Work-up significant for acute pancreatitis.  Patient also noted to have right ureteral stone which was present on prior CT imaging with associated hydronephrosis.  UA negative for infection.  [place] criteria 1.  Patient does have elevation of glucose but no DKA, given IV insulin, IV fluids.  Consulting hospitalist for admission.    [JG]   u May 21, 2020   0003 Patient reassessed.  Discussed ED findings, differential diagnosis, and the need for admission for evaluation/treatment.  They are agreeable to admission and all questions were  answered.        [JG]   0006 Patient status post 2 doses of 4 mg of IV morphine, currently complains of worsening abdominal pain.  Additional morphine ordered.    [JG]   0016 Phone call with Beatriz, CHIN for A.  Discussed the patient, relevant history, exam, diagnostics, ED findings/progress, and concerns. They agree to admit the patient to med-surg under Dr Dooley. Care assumed by the admitting physician at this time.        [JG]      ED Course User Index  [JG] Eris Connolly MD       AS OF 00:20 VITALS:    BP - 136/99  HR - 106  TEMP - 97.7 °F (36.5 °C) (Tympanic)  O2 SATS - 95%    DIAGNOSIS  Final diagnoses:   Acute pancreatitis without infection or necrosis, unspecified pancreatitis type   Elevated lipase   Leukocytosis, unspecified type   Hyperglycemia   Right ureteral stone         DISPOSITION  ADMISSION    Discussed treatment plan and reason for admission with pt/family and admitting physician.  Pt/family voiced understanding of the plan for admission for further testing/treatment as needed.          Eris Connolly MD  05/21/20 0020

## 2020-05-21 NOTE — NURSING NOTE
Note DM ed order per order set. Discuss with bedside RN. Will defer ed-assessment this afternoon d/t pt level of pain.

## 2020-05-21 NOTE — PLAN OF CARE
"Remains tachycardic 110\"s, c/o pain & nausea several times, medicated with some short term relief, dry heaves no emesis, up to BR with assist of 1, voiding cloudy blood tinge urine, urine starting to clear, Blood glucose remains in 200's, ileostomy with soft brown stool, pouch/bandage dry & intact, c/o muscle like spasms in abd, GI consulted, Endocrine consulted  "

## 2020-05-21 NOTE — ED TRIAGE NOTES
Pt reports upper abd pain that started today about an hour ago at rest. Pt reports NV Pt reports she had a scope yesterday. Pt reports her symptoms are new. Pt reports pain 10/10.     Pt was placed in a mask at triage.   This RN  Wore ppe

## 2020-05-21 NOTE — NURSING NOTE
CWOCN- patient has an ileostomy. She should be independent with pouch change. She says she uses convexity (1 piece) and a flat barrier ring. I will tube some over. Will see PRN but she has not been having any ostomy issues.

## 2020-05-21 NOTE — PROGRESS NOTES
Discharge Planning Assessment  [facility]     Patient Name: Edie Camacho  MRN: [id number]  Today's Date: 5/21/2020    Admit Date: 5/20/2020    Discharge Needs Assessment     Row Name 05/21/20 1323       Living Environment    Lives With  child(inez), dependent    Name(s) of Who Lives With Patient  2 sons ages 13 and 3    Current Living Arrangements  home/apartment/condo    Primary Care Provided by  self    Provides Primary Care For  no one, unable/limited ability to care for self    Family Caregiver if Needed  sibling(s)    Family Caregiver Names  brother- Sudheer Brooks [phone]    Quality of Family Relationships  helpful;involved;supportive    Able to Return to Prior Arrangements  yes       Resource/Environmental Concerns    Resource/Environmental Concerns  home accessibility    Home Accessibility Concerns  stairs to enter home 3 steps with no handrails to enter her single story home       Transition Planning    Patient/Family Anticipates Transition to  home with family    Patient/Family Anticipated Services at Transition  none    Transportation Anticipated  family or friend will provide       Discharge Needs Assessment    Concerns to be Addressed  denies needs/concerns at this time    Equipment Currently Used at Home  none    Anticipated Changes Related to Illness  none    Equipment Needed After Discharge  none    Current Discharge Risk  physical impairment        Discharge Plan     Row Name 05/21/20 1302       Plan    Plan  Plans home; denies needs.  Follow to see if the patient will need Humalog upon d/c.      Provided Post Acute Provider List?  Yes    Post Acute Provider List  Home Health;Nursing Home    Provided Post Acute Provider Quality & Resource List?  N/A    N/A Quality & Resource List Comment  The patient was provided with a HH/SNF list and not a print out of HH/nursing home compare lists from Medicare.gov as she currently denies any d/c needs.      Delivered To  Patient    Method of Delivery   In person    Patient/Family in Agreement with Plan  yes    Plan Comments  Met with the patient at bedside; explained role of CCP, verified facesheet and discussed discharge planning needs.  The patient plans to return home where she resides with her 2 sons ages 13 and 3 but will have assistance from her brother Sudheer Brooks [phone] if needed who can also assist with grocery needs for the patient.  The patient uses no DME, has 3 steps with no handrails to enter her single story home.  The patient's PCP is Layla Monique, pharmacy is [facility] in [place], is agreeable to using [facility] and she denies any trouble remembering to take her medication or with affording her medication.   The patient denies any HH/SNF history, denies any POA documents, states that although she drives herself to her appointments her uncle will transport her home upon d/c.  The patient was provided with a HH/SNF and not a print out of the HH/nursing home compare list from Medicare.gov as she currently denies any d/c needs.  The patient states that she was taking Basagalar insulin, Januvia and Carafate prior to admission and CCP will follow to see if she is d/c on Humalog.  CHIDI Jacobsen        Destination      Coordination has not been started for this encounter.      Durable Medical Equipment      Coordination has not been started for this encounter.      Dialysis/Infusion      Coordination has not been started for this encounter.      Home Medical Care      Coordination has not been started for this encounter.      Therapy      Coordination has not been started for this encounter.      Community Resources      Coordination has not been started for this encounter.          Demographic Summary     Row Name 05/21/20 0430       General Information    Admission Type  inpatient    Arrived From  home    Referral Source  admission list    Reason for Consult  discharge planning    Preferred Language  English      Used During This Interaction  no        Functional Status     Row Name 05/21/20 1323       Functional Status    Usual Activity Tolerance  good    Current Activity Tolerance  moderate       Functional Status, IADL    Medications  independent    Meal Preparation  independent    Housekeeping  independent    Laundry  independent    Shopping  independent       Mental Status    General Appearance WDL  WDL       Mental Status Summary    Recent Changes in Mental Status/Cognitive Functioning  no changes        Psychosocial    No documentation.       Abuse/Neglect    No documentation.       Legal    No documentation.       Substance Abuse    No documentation.       Patient Forms     Row Name 05/21/20 1323       Patient Forms    Provider Choice List  Delivered    Delivered to  Patient    Method of delivery  In person            CHIDI Lebron

## 2020-05-21 NOTE — PLAN OF CARE
Problem: Patient Care Overview  Goal: Plan of Care Review  Flowsheets (Taken 5/21/2020 0513)  Progress: no change  Plan of Care Reviewed With: patient  Note:   C/O epigastric pain. C/O nausea. No emesis at this time. Medicated with dilaudid and zofran. Afebrile. Slightly tachy. Ivfs at 200cc/hr per orders. Pt is NPO. Monitoring blood sugars

## 2020-05-21 NOTE — CONSULTS
[facility]  Initial Inpatient Consult Note    Referring Provider: Dr Rogers    Reason for Consultation: Pancreatitis    Subjective     History of present illness:    35 y.o. female with hx of LAR with diverting colostomy in 2019, admitted with 24hrs of intense upper abdominal pain. Pain in epigastrium with radiation to back.  Pain associated with vomiting.  Ct scan in ER shows evidence of acute pancreatitis in body/tail of pancreas without other complicating features. Lipase >2000.  No prior hx of pancreatitis. No EtOh use.      Interesting the patient had EGD colonoscopy performed under [facility] on day prior to onset of her symptoms.      Past Medical History:  Past Medical History:   Diagnosis Date   • Abdominal bloating with cramps    • Bloating symptom    • Diabetes mellitus (CMS/HCC)    • Disease of thyroid gland    • Gastritis    • GERD (gastroesophageal reflux disease)    • H/O being hospitalized     TWO RECENT EPISODES S/P BOWEL OBSTRUCTIONS    • History of kidney stones    • History of staph infection     S/P TATTOO-ON BACK    • Kidney stone    • Large bowel obstruction (CMS/HCC)    • Neuropathy     LOWER EXTRIMITES   • Pregnancy    • Seasonal allergies    • Stricture of colon determined by endoscopy (CMS/HCC)      Past Surgical History:  Past Surgical History:   Procedure Laterality Date   •  SECTION N/A    • COLON RESECTION N/A 7/10/2019    Procedure: LAPAROSCOPIC LOW ANTERIOR  COLON RESECTION WITH DIVERTING LOOP ILEOSTOMY;  Surgeon: Roderick Arnold MD;  Location: [facility] OR;  Service: General   • COLON RESECTION WITH GARRIDO POUCH     • COLONOSCOPY N/A 2020    Procedure: COLONOSCOPY to Cecum via Colostomy and Rectum;  Surgeon: Roderick Arnold MD;  Location: [facility] ENDOSCOPY;  Service: General;  Laterality: N/A;  Colostomy Status   Post Surgical Anatomy   • COLONOSCOPY W/ BIOPSIES AND POLYPECTOMY     • COLOSTOMY     • DIAGNOSTIC LAPAROSCOPY N/A  7/11/2019    Procedure: DIAGNOSTIC LAPAROSCOPY;  Surgeon: Radha Loera MD;  Location: [facility] MAIN OR;  Service: General   • ENDOSCOPY N/A 5/19/2020    Procedure: ESOPHAGOGASTRODUODENOSCOPY with Bx's;  Surgeon: Roderick Arnold MD;  Location: [facility] ENDOSCOPY;  Service: General;  Laterality: N/A;  Relfux   Reflux Esophagitis, Mild Duodenitis   • EXPLORATORY LAPAROTOMY N/A 7/11/2019    Procedure: COLON RESECTION WITH WAYNE'S PROCEDURE WITH FLEXIBLE SIGMOIDSCOPE;  Surgeon: Radha Loera MD;  Location: [facility] MAIN OR;  Service: General   • ILEOSTOMY     • NEPHROSTOMY TRACT DILATATION W/ LITHOTRIPSY        Social History:   Social History     Tobacco Use   • Smoking status: Current Every Day Smoker     Packs/day: 0.50     Years: 21.00     Pack years: 10.50     Types: Cigarettes     Start date: 1969   • Smokeless tobacco: Never Used   Substance Use Topics   • Alcohol use: No      Family History:  Family History   Problem Relation Age of Onset   • Heart disease Father    • Malig Hyperthermia Neg Hx        Home Meds:  Medications Prior to Admission   Medication Sig Dispense Refill Last Dose   • busPIRone (BUSPAR) 7.5 MG tablet Take 7.5 mg by mouth 3 (Three) Times a Day.   5/18/2020 at Unknown time   • fluticasone (FLONASE) 50 MCG/ACT nasal spray 2 sprays into the nostril(s) as directed by provider Daily.   5/18/2020 at Unknown time   • FREESTYLE LITE test strip    Taking   • gabapentin (NEURONTIN) 400 MG capsule Take 400 mg by mouth 3 (Three) Times a Day.   5/18/2020 at Unknown time   • Insulin Glargine (BASAGLAR KWIKPEN) 100 UNIT/ML injection pen Inject 5 Units under the skin into the appropriate area as directed Every Night. Prescribed instructions: Takes 5 units sq every night, increase to 10 units if Blood glucose >120.   Taking   • levothyroxine (SYNTHROID, LEVOTHROID) 200 MCG tablet Take 200 mcg by mouth Every Morning.   5/18/2020 at Unknown time   • metFORMIN (GLUCOPHAGE) 500 MG tablet Take 500 mg by mouth  3 (Three) Times a Day.   5/18/2020 at Unknown time   • montelukast (SINGULAIR) 10 MG tablet Take 10 mg by mouth Daily.   5/18/2020 at Unknown time   • pantoprazole (PROTONIX) 40 MG EC tablet Take 40 mg by mouth Daily.   5/19/2020 at Unknown time   • potassium chloride (K-DUR) 10 MEQ CR tablet Take 10 mEq by mouth 2 (Two) Times a Day.   5/18/2020 at Unknown time   • SITagliptin (JANUVIA) 100 MG tablet Take 100 mg by mouth Daily.   5/18/2020 at Unknown time   • sucralfate (CARAFATE) 1 g tablet Take 1 g by mouth 4 (Four) Times a Day Before Meals & at Bedtime.   5/18/2020 at Unknown time     Current Meds:     insulin lispro 0-9 Units Subcutaneous TID AC   sodium chloride 10 mL Intravenous Q12H     Allergies:  Allergies   Allergen Reactions   • Bactrim [Sulfamethoxazole-Trimethoprim] Anaphylaxis   • Ciprofloxacin Anaphylaxis   • Sulfa Antibiotics Anaphylaxis   • Toradol [Ketorolac Tromethamine] Anxiety     Review of Systems  All systems were reviewed and negative except for:  Gastrointestinal: positive for  nausea and pain     Objective     Vital Signs  Temp:  [97.1 °F (36.2 °C)-98.4 °F (36.9 °C)] 97.1 °F (36.2 °C)  Heart Rate:  [] 118  Resp:  [16-20] 18  BP: (132-165)/() 165/96  Physical Exam:  General Appearance:     Alert, cooperative, in no acute distress   Head:    Normocephalic, without obvious abnormality, atraumatic   Eyes:            Lids and lashes normal, conjunctivae and sclerae normal, no icterus   Throat:   No oral lesions, no thrush, oral mucosa moist   Neck:   No adenopathy, supple, trachea midline, no thyromegaly, no carotid bruit, no JVD   Lungs:     Clear to auscultation,respirations regular, even and            unlabored    Heart:    Regular rhythm and normal rate, normal S1 and S2, no        murmur, no gallop, no rub, no click   Chest Wall:    No abnormalities observed   Abdomen:     TTP in mid abdomen, no guarding.  Ostomy site clean   Rectal:     Deferred   Extremities:   no edema, no  cyanosis, no redness   Skin:   No bleeding, bruising or rash   Lymph nodes:   No palpable adenopathy   Psychiatric:  Judgement and insight: normal   Orientation to person place and time: normal   Mood and affect: normal   Results Review:   I reviewed the patient's new clinical results.  I reviewed the patient's new imaging results and agree with the interpretation.    Results from last 7 days   Lab Units 05/21/20  0440 05/20/20  2100   WBC 10*3/mm3 11.60* 10.88*   HEMOGLOBIN g/dL 13.4 13.6   HEMATOCRIT % 38.0 40.0   PLATELETS 10*3/mm3 280 292     Results from last 7 days   Lab Units 05/21/20  0713 05/20/20  2100   SODIUM mmol/L 138 134*   POTASSIUM mmol/L 4.1 3.9   CHLORIDE mmol/L 107 98   CO2 mmol/L 13.0* 18.9*   BUN mg/dL 6 8   CREATININE mg/dL 0.57 0.63   CALCIUM mg/dL 8.5* 9.5   BILIRUBIN mg/dL  --  0.4   ALK PHOS U/L  --  119*   ALT (SGPT) U/L  --  55*   AST (SGOT) U/L  --  47*   GLUCOSE mg/dL 283* 421*         Lab Results   Lab Value Date/Time    LIPASE >3,000 (H) 05/20/2020 2100    LIPASE 80 (H) 08/04/2019 1604    LIPASE 48 06/20/2019 1716    LIPASE 47 05/12/2019 0046    LIPASE 37 06/18/2016 0255       Radiology:  CT Abdomen Pelvis With Contrast   Final Result       1. Pancreatitis involving the body and tail of pancreas.   2. Right lower quadrant ileostomy with bowel containing parastomal   hernia, without evidence of obstruction. There is also a left lower   quadrant colostomy with associated parastomal hernia. In contrast to   prior study, the hernia sac now does contain a loop of transverse colon,   without evidence of obstruction.   3. Right-sided hydroureteronephrosis secondary to a 5 mm stone within   the mid to distal right ureter. Similar findings were present on prior   exam, although the degree of hydronephrosis may have increased slightly.       Radiation dose reduction techniques were utilized, including automated   exposure control and exposure modulation based on body size.       This report  was finalized on 5/20/2020 11:19 PM by Dr. Marie Jeong M.D.              Assessment/Plan   Assessment:   1. Acute pancreatitis   2. R sided hydronephrosis  3. Prior sigmoid colon resection with colostomy  4. Elevated triglycerides    Plan:   I suspect the etiology of the patient's acute pancreatitis is hypertriglyceridemia as her triglycerides are greater than 4000 on admission.  She does have some baseline hypertriglyceridemia and I wonder if this possibly could have been exacerbated by her recent endoscopic procedures and use of propofol or if this is just secondary to poorly controlled triglycerides in the outpatient setting.  UA I recommend endocrinology consult for further management of her elevated triglycerides.  I would continue with aggressive IV fluids and pain management at this time.  If she has improvement in her symptoms tomorrow and we could begin a clear liquid diet and advance as tolerated    I discussed the patients findings and my recommendations with patient.         Samuel Stringer M.D.  [facility]  [address] [place] [address]  Office: [phone]

## 2020-05-21 NOTE — H&P
Patient Name:  Edie Camacho  YOB: 1985  MRN:  [id number]  Admit Date:  5/20/2020  Patient Care Team:  Layla Monique APRN as PCP - General (Family Medicine)      Chief Complaint   Patient presents with   • Abdominal Pain   • Vomiting   • Nausea       Subjective     Ms. Camacho is a 35 y.o. female with a history of DM2, hypothyroidism, ileostomy that presents to [facility] complaining of abdominal pain, nausea and vomiting. Patient reports constant sharp epigastric abdominal pain beginning today, no aggravating or alleviating factors and does not radiate. She also reports associated nausea and vomiting since the pain began. During exam, patient writhing in pain and was unable to obtain the most thorough history, though she denies fever, chest pain, shortness of breath, changes in bowel or bladder habits, edema. She is diabetic and her blood glucose on arrival tonight was 421, which was treated while in ED. CT abdomen showed pancreatitis and right sided hydronephrosis secondary to stone which was known but may have increased in size since previous imaging. Patient was given pain medication while in ED, though does not appear to have had much relief. She denies history of pancreatitis in the past or alcohol use. Of note, patient did have EGD and colonoscopy performed yesterday.      History of Present Illness    Past Medical History:   Diagnosis Date   • Abdominal bloating with cramps    • Bloating symptom    • Diabetes mellitus (CMS/HCC)    • Disease of thyroid gland    • Gastritis    • GERD (gastroesophageal reflux disease)    • H/O being hospitalized     TWO RECENT EPISODES S/P BOWEL OBSTRUCTIONS    • History of kidney stones    • History of staph infection 2009    S/P TATTOO-ON BACK    • Kidney stone    • Large bowel obstruction (CMS/HCC)    • Neuropathy     LOWER EXTRIMITES   • Pregnancy    • Seasonal allergies    • Stricture of colon determined by endoscopy  (CMS/[facility])      Past Surgical History:   Procedure Laterality Date   •  SECTION N/A    • COLON RESECTION N/A 7/10/2019    Procedure: LAPAROSCOPIC LOW ANTERIOR  COLON RESECTION WITH DIVERTING LOOP ILEOSTOMY;  Surgeon: Roderick Arnold MD;  Location: [facility] MAIN OR;  Service: General   • COLON RESECTION WITH GARRIDO POUCH     • COLONOSCOPY N/A 2020    Procedure: COLONOSCOPY to Cecum via Colostomy and Rectum;  Surgeon: Roderick Arnold MD;  Location: [facility] ENDOSCOPY;  Service: General;  Laterality: N/A;  Colostomy Status   Post Surgical Anatomy   • COLONOSCOPY W/ BIOPSIES AND POLYPECTOMY     • COLOSTOMY     • DIAGNOSTIC LAPAROSCOPY N/A 2019    Procedure: DIAGNOSTIC LAPAROSCOPY;  Surgeon: Radha Loera MD;  Location: [facility] MAIN OR;  Service: General   • ENDOSCOPY N/A 2020    Procedure: ESOPHAGOGASTRODUODENOSCOPY with Bx's;  Surgeon: Roderick Arnold MD;  Location: [facility] ENDOSCOPY;  Service: General;  Laterality: N/A;  Relfux   Reflux Esophagitis, Mild Duodenitis   • EXPLORATORY LAPAROTOMY N/A 2019    Procedure: COLON RESECTION WITH WAYNE'S PROCEDURE WITH FLEXIBLE SIGMOIDSCOPE;  Surgeon: Radha Loera MD;  Location: [facility] MAIN OR;  Service: General   • ILEOSTOMY     • NEPHROSTOMY TRACT DILATATION W/ LITHOTRIPSY       Family History   Problem Relation Age of Onset   • Heart disease Father    • Malig Hyperthermia Neg Hx      Social History     Tobacco Use   • Smoking status: Current Every Day Smoker     Packs/day: 0.50     Years: 21.00     Pack years: 10.50     Types: Cigarettes     Start date:    • Smokeless tobacco: Never Used   Substance Use Topics   • Alcohol use: No   • Drug use: No       (Not in a hospital admission)  Allergies:    Allergies   Allergen Reactions   • Bactrim [Sulfamethoxazole-Trimethoprim] Anaphylaxis   • Ciprofloxacin Anaphylaxis   • Sulfa Antibiotics Anaphylaxis   • Toradol [Ketorolac Tromethamine] Anxiety       Review of Systems   Constitutional:  Negative.  Negative for chills and fever.   HENT: Negative for congestion and sore throat.    Eyes: Negative.  Negative for visual disturbance.   Respiratory: Negative.  Negative for cough and shortness of breath.    Cardiovascular: Negative.  Negative for chest pain.   Gastrointestinal: Positive for abdominal pain, nausea and vomiting. Negative for constipation and diarrhea.   Endocrine: Negative.    Genitourinary: Negative.  Negative for dysuria, frequency and urgency.   Musculoskeletal: Negative.  Negative for arthralgias and myalgias.   Skin: Negative.  Negative for color change and pallor.   Allergic/Immunologic: Negative.    Neurological: Negative.  Negative for dizziness, weakness and light-headedness.   Hematological: Negative.    Psychiatric/Behavioral: Negative.  Negative for agitation and behavioral problems.        Objective    Vital Signs  Temp:  [97.7 °F (36.5 °C)] 97.7 °F (36.5 °C)  Heart Rate:  [] 106  Resp:  [18-20] 18  BP: (136-156)/() 136/99  SpO2:  [95 %-97 %] 95 %  on   ;      Body mass index is 33.06 kg/m².    Physical Exam   Constitutional: She is oriented to person, place, and time. She appears well-developed and well-nourished. No distress.   HENT:   Head: Normocephalic and atraumatic.   Eyes: EOM are normal.   Neck: Normal range of motion. Neck supple.   Cardiovascular: Normal rate, regular rhythm and intact distal pulses.   Pulmonary/Chest: Effort normal. She has decreased breath sounds in the right lower field and the left lower field.   Abdominal: Soft. She exhibits no distension. Bowel sounds are decreased. There is generalized tenderness. There is no rigidity and no guarding.   Ileostomy present to RLQ   Musculoskeletal: Normal range of motion. She exhibits no edema.   Neurological: She is alert and oriented to person, place, and time.   Skin: Skin is warm and dry. She is not diaphoretic. No erythema.   Psychiatric: She has a normal mood and affect. Her behavior is normal.    Nursing note and vitals reviewed.      Results Review:   I reviewed the patient's new clinical results including all labs and xrays.    Lab Results (last 24 hours)     Procedure Component Value Units Date/Time    CBC & Differential [[id number]] Collected:  05/20/20 2100    Specimen:  Blood Updated:  05/20/20 2127    Narrative:       The following orders were created for panel order CBC & Differential.  Procedure                               Abnormality         Status                     ---------                               -----------         ------                     CBC Auto Differential[[id number]]        Abnormal            Final result                 Please view results for these tests on the individual orders.    Comprehensive Metabolic Panel [[id number]]  (Abnormal) Collected:  05/20/20 2100    Specimen:  Blood Updated:  05/20/20 2226     Glucose 421 mg/dL      BUN 8 mg/dL      Creatinine 0.63 mg/dL      Sodium 134 mmol/L      Potassium 3.9 mmol/L      Chloride 98 mmol/L      CO2 18.9 mmol/L      Calcium 9.5 mg/dL      Total Protein 7.0 g/dL      Albumin 4.10 g/dL      ALT (SGPT) 55 U/L      AST (SGOT) 47 U/L      Comment: Specimen hemolyzed.  Results may be affected.        Alkaline Phosphatase 119 U/L      Total Bilirubin 0.4 mg/dL      eGFR Non African Amer 106 mL/min/1.73      Globulin 2.9 gm/dL      A/G Ratio 1.4 g/dL      BUN/Creatinine Ratio 12.7     Anion Gap 17.1 mmol/L     Narrative:       GFR Normal >60  Chronic Kidney Disease <60  Kidney Failure <15      Lipase [[id number]]  (Abnormal) Collected:  05/20/20 2100    Specimen:  Blood Updated:  05/20/20 2234     Lipase >3,000 U/L     hCG, Serum, Qualitative [[id number]]  (Normal) Collected:  05/20/20 2100    Specimen:  Blood Updated:  05/20/20 2150     HCG Qualitative Negative    CBC Auto Differential [[id number]]  (Abnormal) Collected:  05/20/20 2100    Specimen:  Blood Updated:  05/20/20 2127     WBC 10.88 10*3/mm3      RBC 4.62 10*6/mm3       Hemoglobin 18.2 g/dL      Hematocrit 42.0 %      MCV 90.9 fL      MCH 39.4 pg      MCHC 43.3 g/dL      RDW 12.7 %      RDW-SD 41.3 fl      MPV 10.6 fL      Platelets 292 10*3/mm3     Manual Differential [[id number]]  (Abnormal) Collected:  05/20/20 2100    Specimen:  Blood Updated:  05/20/20 2137     Neutrophil % 64.0 %      Lymphocyte % 24.0 %      Monocyte % 7.0 %      Eosinophil % 4.0 %      Basophil % 1.0 %      Neutrophils Absolute 6.96 10*3/mm3      Lymphocytes Absolute 2.61 10*3/mm3      Monocytes Absolute 0.76 10*3/mm3      Eosinophils Absolute 0.44 10*3/mm3      Basophils Absolute 0.11 10*3/mm3      Anisocytosis Slight/1+     Microcytes Slight/1+     WBC Morphology Normal     Platelet Morphology Normal    Lactate Dehydrogenase [[id number]]  (Abnormal) Collected:  05/20/20 2100    Specimen:  Blood Updated:  05/20/20 2323      U/L     Urinalysis With Microscopic If Indicated (No Culture) - Urine, Clean Catch [[id number]]  (Abnormal) Collected:  05/20/20 2158    Specimen:  Urine, Clean Catch Updated:  05/20/20 2214     Color, UA Yellow     Appearance, UA Clear     pH, UA <=5.0     Specific Gravity, UA >=1.030     Glucose,  mg/dL (2+)     Ketones, UA 80 mg/dL (3+)     Bilirubin, UA Negative     Blood, UA Moderate (2+)     Protein,  mg/dL (2+)     Leuk Esterase, UA Negative     Nitrite, UA Negative     Urobilinogen, UA 0.2 E.U./dL    Urinalysis, Microscopic Only - Urine, Clean Catch [[id number]]  (Abnormal) Collected:  05/20/20 2158    Specimen:  Urine, Clean Catch Updated:  05/20/20 2214     RBC, UA 21-30 /HPF      WBC, UA 0-2 /HPF      Bacteria, UA None Seen /HPF      Squamous Epithelial Cells, UA 0-2 /HPF      Hyaline Casts, UA None Seen /LPF      Methodology Automated Microscopy    Blood Gas, Venous [[id number]]  (Abnormal) Collected:  05/20/20 2309    Specimen:  Venous Blood Updated:  05/20/20 2310     Site N/A     pH, Venous 7.378 pH Units      pCO2, Venous 35.4 mm Hg      pO2, Venous 57.5 mm  Hg      HCO3, Venous 20.9 mmol/L      Base Excess, Venous -3.6 mmol/L      O2 Saturation Calculated 89.1 %      Barometric Pressure for Blood Gas 751.7 mmHg      Modality Room Air     Rate 18 Breaths/minute           CT Abdomen Pelvis With Contrast   Final Result       1. Pancreatitis involving the body and tail of pancreas.   2. Right lower quadrant ileostomy with bowel containing parastomal   hernia, without evidence of obstruction. There is also a left lower   quadrant colostomy with associated parastomal hernia. In contrast to   prior study, the hernia sac now does contain a loop of transverse colon,   without evidence of obstruction.   3. Right-sided hydroureteronephrosis secondary to a 5 mm stone within   the mid to distal right ureter. Similar findings were present on prior   exam, although the degree of hydronephrosis may have increased slightly.       Radiation dose reduction techniques were utilized, including automated   exposure control and exposure modulation based on body size.       This report was finalized on 5/20/2020 11:19 PM by Dr. Marie Jeong M.D.            Assessment/Plan      Active Hospital Problems    Diagnosis  POA   • **Acute pancreatitis without infection or necrosis [K85.90]  Yes   • Hypothyroidism (acquired) [E03.9]  Yes   • Type 2 diabetes mellitus (CMS/HCC) [E11.9]  Yes      Resolved Hospital Problems   No resolved problems to display.     Acute pancreatitis  -unclear etiology at this time  -check lipid panel in AM  -aggressive IVF  -continue pain medication and antiemetics PRN  -NPO    DM2  -hyperglycemic on arrival, hold home dose januvia and metformin and start moderate dose correctional factor insulin Q6 hrs while NPO  -a1c in AM    Hypothyroidism  -may continue home meds once taking PO    VTE Ppx  -SCDs    CODE status  -full    I discussed the patients findings and my recommendations with patient.    JEFE Tucker  [facility]  05/21/20  12:31 AM

## 2020-05-21 NOTE — CONSULTS
35 y.o.  Patient Care Team:  Layla Monique APRN as PCP - General (Family Medicine)      Chief Complaint   Patient presents with   • Abdominal Pain   • Vomiting   • Nausea   Uncontrolled type 2 diabetes mellitus, hypertriglyceridemia and acute pancreatitis    HPI   Patient is a 35-year-old white female with a history of uncontrolled type 2 diabetes mellitus and hypertriglyceridemia admitted to the hospital with acute pancreatitis  She was admitted to the hospital with abdominal pain nausea and vomiting and was evaluated in the emergency room and CT scan confirmed acute pancreatitis with right-sided hydronephrosis secondary to a stone as well  Patient is currently receiving pain medication and is very groggy and was able to provide only partial history    Patient reported that she is a known diabetic for at least 16 years.  Apparently she developed diabetes along with last pregnancy and actually 14 years old  She reports symptoms suggestive of diabetic peripheral neuropathy  Patient's home regimen includes Basaglar daily Glucophage 500 mg 3 times daily, Januvia 100 mg daily and gabapentin 400 3 times daily    Hypothyroidism  Patient has history of hypothyroidism and is currently on medication levothyroxine 200 mcg daily.  Patient denied any knowledge of diabetic retinopathy or nephropathy  Patient apparently had EGD and colonoscopy yesterday prior to admission  Patient has a history of 2 episodes of small bowel obstruction in June 2019 and subsequently has ileostomy which apparently was supposed to be closed soon at some point according to the patient            Past Medical History:   Diagnosis Date   • Abdominal bloating with cramps    • Bloating symptom    • Diabetes mellitus (CMS/HCC)    • Disease of thyroid gland    • Gastritis    • GERD (gastroesophageal reflux disease)    • H/O being hospitalized     TWO RECENT EPISODES S/P BOWEL OBSTRUCTIONS    • History of kidney stones    • History of staph  infection 2009    S/P TATTOO-ON BACK    • Kidney stone    • Large bowel obstruction (CMS/[facility])    • Neuropathy     LOWER EXTRIMITES   • Pregnancy    • Seasonal allergies    • Stricture of colon determined by endoscopy (CMS/[facility])        Family History   Problem Relation Age of Onset   • Heart disease Father    • Malig Hyperthermia Neg Hx        Social History     Socioeconomic History   • Marital status:      Spouse name: Not on file   • Number of children: Not on file   • Years of education: Not on file   • Highest education level: Not on file   Tobacco Use   • Smoking status: Current Every Day Smoker     Packs/day: 0.50     Years: 21.00     Pack years: 10.50     Types: Cigarettes     Start date: 1969   • Smokeless tobacco: Never Used   Substance and Sexual Activity   • Alcohol use: No   • Drug use: No   • Sexual activity: Defer       Allergies   Allergen Reactions   • Bactrim [Sulfamethoxazole-Trimethoprim] Anaphylaxis   • Ciprofloxacin Anaphylaxis   • Sulfa Antibiotics Anaphylaxis   • Toradol [Ketorolac Tromethamine] Anxiety         Current Facility-Administered Medications:   •  acetaminophen (TYLENOL) tablet 650 mg, 650 mg, Oral, Q4H PRN **OR** acetaminophen (TYLENOL) 160 MG/5ML solution 650 mg, 650 mg, Oral, Q4H PRN **OR** acetaminophen (TYLENOL) suppository 650 mg, 650 mg, Rectal, Q4H PRN, Beatriz Martinez, APRN  •  dextrose (D50W) 25 g/ 50mL Intravenous Solution 25 g, 25 g, Intravenous, Q15 Min PRN, Beatriz Martinez, APRN  •  dextrose (GLUTOSE) oral gel 15 g, 15 g, Oral, Q15 Min PRN, Beatriz Martinez, APRN  •  glucagon (human recombinant) (GLUCAGEN DIAGNOSTIC) injection 1 mg, 1 mg, Subcutaneous, PRN, Beatriz Martinez, APRN  •  HYDROmorphone (DILAUDID) injection 0.5 mg, 0.5 mg, Intravenous, Q2H PRN, Beatriz Martinez APRN, 0.5 mg at 05/21/20 1656  •  insulin lispro (humaLOG) injection 0-9 Units, 0-9 Units, Subcutaneous, TID AC, Beatriz Martinez, APRN, 4 Units at 05/21/20 1656  •  ondansetron (ZOFRAN)  injection 4 mg, 4 mg, Intravenous, Q6H PRN, English Beatriz C, APRN, 4 mg at 05/21/20 1656  •  sodium chloride 0.9 % flush 10 mL, 10 mL, Intravenous, Q12H, English, Beatriz C, APRN  •  sodium chloride 0.9 % flush 10 mL, 10 mL, Intravenous, PRN, English, Beatriz C, APRN  •  sodium chloride 0.9 % infusion, 200 mL/hr, Intravenous, Continuous, English, Beatriz C, APRN, Last Rate: 200 mL/hr at 05/21/20 1340, 200 mL/hr at 05/21/20 1340         Review of Systems   Constitutional: Positive for fatigue.   Eyes: Negative.    Respiratory: Negative.    Cardiovascular: Negative.    Gastrointestinal: Positive for abdominal distention and abdominal pain. Negative for nausea.   Endocrine: Negative.    Skin: Negative.    Neurological: Positive for dizziness, weakness, numbness and headaches.   Psychiatric/Behavioral: Negative.    All other systems reviewed and are negative.    Objective     Vital Signs  Temp:  [97.1 °F (36.2 °C)-98.4 °F (36.9 °C)] 97.1 °F (36.2 °C)  Heart Rate:  [] 111  Resp:  [16-20] 18  BP: (132-165)/() 147/101    Physical Exam  Physical Exam   Constitutional: She is oriented to person, place, and time.   Eyes: Pupils are equal, round, and reactive to light. EOM are normal.   No circumorbital puffiness   Neck: Normal range of motion. Neck supple. No thyromegaly present.   Cardiovascular: Normal rate, regular rhythm, normal heart sounds and intact distal pulses.   Pulmonary/Chest: Effort normal and breath sounds normal.   Abdominal: Soft. Bowel sounds are normal. She exhibits no distension. There is no tenderness.   Musculoskeletal: Normal range of motion. She exhibits no edema.   Neurological: She is alert and oriented to person, place, and time.   Skin: Skin is warm and dry.   Negative for acanthosis and vitiligo or purple striae   Psychiatric: She has a normal mood and affect. Her behavior is normal.   Nursing note and vitals reviewed.      Results Review:    I reviewed the patient's new clinical  results.  Glucose   Date/Time Value Ref Range Status   05/21/2020 1646 231 (H) 70 - 130 mg/dL Final   05/21/2020 1102 224 (H) 70 - 130 mg/dL Final   05/21/2020 0552 272 (H) 70 - 130 mg/dL Final   05/21/2020 0202 296 (H) 70 - 130 mg/dL Final   05/19/2020 1434 274 (H) 70 - 130 mg/dL Final   05/19/2020 1304 299 (H) 70 - 130 mg/dL Final   05/19/2020 1154 354 (H) 70 - 130 mg/dL Final     Lab Results (last 72 hours)     Procedure Component Value Units Date/Time    POC Glucose Once [[id number]]  (Abnormal) Collected:  05/21/20 1646    Specimen:  Blood Updated:  05/21/20 1649     Glucose 231 mg/dL     CBC & Differential [[id number]] Collected:  05/20/20 2100    Specimen:  Blood Updated:  05/21/20 1120    Narrative:       The following orders were created for panel order CBC & Differential.  Procedure                               Abnormality         Status                     ---------                               -----------         ------                     CBC Auto Differential[[id number]]        Abnormal            Edited Result - FINAL        Please view results for these tests on the individual orders.    CBC Auto Differential [[id number]]  (Abnormal) Collected:  05/20/20 2100    Specimen:  Blood Updated:  05/21/20 1120     WBC 10.88 10*3/mm3      RBC 4.62 10*6/mm3      Hemoglobin 13.6 g/dL      Comment: Corrected result. Previous result was 18.2 g/dL on 5/20/2020 at [facility] EDT.        Hematocrit 40.0 %      Comment: Corrected result. Previous result was 42.0 % on 5/20/2020 at [facility] EDT.        MCV 86.6 fL      Comment: Corrected result. Previous result was 90.9 fL on 5/20/2020 at [facility] EDT.        MCH 29.4 pg      Comment: Corrected result. Previous result was 39.4 pg on 5/20/2020 at [facility] EDT.        MCHC 34.0 g/dL      Comment: Corrected result. Previous result was 43.3 g/dL on 5/20/2020 at [facility] EDT.        RDW 12.7 %      RDW-SD 41.3 fl      MPV 10.6 fL      Platelets 292 10*3/mm3     POC Glucose Once [[id number]]   (Abnormal) Collected:  05/21/20 1102    Specimen:  Blood Updated:  05/21/20 1104     Glucose 224 mg/dL     CBC (No Diff) [[id number]]  (Abnormal) Collected:  05/21/20 0440    Specimen:  Blood Updated:  05/21/20 0851     WBC 11.60 10*3/mm3      RBC 4.50 10*6/mm3      Hemoglobin 13.4 g/dL      Hematocrit 38.0 %      MCV 84.4 fL      MCH 29.8 pg      MCHC 35.3 g/dL      RDW 12.4 %      RDW-SD 40.6 fl      MPV 10.6 fL      Platelets 280 10*3/mm3     LDL Cholesterol, Direct [[id number]]  (Normal) Collected:  05/21/20 0713    Specimen:  Blood from Arm, Left Updated:  05/21/20 0848     LDL Cholesterol  47 mg/dL     Narrative:       LDL Reference Ranges    (U.S. Department of Health and Human Services ATP III Classifications)    Optimal          <100 mg/dl  Near Optimal     100-129 mg/dl  Borderline High  130-159 mg/dl  High             160-189 mg/dl  Very High        >189 mg/dl      Basic Metabolic Panel [[id number]]  (Abnormal) Collected:  05/21/20 0713    Specimen:  Blood from Arm, Left Updated:  05/21/20 0833     Glucose 283 mg/dL      BUN 6 mg/dL      Creatinine 0.57 mg/dL      Sodium 138 mmol/L      Potassium 4.1 mmol/L      Chloride 107 mmol/L      CO2 13.0 mmol/L      Calcium 8.5 mg/dL      eGFR Non African Amer 119 mL/min/1.73      BUN/Creatinine Ratio 10.5     Anion Gap 18.0 mmol/L     Narrative:       GFR Normal >60  Chronic Kidney Disease <60  Kidney Failure <15      Lipid Panel [[id number]]  (Abnormal) Collected:  05/21/20 0713    Specimen:  Blood from Arm, Left Updated:  05/21/20 0818     Total Cholesterol 973 mg/dL      Triglycerides >4,425 mg/dL      HDL Cholesterol 12 mg/dL      Comment: Unable to perform due to elevated triglyceride.         LDL Cholesterol  --     Comment: Unable to calculate        VLDL Cholesterol --     Comment: Unable to calculate        LDL/HDL Ratio --     Comment: Unable to calculate       Narrative:       Cholesterol Reference Ranges  (U.S. Department of Health and Human Services ATP  III Classifications)    Desirable          <200 mg/dL  Borderline High    200-239 mg/dL  High Risk          >240 mg/dL      Triglyceride Reference Ranges  (U.S. Department of Health and Human Services ATP III Classifications)    Normal           <150 mg/dL  Borderline High  150-199 mg/dL  High             200-499 mg/dL  Very High        >500 mg/dL    HDL Reference Ranges  (U.S. Department of Health and Human Services ATP III Classifcations)    Low     <40 mg/dl (major risk factor for CHD)  High    >60 mg/dl ('negative' risk factor for CHD)        LDL Reference Ranges  (U.S. Department of Health and Human Services ATP III Classifcations)    Optimal          <100 mg/dL  Near Optimal     100-129 mg/dL  Borderline High  130-159 mg/dL  High             160-189 mg/dL  Very High        >189 mg/dL    POC Glucose Once [[id number]]  (Abnormal) Collected:  05/21/20 0552    Specimen:  Blood Updated:  05/21/20 0601     Glucose 272 mg/dL     Hemoglobin A1c [[id number]]  (Abnormal) Collected:  05/21/20 0440    Specimen:  Blood Updated:  05/21/20 0550     Hemoglobin A1C 11.70 %     Narrative:       Hemoglobin A1C Ranges:    Increased Risk for Diabetes  5.7% to 6.4%  Diabetes                     >= 6.5%  Diabetic Goal                < 7.0%    POC Glucose Once [[id number]]  (Abnormal) Collected:  05/21/20 0202    Specimen:  Blood Updated:  05/21/20 0204     Glucose 296 mg/dL     Lactate Dehydrogenase [[id number]]  (Abnormal) Collected:  05/20/20 2100    Specimen:  Blood Updated:  05/20/20 2323      U/L     Blood Gas, Venous [[id number]]  (Abnormal) Collected:  05/20/20 2309    Specimen:  Venous Blood Updated:  05/20/20 2310     Site N/A     pH, Venous 7.378 pH Units      pCO2, Venous 35.4 mm Hg      pO2, Venous 57.5 mm Hg      HCO3, Venous 20.9 mmol/L      Base Excess, Venous -3.6 mmol/L      O2 Saturation Calculated 89.1 %      Barometric Pressure for Blood Gas 751.7 mmHg      Modality Room Air     Rate 18 Breaths/minute      Lipase [[id number]]  (Abnormal) Collected:  05/20/20 2100    Specimen:  Blood Updated:  05/20/20 2234     Lipase >3,000 U/L     Comprehensive Metabolic Panel [[id number]]  (Abnormal) Collected:  05/20/20 2100    Specimen:  Blood Updated:  05/20/20 2226     Glucose 421 mg/dL      BUN 8 mg/dL      Creatinine 0.63 mg/dL      Sodium 134 mmol/L      Potassium 3.9 mmol/L      Chloride 98 mmol/L      CO2 18.9 mmol/L      Calcium 9.5 mg/dL      Total Protein 7.0 g/dL      Albumin 4.10 g/dL      ALT (SGPT) 55 U/L      AST (SGOT) 47 U/L      Comment: Specimen hemolyzed.  Results may be affected.        Alkaline Phosphatase 119 U/L      Total Bilirubin 0.4 mg/dL      eGFR Non African Amer 106 mL/min/1.73      Globulin 2.9 gm/dL      A/G Ratio 1.4 g/dL      BUN/Creatinine Ratio 12.7     Anion Gap 17.1 mmol/L     Narrative:       GFR Normal >60  Chronic Kidney Disease <60  Kidney Failure <15      Urinalysis With Microscopic If Indicated (No Culture) - Urine, Clean Catch [[id number]]  (Abnormal) Collected:  05/20/20 2158    Specimen:  Urine, Clean Catch Updated:  05/20/20 2214     Color, UA Yellow     Appearance, UA Clear     pH, UA <=5.0     Specific Gravity, UA >=1.030     Glucose,  mg/dL (2+)     Ketones, UA 80 mg/dL (3+)     Bilirubin, UA Negative     Blood, UA Moderate (2+)     Protein,  mg/dL (2+)     Leuk Esterase, UA Negative     Nitrite, UA Negative     Urobilinogen, UA 0.2 E.U./dL    Urinalysis, Microscopic Only - Urine, Clean Catch [[id number]]  (Abnormal) Collected:  05/20/20 2158    Specimen:  Urine, Clean Catch Updated:  05/20/20 2214     RBC, UA 21-30 /HPF      WBC, UA 0-2 /HPF      Bacteria, UA None Seen /HPF      Squamous Epithelial Cells, UA 0-2 /HPF      Hyaline Casts, UA None Seen /LPF      Methodology Automated Microscopy    hCG, Serum, Qualitative [[id number]]  (Normal) Collected:  05/20/20 2100    Specimen:  Blood Updated:  05/20/20 2150     HCG Qualitative Negative    Manual Differential [[id number]]   (Abnormal) Collected:  05/20/20 2100    Specimen:  Blood Updated:  05/20/20 2137     Neutrophil % 64.0 %      Lymphocyte % 24.0 %      Monocyte % 7.0 %      Eosinophil % 4.0 %      Basophil % 1.0 %      Neutrophils Absolute 6.96 10*3/mm3      Lymphocytes Absolute 2.61 10*3/mm3      Monocytes Absolute 0.76 10*3/mm3      Eosinophils Absolute 0.44 10*3/mm3      Basophils Absolute 0.11 10*3/mm3      Anisocytosis Slight/1+     Microcytes Slight/1+     WBC Morphology Normal     Platelet Morphology Normal          Imaging Results (Last 72 Hours)     Procedure Component Value Units Date/Time    CT Abdomen Pelvis With Contrast [[id number]] Collected:  05/20/20 2305     Updated:  05/20/20 2322    Narrative:       CT OF THE ABDOMEN AND PELVIS WITH CONTRAST     HISTORY: Upper abdominal pain. Nausea and vomiting.     COMPARISON: 05/13/2020     TECHNIQUE: Axial CT imaging was obtained from the dome the diaphragm to  the symphysis. IV contrast was administered.     FINDINGS:  Area of presumed scarring is again noted at the left lung base,  measuring 1.1 x 1.1 cm. The stomach and duodenum appear unremarkable.  The liver is enlarged and steatotic, measuring up to 20.2 cm in  craniocaudal dimensions. Inflammatory stranding is seen about the  pancreas, concerning for pancreatitis. This is particularly around the  body and tail of the pancreas. There is no pancreatic ductal dilatation.  There is no biliary dilatation. There is no evidence of gastric outlet  obstruction. No peripancreatic collections are seen. The splenic vein is  patent. Gallbladder is unremarkable. The adrenal glands and spleen are  within normal limits. The kidneys enhance symmetrically. The patient has  mild to moderate right-sided hydroureteronephrosis. This is secondary to  a 5 mm stone which is located within the mid to distal ureter. Its  position is unchanged when compared to the prior exam. I think the  degree of hydronephrosis is increased slightly when  compared to prior  exam. Additional nonobstructing stones are seen within both kidneys. No  hydronephrosis is seen on the left. Urinary bladder appears  unremarkable. Uterus is within normal limits. A right ovarian cyst is  again noted. It is not significantly changed in size. There is a right  lower quadrant ileostomy as was previously described, there is a  parastomal hernia, which contains a loop of small bowel, without  evidence of obstruction. There is also a left lower quadrant colostomy.  There is fat identified within this parastomal hernia, although the  patient also has a loop of transverse colon which extends into the  hernia, without evidence of obstruction.       Impression:          1. Pancreatitis involving the body and tail of pancreas.  2. Right lower quadrant ileostomy with bowel containing parastomal  hernia, without evidence of obstruction. There is also a left lower  quadrant colostomy with associated parastomal hernia. In contrast to  prior study, the hernia sac now does contain a loop of transverse colon,  without evidence of obstruction.  3. Right-sided hydroureteronephrosis secondary to a 5 mm stone within  the mid to distal right ureter. Similar findings were present on prior  exam, although the degree of hydronephrosis may have increased slightly.     Radiation dose reduction techniques were utilized, including automated  exposure control and exposure modulation based on body size.     This report was finalized on 5/20/2020 11:19 PM by Dr. Marie Jeong M.D.             Assessment/Plan     Uncontrolled type 2 diabetes mellitus with hemoglobin A1c of 11.3%  Critically high hemoglobin A1c increasing her risk for acute stroke/heart attack/organ dysfunction and death   Uncontrolled type 2 diabetes mellitus neuropathy  Abdominal pain  Nausea vomiting  Hypothyroidism  Acute pancreatitis currently under evaluation and management per GI  renal stones with hydronephrosis    I discussed further  "management of patient's uncontrolled type 2 diabetes mellitus along with hypertriglyceridemia  Patient's triglycerides were greater than 4000 on admission and cholesterol was greater than 900  Patient's blood sugars are also elevated and hemoglobin A1c is 11.3% extremely high    I discussed with the patient that hemoglobin A1c and hyperglycemia are directly related to hypertriglyceridemia and she needs to work hard to get the blood sugars under control so that her triglycerides may be under control and she may not have any further episodes of pancreatitis from  this cause  Patient verbalized understanding  Hypothyroidism on medication    Blood sugars are currently in the 200-400 range  She is n.p.o. except ice chips  I will start her on Lantus every 12 hours  Continue the Humalog correction scale every 6 hours  Monitor Accu-Cheks and triglycerides closely  Recheck TSH and free T4 level in a.m. and adjust if necessary    We will continue to monitor the patient and oral intake and adjust the insulin based on blood sugars  I recommend discontinuation of Januvia and metformin at this time  Thank you for the consult and will follow with you  Darin Merritt MD FACE.  05/21/20  6:18 PM        EMR Dragon / transcription disclaimer:     \"Dictated utilizing Dragon dictation\".   "

## 2020-05-22 LAB
ALBUMIN SERPL-MCNC: 3 G/DL (ref 3.5–5.2)
ALBUMIN/GLOB SERPL: 0.9 G/DL
ALP SERPL-CCNC: 82 U/L (ref 39–117)
ALT SERPL W P-5'-P-CCNC: 31 U/L (ref 1–33)
ANION GAP SERPL CALCULATED.3IONS-SCNC: 15.5 MMOL/L (ref 5–15)
AST SERPL-CCNC: 41 U/L (ref 1–32)
BILIRUB SERPL-MCNC: 0.8 MG/DL (ref 0.2–1.2)
BUN BLD-MCNC: 4 MG/DL (ref 6–20)
BUN/CREAT SERPL: 7.3 (ref 7–25)
CALCIUM SPEC-SCNC: 8.7 MG/DL (ref 8.6–10.5)
CHLORIDE SERPL-SCNC: 105 MMOL/L (ref 98–107)
CO2 SERPL-SCNC: 12.5 MMOL/L (ref 22–29)
CREAT BLD-MCNC: 0.55 MG/DL (ref 0.57–1)
DEPRECATED RDW RBC AUTO: 40.1 FL (ref 37–54)
ERYTHROCYTE [DISTWIDTH] IN BLOOD BY AUTOMATED COUNT: 12.4 % (ref 12.3–15.4)
GFR SERPL CREATININE-BSD FRML MDRD: 124 ML/MIN/1.73
GLOBULIN UR ELPH-MCNC: 3.2 GM/DL
GLUCOSE BLD-MCNC: 209 MG/DL (ref 65–99)
GLUCOSE BLDC GLUCOMTR-MCNC: 183 MG/DL (ref 70–130)
GLUCOSE BLDC GLUCOMTR-MCNC: 193 MG/DL (ref 70–130)
GLUCOSE BLDC GLUCOMTR-MCNC: 210 MG/DL (ref 70–130)
GLUCOSE BLDC GLUCOMTR-MCNC: 211 MG/DL (ref 70–130)
GLUCOSE BLDC GLUCOMTR-MCNC: 218 MG/DL (ref 70–130)
HCT VFR BLD AUTO: 39.4 % (ref 34–46.6)
HGB BLD-MCNC: 14.7 G/DL (ref 12–15.9)
LIPASE SERPL-CCNC: 295 U/L (ref 13–60)
LYMPHOCYTES # BLD MANUAL: 0.53 10*3/MM3 (ref 0.7–3.1)
LYMPHOCYTES NFR BLD MANUAL: 3 % (ref 5–12)
LYMPHOCYTES NFR BLD MANUAL: 7 % (ref 19.6–45.3)
MCH RBC QN AUTO: 33.3 PG (ref 26.6–33)
MCHC RBC AUTO-ENTMCNC: 37.3 G/DL (ref 31.5–35.7)
MCV RBC AUTO: 89.3 FL (ref 79–97)
MONOCYTES # BLD AUTO: 0.23 10*3/MM3 (ref 0.1–0.9)
NEUTROPHILS # BLD AUTO: 6.87 10*3/MM3 (ref 1.7–7)
NEUTROPHILS NFR BLD MANUAL: 90 % (ref 42.7–76)
PLAT MORPH BLD: NORMAL
PLATELET # BLD AUTO: 239 10*3/MM3 (ref 140–450)
PMV BLD AUTO: 10.3 FL (ref 6–12)
POTASSIUM BLD-SCNC: 4.4 MMOL/L (ref 3.5–5.2)
PROT SERPL-MCNC: 6.2 G/DL (ref 6–8.5)
RBC # BLD AUTO: 4.41 10*6/MM3 (ref 3.77–5.28)
RBC MORPH BLD: NORMAL
SODIUM BLD-SCNC: 133 MMOL/L (ref 136–145)
T4 FREE SERPL-MCNC: 0.76 NG/DL (ref 0.93–1.7)
WBC MORPH BLD: NORMAL
WBC NRBC COR # BLD: 7.63 10*3/MM3 (ref 3.4–10.8)

## 2020-05-22 PROCEDURE — 25010000002 ONDANSETRON PER 1 MG: Performed by: NURSE PRACTITIONER

## 2020-05-22 PROCEDURE — 25010000002 HYDROMORPHONE PER 4 MG: Performed by: NURSE PRACTITIONER

## 2020-05-22 PROCEDURE — 85007 BL SMEAR W/DIFF WBC COUNT: CPT | Performed by: INTERNAL MEDICINE

## 2020-05-22 PROCEDURE — 99232 SBSQ HOSP IP/OBS MODERATE 35: CPT | Performed by: INTERNAL MEDICINE

## 2020-05-22 PROCEDURE — 63710000001 INSULIN GLARGINE PER 5 UNITS: Performed by: INTERNAL MEDICINE

## 2020-05-22 PROCEDURE — 63710000001 INSULIN LISPRO (HUMAN) PER 5 UNITS: Performed by: INTERNAL MEDICINE

## 2020-05-22 PROCEDURE — 82962 GLUCOSE BLOOD TEST: CPT

## 2020-05-22 PROCEDURE — 83690 ASSAY OF LIPASE: CPT | Performed by: INTERNAL MEDICINE

## 2020-05-22 PROCEDURE — 84439 ASSAY OF FREE THYROXINE: CPT | Performed by: INTERNAL MEDICINE

## 2020-05-22 PROCEDURE — 85025 COMPLETE CBC W/AUTO DIFF WBC: CPT | Performed by: INTERNAL MEDICINE

## 2020-05-22 PROCEDURE — 80053 COMPREHEN METABOLIC PANEL: CPT | Performed by: INTERNAL MEDICINE

## 2020-05-22 PROCEDURE — 99233 SBSQ HOSP IP/OBS HIGH 50: CPT | Performed by: INTERNAL MEDICINE

## 2020-05-22 RX ORDER — INSULIN GLARGINE 100 [IU]/ML
20 INJECTION, SOLUTION SUBCUTANEOUS EVERY 12 HOURS SCHEDULED
Status: DISCONTINUED | OUTPATIENT
Start: 2020-05-22 | End: 2020-05-23

## 2020-05-22 RX ADMIN — ACETAMINOPHEN 650 MG: 325 TABLET, FILM COATED ORAL at 21:58

## 2020-05-22 RX ADMIN — HYDROMORPHONE HYDROCHLORIDE 0.5 MG: 1 INJECTION, SOLUTION INTRAMUSCULAR; INTRAVENOUS; SUBCUTANEOUS at 14:37

## 2020-05-22 RX ADMIN — SODIUM CHLORIDE 200 ML/HR: 9 INJECTION, SOLUTION INTRAVENOUS at 14:56

## 2020-05-22 RX ADMIN — INSULIN LISPRO 3 UNITS: 100 INJECTION, SOLUTION INTRAVENOUS; SUBCUTANEOUS at 02:14

## 2020-05-22 RX ADMIN — INSULIN GLARGINE 15 UNITS: 100 INJECTION, SOLUTION SUBCUTANEOUS at 10:20

## 2020-05-22 RX ADMIN — SODIUM CHLORIDE 200 ML/HR: 9 INJECTION, SOLUTION INTRAVENOUS at 06:08

## 2020-05-22 RX ADMIN — HYDROMORPHONE HYDROCHLORIDE 0.5 MG: 1 INJECTION, SOLUTION INTRAMUSCULAR; INTRAVENOUS; SUBCUTANEOUS at 00:10

## 2020-05-22 RX ADMIN — SODIUM CHLORIDE 125 ML/HR: 9 INJECTION, SOLUTION INTRAVENOUS at 23:11

## 2020-05-22 RX ADMIN — HYDROMORPHONE HYDROCHLORIDE 0.5 MG: 1 INJECTION, SOLUTION INTRAMUSCULAR; INTRAVENOUS; SUBCUTANEOUS at 20:26

## 2020-05-22 RX ADMIN — INSULIN LISPRO 3 UNITS: 100 INJECTION, SOLUTION INTRAVENOUS; SUBCUTANEOUS at 06:47

## 2020-05-22 RX ADMIN — INSULIN LISPRO 2 UNITS: 100 INJECTION, SOLUTION INTRAVENOUS; SUBCUTANEOUS at 20:19

## 2020-05-22 RX ADMIN — HYDROMORPHONE HYDROCHLORIDE 0.5 MG: 1 INJECTION, SOLUTION INTRAMUSCULAR; INTRAVENOUS; SUBCUTANEOUS at 10:21

## 2020-05-22 RX ADMIN — INSULIN LISPRO 3 UNITS: 100 INJECTION, SOLUTION INTRAVENOUS; SUBCUTANEOUS at 14:37

## 2020-05-22 RX ADMIN — INSULIN GLARGINE 20 UNITS: 100 INJECTION, SOLUTION SUBCUTANEOUS at 20:20

## 2020-05-22 RX ADMIN — ONDANSETRON 4 MG: 2 INJECTION INTRAMUSCULAR; INTRAVENOUS at 10:21

## 2020-05-22 RX ADMIN — HYDROMORPHONE HYDROCHLORIDE 0.5 MG: 1 INJECTION, SOLUTION INTRAMUSCULAR; INTRAVENOUS; SUBCUTANEOUS at 06:09

## 2020-05-22 RX ADMIN — HYDROMORPHONE HYDROCHLORIDE 0.5 MG: 1 INJECTION, SOLUTION INTRAMUSCULAR; INTRAVENOUS; SUBCUTANEOUS at 02:15

## 2020-05-22 RX ADMIN — HYDROMORPHONE HYDROCHLORIDE 0.5 MG: 1 INJECTION, SOLUTION INTRAMUSCULAR; INTRAVENOUS; SUBCUTANEOUS at 17:09

## 2020-05-22 NOTE — PROGRESS NOTES
[facility]     LOS: 1 day     Name: Edie Camacho  Age: 35 y.o.  Sex: female  :  1985  MRN: [id number]         Primary Care Physician: Layla Monique APRN    NPO Diet    Subjective     Patient is seen at bedside, she still has abdominal pain.    Objective   Temp:  [97 °F (36.1 °C)-99.2 °F (37.3 °C)] 98.4 °F (36.9 °C)  Heart Rate:  [111-125] 114  Resp:  [16-18] 16  BP: (130-147)/() 130/82  SpO2:  [92 %-96 %] 95 %  on   ;   Device (Oxygen Therapy): room air  Body mass index is 33.47 kg/m².    Physical Exam   General appearance: awake  Head/Eyes: atraumatic, clear cornea, EOMI, normal conjunctiva/sclera, normal eyelids/periorb., normocephalic, PERRL  ENT: moist mucosal membranes, normal dentition, normal nose, normal pharynx, normal sinus  Neck: full range of motion, non-tender, normal thyroid, supple/no meningismus, no bruit/NL carotids, no JVD, no masses or swelling  Cardiovascular: normal capillary refill, regular rate & rhythm  Respiratory: clear to auscultation, no distress  Abdomen:   Generalized distention and tenderness.  Rectal: not indicated  Extremities: moves all, normal capillary refill, normal range of motion, no edema  Musculoskeletal: normal inspection  Neuro/CNS: alert, oriented X 3    Reviewed medications and new clinical results        Results from last 7 days   Lab Units 20  0516 20  0440 20  2100   WBC 10*3/mm3 7.63 11.60* 10.88*   HEMOGLOBIN g/dL 14.7 13.4 13.6   PLATELETS 10*3/mm3 239 280 292     Results from last 7 days   Lab Units 20  0852 20  0713 20  2100   SODIUM mmol/L 133* 138 134*   POTASSIUM mmol/L 4.4 4.1 3.9   CHLORIDE mmol/L 105 107 98   CO2 mmol/L 12.5* 13.0* 18.9*   BUN mg/dL 4* 6 8   CREATININE mg/dL 0.55* 0.57 0.63   CALCIUM mg/dL 8.7 8.5* 9.5   GLUCOSE mg/dL 209* 283* 421*     Lab Results   Component Value Date    ANIONGAP 15.5 (H) 2020     Glucose   Date/Time  Value Ref Range Status   05/22/2020 1651 193 (H) 70 - 130 mg/dL Final   05/22/2020 1121 211 (H) 70 - 130 mg/dL Final   05/22/2020 0622 210 (H) 70 - 130 mg/dL Final   05/22/2020 0207 218 (H) 70 - 130 mg/dL Final   05/21/2020 2042 210 (H) 70 - 130 mg/dL Final   05/21/2020 1646 231 (H) 70 - 130 mg/dL Final   05/21/2020 1102 224 (H) 70 - 130 mg/dL Final   05/21/2020 0552 272 (H) 70 - 130 mg/dL Final     Hemoglobin A1C   Date Value Ref Range Status   05/21/2020 11.70 (H) 4.80 - 5.60 % Final     Estimated Creatinine Clearance: 150.8 mL/min (A) (by C-G formula based on SCr of 0.55 mg/dL (L)).    Assessment/Plan   Active Hospital Problems    Diagnosis  POA   • **Acute pancreatitis without infection or necrosis [K85.90]  Yes   • Hypothyroidism (acquired) [E03.9]  Yes   • Type 2 diabetes mellitus (CMS/[facility]) [E11.9]  Yes      Resolved Hospital Problems   No resolved problems to display.     35 y.o. female       Assessment and plan:  1. Acute pancreatitis, keep patient NPO, diet recommendations per GI.  2.  Hypertriglyceridemia /hyperlipidemia, management per endocrinology recommendations.  3.  Diabetes mellitus, continue Accu-Cheks and sliding scale insulin coverage.  Patient is noted to be on 20 units of Lantus every 12 hours.  4.  Morbid obesity, complicates all aspects of care, she was encouraged to lose weight.    Joseph Cordova MD   05/22/20  17:40

## 2020-05-22 NOTE — PROGRESS NOTES
Friendly visit only    I was notified by Dr. Stringer of this patient's admission.  I visited with her.  I explained to her that her scopes have been relatively normal.  She presents a serious problem because she has so many poorly controlled medical issues.  She needs at least 2 major operations and has very little rectal stump left which will necessitate a low anastomosis and likely a second operation for reversal of her ileostomy, assuming we are able to deal with the first issue.  However, at this time since her diabetes is wildly out of control, her thyroid is not appropriate and obviously she is dealing with acute pancreatitis and severe hypertriglyceridemia.  Furthermore, she continues to smoke.  I explained to her very carefully that in order to become a surgical candidate to have any reasonable chance of having a better outcome this time than at her last surgery, she will need to get herself in better shape and this will require an extensive amount of personal responsibility in terms of improving her care.  I am hopeful that she can do this.    Roderick Arnold MD  General and Endoscopic Surgery  [facility]    [address], [address]  [place], [place], [address]  P: [phone]  F: [phone]

## 2020-05-22 NOTE — PROGRESS NOTES
Continued Stay Note  [facility]     Patient Name: Edie Camacho  MRN: [id number]  Today's Date: 5/22/2020    Admit Date: 5/20/2020    Discharge Plan     Row Name 05/22/20 1415       Plan    Plan  Home. (FU on poss Humalog at DC)    Plan Comments  DC plan remains home Diet being advanced. CCP will continue to follow for poss needs        Discharge Codes    No documentation.             Debbie Rome RN

## 2020-05-22 NOTE — PLAN OF CARE
Problem: Patient Care Overview  Goal: Plan of Care Review  Outcome: Ongoing (interventions implemented as appropriate)  Flowsheets (Taken 5/22/2020 7608)  Progress: no change  Plan of Care Reviewed With: patient  Note:   Continues to c/o abd pain, pt reports short term relief with dilaudid, tachycardic, 's-120's pt reports her PCP mentioned potentially starting her on medication for tachycardia, denies nausea, IVF infusing, monitoring blood sugars, ssi given, pouch dressing dry and intact, illeostomy with soft brown stool, NPO except for ice chips.

## 2020-05-22 NOTE — PROGRESS NOTES
35 y.o.   LOS: 1 day   Patient Care Team:  Layla Monique APRN as PCP - General (Family Medicine)    Chief Complaint: Uncontrolled type 2 diabetes mellitus and acute pancreatitis    Chief Complaint   Patient presents with   • Abdominal Pain   • Vomiting   • Nausea       Subjective     HPI  Events noted  Patient is currently n.p.o. due to acute pancreatitis  She is only on ice chips  She is also on pain medication continually  Blood sugars are more than the 100 range  Currently on Lantus every 12 hours and correction scale Humalog  Hypothyroidism receiving levothyroxine 200 mcg daily.    Interval History:      Review of Systems:      Review of Systems   Constitutional: Positive for fatigue.   Respiratory: Negative.    Cardiovascular: Negative.    Gastrointestinal: Positive for abdominal distention, abdominal pain and nausea.   All other systems reviewed and are negative.    Objective     Vital Signs   Temp:  [97 °F (36.1 °C)-99.2 °F (37.3 °C)] 99.2 °F (37.3 °C)  Heart Rate:  [111-125] 114  Resp:  [16-18] 16  BP: (130-147)/() 132/83    Physical Exam:  Physical Exam   Cardiovascular: Normal rate, regular rhythm, normal heart sounds and intact distal pulses.   Pulmonary/Chest: Effort normal and breath sounds normal.   Abdominal: Soft. Bowel sounds are normal.   Nursing note and vitals reviewed.  Results Review:     I reviewed the patient's new clinical results.      Glucose   Date/Time Value Ref Range Status   05/22/2020 0852 209 (H) 65 - 99 mg/dL Final   05/21/2020 0713 283 (H) 65 - 99 mg/dL Final   05/20/2020 2100 421 (C) 65 - 99 mg/dL Final     Lab Results (last 72 hours)     Procedure Component Value Units Date/Time    POC Glucose Once [[id number]]  (Abnormal) Collected:  05/22/20 1651    Specimen:  Blood Updated:  05/22/20 1653     Glucose 193 mg/dL     Lipase [[id number]]  (Abnormal) Collected:  05/22/20 0852    Specimen:  Blood Updated:  05/22/20 1212     Lipase 295 U/L     POC Glucose Once [[id number]]   (Abnormal) Collected:  05/22/20 1121    Specimen:  Blood Updated:  05/22/20 1124     Glucose 211 mg/dL     Comprehensive Metabolic Panel [[id number]]  (Abnormal) Collected:  05/22/20 0852    Specimen:  Blood Updated:  05/22/20 1033     Glucose 209 mg/dL      BUN 4 mg/dL      Creatinine 0.55 mg/dL      Sodium 133 mmol/L      Potassium 4.4 mmol/L      Comment: Specimen hemolyzed.  Results may be affected.        Chloride 105 mmol/L      CO2 12.5 mmol/L      Calcium 8.7 mg/dL      Total Protein 6.2 g/dL      Albumin 3.00 g/dL      ALT (SGPT) 31 U/L      Comment: Specimen hemolyzed.  Results may be affected.        AST (SGOT) 41 U/L      Comment: Specimen hemolyzed.  Results may be affected.        Alkaline Phosphatase 82 U/L      Total Bilirubin 0.8 mg/dL      eGFR Non African Amer 124 mL/min/1.73      Globulin 3.2 gm/dL      A/G Ratio 0.9 g/dL      BUN/Creatinine Ratio 7.3     Anion Gap 15.5 mmol/L     Narrative:       GFR Normal >60  Chronic Kidney Disease <60  Kidney Failure <15      CBC & Differential [[id number]] Collected:  05/22/20 0516    Specimen:  Blood Updated:  05/22/20 0807    Narrative:       The following orders were created for panel order CBC & Differential.  Procedure                               Abnormality         Status                     ---------                               -----------         ------                     CBC Auto Differential[[id number]]        Abnormal            Final result                 Please view results for these tests on the individual orders.    CBC Auto Differential [[id number]]  (Abnormal) Collected:  05/22/20 0516    Specimen:  Blood Updated:  05/22/20 0807     WBC 7.63 10*3/mm3      RBC 4.41 10*6/mm3      Hemoglobin 14.7 g/dL      Hematocrit 39.4 %      MCV 89.3 fL      MCH 33.3 pg      MCHC 37.3 g/dL      RDW 12.4 %      RDW-SD 40.1 fl      MPV 10.3 fL      Platelets 239 10*3/mm3     Manual Differential [[id number]]  (Abnormal) Collected:  05/22/20 0516    Specimen:   Blood Updated:  05/22/20 0807     Neutrophil % 90.0 %      Lymphocyte % 7.0 %      Monocyte % 3.0 %      Neutrophils Absolute 6.87 10*3/mm3      Lymphocytes Absolute 0.53 10*3/mm3      Monocytes Absolute 0.23 10*3/mm3      RBC Morphology Normal     WBC Morphology Normal     Platelet Morphology Normal    T4, Free [[id number]]  (Abnormal) Collected:  05/22/20 0516    Specimen:  Blood Updated:  05/22/20 0637     Free T4 0.76 ng/dL     Narrative:       Results may be falsely increased if patient taking Biotin.      POC Glucose Once [[id number]]  (Abnormal) Collected:  05/22/20 0622    Specimen:  Blood Updated:  05/22/20 0625     Glucose 210 mg/dL     POC Glucose Once [[id number]]  (Abnormal) Collected:  05/22/20 0207    Specimen:  Blood Updated:  05/22/20 0210     Glucose 218 mg/dL     POC Glucose Once [[id number]]  (Abnormal) Collected:  05/21/20 2042    Specimen:  Blood Updated:  05/21/20 2044     Glucose 210 mg/dL     TSH [[id number]]  (Abnormal) Collected:  05/21/20 0713    Specimen:  Blood from Arm, Left Updated:  05/21/20 1929     TSH 70.600 uIU/mL      Comment: TSH results may be falsely decreased if patient taking Biotin.       POC Glucose Once [[id number]]  (Abnormal) Collected:  05/21/20 1646    Specimen:  Blood Updated:  05/21/20 1649     Glucose 231 mg/dL     CBC & Differential [[id number]] Collected:  05/20/20 2100    Specimen:  Blood Updated:  05/21/20 1120    Narrative:       The following orders were created for panel order CBC & Differential.  Procedure                               Abnormality         Status                     ---------                               -----------         ------                     CBC Auto Differential[[id number]]        Abnormal            Edited Result - FINAL        Please view results for these tests on the individual orders.    CBC Auto Differential [[id number]]  (Abnormal) Collected:  05/20/20 2100    Specimen:  Blood Updated:  05/21/20 1120     WBC 10.88 10*3/mm3       RBC 4.62 10*6/mm3      Hemoglobin 13.6 g/dL      Comment: Corrected result. Previous result was 18.2 g/dL on 5/20/2020 at 2126 EDT.        Hematocrit 40.0 %      Comment: Corrected result. Previous result was 42.0 % on 5/20/2020 at 2126 EDT.        MCV 86.6 fL      Comment: Corrected result. Previous result was 90.9 fL on 5/20/2020 at 2126 EDT.        MCH 29.4 pg      Comment: Corrected result. Previous result was 39.4 pg on 5/20/2020 at 2126 EDT.        MCHC 34.0 g/dL      Comment: Corrected result. Previous result was 43.3 g/dL on 5/20/2020 at 2126 EDT.        RDW 12.7 %      RDW-SD 41.3 fl      MPV 10.6 fL      Platelets 292 10*3/mm3     POC Glucose Once [[id number]]  (Abnormal) Collected:  05/21/20 1102    Specimen:  Blood Updated:  05/21/20 1104     Glucose 224 mg/dL     CBC (No Diff) [[id number]]  (Abnormal) Collected:  05/21/20 0440    Specimen:  Blood Updated:  05/21/20 0851     WBC 11.60 10*3/mm3      RBC 4.50 10*6/mm3      Hemoglobin 13.4 g/dL      Hematocrit 38.0 %      MCV 84.4 fL      MCH 29.8 pg      MCHC 35.3 g/dL      RDW 12.4 %      RDW-SD 40.6 fl      MPV 10.6 fL      Platelets 280 10*3/mm3     LDL Cholesterol, Direct [[id number]]  (Normal) Collected:  05/21/20 0713    Specimen:  Blood from Arm, Left Updated:  05/21/20 0848     LDL Cholesterol  47 mg/dL     Narrative:       LDL Reference Ranges    (U.S. Department of Health and Human Services ATP III Classifications)    Optimal          <100 mg/dl  Near Optimal     100-129 mg/dl  Borderline High  130-159 mg/dl  High             160-189 mg/dl  Very High        >189 mg/dl      Basic Metabolic Panel [[id number]]  (Abnormal) Collected:  05/21/20 0713    Specimen:  Blood from Arm, Left Updated:  05/21/20 0833     Glucose 283 mg/dL      BUN 6 mg/dL      Creatinine 0.57 mg/dL      Sodium 138 mmol/L      Potassium 4.1 mmol/L      Chloride 107 mmol/L      CO2 13.0 mmol/L      Calcium 8.5 mg/dL      eGFR Non African Amer 119 mL/min/1.73      BUN/Creatinine Ratio  10.5     Anion Gap 18.0 mmol/L     Narrative:       GFR Normal >60  Chronic Kidney Disease <60  Kidney Failure <15      Lipid Panel [[id number]]  (Abnormal) Collected:  05/21/20 0713    Specimen:  Blood from Arm, Left Updated:  05/21/20 0818     Total Cholesterol 973 mg/dL      Triglycerides >4,425 mg/dL      HDL Cholesterol 12 mg/dL      Comment: Unable to perform due to elevated triglyceride.         LDL Cholesterol  --     Comment: Unable to calculate        VLDL Cholesterol --     Comment: Unable to calculate        LDL/HDL Ratio --     Comment: Unable to calculate       Narrative:       Cholesterol Reference Ranges  (U.S. Department of Health and Human Services ATP III Classifications)    Desirable          <200 mg/dL  Borderline High    200-239 mg/dL  High Risk          >240 mg/dL      Triglyceride Reference Ranges  (U.S. Department of Health and Human Services ATP III Classifications)    Normal           <150 mg/dL  Borderline High  150-199 mg/dL  High             200-499 mg/dL  Very High        >500 mg/dL    HDL Reference Ranges  (U.S. Department of Health and Human Services ATP III Classifcations)    Low     <40 mg/dl (major risk factor for CHD)  High    >60 mg/dl ('negative' risk factor for CHD)        LDL Reference Ranges  (U.S. Department of Health and Human Services ATP III Classifcations)    Optimal          <100 mg/dL  Near Optimal     100-129 mg/dL  Borderline High  130-159 mg/dL  High             160-189 mg/dL  Very High        >189 mg/dL    POC Glucose Once [[id number]]  (Abnormal) Collected:  05/21/20 0552    Specimen:  Blood Updated:  05/21/20 0601     Glucose 272 mg/dL     Hemoglobin A1c [[id number]]  (Abnormal) Collected:  05/21/20 0440    Specimen:  Blood Updated:  05/21/20 0550     Hemoglobin A1C 11.70 %     Narrative:       Hemoglobin A1C Ranges:    Increased Risk for Diabetes  5.7% to 6.4%  Diabetes                     >= 6.5%  Diabetic Goal                < 7.0%    POC Glucose Once  [[id number]]  (Abnormal) Collected:  05/21/20 0202    Specimen:  Blood Updated:  05/21/20 0204     Glucose 296 mg/dL     Lactate Dehydrogenase [[id number]]  (Abnormal) Collected:  05/20/20 2100    Specimen:  Blood Updated:  05/20/20 2323      U/L     Blood Gas, Venous [[id number]]  (Abnormal) Collected:  05/20/20 2309    Specimen:  Venous Blood Updated:  05/20/20 2310     Site N/A     pH, Venous 7.378 pH Units      pCO2, Venous 35.4 mm Hg      pO2, Venous 57.5 mm Hg      HCO3, Venous 20.9 mmol/L      Base Excess, Venous -3.6 mmol/L      O2 Saturation Calculated 89.1 %      Barometric Pressure for Blood Gas 751.7 mmHg      Modality Room Air     Rate 18 Breaths/minute     Lipase [[id number]]  (Abnormal) Collected:  05/20/20 2100    Specimen:  Blood Updated:  05/20/20 2234     Lipase >3,000 U/L     Comprehensive Metabolic Panel [[id number]]  (Abnormal) Collected:  05/20/20 2100    Specimen:  Blood Updated:  05/20/20 2226     Glucose 421 mg/dL      BUN 8 mg/dL      Creatinine 0.63 mg/dL      Sodium 134 mmol/L      Potassium 3.9 mmol/L      Chloride 98 mmol/L      CO2 18.9 mmol/L      Calcium 9.5 mg/dL      Total Protein 7.0 g/dL      Albumin 4.10 g/dL      ALT (SGPT) 55 U/L      AST (SGOT) 47 U/L      Comment: Specimen hemolyzed.  Results may be affected.        Alkaline Phosphatase 119 U/L      Total Bilirubin 0.4 mg/dL      eGFR Non African Amer 106 mL/min/1.73      Globulin 2.9 gm/dL      A/G Ratio 1.4 g/dL      BUN/Creatinine Ratio 12.7     Anion Gap 17.1 mmol/L     Narrative:       GFR Normal >60  Chronic Kidney Disease <60  Kidney Failure <15      Urinalysis With Microscopic If Indicated (No Culture) - Urine, Clean Catch [[id number]]  (Abnormal) Collected:  05/20/20 2158    Specimen:  Urine, Clean Catch Updated:  05/20/20 2214     Color, UA Yellow     Appearance, UA Clear     pH, UA <=5.0     Specific Gravity, UA >=1.030     Glucose,  mg/dL (2+)     Ketones, UA 80 mg/dL (3+)     Bilirubin, UA Negative      Blood, UA Moderate (2+)     Protein,  mg/dL (2+)     Leuk Esterase, UA Negative     Nitrite, UA Negative     Urobilinogen, UA 0.2 E.U./dL    Urinalysis, Microscopic Only - Urine, Clean Catch [[id number]]  (Abnormal) Collected:  05/20/20 2158    Specimen:  Urine, Clean Catch Updated:  05/20/20 2214     RBC, UA 21-30 /HPF      WBC, UA 0-2 /HPF      Bacteria, UA None Seen /HPF      Squamous Epithelial Cells, UA 0-2 /HPF      Hyaline Casts, UA None Seen /LPF      Methodology Automated Microscopy    hCG, Serum, Qualitative [[id number]]  (Normal) Collected:  05/20/20 2100    Specimen:  Blood Updated:  05/20/20 2150     HCG Qualitative Negative    Manual Differential [[id number]]  (Abnormal) Collected:  05/20/20 2100    Specimen:  Blood Updated:  05/20/20 2137     Neutrophil % 64.0 %      Lymphocyte % 24.0 %      Monocyte % 7.0 %      Eosinophil % 4.0 %      Basophil % 1.0 %      Neutrophils Absolute 6.96 10*3/mm3      Lymphocytes Absolute 2.61 10*3/mm3      Monocytes Absolute 0.76 10*3/mm3      Eosinophils Absolute 0.44 10*3/mm3      Basophils Absolute 0.11 10*3/mm3      Anisocytosis Slight/1+     Microcytes Slight/1+     WBC Morphology Normal     Platelet Morphology Normal        Imaging Results (Last 72 Hours)     Procedure Component Value Units Date/Time    CT Abdomen Pelvis With Contrast [[id number]] Collected:  05/20/20 2305     Updated:  05/20/20 2322    Narrative:       CT OF THE ABDOMEN AND PELVIS WITH CONTRAST     HISTORY: Upper abdominal pain. Nausea and vomiting.     COMPARISON: 05/13/2020     TECHNIQUE: Axial CT imaging was obtained from the dome the diaphragm to  the symphysis. IV contrast was administered.     FINDINGS:  Area of presumed scarring is again noted at the left lung base,  measuring 1.1 x 1.1 cm. The stomach and duodenum appear unremarkable.  The liver is enlarged and steatotic, measuring up to 20.2 cm in  craniocaudal dimensions. Inflammatory stranding is seen about the  pancreas, concerning  for pancreatitis. This is particularly around the  body and tail of the pancreas. There is no pancreatic ductal dilatation.  There is no biliary dilatation. There is no evidence of gastric outlet  obstruction. No peripancreatic collections are seen. The splenic vein is  patent. Gallbladder is unremarkable. The adrenal glands and spleen are  within normal limits. The kidneys enhance symmetrically. The patient has  mild to moderate right-sided hydroureteronephrosis. This is secondary to  a 5 mm stone which is located within the mid to distal ureter. Its  position is unchanged when compared to the prior exam. I think the  degree of hydronephrosis is increased slightly when compared to prior  exam. Additional nonobstructing stones are seen within both kidneys. No  hydronephrosis is seen on the left. Urinary bladder appears  unremarkable. Uterus is within normal limits. A right ovarian cyst is  again noted. It is not significantly changed in size. There is a right  lower quadrant ileostomy as was previously described, there is a  parastomal hernia, which contains a loop of small bowel, without  evidence of obstruction. There is also a left lower quadrant colostomy.  There is fat identified within this parastomal hernia, although the  patient also has a loop of transverse colon which extends into the  hernia, without evidence of obstruction.       Impression:          1. Pancreatitis involving the body and tail of pancreas.  2. Right lower quadrant ileostomy with bowel containing parastomal  hernia, without evidence of obstruction. There is also a left lower  quadrant colostomy with associated parastomal hernia. In contrast to  prior study, the hernia sac now does contain a loop of transverse colon,  without evidence of obstruction.  3. Right-sided hydroureteronephrosis secondary to a 5 mm stone within  the mid to distal right ureter. Similar findings were present on prior  exam, although the degree of hydronephrosis may  have increased slightly.     Radiation dose reduction techniques were utilized, including automated  exposure control and exposure modulation based on body size.     This report was finalized on 5/20/2020 11:19 PM by Dr. Marie Jeong M.D.             Medication Review:       Current Facility-Administered Medications:   •  acetaminophen (TYLENOL) tablet 650 mg, 650 mg, Oral, Q4H PRN **OR** acetaminophen (TYLENOL) 160 MG/5ML solution 650 mg, 650 mg, Oral, Q4H PRN **OR** acetaminophen (TYLENOL) suppository 650 mg, 650 mg, Rectal, Q4H PRN, Beatriz Martinez, APRN  •  dextrose (D50W) 25 g/ 50mL Intravenous Solution 25 g, 25 g, Intravenous, Q15 Min PRN, Beatriz Martinez APRN  •  dextrose (GLUTOSE) oral gel 15 g, 15 g, Oral, Q15 Min PRN, Beatriz Martinez, APRN  •  glucagon (human recombinant) (GLUCAGEN DIAGNOSTIC) injection 1 mg, 1 mg, Subcutaneous, PRN, Beatriz Martinez APRN  •  HYDROmorphone (DILAUDID) injection 0.5 mg, 0.5 mg, Intravenous, Q2H PRN, Beatriz Martinez APRN, 0.5 mg at 05/22/20 1709  •  insulin glargine (LANTUS) injection 20 Units, 20 Units, Subcutaneous, Q12H, Darin Merritt MD  •  insulin lispro (humaLOG) injection 2-5 Units, 2-5 Units, Subcutaneous, Q6H, Darin Merritt MD, 3 Units at 05/22/20 1437  •  ondansetron (ZOFRAN) injection 4 mg, 4 mg, Intravenous, Q6H PRN, eBatriz Martinez APRN, 4 mg at 05/22/20 1021  •  sodium chloride 0.9 % flush 10 mL, 10 mL, Intravenous, Q12H, Beatriz Martinez APRN  •  sodium chloride 0.9 % flush 10 mL, 10 mL, Intravenous, PRN, Beatriz Martinez, APRN  •  sodium chloride 0.9 % infusion, 200 mL/hr, Intravenous, Continuous, English, Beatriz C, APRN, Last Rate: 200 mL/hr at 05/22/20 1456, 200 mL/hr at 05/22/20 1456    Assessment/Plan     Patient Active Problem List   Diagnosis   • Colitis presumed infectious   • Type 2 diabetes mellitus (CMS/HCC)   • Suprapubic pain, acute   • Hyponatremia   • Sepsis (CMS/HCC)   • Metabolic acidosis   • Chronic  "constipation   • Other microscopic hematuria   • Lower abdominal pain   • Colitis   • Large bowel obstruction (CMS/HCC)   • Stricture of sigmoid colon (CMS/HCC)   • Large bowel anastomotic leak   • Acute pancreatitis without infection or necrosis   • Hypothyroidism (acquired)     Uncontrolled type 2 diabetes mellitus with hemoglobin A1c of 11.3%  Critically high hemoglobin A1c increasing her risk for acute stroke/heart attack/organ dysfunction and death     Uncontrolled type 2 diabetes mellitus neuropathy  Abdominal pain  Nausea vomiting  Hypothyroidism  Acute pancreatitis currently under evaluation and management per GI  renal stones with hydronephrosis    Patient's blood sugars are in the 200 range overnight  I will increase Lantus to 20 units every 12 hours  Continue the correction scale Humalog  Patient is still n.p.o.  We will continue to monitor the Accu-Cheks and then adjust insulin as needed      Darin Merritt MD FACE.  05/22/20  5:48 PM      EMR Dragon / transcription disclaimer:     \"Dictated utilizing Dragon dictation\".   "

## 2020-05-22 NOTE — PLAN OF CARE
Remains tachy, stated being seen for MD regarding prior to hospitalization, c/o pain less often today than yesterday, medicated for pain x 3 nausea x 1 with relief, flat affect, iliostomy with liquid dark brown stool, dressing /pouchto LLQ dry & intact no drainage, pt manages appliances herself, diabetic educator rounded, enc nursing staff to have pt do her own finger sticks & injections when possible

## 2020-05-22 NOTE — NURSING NOTE
"Diabetes Education  Assessment/Teaching    Patient Name:  Edie Camacho  YOB: 1985  MRN: [id number]  Admit Date:  5/20/2020      Assessment Date:  5/22/2020    Most Recent Value   General Information    Referral From:  Other -order set. Meet with 34 y/o at bedside to assess needs for DM ed.    Height  162.6 cm (64\")   Height Method  Stated   Weight  88.5 kg (195 lb)   Weight Method  Standing scale   Diabetes History   What type of diabetes do you have?  Type 2   Do you test your blood sugar at home?  no [pt states she had misplaced her home BG meter, but has since found it.]   Education Preferences   Barriers to Learning  -- acuity, pain.   Assessment Topics   Taking Medication - Assessment  Needs education [endo has increased long-acting insulin dose from pt's home regimen of 10 units basal.]   Reducing Risk - Assessment  Needs education   Healthy Coping - Assessment  Competent   DM Goals            Most Recent Value   DM Education Needs   Meter  Has own   Frequency of Testing  --talk w/pt about re-starting home BG checks.    Medication  Insulin [advise pt to anticipate to be rx'd increased dose of long-acting insulin at dc based on endo orders]   Reducing Risks  Lipids -ed pt about relationship btwn hi BGs, lipids and pancreatitis. Dr Solis's note is that he spoke w/pt about this, but she denies.   Healthy Coping  Appropriate   Discharge Plan  Home, Follow-up with PCP   Teaching Method  Explanation, Discussion   Patient Response  Needs reinforcement        Electronically signed by:  Edie Martinez, RN, BSN, CDE   05/22/20 14:22  "

## 2020-05-22 NOTE — PROGRESS NOTES
[facility]  Inpatient Progress Note    Reason for Follow Up:  Pancreatitis    Subjective     Interval History:   Very slight improvement in abdominal pain.  She is doing ok with ice chips.     Current Facility-Administered Medications:   •  acetaminophen (TYLENOL) tablet 650 mg, 650 mg, Oral, Q4H PRN **OR** acetaminophen (TYLENOL) 160 MG/5ML solution 650 mg, 650 mg, Oral, Q4H PRN **OR** acetaminophen (TYLENOL) suppository 650 mg, 650 mg, Rectal, Q4H PRN, Beatriz Martinez APRN  •  dextrose (D50W) 25 g/ 50mL Intravenous Solution 25 g, 25 g, Intravenous, Q15 Min PRN, Beatriz Martinez APRN  •  dextrose (GLUTOSE) oral gel 15 g, 15 g, Oral, Q15 Min PRN, Beatriz Martinez APRN  •  glucagon (human recombinant) (GLUCAGEN DIAGNOSTIC) injection 1 mg, 1 mg, Subcutaneous, PRN, Beatriz Martinez APRN  •  HYDROmorphone (DILAUDID) injection 0.5 mg, 0.5 mg, Intravenous, Q2H PRN, Beatriz Martinez APRN, 0.5 mg at 05/22/20 0609  •  insulin glargine (LANTUS) injection 15 Units, 15 Units, Subcutaneous, Q12H, Darin Merritt MD, 15 Units at 05/21/20 2145  •  insulin lispro (humaLOG) injection 2-5 Units, 2-5 Units, Subcutaneous, Q6H, Darin Merritt MD, 3 Units at 05/22/20 0647  •  ondansetron (ZOFRAN) injection 4 mg, 4 mg, Intravenous, Q6H PRN, Beatriz Martinez APRN, 4 mg at 05/21/20 1656  •  sodium chloride 0.9 % flush 10 mL, 10 mL, Intravenous, Q12H, Beatriz Martinez APRN  •  sodium chloride 0.9 % flush 10 mL, 10 mL, Intravenous, PRN, Beatriz Martinez APRN  •  sodium chloride 0.9 % infusion, 200 mL/hr, Intravenous, Continuous, Beatriz Martinez APRN, Last Rate: 200 mL/hr at 05/22/20 0608, 200 mL/hr at 05/22/20 0608  Review of Systems:    All systems were reviewed and negative except for:  Gastrointestinal: positive for  nausea and pain    Objective     Vital Signs  Temp:  [97 °F (36.1 °C)-99.2 °F (37.3 °C)] 97 °F (36.1 °C)  Heart Rate:  [111-125] 121  Resp:  [16-18] 16  BP:  (137-165)/() 143/99  Body mass index is 33.47 kg/m².    Intake/Output Summary (Last 24 hours) at 5/22/2020 1006  Last data filed at 5/22/2020 0929  Gross per 24 hour   Intake 3607.33 ml   Output 3350 ml   Net 257.33 ml     I/O this shift:  In: -   Out: 400 [Urine:400]     Physical Exam:   General: patient awake, alert and cooperative   Eyes: Normal lids and lashes, no scleral icterus   Neck: supple, normal ROM   Skin: warm and dry, not jaundiced   Cardiovascular: regular rhythm and rate, no murmurs auscultated   Pulm: clear to auscultation bilaterally, regular and unlabored   Abdomen: soft, nontender, nondistended; normal bowel sounds   Rectal: deferred   Extremities: no rash or edema   Psychiatric: Normal mood and behavior; memory intact     Results Review:     I reviewed the patient's new clinical results.    Results from last 7 days   Lab Units 05/22/20  0516 05/21/20  0440 05/20/20  2100   WBC 10*3/mm3 7.63 11.60* 10.88*   HEMOGLOBIN g/dL 14.7 13.4 13.6   HEMATOCRIT % 39.4 38.0 40.0   PLATELETS 10*3/mm3 239 280 292     Results from last 7 days   Lab Units 05/21/20  0713 05/20/20  2100   SODIUM mmol/L 138 134*   POTASSIUM mmol/L 4.1 3.9   CHLORIDE mmol/L 107 98   CO2 mmol/L 13.0* 18.9*   BUN mg/dL 6 8   CREATININE mg/dL 0.57 0.63   CALCIUM mg/dL 8.5* 9.5   BILIRUBIN mg/dL  --  0.4   ALK PHOS U/L  --  119*   ALT (SGPT) U/L  --  55*   AST (SGOT) U/L  --  47*   GLUCOSE mg/dL 283* 421*         Lab Results   Lab Value Date/Time    LIPASE >3,000 (H) 05/20/2020 2100    LIPASE 80 (H) 08/04/2019 1604    LIPASE 48 06/20/2019 1716    LIPASE 47 05/12/2019 0046    LIPASE 37 06/18/2016 0255       [facility]  [email or web address]      Assessment/Plan   Assessment:   1. Acute pancreatitis   2. R sided hydronephrosis  3. Prior sigmoid colon resection with colostomy  4. Elevated triglycerides    Plan:   Check lipase  Continue IVFs and NPO status for now, hopefully can try CLD tomorrow  Appreciate endocrinology management of the  patient's significant elevated trigylycerides      I discussed the patients findings and my recommendations with patient.         Samuel Stringer M.D.  [facility]  [address] [place] [address]  Office: [phone]

## 2020-05-23 PROBLEM — E78.2 MIXED HYPERLIPIDEMIA: Status: ACTIVE | Noted: 2020-05-23

## 2020-05-23 LAB
ALBUMIN SERPL-MCNC: 2.6 G/DL (ref 3.5–5.2)
ALBUMIN/GLOB SERPL: 0.8 G/DL
ALP SERPL-CCNC: 80 U/L (ref 39–117)
ALT SERPL W P-5'-P-CCNC: 23 U/L (ref 1–33)
ANION GAP SERPL CALCULATED.3IONS-SCNC: 19.6 MMOL/L (ref 5–15)
AST SERPL-CCNC: 19 U/L (ref 1–32)
BASOPHILS # BLD AUTO: 0.02 10*3/MM3 (ref 0–0.2)
BASOPHILS NFR BLD AUTO: 0.2 % (ref 0–1.5)
BILIRUB SERPL-MCNC: 0.6 MG/DL (ref 0.2–1.2)
BUN BLD-MCNC: 6 MG/DL (ref 6–20)
BUN/CREAT SERPL: 13.3 (ref 7–25)
CALCIUM SPEC-SCNC: 8.6 MG/DL (ref 8.6–10.5)
CHLORIDE SERPL-SCNC: 104 MMOL/L (ref 98–107)
CO2 SERPL-SCNC: 9.4 MMOL/L (ref 22–29)
CREAT BLD-MCNC: 0.45 MG/DL (ref 0.57–1)
DEPRECATED RDW RBC AUTO: 39.2 FL (ref 37–54)
EOSINOPHIL # BLD AUTO: 0.12 10*3/MM3 (ref 0–0.4)
EOSINOPHIL NFR BLD AUTO: 1.4 % (ref 0.3–6.2)
ERYTHROCYTE [DISTWIDTH] IN BLOOD BY AUTOMATED COUNT: 12 % (ref 12.3–15.4)
GFR SERPL CREATININE-BSD FRML MDRD: >150 ML/MIN/1.73
GLOBULIN UR ELPH-MCNC: 3.2 GM/DL
GLUCOSE BLD-MCNC: 183 MG/DL (ref 65–99)
GLUCOSE BLDC GLUCOMTR-MCNC: 159 MG/DL (ref 70–130)
GLUCOSE BLDC GLUCOMTR-MCNC: 164 MG/DL (ref 70–130)
GLUCOSE BLDC GLUCOMTR-MCNC: 167 MG/DL (ref 70–130)
GLUCOSE BLDC GLUCOMTR-MCNC: 172 MG/DL (ref 70–130)
GLUCOSE BLDC GLUCOMTR-MCNC: 180 MG/DL (ref 70–130)
HCT VFR BLD AUTO: 38.1 % (ref 34–46.6)
HGB BLD-MCNC: 13.5 G/DL (ref 12–15.9)
LIPASE SERPL-CCNC: 83 U/L (ref 13–60)
LYMPHOCYTES # BLD AUTO: 1.1 10*3/MM3 (ref 0.7–3.1)
LYMPHOCYTES NFR BLD AUTO: 12.8 % (ref 19.6–45.3)
MCH RBC QN AUTO: 32.1 PG (ref 26.6–33)
MCHC RBC AUTO-ENTMCNC: 35.4 G/DL (ref 31.5–35.7)
MCV RBC AUTO: 90.5 FL (ref 79–97)
MONOCYTES # BLD AUTO: 0.43 10*3/MM3 (ref 0.1–0.9)
MONOCYTES NFR BLD AUTO: 5 % (ref 5–12)
NEUTROPHILS # BLD AUTO: 6.85 10*3/MM3 (ref 1.7–7)
NEUTROPHILS NFR BLD AUTO: 79.8 % (ref 42.7–76)
PLATELET # BLD AUTO: 227 10*3/MM3 (ref 140–450)
PMV BLD AUTO: 9.9 FL (ref 6–12)
POTASSIUM BLD-SCNC: 3.5 MMOL/L (ref 3.5–5.2)
PROT SERPL-MCNC: 5.8 G/DL (ref 6–8.5)
RBC # BLD AUTO: 4.21 10*6/MM3 (ref 3.77–5.28)
SODIUM BLD-SCNC: 133 MMOL/L (ref 136–145)
TRIGL SERPL-MCNC: 1198 MG/DL (ref 0–150)
WBC NRBC COR # BLD: 8.59 10*3/MM3 (ref 3.4–10.8)

## 2020-05-23 PROCEDURE — 63710000001 INSULIN LISPRO (HUMAN) PER 5 UNITS: Performed by: INTERNAL MEDICINE

## 2020-05-23 PROCEDURE — 25010000002 HYDROMORPHONE PER 4 MG: Performed by: NURSE PRACTITIONER

## 2020-05-23 PROCEDURE — 85025 COMPLETE CBC W/AUTO DIFF WBC: CPT | Performed by: INTERNAL MEDICINE

## 2020-05-23 PROCEDURE — 84478 ASSAY OF TRIGLYCERIDES: CPT | Performed by: INTERNAL MEDICINE

## 2020-05-23 PROCEDURE — 63710000001 INSULIN GLARGINE PER 5 UNITS: Performed by: INTERNAL MEDICINE

## 2020-05-23 PROCEDURE — 99231 SBSQ HOSP IP/OBS SF/LOW 25: CPT | Performed by: INTERNAL MEDICINE

## 2020-05-23 PROCEDURE — 99232 SBSQ HOSP IP/OBS MODERATE 35: CPT | Performed by: INTERNAL MEDICINE

## 2020-05-23 PROCEDURE — 83690 ASSAY OF LIPASE: CPT | Performed by: INTERNAL MEDICINE

## 2020-05-23 PROCEDURE — 80053 COMPREHEN METABOLIC PANEL: CPT | Performed by: INTERNAL MEDICINE

## 2020-05-23 PROCEDURE — 82962 GLUCOSE BLOOD TEST: CPT

## 2020-05-23 RX ORDER — FENOFIBRATE 145 MG/1
145 TABLET, COATED ORAL DAILY
Status: DISCONTINUED | OUTPATIENT
Start: 2020-05-23 | End: 2020-05-25 | Stop reason: HOSPADM

## 2020-05-23 RX ORDER — INSULIN GLARGINE 100 [IU]/ML
22 INJECTION, SOLUTION SUBCUTANEOUS EVERY 12 HOURS SCHEDULED
Status: DISCONTINUED | OUTPATIENT
Start: 2020-05-23 | End: 2020-05-24

## 2020-05-23 RX ORDER — LEVOTHYROXINE SODIUM 0.1 MG/1
200 TABLET ORAL
Status: DISCONTINUED | OUTPATIENT
Start: 2020-05-23 | End: 2020-05-25 | Stop reason: HOSPADM

## 2020-05-23 RX ORDER — LEVOTHYROXINE SODIUM 0.05 MG/1
50 TABLET ORAL
Status: DISCONTINUED | OUTPATIENT
Start: 2020-05-23 | End: 2020-05-25 | Stop reason: HOSPADM

## 2020-05-23 RX ADMIN — HYDROMORPHONE HYDROCHLORIDE 0.5 MG: 1 INJECTION, SOLUTION INTRAMUSCULAR; INTRAVENOUS; SUBCUTANEOUS at 02:30

## 2020-05-23 RX ADMIN — HYDROMORPHONE HYDROCHLORIDE 0.5 MG: 1 INJECTION, SOLUTION INTRAMUSCULAR; INTRAVENOUS; SUBCUTANEOUS at 14:02

## 2020-05-23 RX ADMIN — HYDROMORPHONE HYDROCHLORIDE 0.5 MG: 1 INJECTION, SOLUTION INTRAMUSCULAR; INTRAVENOUS; SUBCUTANEOUS at 23:08

## 2020-05-23 RX ADMIN — HYDROMORPHONE HYDROCHLORIDE 0.5 MG: 1 INJECTION, SOLUTION INTRAMUSCULAR; INTRAVENOUS; SUBCUTANEOUS at 06:40

## 2020-05-23 RX ADMIN — SODIUM CHLORIDE 125 ML/HR: 9 INJECTION, SOLUTION INTRAVENOUS at 07:00

## 2020-05-23 RX ADMIN — HYDROMORPHONE HYDROCHLORIDE 0.5 MG: 1 INJECTION, SOLUTION INTRAMUSCULAR; INTRAVENOUS; SUBCUTANEOUS at 08:44

## 2020-05-23 RX ADMIN — SODIUM CHLORIDE 125 ML/HR: 9 INJECTION, SOLUTION INTRAVENOUS at 15:59

## 2020-05-23 RX ADMIN — INSULIN LISPRO 2 UNITS: 100 INJECTION, SOLUTION INTRAVENOUS; SUBCUTANEOUS at 01:55

## 2020-05-23 RX ADMIN — HYDROMORPHONE HYDROCHLORIDE 0.5 MG: 1 INJECTION, SOLUTION INTRAMUSCULAR; INTRAVENOUS; SUBCUTANEOUS at 20:31

## 2020-05-23 RX ADMIN — INSULIN LISPRO 2 UNITS: 100 INJECTION, SOLUTION INTRAVENOUS; SUBCUTANEOUS at 21:06

## 2020-05-23 RX ADMIN — HYDROMORPHONE HYDROCHLORIDE 0.5 MG: 1 INJECTION, SOLUTION INTRAMUSCULAR; INTRAVENOUS; SUBCUTANEOUS at 16:15

## 2020-05-23 RX ADMIN — INSULIN LISPRO 2 UNITS: 100 INJECTION, SOLUTION INTRAVENOUS; SUBCUTANEOUS at 06:40

## 2020-05-23 RX ADMIN — INSULIN LISPRO 2 UNITS: 100 INJECTION, SOLUTION INTRAVENOUS; SUBCUTANEOUS at 18:05

## 2020-05-23 RX ADMIN — INSULIN GLARGINE 22 UNITS: 100 INJECTION, SOLUTION SUBCUTANEOUS at 21:06

## 2020-05-23 RX ADMIN — ACETAMINOPHEN 650 MG: 325 TABLET, FILM COATED ORAL at 08:44

## 2020-05-23 RX ADMIN — LEVOTHYROXINE SODIUM 50 MCG: 50 TABLET ORAL at 18:05

## 2020-05-23 RX ADMIN — INSULIN GLARGINE 20 UNITS: 100 INJECTION, SOLUTION SUBCUTANEOUS at 08:28

## 2020-05-23 RX ADMIN — FENOFIBRATE 145 MG: 145 TABLET ORAL at 18:04

## 2020-05-23 RX ADMIN — SODIUM CHLORIDE 125 ML/HR: 9 INJECTION, SOLUTION INTRAVENOUS at 23:42

## 2020-05-23 RX ADMIN — LEVOTHYROXINE SODIUM 200 MCG: 100 TABLET ORAL at 18:04

## 2020-05-23 RX ADMIN — ACETAMINOPHEN 650 MG: 325 TABLET, FILM COATED ORAL at 16:15

## 2020-05-23 NOTE — PROGRESS NOTES
"  ENDOCRINE    Subjective   AND PLANS  Edie Camacho is a 35 y.o. female.     Follow-up diabetes and related disorders    Abdominal pain improving  Lipase coming down  Patient was started on clear liquids and tolerating it well.  No nausea or vomiting    Blood sugar 167-183  Increase Lantus to 22 units twice a day  Change Humalog correction scale to before meals and at bedtime    TSH elevated at 70.60  Free T4 low at 0.76 ng per DL.  Patient states that she has been taking levothyroxine 200 mcg/day regularly before admission  Increase levothyroxine to 250 mcg/day.    Cholesterol and triglycerides markedly elevated on admission  Patient was not taking any lipid medication before admission  Elevated triglycerides could have triggered the pancreatitis.  Start fenofibrate 145 mg/day.  Continue on hypocaloric diet  Recheck lipids    Objective   /92 (BP Location: Left arm, Patient Position: Sitting)   Pulse 113   Temp 98.1 °F (36.7 °C) (Oral)   Resp 16   Ht 162.6 cm (64\")   Wt 88.5 kg (195 lb)   SpO2 96%   BMI 33.47 kg/m²   Physical Exam    Awake, alert, not dyspneic, not tachypneic, not in distress  Pink conjunctiva.  Sclerae not icteric  No xanthelasma  Regular heart rate and rhythm.    No rales or wheezes  Abdomen soft, nontender  Extremities warm.  No cyanosis  No xanthomas  No xanthelasma    Lab Results (last 24 hours)     Procedure Component Value Units Date/Time    POC Glucose Once [[id number]]  (Abnormal) Collected:  05/23/20 1119    Specimen:  Blood Updated:  05/23/20 1120     Glucose 172 mg/dL     Comprehensive Metabolic Panel [[id number]]  (Abnormal) Collected:  05/23/20 0508    Specimen:  Blood Updated:  05/23/20 0833     Glucose 183 mg/dL      BUN 6 mg/dL      Creatinine 0.45 mg/dL      Sodium 133 mmol/L      Potassium 3.5 mmol/L      Chloride 104 mmol/L      CO2 9.4 mmol/L      Calcium 8.6 mg/dL      Total Protein 5.8 g/dL      Albumin 2.60 g/dL      ALT (SGPT) 23 U/L      AST (SGOT) 19 U/L  "     Alkaline Phosphatase 80 U/L      Total Bilirubin 0.6 mg/dL      eGFR Non African Amer >150 mL/min/1.73      Globulin 3.2 gm/dL      A/G Ratio 0.8 g/dL      BUN/Creatinine Ratio 13.3     Anion Gap 19.6 mmol/L     Narrative:       GFR Normal >60  Chronic Kidney Disease <60  Kidney Failure <15      CBC & Differential [[id number]] Collected:  05/23/20 0509    Specimen:  Blood Updated:  05/23/20 0611    Narrative:       The following orders were created for panel order CBC & Differential.  Procedure                               Abnormality         Status                     ---------                               -----------         ------                     CBC Auto Differential[[id number]]        Abnormal            Final result                 Please view results for these tests on the individual orders.    CBC Auto Differential [[id number]]  (Abnormal) Collected:  05/23/20 0509    Specimen:  Blood Updated:  05/23/20 0611     WBC 8.59 10*3/mm3      RBC 4.21 10*6/mm3      Hemoglobin 13.5 g/dL      Hematocrit 38.1 %      MCV 90.5 fL      MCH 32.1 pg      MCHC 35.4 g/dL      RDW 12.0 %      RDW-SD 39.2 fl      MPV 9.9 fL      Platelets 227 10*3/mm3      Neutrophil % 79.8 %      Lymphocyte % 12.8 %      Monocyte % 5.0 %      Eosinophil % 1.4 %      Basophil % 0.2 %      Neutrophils, Absolute 6.85 10*3/mm3      Lymphocytes, Absolute 1.10 10*3/mm3      Monocytes, Absolute 0.43 10*3/mm3      Eosinophils, Absolute 0.12 10*3/mm3      Basophils, Absolute 0.02 10*3/mm3     POC Glucose Once [[id number]]  (Abnormal) Collected:  05/23/20 0603    Specimen:  Blood Updated:  05/23/20 0604     Glucose 180 mg/dL     POC Glucose Once [[id number]]  (Abnormal) Collected:  05/23/20 0150    Specimen:  Blood Updated:  05/23/20 0152     Glucose 167 mg/dL     POC Glucose Once [[id number]]  (Abnormal) Collected:  05/22/20 1952    Specimen:  Blood Updated:  05/22/20 1954     Glucose 183 mg/dL     POC Glucose Once [[id number]]  (Abnormal)  Collected:  05/22/20 1651    Specimen:  Blood Updated:  05/22/20 1653     Glucose 193 mg/dL               Acute pancreatitis without infection or necrosis    Type 2 diabetes mellitus (CMS/HCC)    Hypothyroidism (acquired)    Mixed hyperlipidemia    Diabetes therapy as discussed above.  Thyroid hormone replacement as discussed above.  Lipid therapy as discussed above.

## 2020-05-23 NOTE — PROGRESS NOTES
[facility]  Inpatient Progress Note    Reason for Follow Up: Pancreatitis    Subjective     Interval History:   She is feeling better today.  Tolerating liquid diet.  Less abdominal pain and no nausea and vomiting.    Current Facility-Administered Medications:   •  acetaminophen (TYLENOL) tablet 650 mg, 650 mg, Oral, Q4H PRN, 650 mg at 05/23/20 1615 **OR** acetaminophen (TYLENOL) 160 MG/5ML solution 650 mg, 650 mg, Oral, Q4H PRN **OR** acetaminophen (TYLENOL) suppository 650 mg, 650 mg, Rectal, Q4H PRN, Beatriz Martinez, JEFE  •  dextrose (D50W) 25 g/ 50mL Intravenous Solution 25 g, 25 g, Intravenous, Q15 Min PRN, Beatriz Martinez APRN  •  dextrose (GLUTOSE) oral gel 15 g, 15 g, Oral, Q15 Min PRN, Beatriz Martinez APRN  •  fenofibrate (TRICOR) tablet 145 mg, 145 mg, Oral, Daily, Thong Gonzalez MD  •  glucagon (human recombinant) (GLUCAGEN DIAGNOSTIC) injection 1 mg, 1 mg, Subcutaneous, PRN, Beatriz Martinez APRN  •  HYDROmorphone (DILAUDID) injection 0.5 mg, 0.5 mg, Intravenous, Q2H PRN, Beatriz Martinez APRN, 0.5 mg at 05/23/20 1615  •  insulin glargine (LANTUS) injection 22 Units, 22 Units, Subcutaneous, Q12H, Thong Gonzalez MD  •  insulin lispro (humaLOG) injection 2-5 Units, 2-5 Units, Subcutaneous, 4x Daily With Meals & Nightly, Thong Gonzalez MD  •  levothyroxine (SYNTHROID, LEVOTHROID) tablet 200 mcg, 200 mcg, Oral, Q AM, Thong Gonzalez MD  •  levothyroxine (SYNTHROID, LEVOTHROID) tablet 50 mcg, 50 mcg, Oral, Q AM, Thong Gonzalez MD  •  ondansetron (ZOFRAN) injection 4 mg, 4 mg, Intravenous, Q6H PRN, Beatriz Martinez APRN, 4 mg at 05/22/20 1021  •  sodium chloride 0.9 % flush 10 mL, 10 mL, Intravenous, Q12H, Beatriz Martinez APRN  •  sodium chloride 0.9 % flush 10 mL, 10 mL, Intravenous, PRN, Beatriz Martinez APRN  •  sodium chloride 0.9 % infusion, 125 mL/hr, Intravenous, Continuous, Joseph Cordova MD, Last Rate: 125 mL/hr at 05/23/20 1559, 125 mL/hr at  05/23/20 1559  Review of Systems:   Positive for fever, negative for chills, negative for nausea or vomiting    Objective     Vital Signs  Temp:  [97.2 °F (36.2 °C)-100.4 °F (38 °C)] 98.3 °F (36.8 °C)  Heart Rate:  [111-118] 112  Resp:  [16] 16  BP: (104-136)/(70-92) 133/85  Body mass index is 33.47 kg/m².    Intake/Output Summary (Last 24 hours) at 5/23/2020 1802  Last data filed at 5/23/2020 1719  Gross per 24 hour   Intake 2885 ml   Output 3775 ml   Net -890 ml     I/O this shift:  In: 1060 [P.O.:120; I.V.:940]  Out: 1425 [Urine:1000; Stool:425]     Physical Exam:   General: patient awake, alert and cooperative   Eyes: Normal lids and lashes, no scleral icterus   Neck: supple, normal ROM   Skin: warm and dry, not jaundiced   Abdomen: soft, mild mid abdominal tenderness, nondistended; normal bowel sounds   Extremities: no rash or edema   Psychiatric: Normal mood and behavior; memory intact     Results Review:     I reviewed the patient's new clinical results.    Results from last 7 days   Lab Units 05/23/20  0509 05/22/20  0516 05/21/20  0440   WBC 10*3/mm3 8.59 7.63 11.60*   HEMOGLOBIN g/dL 13.5 14.7 13.4   HEMATOCRIT % 38.1 39.4 38.0   PLATELETS 10*3/mm3 227 239 280     Results from last 7 days   Lab Units 05/23/20  0508 05/22/20  0852 05/21/20  0713 05/20/20  2100   SODIUM mmol/L 133* 133* 138 134*   POTASSIUM mmol/L 3.5 4.4 4.1 3.9   CHLORIDE mmol/L 104 105 107 98   CO2 mmol/L 9.4* 12.5* 13.0* 18.9*   BUN mg/dL 6 4* 6 8   CREATININE mg/dL 0.45* 0.55* 0.57 0.63   CALCIUM mg/dL 8.6 8.7 8.5* 9.5   BILIRUBIN mg/dL 0.6 0.8  --  0.4   ALK PHOS U/L 80 82  --  119*   ALT (SGPT) U/L 23 31  --  55*   AST (SGOT) U/L 19 41*  --  47*   GLUCOSE mg/dL 183* 209* 283* 421*         Lab Results   Lab Value Date/Time    LIPASE 83 (H) 05/23/2020 1530    LIPASE 295 (H) 05/22/2020 0852    LIPASE >3,000 (H) 05/20/2020 2100    LIPASE 80 (H) 08/04/2019 1604    LIPASE 48 06/20/2019 1716    LIPASE 47 05/12/2019 0046    LIPASE 37  06/18/2016 0255       Radiology:  CT Abdomen Pelvis With Contrast   Final Result       1. Pancreatitis involving the body and tail of pancreas.   2. Right lower quadrant ileostomy with bowel containing parastomal   hernia, without evidence of obstruction. There is also a left lower   quadrant colostomy with associated parastomal hernia. In contrast to   prior study, the hernia sac now does contain a loop of transverse colon,   without evidence of obstruction.   3. Right-sided hydroureteronephrosis secondary to a 5 mm stone within   the mid to distal right ureter. Similar findings were present on prior   exam, although the degree of hydronephrosis may have increased slightly.       Radiation dose reduction techniques were utilized, including automated   exposure control and exposure modulation based on body size.       This report was finalized on 5/20/2020 11:19 PM by Dr. Marie Jeong M.D.              Assessment/Plan     Patient Active Problem List   Diagnosis   • Colitis presumed infectious   • Type 2 diabetes mellitus (CMS/HCC)   • Suprapubic pain, acute   • Hyponatremia   • Sepsis (CMS/HCC)   • Metabolic acidosis   • Chronic constipation   • Other microscopic hematuria   • Lower abdominal pain   • Colitis   • Large bowel obstruction (CMS/HCC)   • Stricture of sigmoid colon (CMS/HCC)   • Large bowel anastomotic leak   • Acute pancreatitis without infection or necrosis   • Hypothyroidism (acquired)   • Mixed hyperlipidemia       Assessment:  1. Acute pancreatitis   2. R sided hydronephrosis  3. Prior sigmoid colon resection with colostomy  4. Elevated triglycerides      Plan:  · Trial clear liquids-advance to full liquids in the morning  · Lipase improved, clinically improved  · Appreciate endocrinology management of elevated triglycerides    I discussed the patients findings and my recommendations with patient and nursing staff.    Regine Grover MD

## 2020-05-23 NOTE — PLAN OF CARE
Problem: Patient Care Overview  Goal: Plan of Care Review  Outcome: Ongoing (interventions implemented as appropriate)  Flowsheets (Taken 5/23/2020 1714)  Progress: improving  Plan of Care Reviewed With: patient  Outcome Summary: vss and afebrile, c/o abdominal pain especially with coughing, pt has moist productive cough today, monitoring blood sugars, pt involved in giving her insulin injections, ileostomy with liquid stool, ambulated in iraheta and sat in chair today, advanced diet to clear liquids and she tolerated well, no c/o nausea, ivf infusing as ordered

## 2020-05-23 NOTE — PLAN OF CARE
Problem: Patient Care Overview  Goal: Plan of Care Review  Outcome: Ongoing (interventions implemented as appropriate)  Flowsheets (Taken 5/23/2020 0406)  Progress: no change  Plan of Care Reviewed With: patient  Outcome Summary: VSS. low grade fever. Tylenol given. C/o pain throughout shift., dilaudid given.  Accuchecks and insulin given. Pt participating more in managing dm.  Up to bathroom with assist. Ileostomy draining well. Pt doesn't want to deep breathe and cough d/t pain.

## 2020-05-23 NOTE — PROGRESS NOTES
[facility]     LOS: 2 days     Name: Edie Camacho  Age: 35 y.o.  Sex: female  :  1985  MRN: [id number]         Primary Care Physician: Layla Monique APRN    Diet Clear Liquid; Consistent Carbohydrate    Subjective     Patient is seen at bedside, no new complaints.      Objective   Temp:  [97.2 °F (36.2 °C)-100.4 °F (38 °C)] 98.3 °F (36.8 °C)  Heart Rate:  [111-118] 112  Resp:  [16] 16  BP: (104-136)/(70-92) 133/85  SpO2:  [94 %-96 %] 94 %  on   ;   Device (Oxygen Therapy): room air  Body mass index is 33.47 kg/m².    Physical Exam   General appearance: awake  Head/Eyes: atraumatic, clear cornea, EOMI, normal conjunctiva/sclera, normal eyelids/periorb., normocephalic, PERRL  ENT: moist mucosal membranes, normal dentition, normal nose, normal pharynx, normal sinus  Neck: full range of motion, non-tender, normal thyroid, supple/no meningismus, no bruit/NL carotids, no JVD, no masses or swelling  Cardiovascular: normal capillary refill, regular rate & rhythm  Respiratory: clear to auscultation, no distress  Abdomen:   Generalized distention and tenderness.  Rectal: not indicated  Extremities: moves all, normal capillary refill, normal range of motion, no edema  Musculoskeletal: normal inspection  Neuro/CNS: alert, oriented X 3    Reviewed medications and new clinical results        Results from last 7 days   Lab Units 20  0509 20  0516 20  0440 20  2100   WBC 10*3/mm3 8.59 7.63 11.60* 10.88*   HEMOGLOBIN g/dL 13.5 14.7 13.4 13.6   PLATELETS 10*3/mm3 227 239 280 292     Results from last 7 days   Lab Units 20  0508 20  0852 20  0713 20  2100   SODIUM mmol/L 133* 133* 138 134*   POTASSIUM mmol/L 3.5 4.4 4.1 3.9   CHLORIDE mmol/L 104 105 107 98   CO2 mmol/L 9.4* 12.5* 13.0* 18.9*   BUN mg/dL 6 4* 6 8   CREATININE mg/dL 0.45* 0.55* 0.57 0.63   CALCIUM mg/dL 8.6 8.7 8.5* 9.5   GLUCOSE mg/dL 183* 209*  283* 421*     Lab Results   Component Value Date    ANIONGAP 19.6 (H) 05/23/2020     Glucose   Date/Time Value Ref Range Status   05/23/2020 1634 164 (H) 70 - 130 mg/dL Final   05/23/2020 1119 172 (H) 70 - 130 mg/dL Final   05/23/2020 0603 180 (H) 70 - 130 mg/dL Final   05/23/2020 0150 167 (H) 70 - 130 mg/dL Final   05/22/2020 1952 183 (H) 70 - 130 mg/dL Final   05/22/2020 1651 193 (H) 70 - 130 mg/dL Final   05/22/2020 1121 211 (H) 70 - 130 mg/dL Final   05/22/2020 0622 210 (H) 70 - 130 mg/dL Final     Hemoglobin A1C   Date Value Ref Range Status   05/21/2020 11.70 (H) 4.80 - 5.60 % Final     Estimated Creatinine Clearance: 184.3 mL/min (A) (by C-G formula based on SCr of 0.45 mg/dL (L)).    Assessment/Plan   Active Hospital Problems    Diagnosis  POA   • **Acute pancreatitis without infection or necrosis [K85.90]  Yes   • Mixed hyperlipidemia [E78.2]  Yes   • Hypothyroidism (acquired) [E03.9]  Yes   • Type 2 diabetes mellitus (CMS/HCC) [E11.9]  Yes      Resolved Hospital Problems   No resolved problems to display.     35 y.o. female       Assessment and plan:  1. Acute pancreatitis, continue clear liquid diet, advance diet as tolerated.  GI on board.  2.  Hypertriglyceridemia /hyperlipidemia, management per endocrinology recommendations.  3.  Diabetes mellitus, continue Accu-Cheks and sliding scale insulin coverage.  Patient is noted to be on 22 units of Lantus every 12 hours.  4.  Morbid obesity, complicates all aspects of care, she was encouraged to lose weight.    Joseph Cordova MD   05/23/20  18:29

## 2020-05-24 PROBLEM — E87.1 HYPONATREMIA: Status: ACTIVE | Noted: 2020-05-24

## 2020-05-24 PROBLEM — E87.6 HYPOKALEMIA: Status: ACTIVE | Noted: 2020-05-24

## 2020-05-24 PROBLEM — E87.20 METABOLIC ACIDOSIS: Status: ACTIVE | Noted: 2020-05-24

## 2020-05-24 LAB
ANION GAP SERPL CALCULATED.3IONS-SCNC: 11.1 MMOL/L (ref 5–15)
ARTICHOKE IGE QN: 140 MG/DL (ref 0–100)
BACTERIA UR QL AUTO: ABNORMAL /HPF
BASOPHILS # BLD AUTO: 0.04 10*3/MM3 (ref 0–0.2)
BASOPHILS NFR BLD AUTO: 0.5 % (ref 0–1.5)
BILIRUB UR QL STRIP: NEGATIVE
BUN BLD-MCNC: 5 MG/DL (ref 6–20)
BUN/CREAT SERPL: 11.6 (ref 7–25)
CALCIUM SPEC-SCNC: 8.2 MG/DL (ref 8.6–10.5)
CHLORIDE SERPL-SCNC: 105 MMOL/L (ref 98–107)
CHOLEST SERPL-MCNC: 438 MG/DL (ref 0–200)
CK SERPL-CCNC: 26 U/L (ref 20–180)
CLARITY UR: CLEAR
CO2 SERPL-SCNC: 14.9 MMOL/L (ref 22–29)
COLOR UR: YELLOW
CREAT BLD-MCNC: 0.43 MG/DL (ref 0.57–1)
DEPRECATED RDW RBC AUTO: 40.1 FL (ref 37–54)
EOSINOPHIL # BLD AUTO: 0.15 10*3/MM3 (ref 0–0.4)
EOSINOPHIL NFR BLD AUTO: 1.7 % (ref 0.3–6.2)
ERYTHROCYTE [DISTWIDTH] IN BLOOD BY AUTOMATED COUNT: 12.2 % (ref 12.3–15.4)
GFR SERPL CREATININE-BSD FRML MDRD: >150 ML/MIN/1.73
GLUCOSE BLD-MCNC: 153 MG/DL (ref 65–99)
GLUCOSE BLDC GLUCOMTR-MCNC: 142 MG/DL (ref 70–130)
GLUCOSE BLDC GLUCOMTR-MCNC: 209 MG/DL (ref 70–130)
GLUCOSE BLDC GLUCOMTR-MCNC: 260 MG/DL (ref 70–130)
GLUCOSE BLDC GLUCOMTR-MCNC: 297 MG/DL (ref 70–130)
GLUCOSE UR STRIP-MCNC: ABNORMAL MG/DL
HCT VFR BLD AUTO: 36 % (ref 34–46.6)
HDLC SERPL-MCNC: 19 MG/DL (ref 40–60)
HGB BLD-MCNC: 12.9 G/DL (ref 12–15.9)
HGB UR QL STRIP.AUTO: ABNORMAL
HYALINE CASTS UR QL AUTO: ABNORMAL /LPF
IMM GRANULOCYTES # BLD AUTO: 0.14 10*3/MM3 (ref 0–0.05)
IMM GRANULOCYTES NFR BLD AUTO: 1.6 % (ref 0–0.5)
KETONES UR QL STRIP: ABNORMAL
LDLC SERPL CALC-MCNC: ABNORMAL MG/DL
LDLC/HDLC SERPL: ABNORMAL {RATIO}
LEUKOCYTE ESTERASE UR QL STRIP.AUTO: NEGATIVE
LYMPHOCYTES # BLD AUTO: 1.2 10*3/MM3 (ref 0.7–3.1)
LYMPHOCYTES NFR BLD AUTO: 13.8 % (ref 19.6–45.3)
MCH RBC QN AUTO: 32.3 PG (ref 26.6–33)
MCHC RBC AUTO-ENTMCNC: 35.8 G/DL (ref 31.5–35.7)
MCV RBC AUTO: 90 FL (ref 79–97)
MONOCYTES # BLD AUTO: 0.42 10*3/MM3 (ref 0.1–0.9)
MONOCYTES NFR BLD AUTO: 4.8 % (ref 5–12)
NEUTROPHILS # BLD AUTO: 6.74 10*3/MM3 (ref 1.7–7)
NEUTROPHILS NFR BLD AUTO: 77.6 % (ref 42.7–76)
NITRITE UR QL STRIP: NEGATIVE
NRBC BLD AUTO-RTO: 0 /100 WBC (ref 0–0.2)
OSMOLALITY UR: 437 MOSM/KG
PH UR STRIP.AUTO: 6 [PH] (ref 5–8)
PLATELET # BLD AUTO: 260 10*3/MM3 (ref 140–450)
PMV BLD AUTO: 9.9 FL (ref 6–12)
POTASSIUM BLD-SCNC: 2.8 MMOL/L (ref 3.5–5.2)
POTASSIUM BLD-SCNC: 3.6 MMOL/L (ref 3.5–5.2)
POTASSIUM UR-SCNC: 13.7 MMOL/L
PROT UR QL STRIP: ABNORMAL
RBC # BLD AUTO: 4 10*6/MM3 (ref 3.77–5.28)
RBC # UR: ABNORMAL /HPF
REF LAB TEST METHOD: ABNORMAL
SODIUM BLD-SCNC: 131 MMOL/L (ref 136–145)
SODIUM UR-SCNC: 109 MMOL/L
SP GR UR STRIP: 1.01 (ref 1–1.03)
SQUAMOUS #/AREA URNS HPF: ABNORMAL /HPF
TRIGL SERPL-MCNC: 938 MG/DL (ref 0–150)
URATE SERPL-MCNC: 3.1 MG/DL (ref 2.4–5.7)
UROBILINOGEN UR QL STRIP: ABNORMAL
VLDLC SERPL-MCNC: ABNORMAL MG/DL
WBC NRBC COR # BLD: 8.69 10*3/MM3 (ref 3.4–10.8)
WBC UR QL AUTO: ABNORMAL /HPF

## 2020-05-24 PROCEDURE — 84133 ASSAY OF URINE POTASSIUM: CPT | Performed by: INTERNAL MEDICINE

## 2020-05-24 PROCEDURE — 83935 ASSAY OF URINE OSMOLALITY: CPT | Performed by: INTERNAL MEDICINE

## 2020-05-24 PROCEDURE — 25810000003 SODIUM CHLORIDE 0.9 % WITH KCL 20 MEQ 20-0.9 MEQ/L-% SOLUTION: Performed by: INTERNAL MEDICINE

## 2020-05-24 PROCEDURE — 80048 BASIC METABOLIC PNL TOTAL CA: CPT | Performed by: INTERNAL MEDICINE

## 2020-05-24 PROCEDURE — 84156 ASSAY OF PROTEIN URINE: CPT | Performed by: INTERNAL MEDICINE

## 2020-05-24 PROCEDURE — 83721 ASSAY OF BLOOD LIPOPROTEIN: CPT | Performed by: INTERNAL MEDICINE

## 2020-05-24 PROCEDURE — 80061 LIPID PANEL: CPT | Performed by: INTERNAL MEDICINE

## 2020-05-24 PROCEDURE — 86341 ISLET CELL ANTIBODY: CPT | Performed by: INTERNAL MEDICINE

## 2020-05-24 PROCEDURE — 99232 SBSQ HOSP IP/OBS MODERATE 35: CPT | Performed by: INTERNAL MEDICINE

## 2020-05-24 PROCEDURE — 82962 GLUCOSE BLOOD TEST: CPT

## 2020-05-24 PROCEDURE — 84300 ASSAY OF URINE SODIUM: CPT | Performed by: INTERNAL MEDICINE

## 2020-05-24 PROCEDURE — 25010000002 HYDROMORPHONE PER 4 MG: Performed by: NURSE PRACTITIONER

## 2020-05-24 PROCEDURE — 81001 URINALYSIS AUTO W/SCOPE: CPT | Performed by: INTERNAL MEDICINE

## 2020-05-24 PROCEDURE — 85025 COMPLETE CBC W/AUTO DIFF WBC: CPT | Performed by: INTERNAL MEDICINE

## 2020-05-24 PROCEDURE — 82550 ASSAY OF CK (CPK): CPT | Performed by: INTERNAL MEDICINE

## 2020-05-24 PROCEDURE — 84132 ASSAY OF SERUM POTASSIUM: CPT | Performed by: INTERNAL MEDICINE

## 2020-05-24 PROCEDURE — 63710000001 INSULIN LISPRO (HUMAN) PER 5 UNITS: Performed by: INTERNAL MEDICINE

## 2020-05-24 PROCEDURE — 84166 PROTEIN E-PHORESIS/URINE/CSF: CPT | Performed by: INTERNAL MEDICINE

## 2020-05-24 PROCEDURE — 84681 ASSAY OF C-PEPTIDE: CPT | Performed by: INTERNAL MEDICINE

## 2020-05-24 PROCEDURE — 25010000002 MAGNESIUM SULFATE IN D5W 1G/100ML (PREMIX) 1-5 GM/100ML-% SOLUTION: Performed by: INTERNAL MEDICINE

## 2020-05-24 PROCEDURE — 63710000001 INSULIN GLARGINE PER 5 UNITS: Performed by: INTERNAL MEDICINE

## 2020-05-24 PROCEDURE — 84550 ASSAY OF BLOOD/URIC ACID: CPT | Performed by: INTERNAL MEDICINE

## 2020-05-24 RX ORDER — POTASSIUM CHLORIDE 750 MG/1
40 CAPSULE, EXTENDED RELEASE ORAL AS NEEDED
Status: DISCONTINUED | OUTPATIENT
Start: 2020-05-24 | End: 2020-05-25 | Stop reason: HOSPADM

## 2020-05-24 RX ORDER — SODIUM CHLORIDE AND POTASSIUM CHLORIDE 150; 900 MG/100ML; MG/100ML
100 INJECTION, SOLUTION INTRAVENOUS CONTINUOUS
Status: DISCONTINUED | OUTPATIENT
Start: 2020-05-24 | End: 2020-05-24

## 2020-05-24 RX ORDER — POTASSIUM CHLORIDE 7.45 MG/ML
10 INJECTION INTRAVENOUS
Status: DISCONTINUED | OUTPATIENT
Start: 2020-05-24 | End: 2020-05-25 | Stop reason: HOSPADM

## 2020-05-24 RX ORDER — SODIUM BICARBONATE 650 MG/1
1300 TABLET ORAL 3 TIMES DAILY
Status: DISCONTINUED | OUTPATIENT
Start: 2020-05-24 | End: 2020-05-25 | Stop reason: HOSPADM

## 2020-05-24 RX ORDER — ATORVASTATIN CALCIUM 20 MG/1
10 TABLET, FILM COATED ORAL NIGHTLY
Status: DISCONTINUED | OUTPATIENT
Start: 2020-05-24 | End: 2020-05-25 | Stop reason: HOSPADM

## 2020-05-24 RX ORDER — INSULIN GLARGINE 100 [IU]/ML
25 INJECTION, SOLUTION SUBCUTANEOUS EVERY 12 HOURS SCHEDULED
Status: DISCONTINUED | OUTPATIENT
Start: 2020-05-24 | End: 2020-05-25

## 2020-05-24 RX ORDER — POTASSIUM CHLORIDE 1.5 G/1.77G
40 POWDER, FOR SOLUTION ORAL AS NEEDED
Status: DISCONTINUED | OUTPATIENT
Start: 2020-05-24 | End: 2020-05-25 | Stop reason: HOSPADM

## 2020-05-24 RX ORDER — MAGNESIUM SULFATE 1 G/100ML
1 INJECTION INTRAVENOUS
Status: COMPLETED | OUTPATIENT
Start: 2020-05-24 | End: 2020-05-24

## 2020-05-24 RX ORDER — DIPHENOXYLATE HYDROCHLORIDE AND ATROPINE SULFATE 2.5; .025 MG/1; MG/1
1 TABLET ORAL EVERY 4 HOURS PRN
Status: DISCONTINUED | OUTPATIENT
Start: 2020-05-24 | End: 2020-05-25 | Stop reason: HOSPADM

## 2020-05-24 RX ORDER — CHOLESTYRAMINE 4 G/9G
1 POWDER, FOR SUSPENSION ORAL EVERY 12 HOURS SCHEDULED
Status: DISCONTINUED | OUTPATIENT
Start: 2020-05-24 | End: 2020-05-24

## 2020-05-24 RX ORDER — SODIUM CHLORIDE AND POTASSIUM CHLORIDE 300; 900 MG/100ML; MG/100ML
100 INJECTION, SOLUTION INTRAVENOUS CONTINUOUS
Status: DISCONTINUED | OUTPATIENT
Start: 2020-05-24 | End: 2020-05-25

## 2020-05-24 RX ADMIN — INSULIN GLARGINE 22 UNITS: 100 INJECTION, SOLUTION SUBCUTANEOUS at 08:26

## 2020-05-24 RX ADMIN — DIPHENOXYLATE HYDROCHLORIDE AND ATROPINE SULFATE 1 TABLET: 2.5; .025 TABLET ORAL at 18:25

## 2020-05-24 RX ADMIN — MAGNESIUM SULFATE HEPTAHYDRATE 1 G: 1 INJECTION, SOLUTION INTRAVENOUS at 18:25

## 2020-05-24 RX ADMIN — POTASSIUM CHLORIDE AND SODIUM CHLORIDE 100 ML/HR: 900; 150 INJECTION, SOLUTION INTRAVENOUS at 18:25

## 2020-05-24 RX ADMIN — FENOFIBRATE 145 MG: 145 TABLET ORAL at 08:26

## 2020-05-24 RX ADMIN — HYDROMORPHONE HYDROCHLORIDE 0.5 MG: 1 INJECTION, SOLUTION INTRAMUSCULAR; INTRAVENOUS; SUBCUTANEOUS at 11:53

## 2020-05-24 RX ADMIN — SODIUM BICARBONATE 1300 MG: 650 TABLET ORAL at 20:34

## 2020-05-24 RX ADMIN — LEVOTHYROXINE SODIUM 50 MCG: 50 TABLET ORAL at 05:50

## 2020-05-24 RX ADMIN — HYDROMORPHONE HYDROCHLORIDE 0.5 MG: 1 INJECTION, SOLUTION INTRAMUSCULAR; INTRAVENOUS; SUBCUTANEOUS at 23:27

## 2020-05-24 RX ADMIN — DIPHENOXYLATE HYDROCHLORIDE AND ATROPINE SULFATE 1 TABLET: 2.5; .025 TABLET ORAL at 23:27

## 2020-05-24 RX ADMIN — ACETAMINOPHEN 650 MG: 325 TABLET, FILM COATED ORAL at 11:53

## 2020-05-24 RX ADMIN — ATORVASTATIN CALCIUM 10 MG: 20 TABLET, FILM COATED ORAL at 20:34

## 2020-05-24 RX ADMIN — SODIUM CHLORIDE 125 ML/HR: 9 INJECTION, SOLUTION INTRAVENOUS at 07:29

## 2020-05-24 RX ADMIN — INSULIN LISPRO 7 UNITS: 100 INJECTION, SOLUTION INTRAVENOUS; SUBCUTANEOUS at 17:07

## 2020-05-24 RX ADMIN — INSULIN LISPRO 4 UNITS: 100 INJECTION, SOLUTION INTRAVENOUS; SUBCUTANEOUS at 11:53

## 2020-05-24 RX ADMIN — POTASSIUM CHLORIDE 40 MEQ: 10 CAPSULE, COATED, EXTENDED RELEASE ORAL at 09:40

## 2020-05-24 RX ADMIN — POTASSIUM CHLORIDE 40 MEQ: 10 CAPSULE, COATED, EXTENDED RELEASE ORAL at 18:25

## 2020-05-24 RX ADMIN — INSULIN LISPRO 4 UNITS: 100 INJECTION, SOLUTION INTRAVENOUS; SUBCUTANEOUS at 21:42

## 2020-05-24 RX ADMIN — HYDROMORPHONE HYDROCHLORIDE 0.5 MG: 1 INJECTION, SOLUTION INTRAMUSCULAR; INTRAVENOUS; SUBCUTANEOUS at 17:08

## 2020-05-24 RX ADMIN — INSULIN LISPRO 3 UNITS: 100 INJECTION, SOLUTION INTRAVENOUS; SUBCUTANEOUS at 17:07

## 2020-05-24 RX ADMIN — INSULIN GLARGINE 25 UNITS: 100 INJECTION, SOLUTION SUBCUTANEOUS at 21:43

## 2020-05-24 RX ADMIN — HYDROMORPHONE HYDROCHLORIDE 0.5 MG: 1 INJECTION, SOLUTION INTRAMUSCULAR; INTRAVENOUS; SUBCUTANEOUS at 20:34

## 2020-05-24 RX ADMIN — ACETAMINOPHEN 650 MG: 325 TABLET, FILM COATED ORAL at 23:27

## 2020-05-24 RX ADMIN — POTASSIUM CHLORIDE 40 MEQ: 10 CAPSULE, COATED, EXTENDED RELEASE ORAL at 14:24

## 2020-05-24 RX ADMIN — ACETAMINOPHEN 650 MG: 325 TABLET, FILM COATED ORAL at 05:50

## 2020-05-24 RX ADMIN — LEVOTHYROXINE SODIUM 200 MCG: 100 TABLET ORAL at 05:50

## 2020-05-24 NOTE — PROGRESS NOTES
[facility]     LOS: 3 days     Name: Edie Camacho  Age: 35 y.o.  Sex: female  :  1985  MRN: [id number]         Primary Care Physician: Layla Monique APRN    Diet Regular; Low Fat, Consistent Carbohydrate    Subjective    Patient is seen at bedside, she feels better.    Objective   Temp:  [97.7 °F (36.5 °C)-99.2 °F (37.3 °C)] 97.7 °F (36.5 °C)  Heart Rate:  [] 89  Resp:  [16] 16  BP: (109-133)/(71-87) 127/87  SpO2:  [92 %-96 %] 96 %  on   ;   Device (Oxygen Therapy): room air  Body mass index is 33.47 kg/m².    Physical Exam   General appearance: awake  Head/Eyes: atraumatic, clear cornea, EOMI, normal conjunctiva/sclera, normal eyelids/periorb., normocephalic, PERRL  ENT: moist mucosal membranes, normal dentition, normal nose, normal pharynx, normal sinus  Neck: full range of motion, non-tender, normal thyroid, supple/no meningismus, no bruit/NL carotids, no JVD, no masses or swelling  Cardiovascular: normal capillary refill, regular rate & rhythm  Respiratory: clear to auscultation, no distress  Abdomen:   Generalized distention and tenderness.   ileostomy.  Rectal: not indicated  Extremities: moves all, normal capillary refill, normal range of motion, no edema  Musculoskeletal: normal inspection  Neuro/CNS: alert, oriented X 3    Reviewed medications and new clinical results        Results from last 7 days   Lab Units 20  0613 20  0509 20  0516 20  0440 20  2100   WBC 10*3/mm3 8.69 8.59 7.63 11.60* 10.88*   HEMOGLOBIN g/dL 12.9 13.5 14.7 13.4 13.6   PLATELETS 10*3/mm3 260 227 239 280 292     Results from last 7 days   Lab Units 20  0613 20  0508 20  0852 20  0713 20  2100   SODIUM mmol/L 131* 133* 133* 138 134*   POTASSIUM mmol/L 2.8* 3.5 4.4 4.1 3.9   CHLORIDE mmol/L 105 104 105 107 98   CO2 mmol/L 14.9* 9.4* 12.5* 13.0* 18.9*   BUN mg/dL 5* 6 4* 6 8   CREATININE mg/dL  0.43* 0.45* 0.55* 0.57 0.63   CALCIUM mg/dL 8.2* 8.6 8.7 8.5* 9.5   GLUCOSE mg/dL 153* 183* 209* 283* 421*     Lab Results   Component Value Date    ANIONGAP 11.1 05/24/2020     Glucose   Date/Time Value Ref Range Status   05/24/2020 1634 209 (H) 70 - 130 mg/dL Final   05/24/2020 1131 297 (H) 70 - 130 mg/dL Final   05/24/2020 0620 142 (H) 70 - 130 mg/dL Final   05/23/2020 2053 159 (H) 70 - 130 mg/dL Final   05/23/2020 1634 164 (H) 70 - 130 mg/dL Final   05/23/2020 1119 172 (H) 70 - 130 mg/dL Final   05/23/2020 0603 180 (H) 70 - 130 mg/dL Final   05/23/2020 0150 167 (H) 70 - 130 mg/dL Final     No results found for: HGBA1C  Estimated Creatinine Clearance: 192.9 mL/min (A) (by C-G formula based on SCr of 0.43 mg/dL (L)).    Assessment/Plan   Active Hospital Problems    Diagnosis  POA   • **Acute pancreatitis without infection or necrosis [K85.90]  Yes   • Mixed hyperlipidemia [E78.2]  Yes   • Hypothyroidism (acquired) [E03.9]  Yes   • Type 2 diabetes mellitus (CMS/HCC) [E11.9]  Yes      Resolved Hospital Problems   No resolved problems to display.     35 y.o. female       Assessment and plan:  1. Acute pancreatitis, continue clear liquid diet, advance diet as tolerated.  GI on board.  2.  Hypertriglyceridemia /hyperlipidemia, management per endocrinology recommendations.  3.  Diabetes mellitus, continue Accu-Cheks and sliding scale insulin coverage.  Patient is noted to be on 22 units of Lantus every 12 hours.  4.  Hyponatremia, metabolic acidosis and hypokalemia.  Continue IV fluids, nephrology evaluation.  5.  Morbid obesity, complicates all aspects of care, she was encouraged to lose weight.    Joseph Cordova MD   05/24/20  17:02

## 2020-05-24 NOTE — PROGRESS NOTES
[facility]  Inpatient Progress Note    Reason for Follow Up:  Pancreatitis    Subjective     Interval History:   She feels better today.  She is tolerating liquids and she is not having any abdominal pain or nausea.  She does note more output from her ileostomy.  Typically when this happens at home she eats a banana.  She does not take antidiarrheals.    Current Facility-Administered Medications:   •  acetaminophen (TYLENOL) tablet 650 mg, 650 mg, Oral, Q4H PRN, 650 mg at 05/24/20 1153 **OR** acetaminophen (TYLENOL) 160 MG/5ML solution 650 mg, 650 mg, Oral, Q4H PRN **OR** acetaminophen (TYLENOL) suppository 650 mg, 650 mg, Rectal, Q4H PRN, Beatriz Martinez APRN  •  dextrose (D50W) 25 g/ 50mL Intravenous Solution 25 g, 25 g, Intravenous, Q15 Min PRN, Beatriz Martinez APRN  •  dextrose (GLUTOSE) oral gel 15 g, 15 g, Oral, Q15 Min PRN, Beatriz Martinez APRN  •  fenofibrate (TRICOR) tablet 145 mg, 145 mg, Oral, Daily, Thong Gonzalez MD, 145 mg at 05/24/20 0826  •  glucagon (human recombinant) (GLUCAGEN DIAGNOSTIC) injection 1 mg, 1 mg, Subcutaneous, PRN, Beatriz Martinez APRN  •  HYDROmorphone (DILAUDID) injection 0.5 mg, 0.5 mg, Intravenous, Q2H PRN, Beatriz Martinez APRN, 0.5 mg at 05/24/20 1153  •  insulin glargine (LANTUS) injection 22 Units, 22 Units, Subcutaneous, Q12H, Thong Gonzalez MD, 22 Units at 05/24/20 0826  •  insulin lispro (humaLOG) injection 2-5 Units, 2-5 Units, Subcutaneous, 4x Daily With Meals & Nightly, Thong Gonzalez MD, 4 Units at 05/24/20 1153  •  levothyroxine (SYNTHROID, LEVOTHROID) tablet 200 mcg, 200 mcg, Oral, Q AM, Thong Gonzalez MD, 200 mcg at 05/24/20 0550  •  levothyroxine (SYNTHROID, LEVOTHROID) tablet 50 mcg, 50 mcg, Oral, Q AM, Thong Gonzalez MD, 50 mcg at 05/24/20 0550  •  ondansetron (ZOFRAN) injection 4 mg, 4 mg, Intravenous, Q6H PRN, Beatriz Martinez APRN, 4 mg at 05/22/20 1021  •  potassium chloride (MICRO-K) CR capsule 40 mEq,  40 mEq, Oral, PRN, 40 mEq at 05/24/20 1424 **OR** potassium chloride (KLOR-CON) packet 40 mEq, 40 mEq, Oral, PRN **OR** potassium chloride 10 mEq in 100 mL IVPB, 10 mEq, Intravenous, Q1H PRN, Joseph Cordova MD  •  sodium chloride 0.9 % flush 10 mL, 10 mL, Intravenous, Q12H, Beatriz Martinez APRN  •  sodium chloride 0.9 % flush 10 mL, 10 mL, Intravenous, PRN, Beatriz Martinez APRN  •  sodium chloride 0.9 % infusion, 125 mL/hr, Intravenous, Continuous, Joseph Cordova MD, Last Rate: 125 mL/hr at 05/24/20 0729, 125 mL/hr at 05/24/20 0729  Review of Systems:   Negative for fevers or chills, negative for abdominal pain or nausea    Objective     Vital Signs  Temp:  [97.7 °F (36.5 °C)-99.2 °F (37.3 °C)] 97.7 °F (36.5 °C)  Heart Rate:  [] 89  Resp:  [16] 16  BP: (109-133)/(71-87) 127/87  Body mass index is 33.47 kg/m².    Intake/Output Summary (Last 24 hours) at 5/24/2020 1447  Last data filed at 5/24/2020 1420  Gross per 24 hour   Intake 3040 ml   Output 5425 ml   Net -2385 ml     I/O this shift:  In: 1000 [I.V.:1000]  Out: 2450 [Urine:1750; Stool:700]     Physical Exam:   General: patient awake, alert and cooperative   Eyes: Normal lids and lashes, no scleral icterus   Neck: supple, normal ROM   Skin: warm and dry, not jaundiced   Pulm: regular and unlabored   Abdomen: soft, nontender, nondistended; ileostomy extremities: no rash or edema   Psychiatric: Normal mood and behavior; memory intact     Results Review:     I reviewed the patient's new clinical results.    Results from last 7 days   Lab Units 05/24/20  0613 05/23/20  0509 05/22/20  0516   WBC 10*3/mm3 8.69 8.59 7.63   HEMOGLOBIN g/dL 12.9 13.5 14.7   HEMATOCRIT % 36.0 38.1 39.4   PLATELETS 10*3/mm3 260 227 239     Results from last 7 days   Lab Units 05/24/20  0613 05/23/20  0508 05/22/20  0852  05/20/20  2100   SODIUM mmol/L 131* 133* 133*   < > 134*   POTASSIUM mmol/L 2.8* 3.5 4.4   < > 3.9   CHLORIDE mmol/L 105 104 105   < > 98   CO2 mmol/L 14.9*  9.4* 12.5*   < > 18.9*   BUN mg/dL 5* 6 4*   < > 8   CREATININE mg/dL 0.43* 0.45* 0.55*   < > 0.63   CALCIUM mg/dL 8.2* 8.6 8.7   < > 9.5   BILIRUBIN mg/dL  --  0.6 0.8  --  0.4   ALK PHOS U/L  --  80 82  --  119*   ALT (SGPT) U/L  --  23 31  --  55*   AST (SGOT) U/L  --  19 41*  --  47*   GLUCOSE mg/dL 153* 183* 209*   < > 421*    < > = values in this interval not displayed.         Lab Results   Lab Value Date/Time    LIPASE 83 (H) 05/23/2020 1530    LIPASE 295 (H) 05/22/2020 0852    LIPASE >3,000 (H) 05/20/2020 2100    LIPASE 80 (H) 08/04/2019 1604    LIPASE 48 06/20/2019 1716    LIPASE 47 05/12/2019 0046    LIPASE 37 06/18/2016 0255       Radiology:  CT Abdomen Pelvis With Contrast   Final Result       1. Pancreatitis involving the body and tail of pancreas.   2. Right lower quadrant ileostomy with bowel containing parastomal   hernia, without evidence of obstruction. There is also a left lower   quadrant colostomy with associated parastomal hernia. In contrast to   prior study, the hernia sac now does contain a loop of transverse colon,   without evidence of obstruction.   3. Right-sided hydroureteronephrosis secondary to a 5 mm stone within   the mid to distal right ureter. Similar findings were present on prior   exam, although the degree of hydronephrosis may have increased slightly.       Radiation dose reduction techniques were utilized, including automated   exposure control and exposure modulation based on body size.       This report was finalized on 5/20/2020 11:19 PM by Dr. Marie Jeong M.D.              Assessment/Plan     Patient Active Problem List   Diagnosis   • Colitis presumed infectious   • Type 2 diabetes mellitus (CMS/HCC)   • Suprapubic pain, acute   • Hyponatremia   • Sepsis (CMS/HCC)   • Metabolic acidosis   • Chronic constipation   • Other microscopic hematuria   • Lower abdominal pain   • Colitis   • Large bowel obstruction (CMS/HCC)   • Stricture of sigmoid colon (CMS/HCC)   •  Large bowel anastomotic leak   • Acute pancreatitis without infection or necrosis   • Hypothyroidism (acquired)   • Mixed hyperlipidemia       Assessment:  1. Acute pancreatitis   2. R sided hydronephrosis  3. Prior sigmoid colon resection with colostomy  4. Elevated triglycerides      Plan:  · Showing signs of improvement-we will advance to a low-fat diet  · We will add Questran for a few doses to try to decrease the output of her ostomy-hopefully starting solid food will also decrease her output.  Could use antidiarrheals as needed but she reports that typically it improves with the diet change at home.  · If she does well overnight would not be opposed to discharge tomorrow.    I discussed the patients findings and my recommendations with patient and nursing staff.    Regine Grover MD

## 2020-05-24 NOTE — PROGRESS NOTES
"  ENDOCRINE    Subjective   AND PLANS  Edie Camacho is a 35 y.o. female.     Follow-up diabetes and related disorders    No abdominal pain.  Triglycerides down to 938.  Continue fenofibrate 145 mg/day.  Add Vascepa 2 g twice daily.  Will hold using cholestyramine because it will increase triglycerides    Diet will be advanced to regular.    Fasting glucose 153.  Random glucose 159-297  Increase Lantus to 25 units twice daily  Start Humalog 7 units with each meal  Continue Humalog correction scale    Continue levothyroxine 200 mcg +50 mcg daily.  Repeat thyroid function testing 4 to 6 weeks and adjust dose if needed.    Potassium low.  Change IV fluids to normal saline with potassium chloride    Have discussed recommendations with patient    Objective   /87 (BP Location: Left arm, Patient Position: Sitting)   Pulse 89   Temp 97.7 °F (36.5 °C) (Oral)   Resp 16   Ht 162.6 cm (64\")   Wt 88.5 kg (195 lb)   SpO2 96%   BMI 33.47 kg/m²   Physical Exam    Awake, alert, not in distress  No xanthelasma  No rales or wheezes  Regular heart rate and rhythm.  No gallop  Abdomen soft, globular, nontender  Extremities warm.  No cyanosis  No calf tenderness    Lab Results (last 24 hours)     Procedure Component Value Units Date/Time    POC Glucose Once [[id number]]  (Abnormal) Collected:  05/24/20 1131    Specimen:  Blood Updated:  05/24/20 1138     Glucose 297 mg/dL     LDL Cholesterol, Direct [[id number]]  (Abnormal) Collected:  05/24/20 0613    Specimen:  Blood Updated:  05/24/20 0939     LDL Cholesterol  140 mg/dL     Narrative:       LDL Reference Ranges    (U.S. Department of Health and Human Services ATP III Classifications)    Optimal          <100 mg/dl  Near Optimal     100-129 mg/dl  Borderline High  130-159 mg/dl  High             160-189 mg/dl  Very High        >189 mg/dl      Lipid Panel [[id number]]  (Abnormal) Collected:  05/24/20 0613    Specimen:  Blood Updated:  05/24/20 0720     Total Cholesterol " 438 mg/dL      Triglycerides 938 mg/dL      HDL Cholesterol 19 mg/dL      LDL Cholesterol  --     Comment: Unable to calculate        VLDL Cholesterol --     Comment: Unable to calculate        LDL/HDL Ratio --     Comment: Unable to calculate       Narrative:       Cholesterol Reference Ranges  (U.S. Department of Health and Human Services ATP III Classifications)    Desirable          <200 mg/dL  Borderline High    200-239 mg/dL  High Risk          >240 mg/dL      Triglyceride Reference Ranges  (U.S. Department of Health and Human Services ATP III Classifications)    Normal           <150 mg/dL  Borderline High  150-199 mg/dL  High             200-499 mg/dL  Very High        >500 mg/dL    HDL Reference Ranges  (U.S. Department of Health and Human Services ATP III Classifcations)    Low     <40 mg/dl (major risk factor for CHD)  High    >60 mg/dl ('negative' risk factor for CHD)        LDL Reference Ranges  (U.S. Department of Health and Human Services ATP III Classifcations)    Optimal          <100 mg/dL  Near Optimal     100-129 mg/dL  Borderline High  130-159 mg/dL  High             160-189 mg/dL  Very High        >189 mg/dL    Basic Metabolic Panel [[id number]]  (Abnormal) Collected:  05/24/20 0613    Specimen:  Blood Updated:  05/24/20 0707     Glucose 153 mg/dL      BUN 5 mg/dL      Creatinine 0.43 mg/dL      Sodium 131 mmol/L      Potassium 2.8 mmol/L      Chloride 105 mmol/L      CO2 14.9 mmol/L      Calcium 8.2 mg/dL      eGFR Non African Amer >150 mL/min/1.73      BUN/Creatinine Ratio 11.6     Anion Gap 11.1 mmol/L     Narrative:       GFR Normal >60  Chronic Kidney Disease <60  Kidney Failure <15      CBC & Differential [[id number]] Collected:  05/24/20 0613    Specimen:  Blood Updated:  05/24/20 0645    Narrative:       The following orders were created for panel order CBC & Differential.  Procedure                               Abnormality         Status                     ---------                                -----------         ------                     CBC Auto Differential[[id number]]        Abnormal            Final result                 Please view results for these tests on the individual orders.    CBC Auto Differential [[id number]]  (Abnormal) Collected:  05/24/20 0613    Specimen:  Blood Updated:  05/24/20 0645     WBC 8.69 10*3/mm3      RBC 4.00 10*6/mm3      Hemoglobin 12.9 g/dL      Hematocrit 36.0 %      MCV 90.0 fL      MCH 32.3 pg      MCHC 35.8 g/dL      RDW 12.2 %      RDW-SD 40.1 fl      MPV 9.9 fL      Platelets 260 10*3/mm3      Neutrophil % 77.6 %      Lymphocyte % 13.8 %      Monocyte % 4.8 %      Eosinophil % 1.7 %      Basophil % 0.5 %      Immature Grans % 1.6 %      Neutrophils, Absolute 6.74 10*3/mm3      Lymphocytes, Absolute 1.20 10*3/mm3      Monocytes, Absolute 0.42 10*3/mm3      Eosinophils, Absolute 0.15 10*3/mm3      Basophils, Absolute 0.04 10*3/mm3      Immature Grans, Absolute 0.14 10*3/mm3      nRBC 0.0 /100 WBC     C-Peptide [[id number]] Collected:  05/24/20 0613    Specimen:  Blood Updated:  05/24/20 0638    Glutamic Acid Decarboxylase [[id number]] Collected:  05/24/20 0613    Specimen:  Blood Updated:  05/24/20 0638    POC Glucose Once [[id number]]  (Abnormal) Collected:  05/24/20 0620    Specimen:  Blood Updated:  05/24/20 0622     Glucose 142 mg/dL     POC Glucose Once [[id number]]  (Abnormal) Collected:  05/23/20 2053    Specimen:  Blood Updated:  05/23/20 2055     Glucose 159 mg/dL     Triglycerides [[id number]]  (Abnormal) Collected:  05/23/20 1530    Specimen:  Blood Updated:  05/23/20 1642     Triglycerides 1,198 mg/dL     POC Glucose Once [[id number]]  (Abnormal) Collected:  05/23/20 1634    Specimen:  Blood Updated:  05/23/20 1635     Glucose 164 mg/dL     Lipase [[id number]]  (Abnormal) Collected:  05/23/20 1530    Specimen:  Blood Updated:  05/23/20 1630     Lipase 83 U/L               Acute pancreatitis without infection or necrosis    Type 2 diabetes mellitus  (CMS/HCC)    Hypothyroidism (acquired)    Mixed hyperlipidemia    Insulin therapy as discussed above.  Thyroid hormone replacement as discussed above.  Lipid therapy as discussed above.

## 2020-05-24 NOTE — PLAN OF CARE
Problem: Patient Care Overview  Goal: Plan of Care Review  Outcome: Ongoing (interventions implemented as appropriate)  Flowsheets (Taken 5/24/2020 8499)  Progress: no change  Plan of Care Reviewed With: patient  Outcome Summary: VSS. highest temp 99.2.  c/o abd pain- dilaudid given. pain when coughing. Nonproductive cough.. blood sugar monitoring. ileostomy with liquid stool . ambulated in room. tolerating clear liquids. no c/o nausea. IVF infusing.

## 2020-05-24 NOTE — PLAN OF CARE
Problem: Patient Care Overview  Goal: Plan of Care Review  Outcome: Ongoing (interventions implemented as appropriate)  Flowsheets (Taken 5/24/2020 1823)  Progress: improving  Plan of Care Reviewed With: patient  Outcome Summary: afebrile, blood sugars checked - high at lunch time, pt unaware that juice has high sugar content, ileostomy with large liquid output - GI aware and orders received, will advance diet to low fat tonight, up in chair today, potassium protocol initiated today, states pain is better today

## 2020-05-25 ENCOUNTER — NURSE TRIAGE (OUTPATIENT)
Dept: CALL CENTER | Facility: HOSPITAL | Age: 35
End: 2020-05-25

## 2020-05-25 VITALS
SYSTOLIC BLOOD PRESSURE: 132 MMHG | DIASTOLIC BLOOD PRESSURE: 90 MMHG | OXYGEN SATURATION: 96 % | BODY MASS INDEX: 33.29 KG/M2 | WEIGHT: 195 LBS | TEMPERATURE: 98.2 F | HEIGHT: 64 IN | HEART RATE: 103 BPM | RESPIRATION RATE: 16 BRPM

## 2020-05-25 PROBLEM — N20.0 RENAL LITHIASIS: Status: ACTIVE | Noted: 2020-05-25

## 2020-05-25 PROBLEM — R19.7 DIARRHEA: Status: ACTIVE | Noted: 2020-05-25

## 2020-05-25 PROBLEM — Z93.2 ILEOSTOMY IN PLACE (HCC): Status: ACTIVE | Noted: 2020-05-25

## 2020-05-25 LAB
ALBUMIN SERPL-MCNC: 2.6 G/DL (ref 3.5–5.2)
ALBUMIN/GLOB SERPL: 0.9 G/DL
ALP SERPL-CCNC: 90 U/L (ref 39–117)
ALT SERPL W P-5'-P-CCNC: 23 U/L (ref 1–33)
ANION GAP SERPL CALCULATED.3IONS-SCNC: 10.2 MMOL/L (ref 5–15)
ARTICHOKE IGE QN: 192 MG/DL (ref 0–100)
AST SERPL-CCNC: 19 U/L (ref 1–32)
BASOPHILS # BLD AUTO: 0.04 10*3/MM3 (ref 0–0.2)
BASOPHILS NFR BLD AUTO: 0.5 % (ref 0–1.5)
BILIRUB SERPL-MCNC: 0.3 MG/DL (ref 0.2–1.2)
BUN BLD-MCNC: 6 MG/DL (ref 6–20)
BUN/CREAT SERPL: 12.5 (ref 7–25)
CA-I BLD-MCNC: 5.3 MG/DL (ref 4.6–5.4)
CA-I SERPL ISE-MCNC: 1.32 MMOL/L (ref 1.15–1.35)
CALCIUM SPEC-SCNC: 8.1 MG/DL (ref 8.6–10.5)
CHLORIDE SERPL-SCNC: 108 MMOL/L (ref 98–107)
CHOLEST SERPL-MCNC: 334 MG/DL (ref 0–200)
CO2 SERPL-SCNC: 16.8 MMOL/L (ref 22–29)
CREAT BLD-MCNC: 0.48 MG/DL (ref 0.57–1)
DEPRECATED RDW RBC AUTO: 41.7 FL (ref 37–54)
EOSINOPHIL # BLD AUTO: 0.15 10*3/MM3 (ref 0–0.4)
EOSINOPHIL NFR BLD AUTO: 1.9 % (ref 0.3–6.2)
ERYTHROCYTE [DISTWIDTH] IN BLOOD BY AUTOMATED COUNT: 12.2 % (ref 12.3–15.4)
GFR SERPL CREATININE-BSD FRML MDRD: 146 ML/MIN/1.73
GLOBULIN UR ELPH-MCNC: 2.8 GM/DL
GLUCOSE BLD-MCNC: 290 MG/DL (ref 65–99)
GLUCOSE BLDC GLUCOMTR-MCNC: 209 MG/DL (ref 70–130)
GLUCOSE BLDC GLUCOMTR-MCNC: 264 MG/DL (ref 70–130)
HCT VFR BLD AUTO: 36.7 % (ref 34–46.6)
HDLC SERPL-MCNC: 22 MG/DL (ref 40–60)
HGB BLD-MCNC: 12.6 G/DL (ref 12–15.9)
IMM GRANULOCYTES # BLD AUTO: 0.22 10*3/MM3 (ref 0–0.05)
IMM GRANULOCYTES NFR BLD AUTO: 2.8 % (ref 0–0.5)
LDLC SERPL CALC-MCNC: ABNORMAL MG/DL
LDLC/HDLC SERPL: ABNORMAL {RATIO}
LIPASE SERPL-CCNC: 76 U/L (ref 13–60)
LYMPHOCYTES # BLD AUTO: 1.24 10*3/MM3 (ref 0.7–3.1)
LYMPHOCYTES NFR BLD AUTO: 15.9 % (ref 19.6–45.3)
MAGNESIUM SERPL-MCNC: 1.8 MG/DL (ref 1.6–2.6)
MCH RBC QN AUTO: 31.8 PG (ref 26.6–33)
MCHC RBC AUTO-ENTMCNC: 34.3 G/DL (ref 31.5–35.7)
MCV RBC AUTO: 92.7 FL (ref 79–97)
MONOCYTES # BLD AUTO: 0.53 10*3/MM3 (ref 0.1–0.9)
MONOCYTES NFR BLD AUTO: 6.8 % (ref 5–12)
NEUTROPHILS # BLD AUTO: 5.6 10*3/MM3 (ref 1.7–7)
NEUTROPHILS NFR BLD AUTO: 72.1 % (ref 42.7–76)
NRBC BLD AUTO-RTO: 0 /100 WBC (ref 0–0.2)
PHOSPHATE SERPL-MCNC: 2.3 MG/DL (ref 2.5–4.5)
PLATELET # BLD AUTO: 231 10*3/MM3 (ref 140–450)
PMV BLD AUTO: 9.5 FL (ref 6–12)
POTASSIUM BLD-SCNC: 3.7 MMOL/L (ref 3.5–5.2)
PROT SERPL-MCNC: 5.4 G/DL (ref 6–8.5)
RBC # BLD AUTO: 3.96 10*6/MM3 (ref 3.77–5.28)
SODIUM BLD-SCNC: 135 MMOL/L (ref 136–145)
TRIGL SERPL-MCNC: 638 MG/DL (ref 0–150)
TSH SERPL DL<=0.05 MIU/L-ACNC: 99.3 UIU/ML (ref 0.27–4.2)
VLDLC SERPL-MCNC: ABNORMAL MG/DL
WBC NRBC COR # BLD: 7.78 10*3/MM3 (ref 3.4–10.8)

## 2020-05-25 PROCEDURE — 63710000001 INSULIN GLARGINE PER 5 UNITS: Performed by: INTERNAL MEDICINE

## 2020-05-25 PROCEDURE — 80061 LIPID PANEL: CPT | Performed by: INTERNAL MEDICINE

## 2020-05-25 PROCEDURE — 99232 SBSQ HOSP IP/OBS MODERATE 35: CPT | Performed by: INTERNAL MEDICINE

## 2020-05-25 PROCEDURE — 25010000002 MAGNESIUM SULFATE IN D5W 1G/100ML (PREMIX) 1-5 GM/100ML-% SOLUTION: Performed by: INTERNAL MEDICINE

## 2020-05-25 PROCEDURE — 82962 GLUCOSE BLOOD TEST: CPT

## 2020-05-25 PROCEDURE — 84100 ASSAY OF PHOSPHORUS: CPT | Performed by: INTERNAL MEDICINE

## 2020-05-25 PROCEDURE — 83690 ASSAY OF LIPASE: CPT | Performed by: INTERNAL MEDICINE

## 2020-05-25 PROCEDURE — 25010000002 ALBUMIN HUMAN 25% PER 50 ML: Performed by: INTERNAL MEDICINE

## 2020-05-25 PROCEDURE — 25010000002 HYDROMORPHONE PER 4 MG: Performed by: NURSE PRACTITIONER

## 2020-05-25 PROCEDURE — 82330 ASSAY OF CALCIUM: CPT | Performed by: INTERNAL MEDICINE

## 2020-05-25 PROCEDURE — 83735 ASSAY OF MAGNESIUM: CPT | Performed by: INTERNAL MEDICINE

## 2020-05-25 PROCEDURE — 25810000003 SODIUM CHLORIDE 0.9 % WITH KCL 40 MEQ/L 40-0.9 MEQ/L-% SOLUTION: Performed by: INTERNAL MEDICINE

## 2020-05-25 PROCEDURE — 84443 ASSAY THYROID STIM HORMONE: CPT | Performed by: INTERNAL MEDICINE

## 2020-05-25 PROCEDURE — P9047 ALBUMIN (HUMAN), 25%, 50ML: HCPCS | Performed by: INTERNAL MEDICINE

## 2020-05-25 PROCEDURE — 83721 ASSAY OF BLOOD LIPOPROTEIN: CPT | Performed by: INTERNAL MEDICINE

## 2020-05-25 PROCEDURE — 80053 COMPREHEN METABOLIC PANEL: CPT | Performed by: INTERNAL MEDICINE

## 2020-05-25 PROCEDURE — 63710000001 INSULIN LISPRO (HUMAN) PER 5 UNITS: Performed by: INTERNAL MEDICINE

## 2020-05-25 PROCEDURE — 85025 COMPLETE CBC W/AUTO DIFF WBC: CPT | Performed by: INTERNAL MEDICINE

## 2020-05-25 RX ORDER — INSULIN GLARGINE 100 [IU]/ML
25 INJECTION, SOLUTION SUBCUTANEOUS 2 TIMES DAILY
Start: 2020-05-25 | End: 2022-07-21

## 2020-05-25 RX ORDER — CEFAZOLIN SODIUM 1 G/50ML
1 INJECTION, SOLUTION INTRAVENOUS ONCE
Status: DISCONTINUED | OUTPATIENT
Start: 2020-05-26 | End: 2020-05-25

## 2020-05-25 RX ORDER — ATORVASTATIN CALCIUM 10 MG/1
10 TABLET, FILM COATED ORAL NIGHTLY
Qty: 30 TABLET | Refills: 0 | Status: SHIPPED | OUTPATIENT
Start: 2020-05-25 | End: 2023-01-14 | Stop reason: HOSPADM

## 2020-05-25 RX ORDER — INSULIN GLARGINE 100 [IU]/ML
28 INJECTION, SOLUTION SUBCUTANEOUS EVERY 12 HOURS SCHEDULED
Status: DISCONTINUED | OUTPATIENT
Start: 2020-05-25 | End: 2020-05-25 | Stop reason: HOSPADM

## 2020-05-25 RX ORDER — GABAPENTIN 400 MG/1
400 CAPSULE ORAL 2 TIMES DAILY
Start: 2020-05-25

## 2020-05-25 RX ORDER — ALBUMIN (HUMAN) 12.5 G/50ML
25 SOLUTION INTRAVENOUS EVERY 8 HOURS
Status: DISCONTINUED | OUTPATIENT
Start: 2020-05-25 | End: 2020-05-25 | Stop reason: HOSPADM

## 2020-05-25 RX ORDER — MAGNESIUM SULFATE 1 G/100ML
1 INJECTION INTRAVENOUS ONCE
Status: COMPLETED | OUTPATIENT
Start: 2020-05-25 | End: 2020-05-25

## 2020-05-25 RX ORDER — POTASSIUM CHLORIDE 750 MG/1
20 TABLET, FILM COATED, EXTENDED RELEASE ORAL 2 TIMES DAILY
Status: ON HOLD
Start: 2020-05-25 | End: 2023-01-13

## 2020-05-25 RX ORDER — SODIUM BICARBONATE 650 MG/1
1300 TABLET ORAL 3 TIMES DAILY
Qty: 180 TABLET | Refills: 0 | Status: ON HOLD | OUTPATIENT
Start: 2020-05-25 | End: 2023-01-13

## 2020-05-25 RX ORDER — MONTELUKAST SODIUM 10 MG/1
10 TABLET ORAL NIGHTLY
Status: DISCONTINUED | OUTPATIENT
Start: 2020-05-25 | End: 2020-05-25 | Stop reason: HOSPADM

## 2020-05-25 RX ORDER — POTASSIUM CHLORIDE 750 MG/1
20 CAPSULE, EXTENDED RELEASE ORAL 2 TIMES DAILY WITH MEALS
Status: DISCONTINUED | OUTPATIENT
Start: 2020-05-25 | End: 2020-05-25 | Stop reason: HOSPADM

## 2020-05-25 RX ORDER — INSULIN GLARGINE 100 [IU]/ML
28 INJECTION, SOLUTION SUBCUTANEOUS EVERY 12 HOURS SCHEDULED
Refills: 12 | Status: ON HOLD
Start: 2020-05-25 | End: 2023-01-14 | Stop reason: SDUPTHER

## 2020-05-25 RX ORDER — PANTOPRAZOLE SODIUM 40 MG/1
40 TABLET, DELAYED RELEASE ORAL
Status: DISCONTINUED | OUTPATIENT
Start: 2020-05-25 | End: 2020-05-25 | Stop reason: HOSPADM

## 2020-05-25 RX ORDER — SUCRALFATE 1 G/1
1 TABLET ORAL
Status: DISCONTINUED | OUTPATIENT
Start: 2020-05-25 | End: 2020-05-25 | Stop reason: HOSPADM

## 2020-05-25 RX ORDER — DIPHENOXYLATE HYDROCHLORIDE AND ATROPINE SULFATE 2.5; .025 MG/1; MG/1
1 TABLET ORAL 4 TIMES DAILY PRN
Qty: 20 TABLET | Refills: 0 | Status: SHIPPED | OUTPATIENT
Start: 2020-05-25 | End: 2022-07-21

## 2020-05-25 RX ORDER — FENOFIBRATE 145 MG/1
145 TABLET, COATED ORAL DAILY
Qty: 30 TABLET | Refills: 0 | Status: SHIPPED | OUTPATIENT
Start: 2020-05-26

## 2020-05-25 RX ORDER — SODIUM CHLORIDE 9 MG/ML
100 INJECTION, SOLUTION INTRAVENOUS CONTINUOUS
Status: DISCONTINUED | OUTPATIENT
Start: 2020-05-25 | End: 2020-05-25 | Stop reason: HOSPADM

## 2020-05-25 RX ORDER — ICOSAPENT ETHYL 1000 MG/1
2 CAPSULE ORAL 2 TIMES DAILY WITH MEALS
Qty: 120 CAPSULE | Refills: 0 | Status: SHIPPED | OUTPATIENT
Start: 2020-05-25 | End: 2020-05-26 | Stop reason: HOSPADM

## 2020-05-25 RX ORDER — BLOOD-GLUCOSE METER
1 KIT MISCELLANEOUS AS NEEDED
Qty: 1 EACH | Refills: 0 | Status: SHIPPED | OUTPATIENT
Start: 2020-05-25 | End: 2022-07-21

## 2020-05-25 RX ORDER — LEVOTHYROXINE SODIUM 0.05 MG/1
50 TABLET ORAL DAILY
Qty: 30 TABLET | Refills: 0 | Status: SHIPPED | OUTPATIENT
Start: 2020-05-25 | End: 2022-07-21

## 2020-05-25 RX ADMIN — INSULIN GLARGINE 25 UNITS: 100 INJECTION, SOLUTION SUBCUTANEOUS at 08:20

## 2020-05-25 RX ADMIN — POTASSIUM PHOSPHATE, MONOBASIC AND POTASSIUM PHOSPHATE, DIBASIC 20 MMOL: 224; 236 INJECTION, SOLUTION, CONCENTRATE INTRAVENOUS at 11:00

## 2020-05-25 RX ADMIN — POTASSIUM CHLORIDE 20 MEQ: 10 CAPSULE, COATED, EXTENDED RELEASE ORAL at 10:37

## 2020-05-25 RX ADMIN — SODIUM BICARBONATE 1300 MG: 650 TABLET ORAL at 08:13

## 2020-05-25 RX ADMIN — LEVOTHYROXINE SODIUM 200 MCG: 100 TABLET ORAL at 06:39

## 2020-05-25 RX ADMIN — MAGNESIUM SULFATE HEPTAHYDRATE 1 G: 1 INJECTION, SOLUTION INTRAVENOUS at 09:21

## 2020-05-25 RX ADMIN — POTASSIUM CHLORIDE AND SODIUM CHLORIDE 100 ML/HR: 900; 300 INJECTION, SOLUTION INTRAVENOUS at 05:31

## 2020-05-25 RX ADMIN — SUCRALFATE 1 G: 1 TABLET ORAL at 11:09

## 2020-05-25 RX ADMIN — INSULIN LISPRO 4 UNITS: 100 INJECTION, SOLUTION INTRAVENOUS; SUBCUTANEOUS at 08:20

## 2020-05-25 RX ADMIN — DIPHENOXYLATE HYDROCHLORIDE AND ATROPINE SULFATE 1 TABLET: 2.5; .025 TABLET ORAL at 06:15

## 2020-05-25 RX ADMIN — HYDROMORPHONE HYDROCHLORIDE 0.5 MG: 1 INJECTION, SOLUTION INTRAMUSCULAR; INTRAVENOUS; SUBCUTANEOUS at 06:15

## 2020-05-25 RX ADMIN — SODIUM CHLORIDE 100 ML/HR: 9 INJECTION, SOLUTION INTRAVENOUS at 08:11

## 2020-05-25 RX ADMIN — INSULIN LISPRO 3 UNITS: 100 INJECTION, SOLUTION INTRAVENOUS; SUBCUTANEOUS at 11:41

## 2020-05-25 RX ADMIN — ALBUMIN HUMAN 25 G: 0.25 SOLUTION INTRAVENOUS at 10:26

## 2020-05-25 RX ADMIN — INSULIN LISPRO 7 UNITS: 100 INJECTION, SOLUTION INTRAVENOUS; SUBCUTANEOUS at 08:19

## 2020-05-25 RX ADMIN — PANTOPRAZOLE SODIUM 40 MG: 40 TABLET, DELAYED RELEASE ORAL at 09:21

## 2020-05-25 RX ADMIN — HYDROMORPHONE HYDROCHLORIDE 0.5 MG: 1 INJECTION, SOLUTION INTRAMUSCULAR; INTRAVENOUS; SUBCUTANEOUS at 08:28

## 2020-05-25 RX ADMIN — LEVOTHYROXINE SODIUM 50 MCG: 50 TABLET ORAL at 06:39

## 2020-05-25 RX ADMIN — FENOFIBRATE 145 MG: 145 TABLET ORAL at 08:13

## 2020-05-25 RX ADMIN — INSULIN LISPRO 7 UNITS: 100 INJECTION, SOLUTION INTRAVENOUS; SUBCUTANEOUS at 11:41

## 2020-05-25 NOTE — PROGRESS NOTES
Gastroenterology   Inpatient Progress Note    Reason for Follow Up: Follow-up acute pancreatitis    Subjective     Interval History:   Patient continues to report improvement this morning.  She states that she is tolerating a diet without nausea vomiting or abdominal pain.  She states that the addition of the Questran has started to thicken up    Current Facility-Administered Medications:   •  acetaminophen (TYLENOL) tablet 650 mg, 650 mg, Oral, Q4H PRN, 650 mg at 05/24/20 2327 **OR** acetaminophen (TYLENOL) 160 MG/5ML solution 650 mg, 650 mg, Oral, Q4H PRN **OR** acetaminophen (TYLENOL) suppository 650 mg, 650 mg, Rectal, Q4H PRN, Beatriz Martinez APRN  •  atorvastatin (LIPITOR) tablet 10 mg, 10 mg, Oral, Nightly, Thong Gonzalez MD, 10 mg at 05/24/20 2034  •  dextrose (D50W) 25 g/ 50mL Intravenous Solution 25 g, 25 g, Intravenous, Q15 Min PRN, Beatriz Martinez APRN  •  dextrose (GLUTOSE) oral gel 15 g, 15 g, Oral, Q15 Min PRN, Beatriz Martinez APRN  •  diphenoxylate-atropine (LOMOTIL) 2.5-0.025 MG per tablet 1 tablet, 1 tablet, Oral, Q4H PRN, Regine Grover MD, 1 tablet at 05/25/20 0615  •  fenofibrate (TRICOR) tablet 145 mg, 145 mg, Oral, Daily, Thong Gonzalez MD, 145 mg at 05/24/20 0826  •  glucagon (human recombinant) (GLUCAGEN DIAGNOSTIC) injection 1 mg, 1 mg, Subcutaneous, PRN, Beatriz Martinez APRN  •  HYDROmorphone (DILAUDID) injection 0.5 mg, 0.5 mg, Intravenous, Q2H PRN, Beatriz Martinez APRN, 0.5 mg at 05/25/20 0615  •  insulin glargine (LANTUS) injection 25 Units, 25 Units, Subcutaneous, Q12H, Thong Gonzalez MD, 25 Units at 05/24/20 2143  •  insulin lispro (humaLOG) injection 2-5 Units, 2-5 Units, Subcutaneous, 4x Daily With Meals & Nightly, Thong Gonzalez MD, 4 Units at 05/24/20 2142  •  insulin lispro (humaLOG) injection 7 Units, 7 Units, Subcutaneous, TID With Meals, Thong Gonzalez MD, 7 Units at 05/24/20 1707  •  levothyroxine (SYNTHROID, LEVOTHROID) tablet 200 mcg, 200  mcg, Oral, Q AM, Thong Gonzalez MD, 200 mcg at 05/25/20 0639  •  levothyroxine (SYNTHROID, LEVOTHROID) tablet 50 mcg, 50 mcg, Oral, Q AM, Thong Gonzalez MD, 50 mcg at 05/25/20 0639  •  magnesium sulfate in D5W 1g/100mL (PREMIX), 1 g, Intravenous, Once, Tristan Gusman MD  •  montelukast (SINGULAIR) tablet 10 mg, 10 mg, Oral, Nightly, Tristan Gusman MD  •  NON FORMULARY, 2,000 mg, Oral, BID With Meals, Thong Gonzalez MD  •  ondansetron (ZOFRAN) injection 4 mg, 4 mg, Intravenous, Q6H PRN, Beatriz Martinez APRN, 4 mg at 05/22/20 1021  •  pantoprazole (PROTONIX) EC tablet 40 mg, 40 mg, Oral, Q AM, Tristan Gusman MD  •  potassium chloride (MICRO-K) CR capsule 40 mEq, 40 mEq, Oral, PRN, 40 mEq at 05/24/20 1825 **OR** potassium chloride (KLOR-CON) packet 40 mEq, 40 mEq, Oral, PRN **OR** potassium chloride 10 mEq in 100 mL IVPB, 10 mEq, Intravenous, Q1H PRN, Joseph Cordova MD  •  potassium chloride (MICRO-K) CR capsule 20 mEq, 20 mEq, Oral, BID With Meals, Tristan Gusman MD  •  potassium phosphate 20 mmol in sodium chloride 0.9 % 500 mL infusion, 20 mmol, Intravenous, Once, Tristan Gusman MD  •  sodium bicarbonate tablet 1,300 mg, 1,300 mg, Oral, TID, Tristan Gusman MD, 1,300 mg at 05/24/20 2034  •  sodium chloride 0.9 % flush 10 mL, 10 mL, Intravenous, Q12H, Beatriz Martinez, APRN  •  sodium chloride 0.9 % flush 10 mL, 10 mL, Intravenous, PRN, Beatriz Martinez, APRN  •  sodium chloride 0.9 % infusion, 100 mL/hr, Intravenous, Continuous, Tristan Gusman MD  •  sucralfate (CARAFATE) tablet 1 g, 1 g, Oral, 4x Daily AC & at Bedtime, Tristan Gusman MD  Review of Systems:    All systems were reviewed and negative except for:  Gastrointestinal: positive for  Loose ostomy output    Objective     Vital Signs  Temp:  [97.7 °F (36.5 °C)-99.4 °F (37.4 °C)] 98.3 °F (36.8 °C)  Heart Rate:  [] 106  Resp:  [16]  16  BP: (127-141)/(79-97) 134/91  Body mass index is 33.47 kg/m².    Intake/Output Summary (Last 24 hours) at 5/25/2020 0809  Last data filed at 5/25/2020 0809  Gross per 24 hour   Intake 2032.33 ml   Output 5655 ml   Net -3622.67 ml     I/O this shift:  In: 263.3 [I.V.:263.3]  Out: -      Physical Exam:   General: patient awake, alert and cooperative   Eyes: no scleral icterus   Skin: warm and dry, not jaundiced   Abdomen: soft, nontender, nondistended; normal bowel sounds, no masses palpated, no periumbical lymphadenopathy  Ostomy on the left lower abdomen clean dry and intact   Psychiatric: Appropriate affect and behavior     Results Review:     I reviewed the patient's new clinical results.    Results from last 7 days   Lab Units 05/25/20  0604 05/24/20  0613 05/23/20  0509   WBC 10*3/mm3 7.78 8.69 8.59   HEMOGLOBIN g/dL 12.6 12.9 13.5   HEMATOCRIT % 36.7 36.0 38.1   PLATELETS 10*3/mm3 231 260 227     Results from last 7 days   Lab Units 05/25/20  0604 05/24/20  2230 05/24/20  0613 05/23/20  0508 05/22/20  0852   SODIUM mmol/L 135*  --  131* 133* 133*   POTASSIUM mmol/L 3.7 3.6 2.8* 3.5 4.4   CHLORIDE mmol/L 108*  --  105 104 105   CO2 mmol/L 16.8*  --  14.9* 9.4* 12.5*   BUN mg/dL 6  --  5* 6 4*   CREATININE mg/dL 0.48*  --  0.43* 0.45* 0.55*   CALCIUM mg/dL 8.1*  --  8.2* 8.6 8.7   BILIRUBIN mg/dL 0.3  --   --  0.6 0.8   ALK PHOS U/L 90  --   --  80 82   ALT (SGPT) U/L 23  --   --  23 31   AST (SGOT) U/L 19  --   --  19 41*   GLUCOSE mg/dL 290*  --  153* 183* 209*         Lab Results   Lab Value Date/Time    LIPASE 76 (H) 05/25/2020 0604    LIPASE 83 (H) 05/23/2020 1530    LIPASE 295 (H) 05/22/2020 0852    LIPASE >3,000 (H) 05/20/2020 2100    LIPASE 80 (H) 08/04/2019 1604    LIPASE 48 06/20/2019 1716    LIPASE 47 05/12/2019 0046    LIPASE 37 06/18/2016 0255       Radiology:  CT Abdomen Pelvis With Contrast   Final Result       1. Pancreatitis involving the body and tail of pancreas.   2. Right lower quadrant  ileostomy with bowel containing parastomal   hernia, without evidence of obstruction. There is also a left lower   quadrant colostomy with associated parastomal hernia. In contrast to   prior study, the hernia sac now does contain a loop of transverse colon,   without evidence of obstruction.   3. Right-sided hydroureteronephrosis secondary to a 5 mm stone within   the mid to distal right ureter. Similar findings were present on prior   exam, although the degree of hydronephrosis may have increased slightly.       Radiation dose reduction techniques were utilized, including automated   exposure control and exposure modulation based on body size.       This report was finalized on 5/20/2020 11:19 PM by Dr. Marie Jeong M.D.              Assessment/Plan     Patient Active Problem List   Diagnosis   • Colitis presumed infectious   • Type 2 diabetes mellitus (CMS/HCC)   • Suprapubic pain, acute   • Hyponatremia   • Sepsis (CMS/HCC)   • Metabolic acidosis   • Chronic constipation   • Other microscopic hematuria   • Lower abdominal pain   • Colitis   • Large bowel obstruction (CMS/HCC)   • Stricture of sigmoid colon (CMS/HCC)   • Large bowel anastomotic leak   • Acute pancreatitis without infection or necrosis   • Hypothyroidism (acquired)   • Mixed hyperlipidemia   • Hyponatremia   • Metabolic acidosis   • Hypokalemia       Assessment:  1.  Acute pancreatitis thought to be due to increased triglycerides  2.  Right-sided hydronephrosis  3.  Previous colon resection due to diverticulitis with sigmoid resection and colostomy  4.  Liquidy stool from colostomy improving with Questran  5.  Elevated triglycerides  Overall improvement today with tolerating p.o.  These problems are new to me    Plan:  Discussed with the patient that she needs to follow-up with primary care and GI.  She has both of these as an outpatient already set up.  Triglycerides to be managed.  Also following up with her surgeon regarding timing of  closure of her colostomy.  Continue Questran as needed for thickening of her output but caution not to cause constipation    If tolerating p.o. today we are okay with discharge with follow-up with both primary care and GI and surgery as appropriate  I discussed the patients findings and my recommendations with patient and nursing staff.    Ricky Dubon MD

## 2020-05-25 NOTE — CONSULTS
NEPHROLOGY CONSULTATION-----[facility]    [facility]  [phone]  Bobby Gusman MD    Patient Care Team:  Layla Monique APRN as PCP - General (Family Medicine)  Ryan Chatterjee MD as Consulting Physician (Nephrology)    CC/REASON FOR CONSULTATION: HYPOKALEMIA/ACIDOSIS/HYPONATREMIA  PHYSICIAN REQUESTING CONSULTATION:     History of Present Illness     HPI    Patient is a 35 y.o. WF whom I was asked to see in consultation for evaluation and management of electrolyte abnormalities and acidosis. Patient was admitted after presenting to ER with c/o RUQ pain the day after an EGD and colonoscopy.  Patient denies prior knowledge of functional kidney disease, proteinuria, or hematuria. No NSAIDs or recent IV dye exposure. No known h/o hepatitis, TB, rheumatic fever, jaundice, SLE, bleeding/bruising disorders.  Has an ileostomy and stoma. No urinary sx. No edema or fluid retention.  +Compliance with home meds. Was on Metformin prior to admission. No herbal med use. Was also on SSRI. Has been having a lot of ileostomy output      Review of Systems   Constitutional: Positive for activity change and fatigue. Negative for appetite change, chills, diaphoresis, fever and unexpected weight change.   HENT: Negative for congestion, dental problem, drooling, ear discharge, ear pain, facial swelling, hearing loss, mouth sores, nosebleeds, postnasal drip, rhinorrhea, sinus pressure, sinus pain, sneezing, sore throat, tinnitus, trouble swallowing and voice change.    Eyes: Negative for photophobia, pain, discharge, redness, itching and visual disturbance.   Respiratory: Negative for apnea, cough, choking, chest tightness, shortness of breath, wheezing and stridor.    Cardiovascular: Negative for chest pain, palpitations and leg swelling.   Gastrointestinal: Positive for abdominal pain, diarrhea and nausea. Negative for abdominal distention, anal bleeding, blood in stool,  constipation, rectal pain and vomiting.   Endocrine: Negative for cold intolerance, heat intolerance, polydipsia, polyphagia and polyuria.   Genitourinary: Negative for decreased urine volume, difficulty urinating, dysuria, enuresis, flank pain, frequency, genital sores, hematuria and urgency.   Musculoskeletal: Positive for back pain. Negative for arthralgias, gait problem, joint swelling, myalgias, neck pain and neck stiffness.   Skin: Negative for color change, pallor, rash and wound.   Allergic/Immunologic: Negative for environmental allergies, food allergies and immunocompromised state.   Neurological: Positive for weakness. Negative for dizziness, tremors, seizures, syncope, facial asymmetry, speech difficulty, light-headedness, numbness and headaches.   Hematological: Negative for adenopathy. Does not bruise/bleed easily.   Psychiatric/Behavioral: Positive for dysphoric mood. Negative for agitation, behavioral problems, confusion, decreased concentration, hallucinations, self-injury, sleep disturbance and suicidal ideas. The patient is not nervous/anxious and is not hyperactive.           Past Medical History:   Diagnosis Date   • Abdominal bloating with cramps    • Bloating symptom    • Diabetes mellitus (CMS/HCC)    • Disease of thyroid gland    • Gastritis    • GERD (gastroesophageal reflux disease)    • H/O being hospitalized     TWO RECENT EPISODES S/P BOWEL OBSTRUCTIONS    • History of kidney stones    • History of staph infection     S/P TATTOO-ON BACK    • Kidney stone    • Large bowel obstruction (CMS/HCC)    • Neuropathy     LOWER EXTRIMITES   • Pregnancy    • Seasonal allergies    • Stricture of colon determined by endoscopy (CMS/HCC)        Past Surgical History:   Procedure Laterality Date   •  SECTION N/A    • COLON RESECTION N/A 7/10/2019    Procedure: LAPAROSCOPIC LOW ANTERIOR  COLON RESECTION WITH DIVERTING LOOP ILEOSTOMY;  Surgeon: Roderick Arnold MD;  Location: [facility]  MAIN OR;  Service: General   • COLON RESECTION WITH GARRIDO POUCH     • COLONOSCOPY N/A 5/19/2020    Procedure: COLONOSCOPY to Cecum via Colostomy and Rectum;  Surgeon: Roderick Arnold MD;  [facility];  Service: General;  Laterality: N/A;  Colostomy Status   Post Surgical Anatomy   • COLONOSCOPY W/ BIOPSIES AND POLYPECTOMY     • COLOSTOMY     • DIAGNOSTIC LAPAROSCOPY N/A 7/11/2019    Procedure: DIAGNOSTIC LAPAROSCOPY;  Surgeon: Radha Loera MD;  Location: [facility] MAIN OR;  Service: General   • ENDOSCOPY N/A 5/19/2020    Procedure: ESOPHAGOGASTRODUODENOSCOPY with Bx's;  Surgeon: Roderick Arnold MD;  [facility];  Service: General;  Laterality: N/A;  [place]   Reflux Esophagitis, Mild Duodenitis   • EXPLORATORY LAPAROTOMY N/A 7/11/2019    Procedure: COLON RESECTION WITH WAYNE'S PROCEDURE WITH FLEXIBLE SIGMOIDSCOPE;  Surgeon: Radha Loera MD;  Location: [facility] MAIN OR;  Service: General   • ILEOSTOMY     • NEPHROSTOMY TRACT DILATATION W/ LITHOTRIPSY         Family History   Problem Relation Age of Onset   • Heart disease Father    • Malig Hyperthermia Neg Hx        Social History     Tobacco Use   • Smoking status: Current Every Day Smoker     Packs/day: 0.50     Years: 21.00     Pack years: 10.50     Types: Cigarettes     Start date: 1969   • Smokeless tobacco: Never Used   Substance Use Topics   • Alcohol use: No   • Drug use: No       Home Meds:   Medications Prior to Admission   Medication Sig Dispense Refill Last Dose   • busPIRone (BUSPAR) 7.5 MG tablet Take 7.5 mg by mouth 3 (Three) Times a Day.   5/18/2020 at Unknown time   • fluticasone (FLONASE) 50 MCG/ACT nasal spray 2 sprays into the nostril(s) as directed by provider Daily.   5/18/2020 at Unknown time   • FREESTYLE LITE test strip    Taking   • gabapentin (NEURONTIN) 400 MG capsule Take 400 mg by mouth 3 (Three) Times a Day.   5/18/2020 at Unknown time   • Insulin Glargine (BASAGLAR KWIKPEN) 100 UNIT/ML injection  pen Inject 5 Units under the skin into the appropriate area as directed Every Night. Prescribed instructions: Takes 5 units sq every night, increase to 10 units if Blood glucose >120.   Taking   • levothyroxine (SYNTHROID, LEVOTHROID) 200 MCG tablet Take 200 mcg by mouth Every Morning.   5/18/2020 at Unknown time   • metFORMIN (GLUCOPHAGE) 500 MG tablet Take 500 mg by mouth 3 (Three) Times a Day.   5/18/2020 at Unknown time   • montelukast (SINGULAIR) 10 MG tablet Take 10 mg by mouth Daily.   5/18/2020 at Unknown time   • pantoprazole (PROTONIX) 40 MG EC tablet Take 40 mg by mouth Daily.   5/19/2020 at Unknown time   • potassium chloride (K-DUR) 10 MEQ CR tablet Take 10 mEq by mouth 2 (Two) Times a Day.   5/18/2020 at Unknown time   • SITagliptin (JANUVIA) 100 MG tablet Take 100 mg by mouth Daily.   5/18/2020 at Unknown time   • sucralfate (CARAFATE) 1 g tablet Take 1 g by mouth 4 (Four) Times a Day Before Meals & at Bedtime.   5/18/2020 at Unknown time       Scheduled Meds:    atorvastatin 10 mg Oral Nightly   fenofibrate 145 mg Oral Daily   insulin glargine 25 Units Subcutaneous Q12H   insulin lispro 2-5 Units Subcutaneous 4x Daily With Meals & Nightly   insulin lispro 7 Units Subcutaneous TID With Meals   levothyroxine 200 mcg Oral Q AM   levothyroxine 50 mcg Oral Q AM   magnesium sulfate 1 g Intravenous Once   NON FORMULARY 2,000 mg Oral BID With Meals   potassium phosphate 20 mmol Intravenous Once   sodium bicarbonate 1,300 mg Oral TID   sodium chloride 10 mL Intravenous Q12H       Continuous Infusions:    sodium chloride 0.9 % with KCl 40 mEq/L 100 mL/hr Last Rate: 100 mL/hr (05/25/20 0548)       PRN Meds:  •  acetaminophen **OR** acetaminophen **OR** acetaminophen  •  dextrose  •  dextrose  •  diphenoxylate-atropine  •  glucagon (human recombinant)  •  HYDROmorphone  •  ondansetron  •  potassium chloride **OR** potassium chloride **OR** potassium chloride  •  sodium chloride    Allergies:  Bactrim  [sulfamethoxazole-trimethoprim]; Ciprofloxacin; Sulfa antibiotics; and Toradol [ketorolac tromethamine]    OBJECTIVE    Vital Signs  Temp:  [97.7 °F (36.5 °C)-99.4 °F (37.4 °C)] 98.3 °F (36.8 °C)  Heart Rate:  [] 106  Resp:  [16] 16  BP: (127-141)/(79-97) 134/91    No intake/output data recorded.  I/O last 3 completed shifts:  In: 3749 [P.O.:780; I.V.:2969]  Out: 8105 [Urine:6150; Stool:1955]    Physical Exam:  General Appearance: alert, appears stated age and cooperative  Head: normocephalic, without obvious abnormality and atraumatic +DRY OP  Eyes: conjunctivae and sclerae normal and no icterus  Neck: supple and no JVD  Lungs: clear to auscultation and respirations regular  Heart: regular rhythm & SINUS TACH. + S1, S2 +SANDEEP  Chest Wall: no abnormalities observed  Abdomen: normal bowel sounds and +MILD LLQ AND RUQ PAIN ON PALPATION +ILEOSTOMY RLQ AND STOMA LLQ  Extremities: moves extremities well, no edema, no cyanosis and no redness  Skin: no bleeding, bruising or rash +DECREASED SKIN TURGOR  Neurologic: Alert, and oriented. No focal deficits    Results Review:    I reviewed the patient's new clinical results.    WBC WBC   Date Value Ref Range Status   05/25/2020 7.78 3.40 - 10.80 10*3/mm3 Final   05/24/2020 8.69 3.40 - 10.80 10*3/mm3 Final   05/23/2020 8.59 3.40 - 10.80 10*3/mm3 Final      HGB Hemoglobin   Date Value Ref Range Status   05/25/2020 12.6 12.0 - 15.9 g/dL Final   05/24/2020 12.9 12.0 - 15.9 g/dL Final   05/23/2020 13.5 12.0 - 15.9 g/dL Final      HCT Hematocrit   Date Value Ref Range Status   05/25/2020 36.7 34.0 - 46.6 % Final   05/24/2020 36.0 34.0 - 46.6 % Final   05/23/2020 38.1 34.0 - 46.6 % Final      Platlets No results found for: LABPLAT   MCV MCV   Date Value Ref Range Status   05/25/2020 92.7 79.0 - 97.0 fL Final   05/24/2020 90.0 79.0 - 97.0 fL Final   05/23/2020 90.5 79.0 - 97.0 fL Final          Sodium Sodium   Date Value Ref Range Status   05/25/2020 135 (L) 136 - 145 mmol/L Final    05/24/2020 131 (L) 136 - 145 mmol/L Final   05/23/2020 133 (L) 136 - 145 mmol/L Final   05/22/2020 133 (L) 136 - 145 mmol/L Final      Potassium Potassium   Date Value Ref Range Status   05/25/2020 3.7 3.5 - 5.2 mmol/L Final   05/24/2020 3.6 3.5 - 5.2 mmol/L Final   05/24/2020 2.8 (L) 3.5 - 5.2 mmol/L Final   05/23/2020 3.5 3.5 - 5.2 mmol/L Final   05/22/2020 4.4 3.5 - 5.2 mmol/L Final     Comment:     Specimen hemolyzed.  Results may be affected.      Chloride Chloride   Date Value Ref Range Status   05/25/2020 108 (H) 98 - 107 mmol/L Final   05/24/2020 105 98 - 107 mmol/L Final   05/23/2020 104 98 - 107 mmol/L Final   05/22/2020 105 98 - 107 mmol/L Final      CO2 CO2   Date Value Ref Range Status   05/25/2020 16.8 (L) 22.0 - 29.0 mmol/L Final   05/24/2020 14.9 (L) 22.0 - 29.0 mmol/L Final   05/23/2020 9.4 (L) 22.0 - 29.0 mmol/L Final   05/22/2020 12.5 (L) 22.0 - 29.0 mmol/L Final      BUN BUN   Date Value Ref Range Status   05/25/2020 6 6 - 20 mg/dL Final   05/24/2020 5 (L) 6 - 20 mg/dL Final   05/23/2020 6 6 - 20 mg/dL Final   05/22/2020 4 (L) 6 - 20 mg/dL Final      Creatinine Creatinine   Date Value Ref Range Status   05/25/2020 0.48 (L) 0.57 - 1.00 mg/dL Final   05/24/2020 0.43 (L) 0.57 - 1.00 mg/dL Final   05/23/2020 0.45 (L) 0.57 - 1.00 mg/dL Final   05/22/2020 0.55 (L) 0.57 - 1.00 mg/dL Final      Calcium Calcium   Date Value Ref Range Status   05/25/2020 8.1 (L) 8.6 - 10.5 mg/dL Final   05/24/2020 8.2 (L) 8.6 - 10.5 mg/dL Final   05/23/2020 8.6 8.6 - 10.5 mg/dL Final   05/22/2020 8.7 8.6 - 10.5 mg/dL Final      PO4 No results found for: CAPO4   Albumin Albumin   Date Value Ref Range Status   05/25/2020 2.60 (L) 3.50 - 5.20 g/dL Final   05/23/2020 2.60 (L) 3.50 - 5.20 g/dL Final   05/22/2020 3.00 (L) 3.50 - 5.20 g/dL Final      Magnesium Magnesium   Date Value Ref Range Status   05/25/2020 1.8 1.6 - 2.6 mg/dL Final      Uric Acid Uric Acid   Date Value Ref Range Status   05/24/2020 3.1 2.4 - 5.7 mg/dL  Final          Imaging Results (Last 72 Hours)     ** No results found for the last 72 hours. **            Results for orders placed during the hospital encounter of 08/04/19   XR Chest 2 View    Narrative XR CHEST 2 VW-  08/04/2019     HISTORY: Chest pain.     FINDINGS: Heart size is within normal limits. Lungs are slightly  underinflated. There appear to be minimal right and possibly left  pleural effusions. No acute infiltrates are seen. No pneumothorax is  seen.     Minimal increased density overlies the lateral aspect of the left lung  base likely minimal atelectasis or scarring and similar to the  07/21/2019 study.       Impression 1. Possible minimal pleural effusions with blunting of the costophrenic  angles. There is mild atelectasis, scar or inflammatory infiltrate in  the left lung base  2. These findings appear slightly improved from 07/21/2019 study.  3. No pneumothorax or acute infiltrates are seen.     Comment: Please see additional dictation for today's CT scan of the  chest.     This report was finalized on 8/5/2019 7:40 AM by Dr. Bonifacio Leslie M.D.                 ASSESSMENT / PLAN      Acute pancreatitis without infection or necrosis    Type 2 diabetes mellitus (CMS/HCC)    Hypothyroidism (acquired)    Mixed hyperlipidemia    Hyponatremia    Metabolic acidosis    Hypokalemia    1. HYPONATREMIA--------Hypotonic and clinically hypovolemic. Improving with NS. No neuro sx. Keep off of Buspar for now    2. HYPOKALEMIA--------Secondary to ileostomy losses and higher Insulin need. Replace po and IV. Giving a little MgSO4 also. TTKG normal    3. ACIDOSIS------ Metabolic. No elevation in AGAP. Secondary to stool losses. PO NaHCO3 added    4. HYPOTHYROIDISM------Synthroid increased as TSH markedly elevated    5. DMII--------Lantus, Glucometers, SSI. Metformin on hold    6. HYPOPHOSPHATEMIA------Replace IV and follow    7. PANCREATITIS S/P EGD/COLONOSCOPY------per GI,     8.  HYPOALBUMINEMIA-------IV Albumin to temporize    9. PROTEINURIA/MICROSCOPIC HEMATURIA/KETONURIA/GLUCOSURIA------No active sediment. Normal renal function. SPEP ordered. Hyrdating and controlling blood sugars    10. HYPERLIPIDEMIA/HYPERTRIGLYCERIDEMIA-------CK normal. LFTs normal. Synthroid increased. On Fibrate and Statin    11. H/O DIVERTICULITIS WITH ILEOSTOMY/STOMA    12. GERD----PPI    13. DEPRESSION------Hold Buspar      I discussed the patients findings and my recommendations with patient and nursing staff    Will follow along closely. Thank you for allowing us to see this patient in renal consultation.    [facility]  [phone]  MD Bobby Roman MD  05/25/20  07:45

## 2020-05-25 NOTE — PLAN OF CARE
Problem: Patient Care Overview  Goal: Plan of Care Review  Outcome: Ongoing (interventions implemented as appropriate)  Flowsheets (Taken 5/25/2020 4442)  Progress: improving  Plan of Care Reviewed With: patient  Note:   Vss, afebrile, given IV dilaudid and tylenol prn as ordered, IVF Infusing, tolerating low fat diet, monitoring blood sugars, ileostomy continues to output large amount of liquid green stool, given lomotil prn,

## 2020-05-25 NOTE — PROGRESS NOTES
"DAILY PROGRESS NOTE  [facility]    Patient Identification:  Name: Edie Camacho  Age: 35 y.o.  Sex: female  :  1985  MRN: [id number]         Primary Care Physician: Layla Monique APRN      Subjective  Patient feeling much better this morning actually quite anxious to go home.  Abdominal pain resolved.  Tolerated breakfast well.    Objective:  General Appearance:  Comfortable, well-appearing, in no acute distress and not in pain.    Vital signs: (most recent): Blood pressure 132/90, pulse 103, temperature 98.2 °F (36.8 °C), temperature source Oral, resp. rate 16, height 162.6 cm (64\"), weight 88.5 kg (195 lb), SpO2 96 %, not currently breastfeeding.    Lungs:  Normal effort and normal respiratory rate.  Breath sounds clear to auscultation.    Heart: Normal rate.  Regular rhythm.    Abdomen: Abdomen is soft and non-distended.  Bowel sounds are normal.   There is no abdominal tenderness.     Extremities: There is no dependent edema.    Neurological: Patient is oriented to person, place and time.  (Somewhat flat affect.).    Skin:  Warm and dry.                Vital signs in last 24 hours:  Temp:  [98.2 °F (36.8 °C)-99.4 °F (37.4 °C)] 98.2 °F (36.8 °C)  Heart Rate:  [103-106] 103  Resp:  [16] 16  BP: (132-141)/(79-97) 132/90    Intake/Output:    Intake/Output Summary (Last 24 hours) at 2020 1007  Last data filed at 2020 0900  Gross per 24 hour   Intake 2362.33 ml   Output 5255 ml   Net -2892.67 ml         Results from last 7 days   Lab Units 20  0604 20  0613 20  0509 20  0516 20  0440 20  2100   WBC 10*3/mm3 7.78 8.69 8.59 7.63 11.60* 10.88*   HEMOGLOBIN g/dL 12.6 12.9 13.5 14.7 13.4 13.6   PLATELETS 10*3/mm3 231 260 227 239 280 292     Results from last 7 days   Lab Units 20  0604 20  2230 20  0613 20  0508 20  0852 20  0713 20  2100   SODIUM mmol/L 135*  --  131* 133* 133* 138 134*   POTASSIUM " mmol/L 3.7 3.6 2.8* 3.5 4.4 4.1 3.9   CHLORIDE mmol/L 108*  --  105 104 105 107 98   CO2 mmol/L 16.8*  --  14.9* 9.4* 12.5* 13.0* 18.9*   BUN mg/dL 6  --  5* 6 4* 6 8   CREATININE mg/dL 0.48*  --  0.43* 0.45* 0.55* 0.57 0.63   GLUCOSE mg/dL 290*  --  153* 183* 209* 283* 421*   Estimated Creatinine Clearance: 172.8 mL/min (A) (by C-G formula based on SCr of 0.48 mg/dL (L)).  Results from last 7 days   Lab Units 05/25/20  0604 05/24/20  0613 05/23/20  0508 05/22/20  0852 05/21/20  0713 05/20/20  2100   CALCIUM mg/dL 8.1* 8.2* 8.6 8.7 8.5* 9.5   ALBUMIN g/dL 2.60*  --  2.60* 3.00*  --  4.10   MAGNESIUM mg/dL 1.8  --   --   --   --   --    PHOSPHORUS mg/dL 2.3*  --   --   --   --   --      Results from last 7 days   Lab Units 05/25/20  0604 05/23/20 0508 05/22/20  0852 05/20/20  2100   ALBUMIN g/dL 2.60* 2.60* 3.00* 4.10   BILIRUBIN mg/dL 0.3 0.6 0.8 0.4   ALK PHOS U/L 90 80 82 119*   AST (SGOT) U/L 19 19 41* 47*   ALT (SGPT) U/L 23 23 31 55*       Assessment:    Acute pancreatitis without infection or necrosis: Much improved.  Possibly related to hypertriglyceridemia.  GI input appreciated.    Type 2 diabetes mellitus (CMS/HCC): Very poor control with markedly elevated A1c.  Endocrinology input appreciated.    Hypothyroidism (acquired): Markedly elevated TSH.  Synthroid dose increased.  (Possible compliance issues).  This may be affecting her lipid profile as well as her depression.  Endocrinology input appreciated.    Mixed hyperlipidemia: Endocrinology input appreciated.    Hyponatremia: Mild, stable.    Metabolic acidosis: Very possibly secondary to her high ileostomy output.  Patient presently on oral bicarb.  Improving.    Hypokalemia: Corrected.    Ileostomy in place (CMS/HCC): With high output.  Improved with Questran.    Diarrhea: Please see above.    Renal lithiasis: Renal lithiasis noted with hydronephrosis.  This is not a new finding and the patient was negative for CVA tenderness.  She states she did have  an appointment with a urologist that ended up missing the appointment.  I would like to get urology involved.  If they deem outpatient follow-up appropriate then she would be good for discharge.    Plan:  Discussed the above with the patient as well as nursing staff.    Luis Enrique Bailey MD  5/25/2020  10:07

## 2020-05-25 NOTE — PROGRESS NOTES
"  ENDOCRINE    Subjective   AND PLANS  Edie Camacho is a 35 y.o. female.     Follow-up diabetes and related disorders    No abdominal pain.  Triglycerides 638  Vascepa was not started because hospital does not carry it.  Continue fenofibrate 145 mg/day.  Lipitor 10 mg/day was added yesterday.  No myalgia    Continue levothyroxine 200 mcg +50 mcg daily  Repeat thyroid function tests in 4 to 6 weeks    Fasting glucose 290  Random glucose 209  Increase Lantus to 28 units twice daily  Increase Humalog to 10 units with each meal  Continue Humalog correction scale.    If discharged, follow-up with Dr. Merritt in 4 weeks  Prescriptions for insulin, TriCor, and Lipitor were given to patient's nurse  Instructions were given to the patient    Objective   /90 (BP Location: Right arm, Patient Position: Lying)   Pulse 103   Temp 98.2 °F (36.8 °C) (Oral)   Resp 16   Ht 162.6 cm (64\")   Wt 88.5 kg (195 lb)   SpO2 96%   BMI 33.47 kg/m²   Physical Exam    Awake, alert, not in distress.  No rales or wheezes.  Regular heart rate and rhythm  Abdomen soft, nontender.  Extremities warm.  No cyanosis or pedal edema    Lab Results (last 24 hours)     Procedure Component Value Units Date/Time    POC Glucose Once [[id number]]  (Abnormal) Collected:  05/25/20 1111    Specimen:  Blood Updated:  05/25/20 1116     Glucose 209 mg/dL     LDL Cholesterol, Direct [[id number]]  (Abnormal) Collected:  05/25/20 0604    Specimen:  Blood Updated:  05/25/20 0742     LDL Cholesterol  192 mg/dL     Narrative:       LDL Reference Ranges    (U[place] Department of Health and Human Services ATP III Classifications)    Optimal          <100 mg/dl  Near Optimal     100-129 mg/dl  Borderline High  130-159 mg/dl  High             160-189 mg/dl  Very High        >189 mg/dl      Calcium, Ionized [[id number]]  (Normal) Collected:  05/25/20 0604    Specimen:  Blood Updated:  05/25/20 0715     Ionized Calcium 1.32 mmol/L      Ionized Calcium 5.3 mg/dL "     TSH [[id number]]  (Abnormal) Collected:  05/25/20 0604    Specimen:  Blood Updated:  05/25/20 0709     TSH 99.300 uIU/mL      Comment: TSH results may be falsely decreased if patient taking Biotin.       Lipid Panel [[id number]]  (Abnormal) Collected:  05/25/20 0604    Specimen:  Blood Updated:  05/25/20 0703     Total Cholesterol 334 mg/dL      Triglycerides 638 mg/dL      HDL Cholesterol 22 mg/dL      LDL Cholesterol  --     Comment: Unable to calculate        VLDL Cholesterol --     Comment: Unable to calculate        LDL/HDL Ratio --     Comment: Unable to calculate       Narrative:       Cholesterol Reference Ranges  (U.S. Department of Health and Human Services ATP III Classifications)    Desirable          <200 mg/dL  Borderline High    200-239 mg/dL  High Risk          >240 mg/dL      Triglyceride Reference Ranges  (U.S. Department of Health and Human Services ATP III Classifications)    Normal           <150 mg/dL  Borderline High  150-199 mg/dL  High             200-499 mg/dL  Very High        >500 mg/dL    HDL Reference Ranges  (U.S. Department of Health and Human Services ATP III Classifcations)    Low     <40 mg/dl (major risk factor for CHD)  High    >60 mg/dl ('negative' risk factor for CHD)        LDL Reference Ranges  (U.S. Department of Health and Human Services ATP III Classifcations)    Optimal          <100 mg/dL  Near Optimal     100-129 mg/dL  Borderline High  130-159 mg/dL  High             160-189 mg/dL  Very High        >189 mg/dL    Comprehensive Metabolic Panel [[id number]]  (Abnormal) Collected:  05/25/20 0604    Specimen:  Blood Updated:  05/25/20 0703     Glucose 290 mg/dL      BUN 6 mg/dL      Creatinine 0.48 mg/dL      Sodium 135 mmol/L      Potassium 3.7 mmol/L      Chloride 108 mmol/L      CO2 16.8 mmol/L      Calcium 8.1 mg/dL      Total Protein 5.4 g/dL      Albumin 2.60 g/dL      ALT (SGPT) 23 U/L      AST (SGOT) 19 U/L      Alkaline Phosphatase 90 U/L      Total Bilirubin 0.3  mg/dL      eGFR Non African Amer 146 mL/min/1.73      Globulin 2.8 gm/dL      A/G Ratio 0.9 g/dL      BUN/Creatinine Ratio 12.5     Anion Gap 10.2 mmol/L     Narrative:       GFR Normal >60  Chronic Kidney Disease <60  Kidney Failure <15      Phosphorus [[id number]]  (Abnormal) Collected:  05/25/20 0604    Specimen:  Blood Updated:  05/25/20 0703     Phosphorus 2.3 mg/dL     Lipase [[id number]]  (Abnormal) Collected:  05/25/20 0604    Specimen:  Blood Updated:  05/25/20 0703     Lipase 76 U/L     Magnesium [[id number]]  (Normal) Collected:  05/25/20 0604    Specimen:  Blood Updated:  05/25/20 0703     Magnesium 1.8 mg/dL     CBC & Differential [[id number]] Collected:  05/25/20 0604    Specimen:  Blood Updated:  05/25/20 0637    Narrative:       The following orders were created for panel order CBC & Differential.  Procedure                               Abnormality         Status                     ---------                               -----------         ------                     CBC Auto Differential[[id number]]        Abnormal            Final result                 Please view results for these tests on the individual orders.    CBC Auto Differential [[id number]]  (Abnormal) Collected:  05/25/20 0604    Specimen:  Blood Updated:  05/25/20 0637     WBC 7.78 10*3/mm3      RBC 3.96 10*6/mm3      Hemoglobin 12.6 g/dL      Hematocrit 36.7 %      MCV 92.7 fL      MCH 31.8 pg      MCHC 34.3 g/dL      RDW 12.2 %      RDW-SD 41.7 fl      MPV 9.5 fL      Platelets 231 10*3/mm3      Neutrophil % 72.1 %      Lymphocyte % 15.9 %      Monocyte % 6.8 %      Eosinophil % 1.9 %      Basophil % 0.5 %      Immature Grans % 2.8 %      Neutrophils, Absolute 5.60 10*3/mm3      Lymphocytes, Absolute 1.24 10*3/mm3      Monocytes, Absolute 0.53 10*3/mm3      Eosinophils, Absolute 0.15 10*3/mm3      Basophils, Absolute 0.04 10*3/mm3      Immature Grans, Absolute 0.22 10*3/mm3      nRBC 0.0 /100 WBC     POC Glucose Once [[id number]]   (Abnormal) Collected:  05/25/20 0611    Specimen:  Blood Updated:  05/25/20 0613     Glucose 264 mg/dL     Potassium [[id number]]  (Normal) Collected:  05/24/20 2230    Specimen:  Blood Updated:  05/24/20 2302     Potassium 3.6 mmol/L     POC Glucose Once [[id number]]  (Abnormal) Collected:  05/24/20 2119    Specimen:  Blood Updated:  05/24/20 2120     Glucose 260 mg/dL     Sodium, Urine, Random - Urine, Clean Catch [[id number]] Collected:  05/24/20 1835    Specimen:  Urine, Clean Catch Updated:  05/24/20 1934     Sodium, Urine 109 mmol/L     Narrative:       Reference intervals for random urine have not been established.  Clinical usage is dependent upon physician's interpretation in combination with other laboratory tests.       Potassium, Urine, Random - Urine, Clean Catch [[id number]] Collected:  05/24/20 1835    Specimen:  Urine, Clean Catch Updated:  05/24/20 1934     Potassium, Urine 13.7 mmol/L     Narrative:       Reference intervals for random urine have not been established.  Clinical usage is dependent upon physician's interpretation in combination with other laboratory tests.       Uric Acid [[id number]]  (Normal) Collected:  05/24/20 1808    Specimen:  Blood Updated:  05/24/20 1917     Uric Acid 3.1 mg/dL     CK [[id number]]  (Normal) Collected:  05/24/20 1808    Specimen:  Blood Updated:  05/24/20 1917     Creatine Kinase 26 U/L     Osmolality, Urine - Urine, Clean Catch [[id number]] Collected:  05/24/20 1835    Specimen:  Urine, Clean Catch Updated:  05/24/20 1907     Osmolality, Urine 437 mOsm/kg     Narrative:       Osmo Normal Reference Ranges:    Random:  mOsm/kg H2O, depending on fluid intake.  Random: >850 mOsm/kg H20, after 12 hour fluid restriction.    24 Hour: 300-900 mOsm/kg H2O.    Urinalysis With Microscopic If Indicated (No Culture) - Urine, Clean Catch [[id number]]  (Abnormal) Collected:  05/24/20 1835    Specimen:  Urine, Clean Catch Updated:  05/24/20 1854     Color, UA Yellow      Appearance, UA Clear     pH, UA 6.0     Specific Gravity, UA 1.014     Glucose,  mg/dL (2+)     Ketones, UA 40 mg/dL (2+)     Bilirubin, UA Negative     Blood, UA Large (3+)     Protein, UA 30 mg/dL (1+)     Leuk Esterase, UA Negative     Nitrite, UA Negative     Urobilinogen, UA 0.2 E.U./dL    Urinalysis, Microscopic Only - Urine, Clean Catch [[id number]]  (Abnormal) Collected:  05/24/20 1835    Specimen:  Urine, Clean Catch Updated:  05/24/20 1854     RBC, UA Too Numerous to Count /HPF      WBC, UA 0-2 /HPF      Bacteria, UA None Seen /HPF      Squamous Epithelial Cells, UA 0-2 /HPF      Hyaline Casts, UA 0-2 /LPF      Methodology Automated Microscopy    Protein Electrophoresis, Random Urine - Urine, Clean Catch [[id number]] Collected:  05/24/20 1835    Specimen:  Urine, Clean Catch Updated:  05/24/20 1843    POC Glucose Once [[id number]]  (Abnormal) Collected:  05/24/20 1634    Specimen:  Blood Updated:  05/24/20 1645     Glucose 209 mg/dL               Acute pancreatitis without infection or necrosis    Type 2 diabetes mellitus (CMS/HCC)    Hypothyroidism (acquired)    Mixed hyperlipidemia    Hyponatremia    Metabolic acidosis    Hypokalemia    Ileostomy in place (CMS/HCC)    Diarrhea    Renal lithiasis    Insulin therapy as discussed above  Lipid therapy as discussed above  Thyroid hormone replacement as discussed above.  Follow-up as discussed above.

## 2020-05-25 NOTE — CONSULTS
FIRST UROLOGY CONSULT      Patient Identification:  NAME:  Edie Camacho  Age:  35 y.o.   Sex:  female   :  1985   MRN:  [id number]       Chief complaint: I had stones in the past I did not had one this time    History of present illness: 35-year-old female history of stone disease seen in the past in [facility] admitted with acute pancreatitis CT scan reveals a 5 mm distal renal stone with mild hydronephrosis she has had minimal complaints no hematuria fever and chills      Past medical history:  Past Medical History:   Diagnosis Date   • Abdominal bloating with cramps    • Bloating symptom    • Diabetes mellitus (CMS/HCC)    • Disease of thyroid gland    • Gastritis    • GERD (gastroesophageal reflux disease)    • H/O being hospitalized     TWO RECENT EPISODES S/P BOWEL OBSTRUCTIONS    • History of kidney stones    • History of staph infection 2009    S/P TATTOO-ON BACK    • Kidney stone    • Large bowel obstruction (CMS/HCC)    • Neuropathy     LOWER EXTRIMITES   • Pregnancy    • Seasonal allergies    • Stricture of colon determined by endoscopy (CMS/HCC)        Past surgical history:  Past Surgical History:   Procedure Laterality Date   •  SECTION N/A    • COLON RESECTION N/A 7/10/2019    Procedure: LAPAROSCOPIC LOW ANTERIOR  COLON RESECTION WITH DIVERTING LOOP ILEOSTOMY;  Surgeon: Roderick Arnold MD;  Location: [facility] OR;  Service: General   • COLON RESECTION WITH GARRIDO POUCH     • COLONOSCOPY N/A 2020    Procedure: COLONOSCOPY to Cecum via Colostomy and Rectum;  Surgeon: Roderick Arnold MD;  Location: [facility] ENDOSCOPY;  Service: General;  Laterality: N/A;  Colostomy Status   Post Surgical Anatomy   • COLONOSCOPY W/ BIOPSIES AND POLYPECTOMY     • COLOSTOMY     • DIAGNOSTIC LAPAROSCOPY N/A 2019    Procedure: DIAGNOSTIC LAPAROSCOPY;  Surgeon: Radha Loera MD;  Location: [facility] OR;  Service: General   • ENDOSCOPY N/A 2020     Procedure: ESOPHAGOGASTRODUODENOSCOPY with Bx's;  Surgeon: Roderick Arnold MD;  Location: [facility] ENDOSCOPY;  Service: General;  Laterality: N/A;  [place]   Reflux Esophagitis, Mild Duodenitis   • EXPLORATORY LAPAROTOMY N/A 7/11/2019    Procedure: COLON RESECTION WITH WAYNE'S PROCEDURE WITH FLEXIBLE SIGMOIDSCOPE;  Surgeon: Radha Loera MD;  Location: [facility] MAIN OR;  Service: General   • ILEOSTOMY     • NEPHROSTOMY TRACT DILATATION W/ LITHOTRIPSY         Allergies:  Bactrim [sulfamethoxazole-trimethoprim]; Ciprofloxacin; Sulfa antibiotics; and Toradol [ketorolac tromethamine]    Home medications:  Medications Prior to Admission   Medication Sig Dispense Refill Last Dose   • busPIRone (BUSPAR) 7.5 MG tablet Take 7.5 mg by mouth 3 (Three) Times a Day.   5/18/2020 at Unknown time   • fluticasone (FLONASE) 50 MCG/ACT nasal spray 2 sprays into the nostril(s) as directed by provider Daily.   5/18/2020 at Unknown time   • FREESTYLE LITE test strip    Taking   • gabapentin (NEURONTIN) 400 MG capsule Take 400 mg by mouth 3 (Three) Times a Day.   5/18/2020 at Unknown time   • Insulin Glargine (BASAGLAR KWIKPEN) 100 UNIT/ML injection pen Inject 5 Units under the skin into the appropriate area as directed Every Night. Prescribed instructions: Takes 5 units sq every night, increase to 10 units if Blood glucose >120.   Taking   • levothyroxine (SYNTHROID, LEVOTHROID) 200 MCG tablet Take 200 mcg by mouth Every Morning.   5/18/2020 at Unknown time   • metFORMIN (GLUCOPHAGE) 500 MG tablet Take 500 mg by mouth 3 (Three) Times a Day.   5/18/2020 at Unknown time   • montelukast (SINGULAIR) 10 MG tablet Take 10 mg by mouth Daily.   5/18/2020 at Unknown time   • pantoprazole (PROTONIX) 40 MG EC tablet Take 40 mg by mouth Daily.   5/19/2020 at Unknown time   • potassium chloride (K-DUR) 10 MEQ CR tablet Take 10 mEq by mouth 2 (Two) Times a Day.   5/18/2020 at Unknown time   • SITagliptin (JANUVIA) 100 MG tablet Take 100 mg by mouth  Daily.   2020 at Unknown time   • sucralfate (CARAFATE) 1 g tablet Take 1 g by mouth 4 (Four) Times a Day Before Meals & at Bedtime.   2020 at Unknown time       Hospital medications:    albumin human 25 g Intravenous Q8H   atorvastatin 10 mg Oral Nightly   [START ON 2020] ceFAZolin 1 g Intravenous Once   fenofibrate 145 mg Oral Daily   insulin glargine 25 Units Subcutaneous Q12H   insulin lispro 2-5 Units Subcutaneous 4x Daily With Meals & Nightly   insulin lispro 7 Units Subcutaneous TID With Meals   levothyroxine 200 mcg Oral Q AM   levothyroxine 50 mcg Oral Q AM   montelukast 10 mg Oral Nightly   NON FORMULARY 2,000 mg Oral BID With Meals   pantoprazole 40 mg Oral Q AM   potassium chloride 20 mEq Oral BID With Meals   potassium phosphate 20 mmol Intravenous Once   sodium bicarbonate 1,300 mg Oral TID   sodium chloride 10 mL Intravenous Q12H   sucralfate 1 g Oral 4x Daily AC & at Bedtime       sodium chloride 100 mL/hr Last Rate: 100 mL/hr (20 0811)     •  acetaminophen **OR** acetaminophen **OR** acetaminophen  •  dextrose  •  dextrose  •  diphenoxylate-atropine  •  glucagon (human recombinant)  •  HYDROmorphone  •  ondansetron  •  potassium chloride **OR** potassium chloride **OR** potassium chloride  •  sodium chloride    Family history:  Family History   Problem Relation Age of Onset   • Heart disease Father    • Malig Hyperthermia Neg Hx        Social history:  Social History     Tobacco Use   • Smoking status: Current Every Day Smoker     Packs/day: 0.50     Years: 21.00     Pack years: 10.50     Types: Cigarettes     Start date:    • Smokeless tobacco: Never Used   Substance Use Topics   • Alcohol use: No   • Drug use: No       Review of systems:    Negative 12-system ROS except for the following: No hematuria      Objective:  TMax 24 hours:   Temp (24hrs), Av.5 °F (36.9 °C), Min:98.2 °F (36.8 °C), Max:99.4 °F (37.4 °C)      Vitals Ranges:   Temp:  [98.2 °F (36.8 °C)-99.4 °F  (37.4 °C)] 98.2 °F (36.8 °C)  Heart Rate:  [103-106] 103  Resp:  [16] 16  BP: (132-141)/(79-97) 132/90    Intake/Output Last 3 shifts:  I/O last 3 completed shifts:  In: 3749 [P.O.:780; I.V.:2969]  Out: 8105 [Urine:6150; Stool:1955]     Physical Exam:       General Appearance:    Alert, cooperative, in no acute distress   Head:    Normocephalic, without obvious abnormality, atraumatic   Eyes:          conjunctivae and corneas clear   Ears:    Normal external inspection   Throat:   No oral lesions, oral mucosa moist   Neck:   Supple, no LAD, trachea midline   Back:     No CVA tenderness   Lungs:     Respirations unlabored, symmetric excursion    Heart:    RRR, intact peripheral pulses   Abdomen:    Colostomy   :      Extremities:   No edema, no deformity   Skin:   No bleeding, bruising or rashes   Neuro/Psych:   Orientation intact, mood/affect pleasant, no focal findings       Results review CT scan films report reviewed by me 5 mm distal renal stone with mild hydronephrosis bilateral nephro calculi:   I reviewed the patient's new clinical results.    Data review:  Lab Results (last 24 hours)     Procedure Component Value Units Date/Time    LDL Cholesterol, Direct [[id number]]  (Abnormal) Collected:  05/25/20 0604    Specimen:  Blood Updated:  05/25/20 0742     LDL Cholesterol  192 mg/dL     Narrative:       LDL Reference Ranges    (U.S. Department of Health and Human Services ATP III Classifications)    Optimal          <100 mg/dl  Near Optimal     100-129 mg/dl  Borderline High  130-159 mg/dl  High             160-189 mg/dl  Very High        >189 mg/dl      Calcium, Ionized [[id number]]  (Normal) Collected:  05/25/20 0604    Specimen:  Blood Updated:  05/25/20 0715     Ionized Calcium 1.32 mmol/L      Ionized Calcium 5.3 mg/dL     TSH [[id number]]  (Abnormal) Collected:  05/25/20 0604    Specimen:  Blood Updated:  05/25/20 0709     TSH 99.300 uIU/mL      Comment: TSH results may be falsely decreased if patient  taking Biotin.       Lipid Panel [[id number]]  (Abnormal) Collected:  05/25/20 0604    Specimen:  Blood Updated:  05/25/20 0703     Total Cholesterol 334 mg/dL      Triglycerides 638 mg/dL      HDL Cholesterol 22 mg/dL      LDL Cholesterol  --     Comment: Unable to calculate        VLDL Cholesterol --     Comment: Unable to calculate        LDL/HDL Ratio --     Comment: Unable to calculate       Narrative:       Cholesterol Reference Ranges  (U.S. Department of Health and Human Services ATP III Classifications)    Desirable          <200 mg/dL  Borderline High    200-239 mg/dL  High Risk          >240 mg/dL      Triglyceride Reference Ranges  (U.S. Department of Health and Human Services ATP III Classifications)    Normal           <150 mg/dL  Borderline High  150-199 mg/dL  High             200-499 mg/dL  Very High        >500 mg/dL    HDL Reference Ranges  (U.S. Department of Health and Human Services ATP III Classifcations)    Low     <40 mg/dl (major risk factor for CHD)  High    >60 mg/dl ('negative' risk factor for CHD)        LDL Reference Ranges  (U.S. Department of Health and Human Services ATP III Classifcations)    Optimal          <100 mg/dL  Near Optimal     100-129 mg/dL  Borderline High  130-159 mg/dL  High             160-189 mg/dL  Very High        >189 mg/dL    Comprehensive Metabolic Panel [[id number]]  (Abnormal) Collected:  05/25/20 0604    Specimen:  Blood Updated:  05/25/20 0703     Glucose 290 mg/dL      BUN 6 mg/dL      Creatinine 0.48 mg/dL      Sodium 135 mmol/L      Potassium 3.7 mmol/L      Chloride 108 mmol/L      CO2 16.8 mmol/L      Calcium 8.1 mg/dL      Total Protein 5.4 g/dL      Albumin 2.60 g/dL      ALT (SGPT) 23 U/L      AST (SGOT) 19 U/L      Alkaline Phosphatase 90 U/L      Total Bilirubin 0.3 mg/dL      eGFR Non African Amer 146 mL/min/1.73      Globulin 2.8 gm/dL      A/G Ratio 0.9 g/dL      BUN/Creatinine Ratio 12.5     Anion Gap 10.2 mmol/L     Narrative:       GFR Normal  >60  Chronic Kidney Disease <60  Kidney Failure <15      Phosphorus [[id number]]  (Abnormal) Collected:  05/25/20 0604    Specimen:  Blood Updated:  05/25/20 0703     Phosphorus 2.3 mg/dL     Lipase [[id number]]  (Abnormal) Collected:  05/25/20 0604    Specimen:  Blood Updated:  05/25/20 0703     Lipase 76 U/L     Magnesium [[id number]]  (Normal) Collected:  05/25/20 0604    Specimen:  Blood Updated:  05/25/20 0703     Magnesium 1.8 mg/dL     CBC & Differential [[id number]] Collected:  05/25/20 0604    Specimen:  Blood Updated:  05/25/20 0637    Narrative:       The following orders were created for panel order CBC & Differential.  Procedure                               Abnormality         Status                     ---------                               -----------         ------                     CBC Auto Differential[[id number]]        Abnormal            Final result                 Please view results for these tests on the individual orders.    CBC Auto Differential [[id number]]  (Abnormal) Collected:  05/25/20 0604    Specimen:  Blood Updated:  05/25/20 0637     WBC 7.78 10*3/mm3      RBC 3.96 10*6/mm3      Hemoglobin 12.6 g/dL      Hematocrit 36.7 %      MCV 92.7 fL      MCH 31.8 pg      MCHC 34.3 g/dL      RDW 12.2 %      RDW-SD 41.7 fl      MPV 9.5 fL      Platelets 231 10*3/mm3      Neutrophil % 72.1 %      Lymphocyte % 15.9 %      Monocyte % 6.8 %      Eosinophil % 1.9 %      Basophil % 0.5 %      Immature Grans % 2.8 %      Neutrophils, Absolute 5.60 10*3/mm3      Lymphocytes, Absolute 1.24 10*3/mm3      Monocytes, Absolute 0.53 10*3/mm3      Eosinophils, Absolute 0.15 10*3/mm3      Basophils, Absolute 0.04 10*3/mm3      Immature Grans, Absolute 0.22 10*3/mm3      nRBC 0.0 /100 WBC     POC Glucose Once [[id number]]  (Abnormal) Collected:  05/25/20 0611    Specimen:  Blood Updated:  05/25/20 0613     Glucose 264 mg/dL     Potassium [[id number]]  (Normal) Collected:  05/24/20 2230    Specimen:  Blood Updated:   05/24/20 2302     Potassium 3.6 mmol/L     POC Glucose Once [[id number]]  (Abnormal) Collected:  05/24/20 2119    Specimen:  Blood Updated:  05/24/20 2120     Glucose 260 mg/dL     Sodium, Urine, Random - Urine, Clean Catch [[id number]] Collected:  05/24/20 1835    Specimen:  Urine, Clean Catch Updated:  05/24/20 1934     Sodium, Urine 109 mmol/L     Narrative:       Reference intervals for random urine have not been established.  Clinical usage is dependent upon physician's interpretation in combination with other laboratory tests.       Potassium, Urine, Random - Urine, Clean Catch [[id number]] Collected:  05/24/20 1835    Specimen:  Urine, Clean Catch Updated:  05/24/20 1934     Potassium, Urine 13.7 mmol/L     Narrative:       Reference intervals for random urine have not been established.  Clinical usage is dependent upon physician's interpretation in combination with other laboratory tests.       Uric Acid [[id number]]  (Normal) Collected:  05/24/20 1808    Specimen:  Blood Updated:  05/24/20 1917     Uric Acid 3.1 mg/dL     CK [[id number]]  (Normal) Collected:  05/24/20 1808    Specimen:  Blood Updated:  05/24/20 1917     Creatine Kinase 26 U/L     Osmolality, Urine - Urine, Clean Catch [[id number]] Collected:  05/24/20 1835    Specimen:  Urine, Clean Catch Updated:  05/24/20 1907     Osmolality, Urine 437 mOsm/kg     Narrative:       Osmo Normal Reference Ranges:    Random:  mOsm/kg H2O, depending on fluid intake.  Random: >850 mOsm/kg H20, after 12 hour fluid restriction.    24 Hour: 300-900 mOsm/kg H2O.    Urinalysis With Microscopic If Indicated (No Culture) - Urine, Clean Catch [[id number]]  (Abnormal) Collected:  05/24/20 1835    Specimen:  Urine, Clean Catch Updated:  05/24/20 1854     Color, UA Yellow     Appearance, UA Clear     pH, UA 6.0     Specific Gravity, UA 1.014     Glucose,  mg/dL (2+)     Ketones, UA 40 mg/dL (2+)     Bilirubin, UA Negative     Blood, UA Large (3+)     Protein, UA 30  mg/dL (1+)     Leuk Esterase, UA Negative     Nitrite, UA Negative     Urobilinogen, UA 0.2 E.U./dL    Urinalysis, Microscopic Only - Urine, Clean Catch [[id number]]  (Abnormal) Collected:  05/24/20 1835    Specimen:  Urine, Clean Catch Updated:  05/24/20 1854     RBC, UA Too Numerous to Count /HPF      WBC, UA 0-2 /HPF      Bacteria, UA None Seen /HPF      Squamous Epithelial Cells, UA 0-2 /HPF      Hyaline Casts, UA 0-2 /LPF      Methodology Automated Microscopy    Protein Electrophoresis, Random Urine - Urine, Clean Catch [[id number]] Collected:  05/24/20 1835    Specimen:  Urine, Clean Catch Updated:  05/24/20 1843    POC Glucose Once [[id number]]  (Abnormal) Collected:  05/24/20 1634    Specimen:  Blood Updated:  05/24/20 1645     Glucose 209 mg/dL     POC Glucose Once [[id number]]  (Abnormal) Collected:  05/24/20 1131    Specimen:  Blood Updated:  05/24/20 1138     Glucose 297 mg/dL            Imaging:  Imaging Results (Last 24 Hours)     ** No results found for the last 24 hours. **             Assessment:       Acute pancreatitis without infection or necrosis    Type 2 diabetes mellitus (CMS/HCC)    Hypothyroidism (acquired)    Mixed hyperlipidemia    Hyponatremia    Metabolic acidosis    Hypokalemia    Ileostomy in place (CMS/HCC)    Diarrhea    Renal lithiasis    Right distal ureteral stone mild hydronephrosis    Plan:     Right ureteroscopy holmium fragmentation stone basket traction stone fragments placement of the stent R-B-0 discussed with patient understands agrees to proceed     Addendum  Pt decided not ot ohave stone removed tomorrow  Desires to go home to family follow up in office I have made arrangements   Thank you     Conrado Oglesby MD  05/25/20  10:45

## 2020-05-25 NOTE — DISCHARGE SUMMARY
PHYSICIAN DISCHARGE SUMMARY                                                                        [facility]    Patient Identification:  Name: Edie Camacho  Age: 35 y.o.  Sex: female  :  1985  MRN: [id number]  Primary Care Physician: Layla Monique APRN    Admit date: 2020  Discharge date and time: 2020     Discharged Condition: good    Discharge Diagnoses:   Acute pancreatitis without infection or necrosis:Possibly related to hypertriglyceridemia.     Type 2 diabetes mellitus (CMS/HCC): Very poor control with markedly elevated A1c.      Hypothyroidism (acquired): Markedly elevated TSH.  Synthroid dose increased.  (Possible compliance issues).  This may be affecting her lipid profile as well as her depression.      Mixed hyperlipidemia:     Hyponatremia: Mild, stable.    Metabolic acidosis: Probably secondary to her high ileostomy output.  Patient presently on oral bicarb.  Improving.    Hypokalemia: Corrected.    Ileostomy in place (CMS/HCC): With high output. Lomotil started.     Renal lithiasis: Follow up with Urology.     Hospital Course:  35-year-old female with multiple medical issues presenting with severe abdominal pain associated elevated lipase level and CT scan consistent with pancreatitis.  Please H&P for full details.  In addition above she has a history of diabetes which is been under very poor control.  History of hypothyroidism which also showed very poor control with a TSH in excess of 90.  She has fairly recently undergone surgery for diverticulitis and has an ileostomy lower quadrant colostomy in the left.  On presentation patient also had low potassium levels of only 2.8.  She had a blood sugar that reached in excess of 400 with a recent A1c of 11.7.  TSH also came back as 99.  Patient is  and GI consultation was obtained as well as endocrinology.  She was initially put at bowel  "rest and responded well and then with diet cautiously initiated clear liquids and advance as tolerated.  She is presently following a low-fat diet and tolerating it well.  Resting dose has been significantly increased.  Blood sugars are improved though still certainly not at goals.  Levothyroxine dose also increased advised and concerned about a possible compliance issue accounting for the very high TSH.  Potassium was corrected.  Patient also has the presence of renal lithiasis on the right with hydronephrosis which was previously known.  She had an appointment scheduled with a urologist.  They are coming up to see her in evaluation while in the hospital.  The patient is very anxious driving home however so we will continue to follow her as an outpatient.  There is been no CVA tenderness or flank pain associated with this.      Consults:     Consults     Date and Time Order Name Status Description    5/25/2020 1003 Inpatient Urology Consult Completed     5/24/2020 1705 Inpatient Nephrology Consult Completed     5/21/2020 1454 Inpatient Endocrinology Consult Completed     5/21/2020 09 Inpatient Gastroenterology Consult Completed     5/20/2020 7151 [facility] (on-call MD unless specified) Details Completed             Discharge Exam:  Physical Exam   General Appearance:  Comfortable, well-appearing, in no acute distress and not in pain.    Vital signs: (most recent): Blood pressure 132/90, pulse 103, temperature 98.2 °F (36.8 °C), temperature source Oral, resp. rate 16, height 162.6 cm (64\"), weight 88.5 kg (195 lb), SpO2 96 %, not currently breastfeeding.    Lungs:  Normal effort and normal respiratory rate.  Breath sounds clear to auscultation.    Heart: Normal rate.  Regular rhythm.    Abdomen: Abdomen is soft and non-distended.  Bowel sounds are normal.   There is no abdominal tenderness.     Extremities: There is no dependent edema.    Neurological: Patient is oriented to person, place and time.  (Somewhat flat " affect.).    Skin:  Warm and dry.      Disposition:  Home    Patient Instructions:      Discharge Medications      New Medications      Instructions Start Date   atorvastatin 10 MG tablet  Commonly known as:  LIPITOR   10 mg, Oral, Nightly      diphenoxylate-atropine 2.5-0.025 MG per tablet  Commonly known as:  LOMOTIL   1 tablet, Oral, 4 Times Daily PRN      fenofibrate 145 MG tablet  Commonly known as:  TRICOR   145 mg, Oral, Daily   Start Date:  May 26, 2020     glucose monitor monitoring kit   1 each, Does not apply, As Needed      icosapent ethyl 1 g capsule capsule  Commonly known as:  Vascepa   2 g, Oral, 2 Times Daily With Meals      insulin lispro 100 UNIT/ML injection  Commonly known as:  humaLOG   10 Units, Subcutaneous, 3 Times Daily With Meals      sodium bicarbonate 650 MG tablet   1,300 mg, Oral, 3 Times Daily         Changes to Medications      Instructions Start Date   gabapentin 400 MG capsule  Commonly known as:  NEURONTIN  What changed:  when to take this   400 mg, Oral, 2 Times Daily      Insulin Glargine 100 UNIT/ML injection pen  Commonly known as:  BASAGLAR IRENEPEN  What changed:    · how much to take  · when to take this  · additional instructions   25 Units, Subcutaneous, 2 Times Daily      levothyroxine 50 MCG tablet  Commonly known as:  SYNTHROID, LEVOTHROID  What changed:    · medication strength  · how much to take  · when to take this   50 mcg, Oral, Daily      potassium chloride 10 MEQ CR tablet  Commonly known as:  K-DUR  What changed:    · how much to take  · when to take this   20 mEq, Oral, 2 Times Daily         Continue These Medications      Instructions Start Date   busPIRone 7.5 MG tablet  Commonly known as:  BUSPAR   7.5 mg, Oral, 3 Times Daily      fluticasone 50 MCG/ACT nasal spray  Commonly known as:  FLONASE   2 sprays, Nasal, Daily      FREESTYLE LITE test strip  Generic drug:  glucose blood   No dose, route, or frequency recorded.      montelukast 10 MG tablet  Commonly  known as:  SINGULAIR   10 mg, Oral, Daily      pantoprazole 40 MG EC tablet  Commonly known as:  PROTONIX   40 mg, Oral, Daily      sucralfate 1 g tablet  Commonly known as:  CARAFATE   1 g, Oral, 4 Times Daily Before Meals & Nightly         Stop These Medications    metFORMIN 500 MG tablet  Commonly known as:  GLUCOPHAGE     SITagliptin 100 MG tablet  Commonly known as:  JANUVIA          Diet Instructions     Advance Diet As Tolerated          No future appointments.  Additional Instructions for the Follow-ups that You Need to Schedule     Discharge Follow-up with PCP   As directed       Currently Documented PCP:    Layla Monique APRN    PCP Phone Number:    [phone]     Follow Up Details:  1 week         Discharge Follow-up with Specialty: Gastroenterology, endocrinology, urology.   As directed      Specialty:  Gastroenterology, endocrinology, urology.    Follow Up Details:  As directed           Follow-up Information     Conrado Oglesby MD Follow up in 2 week(s).    Specialty:  Urology  Contact information:  [address] [address]  [phone]             Layla Monique APRN .    Specialty:  Family Medicine  Why:  1 week  Contact information:  [address] [address]  [phone]             Thong Gonzalez MD Follow up in 1 month(s).    Specialty:  Endocrinology  Contact information:  [address]  [place] [address]  [phone]             Tristan Gusman MD Follow up.    Specialty:  Nephrology  Why:  per Md's recommedations.  Contact information:  [address]  [place] [address]  [phone]                 Discharge Order (From admission, onward)     Start     Ordered    05/25/20 1156  Discharge patient  Once     Expected Discharge Date:  05/25/20    Discharge Disposition:  Home or Self Care    Physician of Record for Attribution - Please select from Treatment Team:  REEMA MORSE [[id number]]    Review needed by  CMO to determine Physician of Record:  No       Question Answer Comment   Physician of Record for Attribution - Please select from Treatment Team LUIS ENRIQUE BAILEY    Review needed by CMO to determine Physician of Record No        05/25/20 1212                  Total time spent discharging patient including evaluation,post hospitalization follow up,  medication and post hospitalization instructions and education total time exceeds 30 minutes.    Signed:  Luis Enrique Bailey MD  5/25/2020  13:06    EMR Dragon/Transcription disclaimer:   Much of this encounter note is an electronic transcription/translation of spoken language to printed text. The electronic translation of spoken language may permit erroneous, or at times, nonsensical words or phrases to be inadvertently transcribed; Although I have reviewed the note for such errors, some may still exist.

## 2020-05-26 ENCOUNTER — READMISSION MANAGEMENT (OUTPATIENT)
Dept: CALL CENTER | Facility: HOSPITAL | Age: 35
End: 2020-05-26

## 2020-05-26 ENCOUNTER — TELEPHONE (OUTPATIENT)
Dept: ENDOCRINOLOGY | Age: 35
End: 2020-05-26

## 2020-05-26 LAB — C PEPTIDE SERPL-MCNC: 1.4 NG/ML (ref 1.1–4.4)

## 2020-05-26 RX ORDER — EZETIMIBE 10 MG/1
10 TABLET ORAL DAILY
Qty: 30 TABLET | Refills: 0 | Status: SHIPPED | OUTPATIENT
Start: 2020-05-26 | End: 2022-07-21

## 2020-05-26 NOTE — OUTREACH NOTE
Prep Survey      Responses   [facility] facility patient discharged from?  [place]   Is LACE score < 7 ?  No   Eligibility  Readm Mgmt   Discharge diagnosis  Acute pancreatitis w.o infection or necrosis, DM II poorly controlled, hypothyroidism, Mixed HLD, hyponatremia, Metabolic acidosis, Hypokalemia, Ileostomy in place with high output, renal lithiasis   COVID-19 Test Status  Negative   Does the patient have one of the following disease processes/diagnoses(primary or secondary)?  Other   Does the patient have Home health ordered?  No   Is there a DME ordered?  No   Comments regarding appointments  Pt to schedule F/U appointments   Medication alerts for this patient  Multiple new meds and changes   Prep survey completed?  Yes          Luda Beckham RN

## 2020-05-26 NOTE — TELEPHONE ENCOUNTER
States she was discharged today with pancreatitis. States on the way home she started having severe back pain. Asking if it is okay to use heating pad or icy hot patch?     States she tried a hot shower but no relief. States she doesn't want to go back to the hospital if she doesn't have to. Explained okay to use icyhot patch or cream, do not apply over any open areas. Discussed okay to use heating pad but watch to make sure not too hot and not being burned. May also try ice as needed. Discussed if no improvement or if pain worsens, may need to be seen. She verbalized understanding.    Reason for Disposition  • Back pain    Additional Information  • Negative: Passed out (i.e., lost consciousness, collapsed and was not responding)  • Negative: Shock suspected (e.g., cold/pale/clammy skin, too weak to stand, low BP, rapid pulse)  • Negative: Sounds like a life-threatening emergency to the triager  • Negative: Major injury to the back (e.g., MVA, fall > 10 feet or 3 meters, penetrating injury, etc.)  • Negative: Followed a tailbone injury  • Negative: [1] Pain in the upper back over the ribs (rib cage) AND [2] radiates (travels, goes) into chest  • Negative: [1] Pain in the upper back over the ribs (rib cage) AND [2] worsened by coughing (or clearly increases with breathing)  • Negative: Back pain during pregnancy  • Negative: Pain mainly in flank (i.e., in the side, over the lower ribs or just below the ribs)  • Negative: [1] SEVERE back pain (e.g., excruciating) AND [2] sudden onset AND [3] age > 60  • Negative: [1] Unable to urinate (or only a few drops) > 4 hours AND [2] bladder feels very full (e.g., palpable bladder or strong urge to urinate)  • Negative: [1] Loss of bladder or bowel control (urine or bowel incontinence; wetting self, leaking stool) AND [2] new onset  • Negative: Numbness in groin or rectal area (i.e., loss of sensation)  • Negative: [1] SEVERE abdominal pain AND [2] present > 1 hour  • Negative:  "[1] Abdominal pain AND [2] age > 60  • Negative: Weakness of a leg or foot (e.g., unable to bear weight, dragging foot)  • Negative: Unable to walk  • Negative: Patient sounds very sick or weak to the triager  • Negative: [1] SEVERE back pain (e.g., excruciating, unable to do any normal activities) AND [2] not improved 2 hours after pain medicine  • Negative: [1] Pain radiates into the thigh or further down the leg AND [2] both legs  • Negative: [1] Fever > 100.0 F (37.8 C) AND [2] flank pain (i.e., in side, below ribs and above hip)  • Negative: [1] Pain or burning with passing urine (urination) AND [2] flank pain (i.e., in side, below ribs and above hip)  • Negative: Numbness in a leg or foot (i.e., loss of sensation)  • Negative: [1] Numbness in an arm or hand (i.e., loss of sensation) AND [2] upper back pain  • Negative: High-risk adult (e.g., history of cancer, HIV, or IV drug abuse)  • Negative: [1] Fever AND [2] no symptoms of UTI  (Exception: has generalized muscle pains, not localized back pain)  • Negative: Rash in same area as pain (may be described as \"small blisters\")  • Negative: Blood in urine (red, pink, or tea-colored)  • Negative: [1] MODERATE back pain (e.g., interferes with normal activities) AND [2] present > 3 days  • Negative: [1] Pain radiates into the thigh or further down the leg AND [2] one leg  • Negative: [1] Age > 50 AND [2] no history of prior similar back pain  • Negative: Back pain present > 2 weeks  • Negative: Back pain is a chronic symptom (recurrent or ongoing AND present > 4 weeks)  • Negative: Caused by a twisting, bending, or lifting injury  • Negative: Caused by overuse from recent vigorous activity (e.g., exercise, gardening, lifting and carrying, sports)    Answer Assessment - Initial Assessment Questions  1. ONSET: \"When did the pain begin?\"       See note  2. LOCATION: \"Where does it hurt?\" (upper, mid or lower back)      n/a  3. SEVERITY: \"How bad is the pain?\"  (e.g., " "Scale 1-10; mild, moderate, or severe)    - MILD (1-3): doesn't interfere with normal activities     - MODERATE (4-7): interferes with normal activities or awakens from sleep     - SEVERE (8-10): excruciating pain, unable to do any normal activities       n/a  4. PATTERN: \"Is the pain constant?\" (e.g., yes, no; constant, intermittent)       n/a  5. RADIATION: \"Does the pain shoot into your legs or elsewhere?\"      n/  6. CAUSE:  \"What do you think is causing the back pain?\"       An/a  7. BACK OVERUSE:  \"Any recent lifting of heavy objects, strenuous work or exercise?\"      n/a  8. MEDICATIONS: \"What have you taken so far for the pain?\" (e.g., nothing, acetaminophen, NSAIDS)      n/a  9. NEUROLOGIC SYMPTOMS: \"Do you have any weakness, numbness, or problems with bowel/bladder control?\"      n/a  10. OTHER SYMPTOMS: \"Do you have any other symptoms?\" (e.g., fever, abdominal pain, burning with urination, blood in urine)        n/a  11. PREGNANCY: \"Is there any chance you are pregnant?\" (e.g., yes, no; LMP)        n/a    Protocols used: BACK PAIN-ADULT-AH      "

## 2020-05-26 NOTE — PAYOR COMM NOTE
"Edie Cao (35 y.o. Female)     PLEASE SEE ATTACHED DC SUMMARY    REF#[id number]    THANK YOU    BERNA HASTINGS LPN CCP    Date of Birth Social Security Number Address Home Phone MRN    1985  [address] [place] [id number] [phone] [id number]    [employer] Status          Restoration[employer]        Admission Date Admission Type Admitting Provider Attending Provider Department, Room/Bed    20 Emergency Joseph Cordova MD  [facility] [place], P698/1    Discharge Date Discharge Disposition Discharge Destination        2020 Home or Self Care              Attending Provider:  (none)   Allergies:  Bactrim [Sulfamethoxazole-trimethoprim], Ciprofloxacin, Sulfa Antibiotics, Toradol [Ketorolac Tromethamine]    Isolation:  None   Infection:  None   Code Status:  Prior    Ht:  162.6 cm (64\")   Wt:  88.5 kg (195 lb)    Admission Cmt:  None   Principal Problem:  Acute pancreatitis without infection or necrosis [K85.90]                 Active Insurance as of 2020     Primary Coverage     Payor Plan Insurance Group Employer/Plan Group    [place] [id number]     Payor Plan Address Payor Plan Phone Number Payor Plan Fax Number Effective Dates    [employer] Claims Office - [address] [address] [phone]  2020 - None Entered    [facility] [id number]       Subscriber Name Subscriber Birth Date Member ID       EDIE CAO 1985 [id number]                 Emergency Contacts      (Rel.) Home Phone Work Phone Mobile Phone    j carlos hammer (Brother) [phone] -- --               Discharge Summary      Luis Enrique Bailey MD at 20 1214                                                                             PHYSICIAN DISCHARGE SUMMARY                                                                        [facility]    Patient Identification:  Name: Edie Cao  Age: 35 y.o.  Sex: female  :  " 1985  MRN: [id number]  Primary Care Physician: Layla Monique APRN    Admit date: 5/20/2020  Discharge date and time: 5/25/2020     Discharged Condition: good    Discharge Diagnoses:   Acute pancreatitis without infection or necrosis:Possibly related to hypertriglyceridemia.     Type 2 diabetes mellitus (CMS/HCC): Very poor control with markedly elevated A1c.      Hypothyroidism (acquired): Markedly elevated TSH.  Synthroid dose increased.  (Possible compliance issues).  This may be affecting her lipid profile as well as her depression.      Mixed hyperlipidemia:     Hyponatremia: Mild, stable.    Metabolic acidosis:  Probably secondary to her high ileostomy output.  Patient presently on oral bicarb.  Improving.    Hypokalemia: Corrected.    Ileostomy in place (CMS/HCC): With high output. Lomotil started.     Renal lithiasis:  Follow up with Urology.     Hospital Course:  35-year-old female with multiple medical issues presenting with severe abdominal pain associated elevated lipase level and CT scan consistent with pancreatitis.  Please H&P for full details.  In addition above she has a history of diabetes which is been under very poor control.  History of hypothyroidism which also showed very poor control with a TSH in excess of 90.  She has fairly recently undergone surgery for diverticulitis and has an ileostomy lower quadrant colostomy in the left.  On presentation patient also had low potassium levels of only 2.8.  She had a blood sugar that reached in excess of 400 with a recent A1c of 11.7.  TSH also came back as 99.  Patient is  and GI consultation was obtained as well as endocrinology.  She was initially put at bowel rest and responded well and then with diet cautiously initiated clear liquids and advance as tolerated.  She is presently following a low-fat diet and tolerating it well.  Resting dose has been significantly increased.  Blood sugars are improved though still certainly not at  "goals.  Levothyroxine dose also increased advised and concerned about a possible compliance issue accounting for the very high TSH.  Potassium was corrected.  Patient also has the presence of renal lithiasis on the right with hydronephrosis which was previously known.  She had an appointment scheduled with a urologist.  They are coming up to see her in evaluation while in the hospital.  The patient is very anxious driving home however so we will continue to follow her as an outpatient.  There is been no CVA tenderness or flank pain associated with this.      Consults:     Consults     Date and Time Order Name Status Description    5/25/2020 1003 Inpatient Urology Consult Completed     5/24/2020 1705 Inpatient Nephrology Consult Completed     5/21/2020 2624 Inpatient Endocrinology Consult Completed     5/21/2020 0677 Inpatient Gastroenterology Consult Completed     5/20/2020 0486 [facility] (on-call MD unless specified) Details Completed             Discharge Exam:  Physical Exam   General Appearance:  Comfortable, well-appearing, in no acute distress and not in pain.    Vital signs: (most recent): Blood pressure 132/90, pulse 103, temperature 98.2 °F (36.8 °C), temperature source Oral, resp. rate 16, height 162.6 cm (64\"), weight 88.5 kg (195 lb), SpO2 96 %, not currently breastfeeding.    Lungs:  Normal effort and normal respiratory rate.  Breath sounds clear to auscultation.    Heart: Normal rate.  Regular rhythm.    Abdomen: Abdomen is soft and non-distended.  Bowel sounds are normal.   There is no abdominal tenderness.     Extremities: There is no dependent edema.    Neurological: Patient is oriented to person, place and time.  (Somewhat flat affect.).    Skin:  Warm and dry.      Disposition:  Home    Patient Instructions:      Discharge Medications      New Medications      Instructions Start Date   atorvastatin 10 MG tablet  Commonly known as:  LIPITOR   10 mg, Oral, Nightly      diphenoxylate-atropine 2.5-0.025 MG " per tablet  Commonly known as:  LOMOTIL   1 tablet, Oral, 4 Times Daily PRN      fenofibrate 145 MG tablet  Commonly known as:  TRICOR   145 mg, Oral, Daily   Start Date:  May 26, 2020     glucose monitor monitoring kit   1 each, Does not apply, As Needed      icosapent ethyl 1 g capsule capsule  Commonly known as:  Vascepa   2 g, Oral, 2 Times Daily With Meals      insulin lispro 100 UNIT/ML injection  Commonly known as:  humaLOG   10 Units, Subcutaneous, 3 Times Daily With Meals      sodium bicarbonate 650 MG tablet   1,300 mg, Oral, 3 Times Daily         Changes to Medications      Instructions Start Date   gabapentin 400 MG capsule  Commonly known as:  NEURONTIN  What changed:  when to take this   400 mg, Oral, 2 Times Daily      Insulin Glargine 100 UNIT/ML injection pen  Commonly known as:  BASAGLAR KWIKPEN  What changed:    · how much to take  · when to take this  · additional instructions   25 Units, Subcutaneous, 2 Times Daily      levothyroxine 50 MCG tablet  Commonly known as:  SYNTHROID, LEVOTHROID  What changed:    · medication strength  · how much to take  · when to take this   50 mcg, Oral, Daily      potassium chloride 10 MEQ CR tablet  Commonly known as:  K-DUR  What changed:    · how much to take  · when to take this   20 mEq, Oral, 2 Times Daily         Continue These Medications      Instructions Start Date   busPIRone 7.5 MG tablet  Commonly known as:  BUSPAR   7.5 mg, Oral, 3 Times Daily      fluticasone 50 MCG/ACT nasal spray  Commonly known as:  FLONASE   2 sprays, Nasal, Daily      FREESTYLE LITE test strip  Generic drug:  glucose blood   No dose, route, or frequency recorded.      montelukast 10 MG tablet  Commonly known as:  SINGULAIR   10 mg, Oral, Daily      pantoprazole 40 MG EC tablet  Commonly known as:  PROTONIX   40 mg, Oral, Daily      sucralfate 1 g tablet  Commonly known as:  CARAFATE   1 g, Oral, 4 Times Daily Before Meals & Nightly         Stop These Medications    metFORMIN  500 MG tablet  Commonly known as:  GLUCOPHAGE     SITagliptin 100 MG tablet  Commonly known as:  JANUVIA          Diet Instructions     Advance Diet As Tolerated          No future appointments.  Additional Instructions for the Follow-ups that You Need to Schedule     Discharge Follow-up with PCP   As directed       Currently Documented PCP:    Layla Monique APRN    PCP Phone Number:    [phone]     Follow Up Details:  1 week         Discharge Follow-up with Specialty: Gastroenterology, endocrinology, urology.   As directed      Specialty:  Gastroenterology, endocrinology, urology.    Follow Up Details:  As directed           Follow-up Information     Conrado Oglesby MD Follow up in 2 week(s).    Specialty:  Urology  Contact information:  [address] [address]  [phone]             Layla Monique APRN .    Specialty:  Family Medicine  Why:  1 week  Contact information:  [address] [facility] [address]  [phone]             Thong Gonzalez MD Follow up in 1 month(s).    Specialty:  Endocrinology  Contact information:  [address]  [place] [address]  [phone]             Tristan Gusman MD Follow up.    Specialty:  Nephrology  Why:  per Md's recommedations.  Contact information:  [address] [facility] [address]  [place] [address]  [phone]                 Discharge Order (From admission, onward)     Start     Ordered    05/25/20 1156  Discharge patient  Once     Expected Discharge Date:  05/25/20    Discharge Disposition:  Home or Self Care    Physician of Record for Attribution - Please select from Treatment Team:  REEMA MORSE [[id number]]    Review needed by CMO to determine Physician of Record:  No       Question Answer Comment   Physician of Record for Attribution - Please select from Treatment Team REEMA MORSE    Review needed by CMO to determine Physician of Record No        05/25/20 1212                  Total time spent  discharging patient including evaluation,post hospitalization follow up,  medication and post hospitalization instructions and education total time exceeds 30 minutes.    Signed:  Luis Enrique Bailey MD  5/25/2020  13:06    EMR Dragon/Transcription disclaimer:   Much of this encounter note is an electronic transcription/translation of spoken language to printed text. The electronic translation of spoken language may permit erroneous, or at times, nonsensical words or phrases to be inadvertently transcribed; Although I have reviewed the note for such errors, some may still exist.       Electronically signed by Luis Enrique Bailey MD at 05/25/20 1422

## 2020-05-26 NOTE — PROGRESS NOTES
Case Management Discharge Note      Final Note: Discharged home. Debbie Rome RN    Provided Post Acute Provider List?: Yes  Post Acute Provider List: Home Health, Nursing Home  Provided Post Acute Provider Quality & Resource List?: N/A  N/A Quality & Resource List Comment: The patient was provided with a HH/SNF list and not a print out of HH/nursing home compare lists from Medicare.gov as she currently denies any d/c needs.    Delivered To: Patient  Method of Delivery: In person            Transportation Services  Private: Car    Final Discharge Disposition Code: 01 - home or self-care

## 2020-05-26 NOTE — TELEPHONE ENCOUNTER
"Pt stated just released from [facility] for pancreatitis and high Bp. Pt has no way of taking BP, after the long ride home, she is c/o back and side pain. Pt also has an illeostomy which ids draining fine.    Reason for Disposition  • [1] MODERATE pain (e.g., interferes with normal activities) AND [2] pain comes and goes (cramps) AND [3] present > 24 hours  (Exception: pain with Vomiting or Diarrhea - see that Guideline)    Additional Information  • Negative: Shock suspected (e.g., cold/pale/clammy skin, too weak to stand, low BP, rapid pulse)  • Negative: Difficult to awaken or acting confused (e.g., disoriented, slurred speech)  • Negative: Passed out (i.e., lost consciousness, collapsed and was not responding)  • Negative: Sounds like a life-threatening emergency to the triager  • Negative: Chest pain  • Negative: Pain is mainly in upper abdomen  (if needed ask: \"is it mainly above the belly button?\")  • Negative: Followed an abdomen (stomach) injury  • Negative: [1] Abdominal pain AND [2] pregnant < 20 weeks  • Negative: [1] Abdominal pain AND [2] pregnant > 20 weeks  • Negative: [1] Abdominal pain AND [2] postpartum (from 0 to 6 weeks after delivery)  • Negative: [1] SEVERE pain (e.g., excruciating) AND [2] present > 1 hour  • Negative: [1] SEVERE pain AND [2] age > 60  • Negative: [1] Vomiting AND [2] contains red blood or black (\"coffee ground\") material  (Exception: few red streaks in vomit that only happened once)  • Negative: Blood in bowel movements   (Exception: blood on surface of BM with constipation)  • Negative: Black or tarry bowel movements  (Exception: chronic-unchanged  black-grey bowel movements AND is taking iron pills or Pepto-bismol)  • Negative: Patient sounds very sick or weak to the triager  • Negative: [1] MILD-MODERATE pain AND [2] constant AND [3] present > 2 hours  • Negative: [1] Vomiting AND [2] abdomen looks much more swollen than usual  • Negative: [1] Vomiting AND [2] contains bile " "(green color)  • Negative: White of the eyes have turned yellow (i.e., jaundice)  • Negative: Fever > 103 F (39.4 C)  • Negative: [1] Fever > 101 F (38.3 C) AND [2] age > 60  • Negative: [1] Fever > 100.0 F (37.8 C) AND [2] bedridden (e.g., nursing home patient, CVA, chronic illness, recovering from surgery)  • Negative: [1] Fever > 100.0 F (37.8 C) AND [2] diabetes mellitus or weak immune system (e.g., HIV positive, cancer chemo, splenectomy, organ transplant, chronic steroids)  • Negative: [1] SEVERE pain AND [2] present < 1 hour  • Negative: [1] MILD pain (e.g., does not interfere with normal activities) AND [2] pain comes and goes (cramps) AND [3] present > 48 hours  • Negative: Age > 60 years  • Negative: Pregnancy suspected (e.g., missed last menstrual period)    Answer Assessment - Initial Assessment Questions  1. LOCATION: \"Where does it hurt?\"        All overt  2. RADIATION: \"Does the pain shoot anywhere else?\" (e.g., chest, back)       n/a  3. ONSET: \"When did the pain begin?\" (e.g., minutes, hours or days ago)      Tonight after long ride home from the hospital  4. SUDDEN: \"Gradual or sudden onset?\"    constanmt  5. PATTERN \"Does the pain come and go, or is it constant?\"     - If constant: \"Is it getting better, staying the same, or worsening?\"       (Note: Constant means the pain never goes away completely; most serious pain is constant and it progresses)      - If intermittent: \"How long does it last?\" \"Do you have pain now?\"      (Note: Intermittent means the pain goes away completely between bouts)      constant  6. SEVERITY: \"How bad is the pain?\"  (e.g., Scale 1-10; mild, moderate, or severe)    - MILD (1-3): doesn't interfere with normal activities, abdomen soft and not tender to touch     - MODERATE (4-7): interferes with normal activities or awakens from sleep, tender to touch     - SEVERE (8-10): excruciating pain, doubled over, unable to do any normal activities     moderate  7. RECURRENT " "SYMPTOM: \"Have you ever had this type of abdominal pain before?\" If so, ask: \"When was the last time?\" and \"What happened that time?\"     Has pancreatitis, illeostomy  8. CAUSE: \"What do you think is causing the abdominal pain?\"     The long ride home from hospital  9. RELIEVING/AGGRAVATING FACTORS: \"What makes it better or worse?\" (e.g., movement, antacids, bowel movement)    Nothing helops  10. OTHER SYMPTOMS: \"Has there been any vomiting, diarrhea, constipation, or urine problems?\"      none  11. PREGNANCY: \"Is there any chance you are pregnant?\" \"When was your last menstrual period?\"      none    Protocols used: ABDOMINAL PAIN - FEMALE-ADULT-AH      "

## 2020-05-27 ENCOUNTER — TELEPHONE (OUTPATIENT)
Dept: SOCIAL WORK | Facility: HOSPITAL | Age: 35
End: 2020-05-27

## 2020-05-27 NOTE — TELEPHONE ENCOUNTER
Received PA request from pt's pharmacy for two prescriptions written upon discharge on 5/25/20.  Contacted the discharging provider and the prescriptions were changed to the formulary alternatives for pt's insurance.  Call placed to pt to alert her of the new prescriptions.  No answer received but left HIPAA compliant VM.  Await return call.    Addendum:  Pt returned call.  Explained the replacement prescriptions and pt states that she will call the pharmacy to see when she can pick them up.

## 2020-05-28 LAB
ALBUMIN 24H MFR UR ELPH: 35.2 %
ALPHA1 GLOB 24H MFR UR ELPH: 7.5 %
ALPHA2 GLOB 24H MFR UR ELPH: 22.5 %
B-GLOBULIN MFR UR ELPH: 24.9 %
GAMMA GLOB 24H MFR UR ELPH: 9.9 %
Lab: NORMAL
M-SPIKE, UR: NORMAL %
PROT UR-MCNC: 36.1 MG/DL

## 2020-05-29 ENCOUNTER — READMISSION MANAGEMENT (OUTPATIENT)
Dept: CALL CENTER | Facility: HOSPITAL | Age: 35
End: 2020-05-29

## 2020-05-29 LAB — GAD65 AB SER-ACNC: <5 U/ML (ref 0–5)

## 2020-05-29 NOTE — OUTREACH NOTE
Medical Week 1 Survey      Responses   [facility] patient discharged from?  [place]   COVID-19 Test Status  Negative   Does the patient have one of the following disease processes/diagnoses(primary or secondary)?  Other   Is there a successful TCM telephone encounter documented?  No   Week 1 attempt successful?  Yes   Call start time  1449   Revoke  Decline to participate   Call end time  1450          Lori Paniagua RN

## 2020-07-06 RX ORDER — FENOFIBRATE 145 MG/1
TABLET, COATED ORAL
Qty: 30 TABLET | Refills: 0 | OUTPATIENT
Start: 2020-07-06

## 2020-07-27 RX ORDER — ATORVASTATIN CALCIUM 10 MG/1
TABLET, FILM COATED ORAL
Qty: 30 TABLET | Refills: 0 | OUTPATIENT
Start: 2020-07-27

## 2020-07-27 RX ORDER — FENOFIBRATE 145 MG/1
TABLET, COATED ORAL
Qty: 30 TABLET | Refills: 0 | OUTPATIENT
Start: 2020-07-27

## 2020-07-27 RX ORDER — PEN NEEDLE, DIABETIC 31 GX5/16"
NEEDLE, DISPOSABLE MISCELLANEOUS
Qty: 150 EACH | OUTPATIENT
Start: 2020-07-27

## 2020-07-28 RX ORDER — PEN NEEDLE, DIABETIC 31 GX5/16"
NEEDLE, DISPOSABLE MISCELLANEOUS
Qty: 150 EACH | OUTPATIENT
Start: 2020-07-28

## 2020-07-28 RX ORDER — ATORVASTATIN CALCIUM 10 MG/1
TABLET, FILM COATED ORAL
Qty: 30 TABLET | Refills: 0 | OUTPATIENT
Start: 2020-07-28

## 2020-07-30 RX ORDER — ATORVASTATIN CALCIUM 10 MG/1
TABLET, FILM COATED ORAL
Qty: 30 TABLET | Refills: 0 | OUTPATIENT
Start: 2020-07-30

## 2020-08-03 RX ORDER — PEN NEEDLE, DIABETIC 31 GX5/16"
NEEDLE, DISPOSABLE MISCELLANEOUS
Qty: 150 EACH | OUTPATIENT
Start: 2020-08-03

## 2020-08-05 RX ORDER — PEN NEEDLE, DIABETIC 31 GX5/16"
NEEDLE, DISPOSABLE MISCELLANEOUS
Qty: 150 EACH | OUTPATIENT
Start: 2020-08-05

## 2020-08-10 RX ORDER — ATORVASTATIN CALCIUM 10 MG/1
TABLET, FILM COATED ORAL
Qty: 30 TABLET | Refills: 0 | OUTPATIENT
Start: 2020-08-10

## 2020-08-24 NOTE — TELEPHONE ENCOUNTER
Notes recorded by Isabela Leung APRN on 6/19/2019 at 1:03 PM EDT  Can we contact the patient and see if she can do July 3 with Dr. Morfin?  Thanks BG   Rifampin Counseling: I discussed with the patient the risks of rifampin including but not limited to liver damage, kidney damage, red-orange body fluids, nausea/vomiting and severe allergy.

## 2020-08-26 RX ORDER — FENOFIBRATE 145 MG/1
TABLET, COATED ORAL
Qty: 30 TABLET | Refills: 0 | OUTPATIENT
Start: 2020-08-26

## 2020-08-26 RX ORDER — PEN NEEDLE, DIABETIC 31 GX5/16"
NEEDLE, DISPOSABLE MISCELLANEOUS
Qty: 150 EACH | OUTPATIENT
Start: 2020-08-26

## 2020-08-26 RX ORDER — ATORVASTATIN CALCIUM 10 MG/1
TABLET, FILM COATED ORAL
Qty: 30 TABLET | Refills: 0 | OUTPATIENT
Start: 2020-08-26

## 2020-09-30 ENCOUNTER — OFFICE VISIT (OUTPATIENT)
Dept: SURGERY | Facility: CLINIC | Age: 35
End: 2020-09-30

## 2020-09-30 VITALS — WEIGHT: 204 LBS | BODY MASS INDEX: 35.02 KG/M2

## 2020-09-30 DIAGNOSIS — K56.699 STRICTURE OF SIGMOID COLON (HCC): Primary | ICD-10-CM

## 2020-09-30 PROCEDURE — 99213 OFFICE O/P EST LOW 20 MIN: CPT | Performed by: SURGERY

## 2020-09-30 RX ORDER — INSULIN LISPRO 100 U/ML
INJECTION, SOLUTION SUBCUTANEOUS
COMMUNITY
Start: 2020-08-05 | End: 2022-07-21

## 2020-09-30 RX ORDER — CETIRIZINE HYDROCHLORIDE 10 MG/1
10 TABLET ORAL DAILY
COMMUNITY
Start: 2020-07-22

## 2020-09-30 RX ORDER — CEFUROXIME AXETIL 250 MG/1
TABLET ORAL
COMMUNITY
Start: 2020-09-18 | End: 2022-07-21

## 2020-09-30 RX ORDER — BLOOD-GLUCOSE METER
KIT MISCELLANEOUS
COMMUNITY
Start: 2020-07-28 | End: 2022-07-21

## 2020-09-30 RX ORDER — ALBUTEROL SULFATE 90 UG/1
2 AEROSOL, METERED RESPIRATORY (INHALATION) EVERY 4 HOURS PRN
COMMUNITY
Start: 2020-09-01

## 2020-09-30 RX ORDER — PAROXETINE HYDROCHLORIDE 20 MG/1
20 TABLET, FILM COATED ORAL EVERY MORNING
COMMUNITY
Start: 2020-09-11

## 2020-09-30 RX ORDER — LEVOTHYROXINE SODIUM 0.2 MG/1
200 TABLET ORAL DAILY
COMMUNITY
Start: 2020-09-26

## 2020-09-30 RX ORDER — ERGOCALCIFEROL 1.25 MG/1
50000 CAPSULE ORAL
COMMUNITY
Start: 2020-09-26

## 2020-09-30 RX ORDER — PEN NEEDLE, DIABETIC 31 GX5/16"
NEEDLE, DISPOSABLE MISCELLANEOUS
COMMUNITY
Start: 2020-09-11 | End: 2022-07-21

## 2020-09-30 RX ORDER — INSULIN GLARGINE 100 [IU]/ML
INJECTION, SOLUTION SUBCUTANEOUS
COMMUNITY
Start: 2020-08-10 | End: 2022-07-21

## 2020-09-30 RX ORDER — METHOCARBAMOL 750 MG/1
750 TABLET, FILM COATED ORAL AS NEEDED
COMMUNITY
Start: 2020-09-15

## 2020-09-30 NOTE — PROGRESS NOTES
Cc: Abdominal pain, possible hernia     History of presenting illness:   This is a nice, 35-year-old lady known to me from sigmoid resection for diverticular stricture done a little over a year ago who suffered significant complications with leak requiring colostomy and diverting ileostomy.    She has been attempting to get better control of her diabetes, but unfortunately has continued to gain some weight and is up to 205 pounds currently.  Her last hemoglobin A1c was greater than 9.  She describes discomfort around her colostomy and increased swelling concerning for stomal hernia. In addition she is having some gas and lower abdominal discomfort.  Her weight has been increasing.  Her ileostomy functions.  She also describes some occasional rectal spasms.     Past Medical History: Type 2 diabetes, hypothyroidism, kidney stones, gastroesophageal reflux disease     Past Surgical History: Significant for  section, lithotripsy, laparoscopic sigmoid colon resection and subsequent takeback for leak and formation of end colostomy and diverting loop ileostomy.     Medications: MiraLAX, hyoscyamine, insulin, metformin, Januvia     Allergies: Ciprofloxacin, Bactrim, sulfa     Social History: Patient denies alcohol use.  She is working, although she has to leave work early from time to time.  She continues to smoke between 1/2 pack of cigarettes a day and 1 pack.  She has made efforts to quit but has been unsuccessful to this point.  Discussed risks of surgery with patient and in particular increased risk of wound infection, poor wound healing, hernias (with abdominal surgery) and post-operative pulmonary complications associated with smoking.      Family History: Negative for colorectal cancer     Review of Systems:  Constitutional: Positive for weakness, decreased appetite, negative for weight loss or fever  Neck: no swollen glands or dysphagia or odynophagia  Respiratory: negative for SOB, cough, hemoptysis or  wheezing  Cardiovascular: negative for chest pain, palpitations or peripheral edema  Gastrointestinal: Positive for abdominal pain, bloating, nausea and constipation        Physical Exam:   Body mass index  35 (BMI was 31 at last visit)  General: alert and oriented, appropriate, no acute distress  Neck: Supple without lymphadenopathy or thyromegaly, trachea is in the midline  Respiratory: Lungs are clear bilaterally without wheezing, no use of accessory muscles is noted  Cardiovascular: Regular rate and rhythm without murmur, no peripheral edema  Gastrointestinal:  .  Mild diffuse tenderness.  Obese.  Midline scar is healed although chronically scarred.  Colostomy is viable with no output.  There is a probable parastomal hernia around her colostomy. Ileostomy is viable with liquid stool in the appliance.  There is a likely parastomal hernia around the ileostomy.  There is no palpable midline hernia.     Laboratory data:   No recent relevant data     Imaging data: No recent relevant data        Assessment and plan:   -Stricture of the sigmoid colon status post sigmoid resection and subsequent anastomotic leak.  Currently doing reasonably well.  She has gained weight, but over the last couple months it has been stable.    She needs to eventually have her bowels put back in continuity, but currently she has multiple issues complicating this including increasing obesity, continued tobacco abuse and poorly controlled diabetes.  She is going to meet with her endocrinologist again soon.  I also encouraged her to attempt weight loss.  I told her that if we can get her down to a BMI of around 30 or 31 she would be in much better shape to proceed.  She will eventually require colostomy reversal and then as a subsequent and separate procedure closure of her ileostomy.  -Type 2 diabetes, complicating above, not ideally controlled at this time  -Tobacco abuse, patient is making efforts to cut down and for better or for worse her  chronic illness has made smoking more difficult on her.  She understands that her tobacco use does increase her risk of postoperative complications including bleeding, hernia formation, anastomotic failure and other problems.  She is going to make an effort to continue to cut down.  -I have ordered CT of the abdomen and pelvis to further evaluate.  We will contact the patient once her study has been completed.        Roderick Arnold MD, FACS  General, Minimally Invasive and Endoscopic Surgery  [facility]     [address], [address]  [place], [place], [facility]  P: [phone]  F: [phone]

## 2020-11-25 DIAGNOSIS — K56.699 STRICTURE OF SIGMOID COLON (HCC): ICD-10-CM

## 2021-04-15 ENCOUNTER — TELEPHONE (OUTPATIENT)
Dept: SURGERY | Facility: CLINIC | Age: 36
End: 2021-04-15

## 2021-04-15 NOTE — TELEPHONE ENCOUNTER
Pt wanted to see you for a problem with her ostomy. She saw you in 2019, but has seen Clayton. Pt would perfer to follow up with you from now on, Clayton is ok if you are ok with seeing her?

## 2021-04-18 NOTE — PROGRESS NOTES
SURGERY  Ediemercedez Camacho   1985 04/19/21    Chief Complaint:  Ileostomy pain    HPI    Ms. Camacho is a nice 36 y.o. female who presents with complaints of pain at her ileostomy site, as well as her midline incision.  The pain at the ileostomy site is making it where she really can work effectively and she is getting pressure from her boss at [employer] to either get this fixed or get disability.  She is having difficulty getting her ostomy supplies saying she owes them $800 to get any further supplies which she is going to be unable to come up with.  She feels like she is somewhat in a fix.    I know her from a take back in 2019 after an anastomotic leak which resulted in a Saldana's with a colostomy in addition to her prior existing loop diverting ileostomy.    This was on postop day 1, and it became apparent that she had a very poor prep, with no leak resulting in 1200 cc of stool in the abdomen.   The closure of the rectal stump was difficult, and I had to do that in a handsewn manner with a running chromic and then interrupted 3-0 Vicryl's with the closure going fairly horizontal right to left and then in the midline going almost directly vertical.  I did test it with an air test and there was no we cannot measured at that time at 8 to 10 cm.  Her  I did do a colonoscopy on her in 2020 when she was found to have a rectal stump that ended at about 8 cm, with the remainder of the colonoscopy carried out through the descending colostomy with a normal colon found.      At the time of her surgery she had a history of very poorly controlled diabetes.  She is still having difficulty with this, as well as smoking, she saying that if she tries to quit smoking then she does have more difficulty with gaining weight.  She is cared for without labs to the [facility] so I am uncertain as to her hemoglobin A1c.  Her Body mass index is 34.21 kg/m².     She had a CT scan and about recently where she was  seen to have parastomal hernias both at the ileostomy site and the colostomy site.  The defect at the colostomy site is about 3.7 cm that at the ostomy site about 2.7.  There is redundant small bowel at the parastomal hernia of the ileostomy, but this is a diverting ileostomy so that is not completely unexpected.  The midline area does not drain anything though it does look somewhat red and in place within the overlap of her pannus.      Past Medical History:   Diagnosis Date   • Abdominal bloating with cramps    • Asthma    • Bloating symptom    • Diabetes mellitus (CMS/HCC)    • Disease of thyroid gland    • Diverticulitis    • Gastritis    • GERD (gastroesophageal reflux disease)    • H/O being hospitalized     TWO RECENT EPISODES S/P BOWEL OBSTRUCTIONS    • History of kidney stones    • History of staph infection     S/P TATTOO-ON BACK    • Kidney stone    • Large bowel obstruction (CMS/HCC)    • Neuropathy     LOWER EXTRIMITES   • Pregnancy    • Seasonal allergies    • Stricture of colon determined by endoscopy (CMS/HCC)      Past Surgical History:   Procedure Laterality Date   •  SECTION N/A    • COLON RESECTION N/A 7/10/2019    Procedure: LAPAROSCOPIC LOW ANTERIOR  COLON RESECTION WITH DIVERTING LOOP ILEOSTOMY;  Surgeon: Roderick Arnold MD;  Location: [facility] OR;  Service: General   • COLON RESECTION WITH GARRIDO POUCH     • COLONOSCOPY N/A 2020    Procedure: COLONOSCOPY to Cecum via Colostomy and Rectum;  Surgeon: Roderick Arnold MD;  Location: [facility] ENDOSCOPY;  Service: General;  Laterality: N/A;  Colostomy Status   Post Surgical Anatomy   • COLONOSCOPY W/ BIOPSIES AND POLYPECTOMY     • COLOSTOMY     • DIAGNOSTIC LAPAROSCOPY N/A 2019    Procedure: DIAGNOSTIC LAPAROSCOPY;  Surgeon: Radha Loera MD;  Location: [facility] OR;  Service: General   • ENDOSCOPY N/A 2020    Procedure: ESOPHAGOGASTRODUODENOSCOPY with Bx's;  Surgeon: Roderick Arnold MD;  Location:   [facility] ENDOSCOPY;  Service: General;  Laterality: N/A;  [place]   Reflux Esophagitis, Mild Duodenitis   • EXPLORATORY LAPAROTOMY N/A 7/11/2019    Procedure: COLON RESECTION WITH WAYNE'S PROCEDURE WITH FLEXIBLE SIGMOIDSCOPE;  Surgeon: Radha Loera MD;  Location: [facility] MAIN OR;  Service: General   • ILEOSTOMY     • NEPHROSTOMY TRACT DILATATION W/ LITHOTRIPSY       Family History   Problem Relation Age of Onset   • Heart disease Father    • Diabetes Father    • Asthma Mother    • Diabetes Mother    • Malig Hyperthermia Neg Hx      Social History     Socioeconomic History   • Marital status:      Spouse name: Not on file   • Number of children: Not on file   • Years of education: Not on file   • Highest education level: Not on file   Tobacco Use   • Smoking status: Current Every Day Smoker     Packs/day: 0.50     Years: 21.00     Pack years: 10.50     Types: Cigarettes     Start date: 1969   • Smokeless tobacco: Never Used   Vaping Use   • Vaping Use: Never used   Substance and Sexual Activity   • Alcohol use: No   • Drug use: No   • Sexual activity: Defer         Current Outpatient Medications:   •  acetaminophen-codeine (TYLENOL #3) 300-30 MG per tablet, Take 1 tablet by mouth Every 4 (Four) Hours As Needed for Moderate Pain ., Disp: , Rfl:   •  albuterol sulfate  (90 Base) MCG/ACT inhaler, , Disp: , Rfl:   •  atorvastatin (LIPITOR) 10 MG tablet, Take 1 tablet by mouth Every Night., Disp: 30 tablet, Rfl: 0  •  B-D ULTRAFINE III SHORT PEN 31G X 8 MM misc, , Disp: , Rfl:   •  Blood Glucose Monitoring Suppl (FreeStyle Lite) device, , Disp: , Rfl:   •  Breo Ellipta 200-25 MCG/INH inhaler, , Disp: , Rfl:   •  cetirizine (zyrTEC) 10 MG tablet, , Disp: , Rfl:   •  ezetimibe (Zetia) 10 MG tablet, Take 1 tablet by mouth Daily., Disp: 30 tablet, Rfl: 0  •  fenofibrate (TRICOR) 145 MG tablet, Take 1 tablet by mouth Daily., Disp: 30 tablet, Rfl: 0  •  fluticasone (FLONASE) 50 MCG/ACT nasal spray, 2 sprays  into the nostril(s) as directed by provider Daily., Disp: , Rfl:   •  FREESTYLE LITE test strip, , Disp: , Rfl:   •  gabapentin (NEURONTIN) 400 MG capsule, Take 1 capsule by mouth 2 (Two) Times a Day., Disp: , Rfl:   •  glucose monitor monitoring kit, 1 each As Needed (DM II.)., Disp: 1 each, Rfl: 0  •  Insulin Glargine (BASAGLAR KWIKPEN) 100 UNIT/ML injection pen, Inject 25 Units under the skin into the appropriate area as directed 2 (Two) Times a Day., Disp: , Rfl:   •  Insulin Glargine (BASAGLAR KWIKPEN) 100 UNIT/ML injection pen, Sliding scale, Disp: , Rfl:   •  insulin glargine (LANTUS) 100 UNIT/ML injection, Inject 28 Units under the skin into the appropriate area as directed Every 12 (Twelve) Hours., Disp: , Rfl: 12  •  Insulin Lispro (ADMELOG SOLOSTAR) 100 UNIT/ML injection pen, Sliding scale, Disp: , Rfl:   •  insulin lispro (Admelog) 100 UNIT/ML injection, Inject 7 Units under the skin into the appropriate area as directed 3 (Three) Times a Day Before Meals., Disp: 10 mL, Rfl: 0  •  levothyroxine (SYNTHROID, LEVOTHROID) 200 MCG tablet, , Disp: , Rfl:   •  levothyroxine (SYNTHROID, LEVOTHROID) 50 MCG tablet, Take 1 tablet by mouth Daily., Disp: 30 tablet, Rfl: 0  •  methocarbamol (ROBAXIN) 750 MG tablet, , Disp: , Rfl:   •  montelukast (SINGULAIR) 10 MG tablet, Take 10 mg by mouth Daily., Disp: , Rfl:   •  pantoprazole (PROTONIX) 40 MG EC tablet, Take 40 mg by mouth Daily., Disp: , Rfl:   •  PARoxetine (PAXIL) 20 MG tablet, , Disp: , Rfl:   •  potassium chloride (K-DUR) 10 MEQ CR tablet, Take 2 tablets by mouth 2 (Two) Times a Day., Disp: , Rfl:   •  sucralfate (CARAFATE) 1 g tablet, Take 1 g by mouth 4 (Four) Times a Day Before Meals & at Bedtime., Disp: , Rfl:   •  vitamin D (ERGOCALCIFEROL) 1.25 MG (09717 UT) capsule capsule, , Disp: , Rfl:   •  busPIRone (BUSPAR) 7.5 MG tablet, Take 7.5 mg by mouth 3 (Three) Times a Day., Disp: , Rfl:   •  cefuroxime (CEFTIN) 250 MG tablet, , Disp: , Rfl:   •   "diphenoxylate-atropine (LOMOTIL) 2.5-0.025 MG per tablet, Take 1 tablet by mouth 4 (Four) Times a Day As Needed for Diarrhea., Disp: 20 tablet, Rfl: 0  •  mupirocin (BACTROBAN) 2 % ointment, , Disp: , Rfl:   •  sodium bicarbonate 650 MG tablet, Take 2 tablets by mouth 3 (Three) Times a Day., Disp: 180 tablet, Rfl: 0    Allergies   Allergen Reactions   • Bactrim [Sulfamethoxazole-Trimethoprim] Anaphylaxis   • Ciprofloxacin Anaphylaxis   • Sulfa Antibiotics Anaphylaxis   • Toradol [Ketorolac Tromethamine] Anxiety     Patient states she Is not allergic     Review of Systems  Positive for frequent urination environmental allergies activity change abdominal pain rectal pain and pressure    Vitals:    04/19/21 1611   Weight: 90.4 kg (199 lb 6.4 oz)   Height: 162.6 cm (64.02\")       PHYSICAL EXAM:    Ht 162.6 cm (64.02\")   Wt 90.4 kg (199 lb 6.4 oz)   BMI 34.21 kg/m²   Body mass index is 34.21 kg/m².    Constitutional: well developed, well nourished, appears relatively healthy, appears stated age  ENMT: Hearing intact, neck without masses  CVS: RRR, no murmur  Respiratory: CTA, normal respiratory effort   Gastrointestinal: abdomen rounded with upper abdomen overlapping the midline crease where her tenderness is at the incisional scar, abdominal hernia not well palpated at both the ileostomy and the colostomy site though seen well on the CT scan  Genitourinary: inguinal hernia not detected  Musculoskeletal: gait normal, muscle mass normal  Neurological: awake and alert, seems to have reasonable capacity for understanding for medical decision making  Psychiatric: appears to have reasonable judgement, pleasant, tearful when she talks about all her troubles    Radiographic/Lab Findings: CT scan from [facility] 5/20/2020 reviewed since the [facility] images were not available    IMPRESSION:  · History of low anterior for diverticulitis with leak and subsequent need for colostomy in addition to her previously placed diverting " ileostomy.  Only 8 to 10 cm left of the rectum.  I reviewed the CT scan and this does come up to the uterus.  · Ileostomy and colostomy parastomal hernias, down the right with pain  · Poorly controlled diabetes  · Obesity with BMI of 34  · Smoking  · Psychosocial issues that is making it difficult for her to deal with her ostomies and there problems  · History of pancreatitis from Januvia.  This will make mobilization around the splenic flexure more difficult.    PLAN:  · Did discuss with her the option of a takedown.  It would be somewhat risky and would only be possible if in fact we left the diverting ileostomy in place.  It could be that I could go through an exploration and not be able to take her down in the end.  If that were the case, I would take down her ileostomy and she would not have the opportunity to have a takedown ever in reality.  Would want to assure that she has rectal function prior to that takedown.  · Would not be willing to do an ileostomy and colostomy takedown at the same time.  · Must quit smoking for me to pursue this  · Must begin exercising and attempting to increase her stamina and improve her diabetes prior to surgery.  · Return in 3 weeks for us to discuss more fully what may be possible.  · Hemoglobin A1c prior to surgery  · ERAS and tap blocks etc.    Radha Loera MD  5:32 PM    In order to provide a more personal and interactive patient experience as well as improve efficiency, this note was started prior to the office visit.

## 2021-04-19 ENCOUNTER — OFFICE VISIT (OUTPATIENT)
Dept: SURGERY | Facility: CLINIC | Age: 36
End: 2021-04-19

## 2021-04-19 VITALS — WEIGHT: 199.4 LBS | HEIGHT: 64 IN | BODY MASS INDEX: 34.04 KG/M2

## 2021-04-19 DIAGNOSIS — Z93.2 ILEOSTOMY IN PLACE (HCC): ICD-10-CM

## 2021-04-19 DIAGNOSIS — Z93.3 COLOSTOMY IN PLACE (HCC): ICD-10-CM

## 2021-04-19 DIAGNOSIS — K43.5 PARASTOMAL HERNIA WITHOUT OBSTRUCTION OR GANGRENE: Primary | ICD-10-CM

## 2021-04-19 PROBLEM — K56.609 LARGE BOWEL OBSTRUCTION (HCC): Status: RESOLVED | Noted: 2019-06-20 | Resolved: 2021-04-19

## 2021-04-19 PROBLEM — K56.699 STRICTURE OF SIGMOID COLON (HCC): Status: RESOLVED | Noted: 2019-07-08 | Resolved: 2021-04-19

## 2021-04-19 PROCEDURE — 99214 OFFICE O/P EST MOD 30 MIN: CPT | Performed by: SURGERY

## 2021-04-19 RX ORDER — ACETAMINOPHEN AND CODEINE PHOSPHATE 300; 30 MG/1; MG/1
1 TABLET ORAL EVERY 4 HOURS PRN
COMMUNITY
End: 2022-01-25

## 2021-05-12 ENCOUNTER — PREP FOR SURGERY (OUTPATIENT)
Dept: OTHER | Facility: HOSPITAL | Age: 36
End: 2021-05-12

## 2021-05-12 ENCOUNTER — OFFICE VISIT (OUTPATIENT)
Dept: SURGERY | Facility: CLINIC | Age: 36
End: 2021-05-12

## 2021-05-12 VITALS — WEIGHT: 201.8 LBS | HEIGHT: 64 IN | BODY MASS INDEX: 34.45 KG/M2

## 2021-05-12 DIAGNOSIS — Z93.2 ILEOSTOMY IN PLACE (HCC): Primary | ICD-10-CM

## 2021-05-12 PROBLEM — F17.200 SMOKER: Status: ACTIVE | Noted: 2021-05-12

## 2021-05-12 PROCEDURE — 99214 OFFICE O/P EST MOD 30 MIN: CPT | Performed by: SURGERY

## 2021-05-12 RX ORDER — NEOMYCIN/POLYMYXIN B/PRAMOXINE 3.5-10K-1
1 CREAM (GRAM) TOPICAL DAILY
COMMUNITY

## 2021-05-12 NOTE — PROGRESS NOTES
SURGERY  Edie PAO Camacho   1985 05/12/21    Chief Complaint:  Ileostomy pain    HPI    Ms. Camacho is a nice 36 y.o. female who presents today in follow-up after having been seen 3 weeks ago with complaints of a parastomal hernia.  At that time we discussed the option of an ileostomy takedown, but there would be many steps to be able to complete this.  One of the steps was to quit smoking, and another to lose weight.  Since that time she has started a keto diet and is really cut back on her smoking.  She recognizes that it is going to take her a while to get all of this accomplished.  Prompting her to want to proceed with an ileostomy takedown or issues of difficulty with working due to pain at the parastomal hernia and pressure now at work to get disability as well as the expense of pain for her ostomy supplies which she cannot manage.  Unfortunately one of the key issues with her takedown is that she has very little rectum left.      I know her from a take back in 2019 after an anastomotic leak which resulted in a Saldana's with a colostomy in addition to her prior existing loop diverting ileostomy.    This was on postop day 1, and it became apparent that she had a very poor prep, with no leak resulting in 1200 cc of stool in the abdomen.   The closure of the rectal stump was difficult, and I had to do that in a handsewn manner with a running chromic and then interrupted 3-0 Vicryl's with the closure going fairly horizontal right to left and then in the midline going almost directly vertical.  I did test it with an air test and there was no we cannot measured at that time at 8 to 10 cm.  Her  I did do a colonoscopy on her in 2020 when she was found to have a rectal stump that ended at about 8 cm, with the remainder of the colonoscopy carried out through the descending colostomy with a normal colon found.      At the time of her surgery she had a history of very poorly controlled diabetes.  She is  still having difficulty with this, as well as smoking, she saying that if she tries to quit smoking then she does have more difficulty with gaining weight.  She is cared for without labs to the [facility] [place] by [facility] so I am uncertain as to her hemoglobin A1c.  Her Body mass index is 34.62 kg/m².     She had a CT scan and about recently where she was seen to have parastomal hernias both at the ileostomy site and the colostomy site.  The defect at the colostomy site is about 3.7 cm that at the ostomy site about 2.7.  There is redundant small bowel at the parastomal hernia of the ileostomy, but this is a diverting ileostomy so that is not completely unexpected.  The midline area does not drain anything though it does look somewhat red and in place within the overlap of her pannus.      Past Medical History:   Diagnosis Date   • Abdominal bloating with cramps    • Asthma    • Bloating symptom    • Diabetes mellitus (CMS/HCC)    • Disease of thyroid gland    • Diverticulitis    • Gastritis    • GERD (gastroesophageal reflux disease)    • H/O being hospitalized     TWO RECENT EPISODES S/P BOWEL OBSTRUCTIONS    • History of kidney stones    • History of staph infection     S/P TATTOO-ON BACK    • Kidney stone    • Large bowel obstruction (CMS/HCC)    • Neuropathy     LOWER EXTRIMITES   • Pregnancy    • Seasonal allergies    • Stricture of colon determined by endoscopy (CMS/HCC)    • Stricture of sigmoid colon (CMS/HCC) 2019    Added automatically from request for surgery [id number]   • Tachycardia      Past Surgical History:   Procedure Laterality Date   •  SECTION N/A    • COLON RESECTION N/A 7/10/2019    Procedure: LAPAROSCOPIC LOW ANTERIOR  COLON RESECTION WITH DIVERTING LOOP ILEOSTOMY;  Surgeon: Roderick Arnold MD;  Location: [facility];  Service: General   • COLON RESECTION WITH GARRIDO POUCH     • COLONOSCOPY N/A 2020    Procedure: COLONOSCOPY to Cecum via Colostomy and Rectum;  Surgeon:  Roderick Arnold MD;  Location: [facility] ENDOSCOPY;  Service: General;  Laterality: N/A;  Colostomy Status   Post Surgical Anatomy   • COLONOSCOPY W/ BIOPSIES AND POLYPECTOMY     • COLOSTOMY     • DIAGNOSTIC LAPAROSCOPY N/A 7/11/2019    Procedure: DIAGNOSTIC LAPAROSCOPY;  Surgeon: Radha Loera MD;  Location: [facility] MAIN OR;  Service: General   • ENDOSCOPY N/A 5/19/2020    Procedure: ESOPHAGOGASTRODUODENOSCOPY with Bx's;  Surgeon: Roderick Arnold MD;  [facility];  Service: General;  Laterality: N/A;  [place]   Reflux Esophagitis, Mild Duodenitis   • EXPLORATORY LAPAROTOMY N/A 7/11/2019    Procedure: COLON RESECTION WITH WAYNE'S PROCEDURE WITH FLEXIBLE SIGMOIDSCOPE;  Surgeon: Radha Loera MD;  Location: [facility] MAIN OR;  Service: General   • ILEOSTOMY     • NEPHROSTOMY TRACT DILATATION W/ LITHOTRIPSY       Family History   Problem Relation Age of Onset   • Heart disease Father    • Diabetes Father    • Asthma Mother    • Diabetes Mother    • Malig Hyperthermia Neg Hx      Social History     Socioeconomic History   • Marital status:      Spouse name: Not on file   • Number of children: Not on file   • Years of education: Not on file   • Highest education level: Not on file   Tobacco Use   • Smoking status: Current Every Day Smoker     Packs/day: 0.45     Years: 21.00     Pack years: 9.45     Types: Cigarettes     Start date: 1969   • Smokeless tobacco: Never Used   Vaping Use   • Vaping Use: Never used   Substance and Sexual Activity   • Alcohol use: No   • Drug use: No   • Sexual activity: Defer         Current Outpatient Medications:   •  acetaminophen-codeine (TYLENOL #3) 300-30 MG per tablet, Take 1 tablet by mouth Every 4 (Four) Hours As Needed for Moderate Pain ., Disp: , Rfl:   •  albuterol sulfate  (90 Base) MCG/ACT inhaler, , Disp: , Rfl:   •  atorvastatin (LIPITOR) 10 MG tablet, Take 1 tablet by mouth Every Night., Disp: 30 tablet, Rfl: 0  •  B-D ULTRAFINE III SHORT  PEN 31G X 8 MM misc, , Disp: , Rfl:   •  Blood Glucose Monitoring Suppl (FreeStyle Lite) device, , Disp: , Rfl:   •  Breo Ellipta 200-25 MCG/INH inhaler, , Disp: , Rfl:   •  busPIRone (BUSPAR) 7.5 MG tablet, Take 7.5 mg by mouth 3 (Three) Times a Day., Disp: , Rfl:   •  cefuroxime (CEFTIN) 250 MG tablet, , Disp: , Rfl:   •  cetirizine (zyrTEC) 10 MG tablet, , Disp: , Rfl:   •  diphenoxylate-atropine (LOMOTIL) 2.5-0.025 MG per tablet, Take 1 tablet by mouth 4 (Four) Times a Day As Needed for Diarrhea., Disp: 20 tablet, Rfl: 0  •  ezetimibe (Zetia) 10 MG tablet, Take 1 tablet by mouth Daily., Disp: 30 tablet, Rfl: 0  •  fenofibrate (TRICOR) 145 MG tablet, Take 1 tablet by mouth Daily., Disp: 30 tablet, Rfl: 0  •  fluticasone (FLONASE) 50 MCG/ACT nasal spray, 2 sprays into the nostril(s) as directed by provider Daily., Disp: , Rfl:   •  FREESTYLE LITE test strip, , Disp: , Rfl:   •  gabapentin (NEURONTIN) 400 MG capsule, Take 1 capsule by mouth 2 (Two) Times a Day., Disp: , Rfl:   •  glucose monitor monitoring kit, 1 each As Needed (DM II.)., Disp: 1 each, Rfl: 0  •  Insulin Glargine (BASAGLAR KWIKPEN) 100 UNIT/ML injection pen, Inject 25 Units under the skin into the appropriate area as directed 2 (Two) Times a Day., Disp: , Rfl:   •  Insulin Glargine (BASAGLAR KWIKPEN) 100 UNIT/ML injection pen, Sliding scale, Disp: , Rfl:   •  insulin glargine (LANTUS) 100 UNIT/ML injection, Inject 28 Units under the skin into the appropriate area as directed Every 12 (Twelve) Hours., Disp: , Rfl: 12  •  Insulin Lispro (ADMELOG SOLOSTAR) 100 UNIT/ML injection pen, Sliding scale, Disp: , Rfl:   •  insulin lispro (Admelog) 100 UNIT/ML injection, Inject 7 Units under the skin into the appropriate area as directed 3 (Three) Times a Day Before Meals., Disp: 10 mL, Rfl: 0  •  levothyroxine (SYNTHROID, LEVOTHROID) 200 MCG tablet, , Disp: , Rfl:   •  levothyroxine (SYNTHROID, LEVOTHROID) 50 MCG tablet, Take 1 tablet by mouth Daily., Disp:  "30 tablet, Rfl: 0  •  methocarbamol (ROBAXIN) 750 MG tablet, , Disp: , Rfl:   •  montelukast (SINGULAIR) 10 MG tablet, Take 10 mg by mouth Daily., Disp: , Rfl:   •  Multiple Vitamins-Minerals (Multi-Vitamin Gummies) chewable tablet, Chew., Disp: , Rfl:   •  mupirocin (BACTROBAN) 2 % ointment, , Disp: , Rfl:   •  pantoprazole (PROTONIX) 40 MG EC tablet, Take 40 mg by mouth Daily., Disp: , Rfl:   •  PARoxetine (PAXIL) 20 MG tablet, , Disp: , Rfl:   •  potassium chloride (K-DUR) 10 MEQ CR tablet, Take 2 tablets by mouth 2 (Two) Times a Day., Disp: , Rfl:   •  sodium bicarbonate 650 MG tablet, Take 2 tablets by mouth 3 (Three) Times a Day., Disp: 180 tablet, Rfl: 0  •  sucralfate (CARAFATE) 1 g tablet, Take 1 g by mouth 4 (Four) Times a Day Before Meals & at Bedtime., Disp: , Rfl:   •  vitamin D (ERGOCALCIFEROL) 1.25 MG (28155 UT) capsule capsule, , Disp: , Rfl:     Allergies   Allergen Reactions   • Bactrim [Sulfamethoxazole-Trimethoprim] Anaphylaxis   • Ciprofloxacin Anaphylaxis   • Sulfa Antibiotics Anaphylaxis   • Toradol [Ketorolac Tromethamine] Anxiety     Patient states she Is not allergic     Review of Systems  Positive for frequent urination environmental allergies activity change abdominal pain rectal pain and pressure    Vitals:    05/12/21 1426   Weight: 91.5 kg (201 lb 12.8 oz)   Height: 162.6 cm (64.02\")       PHYSICAL EXAM:    Ht 162.6 cm (64.02\")   Wt 91.5 kg (201 lb 12.8 oz)   BMI 34.62 kg/m²   Body mass index is 34.62 kg/m².    Constitutional: well developed, well nourished, appears relatively healthy, appears stated age  ENMT: Hearing intact, neck without masses  CVS: RRR, no murmur  Respiratory: CTA, normal respiratory effort   Gastrointestinal: abdomen rounded with upper abdomen overlapping the midline crease where her tenderness is at the incisional scar, abdominal hernia not well palpated at both the ileostomy and the colostomy site though seen well on the CT scan  Genitourinary: inguinal hernia " not detected  Musculoskeletal: gait normal, muscle mass normal  Neurological: awake and alert, seems to have reasonable capacity for understanding for medical decision making  Psychiatric: appears to have reasonable judgement, pleasant, tearful when she talks about all her troubles    Radiographic/Lab Findings: CT scan from [facility] 5/20/2020 reviewed since the [facility] images were not available    IMPRESSION:  · History of low anterior for diverticulitis with leak and subsequent need for colostomy in addition to her previously placed diverting ileostomy.  Only 8 to 10 cm left of the rectum.  I reviewed the CT scan and this does come up to the uterus.  · Ileostomy and colostomy parastomal hernias, down the right with pain  · Poorly controlled diabetes  · Obesity with BMI of 34.  She has started a keto diet  · Smoking.  She is cut back on this since last visit  · Psychosocial issues that is making it difficult for her to deal with her ostomies and there problems  · History of pancreatitis from [facility].  This will make mobilization around the splenic flexure more difficult.    PLAN:  · Did discuss with her the option of a takedown.  It would be somewhat risky and would only be possible if in fact we left the diverting ileostomy in place.  It could be that I could go through an exploration and not be able to take her down in the end.  If that were the case, I would take down her ileostomy and she would not have the opportunity to have a takedown ever in reality.  Would want to assure that she has rectal function prior to that takedown.  Read this out verbatim today and asked her if she recalled all of that discussion and she did  · Would not be willing to do an ileostomy and colostomy takedown at the same time.  She recalls her prior discussion  · Must quit smoking for me to pursue this.  Reminded and she recalls  · Must begin exercising and attempting to increase her stamina and improve her diabetes prior to surgery.   Reminded and she recalls  · Schedule flexible sigmoidoscopy to evaluate the remaining bowel.  She has no history of polyps, my having reviewed her prior colonoscopy, except a hyperplastic rectal and thus she does not need one until her 10-year mell which is well off  · Hemoglobin A1c prior to surgery  · ERAS and tap blocks etc.  · Discussed risks of surgery with patient and in particular increased risk of wound infection, poor wound healing, hernias (with abdominal surgery) and post-operative pulmonary complications associated with smoking.   · Discussed with patient increased perioperative risks associated with obesity including increased risks of DVT, infection, seromas, poor wound healing and hernias (with abdominal surgery).      Radha Loera MD  5:32 PM    In order to provide a more personal and interactive patient experience as well as improve efficiency, this note was started prior to the office visit.

## 2021-05-18 ENCOUNTER — TRANSCRIBE ORDERS (OUTPATIENT)
Dept: LAB | Facility: HOSPITAL | Age: 36
End: 2021-05-18

## 2021-05-18 DIAGNOSIS — Z01.818 OTHER SPECIFIED PRE-OPERATIVE EXAMINATION: Primary | ICD-10-CM

## 2021-05-21 ENCOUNTER — TRANSCRIBE ORDERS (OUTPATIENT)
Dept: SLEEP MEDICINE | Facility: HOSPITAL | Age: 36
End: 2021-05-21

## 2021-05-21 DIAGNOSIS — Z01.818 OTHER SPECIFIED PRE-OPERATIVE EXAMINATION: Primary | ICD-10-CM

## 2021-06-02 ENCOUNTER — HOSPITAL ENCOUNTER (OUTPATIENT)
Facility: HOSPITAL | Age: 36
Setting detail: HOSPITAL OUTPATIENT SURGERY
Discharge: HOME | End: 2021-06-02
Attending: SURGERY | Admitting: SURGERY

## 2021-06-02 ENCOUNTER — ANESTHESIA EVENT (OUTPATIENT)
Dept: GASTROENTEROLOGY | Facility: HOSPITAL | Age: 36
End: 2021-06-02

## 2021-06-02 ENCOUNTER — ANESTHESIA (OUTPATIENT)
Dept: GASTROENTEROLOGY | Facility: HOSPITAL | Age: 36
End: 2021-06-02

## 2021-06-02 VITALS
OXYGEN SATURATION: 98 % | HEIGHT: 64 IN | HEART RATE: 86 BPM | TEMPERATURE: 97.2 F | WEIGHT: 202.1 LBS | RESPIRATION RATE: 14 BRPM | BODY MASS INDEX: 34.5 KG/M2 | DIASTOLIC BLOOD PRESSURE: 83 MMHG | SYSTOLIC BLOOD PRESSURE: 118 MMHG

## 2021-06-02 LAB
B-HCG UR QL: NEGATIVE
GLUCOSE BLDC GLUCOMTR-MCNC: 208 MG/DL (ref 70–130)
INTERNAL NEGATIVE CONTROL: NEGATIVE
INTERNAL POSITIVE CONTROL: POSITIVE
Lab: NORMAL

## 2021-06-02 PROCEDURE — 25010000002 PROPOFOL 10 MG/ML EMULSION: Performed by: ANESTHESIOLOGY

## 2021-06-02 PROCEDURE — 81025 URINE PREGNANCY TEST: CPT | Performed by: SURGERY

## 2021-06-02 PROCEDURE — 45330 DIAGNOSTIC SIGMOIDOSCOPY: CPT | Performed by: SURGERY

## 2021-06-02 PROCEDURE — 82962 GLUCOSE BLOOD TEST: CPT

## 2021-06-02 RX ORDER — PROPOFOL 10 MG/ML
VIAL (ML) INTRAVENOUS CONTINUOUS PRN
Status: DISCONTINUED | OUTPATIENT
Start: 2021-06-02 | End: 2021-06-02 | Stop reason: SURG

## 2021-06-02 RX ORDER — PROPOFOL 10 MG/ML
VIAL (ML) INTRAVENOUS AS NEEDED
Status: DISCONTINUED | OUTPATIENT
Start: 2021-06-02 | End: 2021-06-02 | Stop reason: SURG

## 2021-06-02 RX ORDER — LIDOCAINE HYDROCHLORIDE 20 MG/ML
INJECTION, SOLUTION INFILTRATION; PERINEURAL AS NEEDED
Status: DISCONTINUED | OUTPATIENT
Start: 2021-06-02 | End: 2021-06-02 | Stop reason: SURG

## 2021-06-02 RX ORDER — SODIUM CHLORIDE, SODIUM LACTATE, POTASSIUM CHLORIDE, CALCIUM CHLORIDE 600; 310; 30; 20 MG/100ML; MG/100ML; MG/100ML; MG/100ML
30 INJECTION, SOLUTION INTRAVENOUS CONTINUOUS PRN
Status: DISCONTINUED | OUTPATIENT
Start: 2021-06-02 | End: 2021-06-02 | Stop reason: HOSPADM

## 2021-06-02 RX ADMIN — PROPOFOL 200 MCG/KG/MIN: 10 INJECTION, EMULSION INTRAVENOUS at 07:06

## 2021-06-02 RX ADMIN — SODIUM CHLORIDE, POTASSIUM CHLORIDE, SODIUM LACTATE AND CALCIUM CHLORIDE 30 ML/HR: 600; 310; 30; 20 INJECTION, SOLUTION INTRAVENOUS at 06:53

## 2021-06-02 RX ADMIN — PROPOFOL 150 MG: 10 INJECTION, EMULSION INTRAVENOUS at 07:06

## 2021-06-02 RX ADMIN — LIDOCAINE HYDROCHLORIDE 60 MG: 20 INJECTION, SOLUTION INFILTRATION; PERINEURAL at 07:06

## 2021-06-02 NOTE — INTERVAL H&P NOTE
H&P updated. The patient was examined and the following changes are noted:  no weight loss since last visit.  Still smoking 5 cigarettes per day.

## 2021-06-02 NOTE — OP NOTE
Sigmoidoscopy Procedure Note  Edie Camacho  1985  Date of Procedure: 06/02/21    Pre-operative Diagnosis:    · History of low anterior for diverticulitis with leak and subsequent need for colostomy in addition to her previously placed diverting ileostomy.  estimated 8 to 10 cm left of the rectum.  I reviewed the CT scan and this does come up to the uterus.  · Ileostomy and colostomy parastomal hernias, down the right with pain  · Poorly controlled diabetes  · Obesity with BMI of 34.  She has started a keto diet  · Smoking.  She is cut back on this since last visit  · Psychosocial issues that is making it difficult for her to deal with her ostomies and there problems  · History of pancreatitis from [facility].  This will make mobilization around the splenic flexure more difficult.    Post-operative Diagnosis:  · 8-10 cm rectum remaining    Procedure: Flexible Sigmoidoscopy with disimpaction mucoid ball     Findings/Treatments:   · 8-10 cm remaining with ?ethibonds/prolenes visible at corners       Recommendations:   · Weight loss, quit smoking, control diabetes and then surgery.  Must quit smoking before surgery and have   · Keep a copy of the photographs of the procedure given to you today for possible need for reference in the future.    Surgeon: Evaristo    Anesthetic: MAC per Daniel Hough MD    Indications:  As above     Procedure Details     MAC anesthesia was induced.  The 180 Colonoscopy was inserted blindly into the rectum and advanced to the 8-10 cm mell, with relative ease.    Area in question was identified and photographed for documentation.  Digital disimpaction of mucoid balls X 2, larger about 4 cm, smaller about 2 cm.   Anal tone was moderate to good.    Radha Loera MD  06/02/21  07:17 EDT

## 2021-06-02 NOTE — ANESTHESIA PREPROCEDURE EVALUATION
Anesthesia Evaluation     Patient summary reviewed and Nursing notes reviewed                Airway   Mallampati: II  TM distance: >3 FB  Neck ROM: full  No difficulty expected  Dental - normal exam     Pulmonary - normal exam   (+) a smoker Current Abstained day of surgery, asthma,  Cardiovascular - normal exam    (+) hyperlipidemia,       Neuro/Psych    ROS Comment: Neuropathy  GI/Hepatic/Renal/Endo    (+) obesity,  GERD,  renal disease stones, diabetes mellitus type 2 using insulin,     Musculoskeletal     Abdominal   (+) obese,    Substance History      OB/GYN          Other                        Anesthesia Plan    ASA 3     MAC     intravenous induction     Anesthetic plan, all risks, benefits, and alternatives have been provided, discussed and informed consent has been obtained with: patient.

## 2021-06-02 NOTE — ANESTHESIA POSTPROCEDURE EVALUATION
Patient: Edie Camacho    Procedure Summary     Date: 06/02/21 Room / Location:  [facility]    Anesthesia Start: 0658 Anesthesia Stop: 0716    Procedure: FLEXIBLE SIGMOIDOSCOPY WITH BIOPSY (N/A ) Diagnosis:       Ileostomy in place (CMS/[facility])      (Ileostomy in place (CMS/[facility]) [Z93.2])    Surgeons: Radha Loera MD Provider: Daniel Hough MD    Anesthesia Type: MAC ASA Status: 3          Anesthesia Type: MAC    Vitals  No vitals data found for the desired time range.          Post Anesthesia Care and Evaluation    Patient location during evaluation: PHASE II  Patient participation: complete - patient participated  Level of consciousness: awake and alert  Pain management: adequate  Airway patency: patent  Anesthetic complications: No anesthetic complications  PONV Status: none  Cardiovascular status: acceptable and hemodynamically stable  Respiratory status: acceptable, nonlabored ventilation and spontaneous ventilation  Hydration status: acceptable

## 2022-01-25 ENCOUNTER — HOSPITAL ENCOUNTER (EMERGENCY)
Facility: HOSPITAL | Age: 37
Discharge: HOME | End: 2022-01-25
Attending: EMERGENCY MEDICINE | Admitting: EMERGENCY MEDICINE

## 2022-01-25 ENCOUNTER — APPOINTMENT (OUTPATIENT)
Dept: CT IMAGING | Facility: HOSPITAL | Age: 37
End: 2022-01-25

## 2022-01-25 VITALS
SYSTOLIC BLOOD PRESSURE: 127 MMHG | OXYGEN SATURATION: 97 % | HEART RATE: 101 BPM | WEIGHT: 204 LBS | RESPIRATION RATE: 18 BRPM | BODY MASS INDEX: 36.14 KG/M2 | DIASTOLIC BLOOD PRESSURE: 82 MMHG | HEIGHT: 63 IN | TEMPERATURE: 98 F

## 2022-01-25 DIAGNOSIS — R10.31 RIGHT LOWER QUADRANT ABDOMINAL PAIN: Primary | ICD-10-CM

## 2022-01-25 DIAGNOSIS — K94.09 COLOSTOMY HERNIA: ICD-10-CM

## 2022-01-25 LAB
ALBUMIN SERPL-MCNC: 4.3 G/DL (ref 3.5–5.2)
ALBUMIN/GLOB SERPL: 1.7 G/DL
ALP SERPL-CCNC: 124 U/L (ref 39–117)
ALT SERPL W P-5'-P-CCNC: 40 U/L (ref 1–33)
ANION GAP SERPL CALCULATED.3IONS-SCNC: 13 MMOL/L (ref 5–15)
AST SERPL-CCNC: 42 U/L (ref 1–32)
BACTERIA UR QL AUTO: ABNORMAL /HPF
BASOPHILS # BLD AUTO: 0.05 10*3/MM3 (ref 0–0.2)
BASOPHILS NFR BLD AUTO: 0.5 % (ref 0–1.5)
BILIRUB SERPL-MCNC: 0.7 MG/DL (ref 0–1.2)
BILIRUB UR QL STRIP: NEGATIVE
BUN SERPL-MCNC: 11 MG/DL (ref 6–20)
BUN/CREAT SERPL: 13.8 (ref 7–25)
CALCIUM SPEC-SCNC: 9.4 MG/DL (ref 8.6–10.5)
CHLORIDE SERPL-SCNC: 104 MMOL/L (ref 98–107)
CLARITY UR: CLEAR
CO2 SERPL-SCNC: 21 MMOL/L (ref 22–29)
COLOR UR: YELLOW
CREAT SERPL-MCNC: 0.8 MG/DL (ref 0.57–1)
DEPRECATED RDW RBC AUTO: 39.2 FL (ref 37–54)
EOSINOPHIL # BLD AUTO: 0.2 10*3/MM3 (ref 0–0.4)
EOSINOPHIL NFR BLD AUTO: 2 % (ref 0.3–6.2)
ERYTHROCYTE [DISTWIDTH] IN BLOOD BY AUTOMATED COUNT: 12 % (ref 12.3–15.4)
GFR SERPL CREATININE-BSD FRML MDRD: 80 ML/MIN/1.73
GLOBULIN UR ELPH-MCNC: 2.5 GM/DL
GLUCOSE SERPL-MCNC: 118 MG/DL (ref 65–99)
GLUCOSE UR STRIP-MCNC: NEGATIVE MG/DL
HCG SERPL QL: NEGATIVE
HCT VFR BLD AUTO: 46.8 % (ref 34–46.6)
HGB BLD-MCNC: 16.3 G/DL (ref 12–15.9)
HGB UR QL STRIP.AUTO: NEGATIVE
HYALINE CASTS UR QL AUTO: ABNORMAL /LPF
IMM GRANULOCYTES # BLD AUTO: 0.04 10*3/MM3 (ref 0–0.05)
IMM GRANULOCYTES NFR BLD AUTO: 0.4 % (ref 0–0.5)
KETONES UR QL STRIP: NEGATIVE
LEUKOCYTE ESTERASE UR QL STRIP.AUTO: ABNORMAL
LIPASE SERPL-CCNC: 40 U/L (ref 13–60)
LYMPHOCYTES # BLD AUTO: 2.68 10*3/MM3 (ref 0.7–3.1)
LYMPHOCYTES NFR BLD AUTO: 27.2 % (ref 19.6–45.3)
MCH RBC QN AUTO: 31.6 PG (ref 26.6–33)
MCHC RBC AUTO-ENTMCNC: 34.8 G/DL (ref 31.5–35.7)
MCV RBC AUTO: 90.7 FL (ref 79–97)
MONOCYTES # BLD AUTO: 0.52 10*3/MM3 (ref 0.1–0.9)
MONOCYTES NFR BLD AUTO: 5.3 % (ref 5–12)
NEUTROPHILS NFR BLD AUTO: 6.35 10*3/MM3 (ref 1.7–7)
NEUTROPHILS NFR BLD AUTO: 64.6 % (ref 42.7–76)
NITRITE UR QL STRIP: NEGATIVE
NRBC BLD AUTO-RTO: 0 /100 WBC (ref 0–0.2)
PH UR STRIP.AUTO: 6.5 [PH] (ref 5–8)
PLATELET # BLD AUTO: 304 10*3/MM3 (ref 140–450)
PMV BLD AUTO: 9.3 FL (ref 6–12)
POTASSIUM SERPL-SCNC: 3.5 MMOL/L (ref 3.5–5.2)
PROT SERPL-MCNC: 6.8 G/DL (ref 6–8.5)
PROT UR QL STRIP: ABNORMAL
RBC # BLD AUTO: 5.16 10*6/MM3 (ref 3.77–5.28)
RBC # UR STRIP: ABNORMAL /HPF
REF LAB TEST METHOD: ABNORMAL
SODIUM SERPL-SCNC: 138 MMOL/L (ref 136–145)
SP GR UR STRIP: >=1.03 (ref 1–1.03)
SQUAMOUS #/AREA URNS HPF: ABNORMAL /HPF
UROBILINOGEN UR QL STRIP: ABNORMAL
WBC # UR STRIP: ABNORMAL /HPF
WBC NRBC COR # BLD: 9.84 10*3/MM3 (ref 3.4–10.8)
YEAST URNS QL MICRO: ABNORMAL /HPF

## 2022-01-25 PROCEDURE — 83690 ASSAY OF LIPASE: CPT | Performed by: EMERGENCY MEDICINE

## 2022-01-25 PROCEDURE — 96375 TX/PRO/DX INJ NEW DRUG ADDON: CPT

## 2022-01-25 PROCEDURE — 74177 CT ABD & PELVIS W/CONTRAST: CPT

## 2022-01-25 PROCEDURE — 99283 EMERGENCY DEPT VISIT LOW MDM: CPT

## 2022-01-25 PROCEDURE — 85025 COMPLETE CBC W/AUTO DIFF WBC: CPT | Performed by: EMERGENCY MEDICINE

## 2022-01-25 PROCEDURE — 25010000002 HYDROMORPHONE PER 4 MG: Performed by: EMERGENCY MEDICINE

## 2022-01-25 PROCEDURE — 96374 THER/PROPH/DIAG INJ IV PUSH: CPT

## 2022-01-25 PROCEDURE — 25010000002 IOPAMIDOL 61 % SOLUTION: Performed by: EMERGENCY MEDICINE

## 2022-01-25 PROCEDURE — 81001 URINALYSIS AUTO W/SCOPE: CPT | Performed by: EMERGENCY MEDICINE

## 2022-01-25 PROCEDURE — 25010000002 ONDANSETRON PER 1 MG: Performed by: EMERGENCY MEDICINE

## 2022-01-25 PROCEDURE — 84703 CHORIONIC GONADOTROPIN ASSAY: CPT | Performed by: EMERGENCY MEDICINE

## 2022-01-25 PROCEDURE — 80053 COMPREHEN METABOLIC PANEL: CPT | Performed by: EMERGENCY MEDICINE

## 2022-01-25 RX ORDER — ONDANSETRON 2 MG/ML
4 INJECTION INTRAMUSCULAR; INTRAVENOUS ONCE
Status: COMPLETED | OUTPATIENT
Start: 2022-01-25 | End: 2022-01-25

## 2022-01-25 RX ORDER — SODIUM CHLORIDE 0.9 % (FLUSH) 0.9 %
10 SYRINGE (ML) INJECTION AS NEEDED
Status: DISCONTINUED | OUTPATIENT
Start: 2022-01-25 | End: 2022-01-25 | Stop reason: HOSPADM

## 2022-01-25 RX ORDER — HYDROCODONE BITARTRATE AND ACETAMINOPHEN 5; 325 MG/1; MG/1
1 TABLET ORAL EVERY 6 HOURS PRN
Qty: 12 TABLET | Refills: 0 | Status: SHIPPED | OUTPATIENT
Start: 2022-01-25 | End: 2023-02-20 | Stop reason: ALTCHOICE

## 2022-01-25 RX ORDER — ONDANSETRON 4 MG/1
4 TABLET, ORALLY DISINTEGRATING ORAL EVERY 6 HOURS PRN
Qty: 10 TABLET | Refills: 0 | Status: SHIPPED | OUTPATIENT
Start: 2022-01-25

## 2022-01-25 RX ORDER — HYDROMORPHONE HYDROCHLORIDE 1 MG/ML
0.5 INJECTION, SOLUTION INTRAMUSCULAR; INTRAVENOUS; SUBCUTANEOUS ONCE
Status: COMPLETED | OUTPATIENT
Start: 2022-01-25 | End: 2022-01-25

## 2022-01-25 RX ADMIN — HYDROMORPHONE HYDROCHLORIDE 0.5 MG: 1 INJECTION, SOLUTION INTRAMUSCULAR; INTRAVENOUS; SUBCUTANEOUS at 19:39

## 2022-01-25 RX ADMIN — SODIUM CHLORIDE 1000 ML: 9 INJECTION, SOLUTION INTRAVENOUS at 19:10

## 2022-01-25 RX ADMIN — IOPAMIDOL 85 ML: 612 INJECTION, SOLUTION INTRAVENOUS at 20:02

## 2022-01-25 RX ADMIN — ONDANSETRON 4 MG: 2 INJECTION INTRAMUSCULAR; INTRAVENOUS at 19:39

## 2022-01-25 NOTE — ED NOTES
"Pt arrives via EMS from home. Pt reports that she had a \"coughing fit\" yesterday that caused sharp pain in the right side of her abdomen. Pt hx of hernia and ostomy. No n/v.    Pt given mask in triage, staff in PPE.       Madison Edwards, RUFINO  01/25/22 1517    "

## 2022-01-26 NOTE — ED PROVIDER NOTES
EMERGENCY DEPARTMENT ENCOUNTER  I wore full protective equipment throughout this patient encounter including a N95 mask, eye shield, gown and gloves. Hand hygiene was performed before donning protective equipment and after removal when leaving the room.    Room Number:  21/21  Date of encounter:  1/26/2022  PCP: Layla Monique APRN    HPI:  Context: Edie Camacho is a 37 y.o. female who presents to the ED c/o chief complaint of increasing peristomal pain since yesterday.  Patient states she had a coughing spell 2 days ago and felt a pull in the right lower quadrant.  Yesterday, she had a mild pain which she describes as a mild soreness.  Today, she states she woke up and she could not move secondary to the pain.  She describes the pain as a crampy pain that is around her right lower quadrant colostomy and around her ileostomy.  It does radiate to the back.  She states is worse with movement, will walking and coughing.  She is also noted watery diarrhea today.  She denies fever, chills or vomiting.  She has been nauseated.  She denies urinary symptoms including hematuria and dysuria.  She is here today to make sure she does not have a bowel obstruction or perforation.    MEDICAL HISTORY REVIEW  Reviewed in [facility].  Patient had a sigmoidoscopy by Dr. Loera on June 2, 2021.  She has a known history of diverticulitis, status post colostomy and a previously placed diverting ileostomy.    PAST MEDICAL HISTORY  Active Ambulatory Problems     Diagnosis Date Noted   • Colitis presumed infectious 05/12/2019   • Type 2 diabetes mellitus (HCC) 05/12/2019   • Suprapubic pain, acute 05/12/2019   • Hyponatremia 05/12/2019   • Sepsis (HCC) 05/12/2019   • Metabolic acidosis 05/13/2019   • Chronic constipation 05/13/2019   • Other microscopic hematuria 05/14/2019   • Lower abdominal pain 06/18/2019   • Colitis 06/18/2019   • Acute pancreatitis without infection or necrosis 05/21/2020   • Hypothyroidism (acquired)  2020   • Mixed hyperlipidemia 2020   • Hyponatremia 2020   • Metabolic acidosis 2020   • Hypokalemia 2020   • Ileostomy in place ([facility]) 2020   • Diarrhea 2020   • Renal lithiasis 2020   • Smoker 2021     Resolved Ambulatory Problems     Diagnosis Date Noted   • Dehydration 2019   • Large bowel obstruction ([facility]) 2019   • 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     Past Medical History:   Diagnosis Date   • Abdominal bloating with cramps    • Asthma    • Bloating symptom    • Diabetes mellitus (CMS/[facility])    • Disease of thyroid gland    • Diverticulitis    • Gastritis    • GERD (gastroesophageal reflux disease)    • H/O being hospitalized    • History of kidney stones    • History of staph infection    • Kidney stone    • Neuropathy    • Pregnancy    • Seasonal allergies    • Stricture of colon determined by endoscopy (CMS/[facility])    • Tachycardia        PAST SURGICAL HISTORY  Past Surgical History:   Procedure Laterality Date   •  SECTION N/A    • COLON RESECTION N/A 7/10/2019    Procedure: LAPAROSCOPIC LOW ANTERIOR  COLON RESECTION WITH DIVERTING LOOP ILEOSTOMY;  Surgeon: Roderick Arnold MD;  Location: [facility] OR;  Service: General   • COLON RESECTION WITH GARRIDO POUCH     • COLONOSCOPY N/A 2020    Procedure: COLONOSCOPY to Cecum via Colostomy and Rectum;  Surgeon: Roderick Arnold MD;  Location: [facility] ENDOSCOPY;  Service: General;  Laterality: N/A;  Colostomy Status   Post Surgical Anatomy   • COLONOSCOPY W/ BIOPSIES AND POLYPECTOMY     • COLOSTOMY     • DIAGNOSTIC LAPAROSCOPY N/A 2019    Procedure: DIAGNOSTIC LAPAROSCOPY;  Surgeon: Radha Loera MD;  Location: [facility] MAIN OR;  Service: General   • ENDOSCOPY N/A 2020    Procedure: ESOPHAGOGASTRODUODENOSCOPY with Bx's;  Surgeon: Roderick Arnold MD;  Location: [facility] ENDOSCOPY;  Service: General;  Laterality: N/A;  [place]   Reflux Esophagitis, Mild Duodenitis    • EXPLORATORY LAPAROTOMY N/A 7/11/2019    Procedure: COLON RESECTION WITH WAYNE'S PROCEDURE WITH FLEXIBLE SIGMOIDSCOPE;  Surgeon: Radha Loera MD;  Location:  [facility] MAIN OR;  Service: General   • ILEOSTOMY     • NEPHROSTOMY TRACT DILATATION W/ LITHOTRIPSY     • SIGMOIDOSCOPY N/A 6/2/2021    Procedure: FLEXIBLE SIGMOIDOSCOPY;  Surgeon: Radha Loera MD;  Location:  [facility] ENDOSCOPY;  Service: General;  Laterality: N/A;  PREOP/ PERASTOMAL HERNIA  POSTOP/ 8CM RECTUM REMAINING       FAMILY HISTORY  Family History   Problem Relation Age of Onset   • Heart disease Father    • Diabetes Father    • Asthma Mother    • Diabetes Mother    • Malig Hyperthermia Neg Hx        SOCIAL HISTORY  Social History     Socioeconomic History   • Marital status:    Tobacco Use   • Smoking status: Current Every Day Smoker     Packs/day: 0.45     Years: 21.00     Pack years: 9.45     Types: Cigarettes     Start date: 1969   • Smokeless tobacco: Never Used   Vaping Use   • Vaping Use: Never used   Substance and Sexual Activity   • Alcohol use: No   • Drug use: No   • Sexual activity: Defer       ALLERGIES  Bactrim [sulfamethoxazole-trimethoprim], Ciprofloxacin, Sulfa antibiotics, and Toradol [ketorolac tromethamine]    The patient's allergies have been reviewed    REVIEW OF SYSTEMS  All systems reviewed and negative except for those discussed in HPI.     PHYSICAL EXAM  I have reviewed the triage vital signs and nursing notes.  ED Triage Vitals [01/25/22 1832]   Temp Heart Rate Resp BP SpO2   98 °F (36.7 °C) 115 16 162/74 100 %      Temp src Heart Rate Source Patient Position BP Location FiO2 (%)   -- -- -- -- --       General: Mild distress and in pain.  HENT: NCAT, PERRL, Nares patent.  Eyes: no scleral icterus.  Extraocular movements are intact.  Neck: trachea midline, no ROM limitations.  CV: regular rhythm, regular rate.  Respiratory: normal effort, CTAB.  Abdomen: NABS, soft, moderate tenderness in the right lower  quadrant around her colostomy.  She also has tenderness around her ileostomy left lower quadrant.  She has a lower midline scar without dehiscence.  She is mildly distended.  Neuro: alert, moves all extremities, follows commands.  Skin: warm, dry.    LAB RESULTS  Recent Results (from the past 24 hour(s))   Comprehensive Metabolic Panel    Collection Time: 01/25/22  7:08 PM    Specimen: Blood   Result Value Ref Range    Glucose 118 (H) 65 - 99 mg/dL    BUN 11 6 - 20 mg/dL    Creatinine 0.80 0.57 - 1.00 mg/dL    Sodium 138 136 - 145 mmol/L    Potassium 3.5 3.5 - 5.2 mmol/L    Chloride 104 98 - 107 mmol/L    CO2 21.0 (L) 22.0 - 29.0 mmol/L    Calcium 9.4 8.6 - 10.5 mg/dL    Total Protein 6.8 6.0 - 8.5 g/dL    Albumin 4.30 3.50 - 5.20 g/dL    ALT (SGPT) 40 (H) 1 - 33 U/L    AST (SGOT) 42 (H) 1 - 32 U/L    Alkaline Phosphatase 124 (H) 39 - 117 U/L    Total Bilirubin 0.7 0.0 - 1.2 mg/dL    eGFR Non African Amer 80 >60 mL/min/1.73    Globulin 2.5 gm/dL    A/G Ratio 1.7 g/dL    BUN/Creatinine Ratio 13.8 7.0 - 25.0    Anion Gap 13.0 5.0 - 15.0 mmol/L   hCG, Serum, Qualitative    Collection Time: 01/25/22  7:08 PM    Specimen: Blood   Result Value Ref Range    HCG Qualitative Negative Negative   Lipase    Collection Time: 01/25/22  7:08 PM    Specimen: Blood   Result Value Ref Range    Lipase 40 13 - 60 U/L   CBC Auto Differential    Collection Time: 01/25/22  7:08 PM    Specimen: Blood   Result Value Ref Range    WBC 9.84 3.40 - 10.80 10*3/mm3    RBC 5.16 3.77 - 5.28 10*6/mm3    Hemoglobin 16.3 (H) 12.0 - 15.9 g/dL    Hematocrit 46.8 (H) 34.0 - 46.6 %    MCV 90.7 79.0 - 97.0 fL    MCH 31.6 26.6 - 33.0 pg    MCHC 34.8 31.5 - 35.7 g/dL    RDW 12.0 (L) 12.3 - 15.4 %    RDW-SD 39.2 37.0 - 54.0 fl    MPV 9.3 6.0 - 12.0 fL    Platelets 304 140 - 450 10*3/mm3    Neutrophil % 64.6 42.7 - 76.0 %    Lymphocyte % 27.2 19.6 - 45.3 %    Monocyte % 5.3 5.0 - 12.0 %    Eosinophil % 2.0 0.3 - 6.2 %    Basophil % 0.5 0.0 - 1.5 %    Immature  Grans % 0.4 0.0 - 0.5 %    Neutrophils, Absolute 6.35 1.70 - 7.00 10*3/mm3    Lymphocytes, Absolute 2.68 0.70 - 3.10 10*3/mm3    Monocytes, Absolute 0.52 0.10 - 0.90 10*3/mm3    Eosinophils, Absolute 0.20 0.00 - 0.40 10*3/mm3    Basophils, Absolute 0.05 0.00 - 0.20 10*3/mm3    Immature Grans, Absolute 0.04 0.00 - 0.05 10*3/mm3    nRBC 0.0 0.0 - 0.2 /100 WBC   Urinalysis With Microscopic If Indicated (No Culture) - Urine, Clean Catch    Collection Time: 01/25/22  8:29 PM    Specimen: Urine, Clean Catch   Result Value Ref Range    Color, UA Yellow Yellow, Straw    Appearance, UA Clear Clear    pH, UA 6.5 5.0 - 8.0    Specific Gravity, UA >=1.030 1.005 - 1.030    Glucose, UA Negative Negative    Ketones, UA Negative Negative    Bilirubin, UA Negative Negative    Blood, UA Negative Negative    Protein, UA 30 mg/dL (1+) (A) Negative    Leuk Esterase, UA Trace (A) Negative    Nitrite, UA Negative Negative    Urobilinogen, UA 0.2 E.U./dL 0.2 - 1.0 E.U./dL   Urinalysis, Microscopic Only - Urine, Clean Catch    Collection Time: 01/25/22  8:29 PM    Specimen: Urine, Clean Catch   Result Value Ref Range    RBC, UA None Seen None Seen, 0-2 /HPF    WBC, UA 13-20 (A) None Seen, 0-2 /HPF    Bacteria, UA 2+ (A) None Seen /HPF    Squamous Epithelial Cells, UA 13-20 (A) None Seen, 0-2 /HPF    Yeast, UA Moderate/2+ Budding Yeast None Seen /HPF    Hyaline Casts, UA 0-2 None Seen /LPF    Methodology Manual Light Microscopy        I ordered the above labs and reviewed the results.    RADIOLOGY  CT Abdomen Pelvis With Contrast    Result Date: 1/25/2022  CT ABDOMEN PELVIS W CONTRAST-  INDICATIONS: Suspected bowel obstruction  TECHNIQUE: Radiation dose reduction techniques were utilized, including automated exposure control and exposure modulation based on body size. Enhanced ABDOMEN AND PELVIS CT  COMPARISON: 05/20/2020  FINDINGS:  Multiple small bilateral nonobstructive nephrolithiasis is noted. No hydronephrosis.  Diffuse low-density of  the liver is noted, compatible steatosis.  Otherwise unremarkable appearance of the liver, gallbladder, spleen, adrenal glands, pancreas, kidneys, bladder.  Herniation of small bowel around the right lower quadrant ostomy, and also at the left lower quadrant appears similar to prior exam. No definite bowel obstruction, although bowel leading into the right lower quadrant ostomy shows borderline caliber, 2.8 cm, for example sagittal image 38. No abnormal bowel thickening is identified.  No free intraperitoneal gas. Small pelvic free fluid.  Scattered small mesenteric and para-aortic lymph nodes are seen that are not significant by size criteria.  Abdominal aorta is not aneurysmal. Aortic and other arterial calcifications are present.  The lung bases are clear.  Degenerative changes are seen in the spine. No acute fracture is identified.          1. Persistent parastomal herniation of small bowel, without definite bowel obstruction identified, although bowel leading into the right lower quadrant ostomy shows borderline caliber. Small nonspecific pelvic free fluid. Follow-up as indications persist. 2. Bilateral nonobstructive nephrolithiasis. No hydronephrosis.  This report was finalized on 1/25/2022 8:30 PM by Dr. Anthony Gregg M.D.        I ordered the above noted radiological studies. I reviewed the images and results. I agree with the radiologist interpretation.    PROCEDURES  Procedures    MEDICATIONS GIVEN IN ER  Medications   sodium chloride 0.9 % bolus 1,000 mL (0 mL Intravenous Stopped 1/25/22 2156)   ondansetron (ZOFRAN) injection 4 mg (4 mg Intravenous Given 1/25/22 1939)   HYDROmorphone (DILAUDID) injection 0.5 mg (0.5 mg Intravenous Given 1/25/22 1939)   iopamidol (ISOVUE-300) 61 % injection 100 mL (85 mL Intravenous Given 1/25/22 2002)       PROGRESS, DATA ANALYSIS, CONSULTS, AND MEDICAL DECISION MAKING  A complete history and physical exam have been performed.  All available laboratory and imaging  results have been reviewed by myself prior to disposition.    MDM  After the initial H&P, I discussed pertinent information from history and physical exam with patient/family.  Discussed differential diagnosis.  Discussed plan for ED evaluation/work-up/treatment.  All questions answered.  Patient/family is agreeable with plan.  ED Course as of 01/26/22 0055   e Jan 25, 2022 1919 Patient presents with increasing pain around her ostomies after coughing spell.  She does have a history of periosteal hernias along with diverticulitis with perforation.  I agree with her that I am concerned about possible bowel obstruction or inflammation.  We will check basic blood work, urinalysis and a CT of the abdomen pelvis to rule out a bowel obstruction.  We will also give medicine for pain and nausea. [DE]   2020 Hemoglobin(!): 16.3 [DE]   2020 HCG Qualitative: Negative [DE]   2020 CO2(!): 21.0 [DE]   2045 Patient's CT of the abdomen pelvis shows no acute disease.  She does not have evidence of a bowel obstruction or incarcerated hernia at this time. [DE]   2125 WBC, UA(!): 13-20 [DE]   2125 Yeast: Moderate/2+ Budding Yeast [DE]   2125 Squamous Epithelial Cells, UA(!): 13-20 [DE]   2125 Bacteria, UA(!): 2+ [DE]   2146 I updated patient on the results of her CAT scan, urinalysis and blood work.  She is relieved that she does not have a bowel obstruction.  Advised patient to return to the emergency department symptoms worsen with fever, vomiting or worsening pain.  Even know that she does not have incarcerated hernia at this time, I warned her that this could happen.  I advised patient to follow-up with her surgeon. [DE]      ED Course User Index  [DE] Blake Jack MD       AS OF 00:55 EST VITALS:    BP - 127/82  HR - 101  TEMP - 98 °F (36.7 °C)  O2 SATS - 97%    DIAGNOSIS  Final diagnoses:   Right lower quadrant abdominal pain   Colostomy hernia (HCC)         DISPOSITION    DISCHARGE    Patient discharged in stable  condition.    Reviewed implications of results, diagnosis, meds, responsibility to follow up, warning signs and symptoms of possible worsening, potential complications and reasons to return to ER.    Patient/Family voiced understanding of above instructions.    Discussed plan for discharge, as there is no emergent indication for admission. Patient referred to primary care provider for BP management due to today's BP. Pt/family is agreeable and understands need for follow up and repeat testing.  Pt is aware that discharge does not mean that nothing is wrong but it indicates no emergency is present that requires admission and they must continue care with follow-up as given below or physician of their choice.     FOLLOW-UP  Layla Monique, APRN  [address] [facility] [id number] [phone]    Schedule an appointment as soon as possible for a visit       Radha Loera MD  [address] [id number] [phone]    Schedule an appointment as soon as possible for a visit            Medication List      New Prescriptions    HYDROcodone-acetaminophen 5-325 MG per tablet  Commonly known as: NORCO  Take 1 tablet by mouth Every 6 (Six) Hours As Needed for Moderate Pain .     ondansetron ODT 4 MG disintegrating tablet  Commonly known as: ZOFRAN-ODT  Place 1 tablet on the tongue Every 6 (Six) Hours As Needed for Nausea.        Stop    acetaminophen-codeine 300-30 MG per tablet  Commonly known as: TYLENOL #3           Where to Get Your Medications      These medications were sent to [facility] - [place], [place] - [address] [place] [address]. - [phone]  - [phone] [address] [facility] [address]    Phone: [phone]   · HYDROcodone-acetaminophen 5-325 MG per tablet  · ondansetron ODT 4 MG disintegrating tablet          Blake Jack MD  01/26/22 0055

## 2022-01-26 NOTE — ED NOTES
Pt c/o right lower abd pain that started 2days ago after coughing spell. Pain radiates across abd and into lower back. Reports loose  Stools in colostomy    This RN wore mask and goggles during time of contact       Radha Rothman, RN  01/25/22 1431

## 2022-01-26 NOTE — DISCHARGE INSTRUCTIONS
Take medications as needed and follow-up with Dr. Loera.  Please return to the emergency department symptoms worsen, especially if you develop fever, vomiting or increasing pain around your colostomy.

## 2022-07-21 ENCOUNTER — HOSPITAL ENCOUNTER (EMERGENCY)
Facility: HOSPITAL | Age: 37
Discharge: LEFT BEFORE DISCHARGE | End: 2022-07-21
Attending: EMERGENCY MEDICINE | Admitting: EMERGENCY MEDICINE

## 2022-07-21 ENCOUNTER — APPOINTMENT (OUTPATIENT)
Dept: CT IMAGING | Facility: HOSPITAL | Age: 37
End: 2022-07-21

## 2022-07-21 VITALS
DIASTOLIC BLOOD PRESSURE: 89 MMHG | TEMPERATURE: 96.3 F | HEART RATE: 110 BPM | SYSTOLIC BLOOD PRESSURE: 128 MMHG | OXYGEN SATURATION: 94 % | RESPIRATION RATE: 16 BRPM

## 2022-07-21 DIAGNOSIS — E86.0 DEHYDRATION: ICD-10-CM

## 2022-07-21 DIAGNOSIS — K56.609 SMALL BOWEL OBSTRUCTION: Primary | ICD-10-CM

## 2022-07-21 LAB
ALBUMIN SERPL-MCNC: 4.1 G/DL (ref 3.5–5.2)
ALBUMIN/GLOB SERPL: 2.1 G/DL
ALP SERPL-CCNC: 125 U/L (ref 39–117)
ALT SERPL W P-5'-P-CCNC: 41 U/L (ref 1–33)
ANION GAP SERPL CALCULATED.3IONS-SCNC: 16 MMOL/L (ref 5–15)
AST SERPL-CCNC: 25 U/L (ref 1–32)
BACTERIA UR QL AUTO: NORMAL /HPF
BASOPHILS # BLD AUTO: 0.04 10*3/MM3 (ref 0–0.2)
BASOPHILS NFR BLD AUTO: 0.3 % (ref 0–1.5)
BILIRUB SERPL-MCNC: 1.1 MG/DL (ref 0–1.2)
BILIRUB UR QL STRIP: NEGATIVE
BUN SERPL-MCNC: 10 MG/DL (ref 6–20)
BUN/CREAT SERPL: 15.2 (ref 7–25)
CALCIUM SPEC-SCNC: 8.6 MG/DL (ref 8.6–10.5)
CHLORIDE SERPL-SCNC: 98 MMOL/L (ref 98–107)
CLARITY UR: CLEAR
CO2 SERPL-SCNC: 20 MMOL/L (ref 22–29)
COLOR UR: YELLOW
CREAT SERPL-MCNC: 0.66 MG/DL (ref 0.57–1)
D-LACTATE SERPL-SCNC: 2 MMOL/L (ref 0.5–2)
DEPRECATED RDW RBC AUTO: 44 FL (ref 37–54)
EGFRCR SERPLBLD CKD-EPI 2021: 114.6 ML/MIN/1.73
EOSINOPHIL # BLD AUTO: 0.03 10*3/MM3 (ref 0–0.4)
EOSINOPHIL NFR BLD AUTO: 0.3 % (ref 0.3–6.2)
ERYTHROCYTE [DISTWIDTH] IN BLOOD BY AUTOMATED COUNT: 13 % (ref 12.3–15.4)
GLOBULIN UR ELPH-MCNC: 2 GM/DL
GLUCOSE SERPL-MCNC: 294 MG/DL (ref 65–99)
GLUCOSE UR STRIP-MCNC: ABNORMAL MG/DL
HCT VFR BLD AUTO: 50.4 % (ref 34–46.6)
HGB BLD-MCNC: 17.2 G/DL (ref 12–15.9)
HGB UR QL STRIP.AUTO: NEGATIVE
HYALINE CASTS UR QL AUTO: NORMAL /LPF
IMM GRANULOCYTES # BLD AUTO: 0.05 10*3/MM3 (ref 0–0.05)
IMM GRANULOCYTES NFR BLD AUTO: 0.4 % (ref 0–0.5)
KETONES UR QL STRIP: ABNORMAL
LEUKOCYTE ESTERASE UR QL STRIP.AUTO: NEGATIVE
LIPASE SERPL-CCNC: 50 U/L (ref 13–60)
LYMPHOCYTES # BLD AUTO: 2.15 10*3/MM3 (ref 0.7–3.1)
LYMPHOCYTES NFR BLD AUTO: 18.7 % (ref 19.6–45.3)
MCH RBC QN AUTO: 31.7 PG (ref 26.6–33)
MCHC RBC AUTO-ENTMCNC: 34.1 G/DL (ref 31.5–35.7)
MCV RBC AUTO: 93 FL (ref 79–97)
MONOCYTES # BLD AUTO: 0.58 10*3/MM3 (ref 0.1–0.9)
MONOCYTES NFR BLD AUTO: 5 % (ref 5–12)
NEUTROPHILS NFR BLD AUTO: 75.3 % (ref 42.7–76)
NEUTROPHILS NFR BLD AUTO: 8.65 10*3/MM3 (ref 1.7–7)
NITRITE UR QL STRIP: NEGATIVE
NRBC BLD AUTO-RTO: 0 /100 WBC (ref 0–0.2)
PH UR STRIP.AUTO: 5.5 [PH] (ref 5–8)
PLATELET # BLD AUTO: 319 10*3/MM3 (ref 140–450)
PMV BLD AUTO: 10.1 FL (ref 6–12)
POTASSIUM SERPL-SCNC: 3.9 MMOL/L (ref 3.5–5.2)
PROT SERPL-MCNC: 6.1 G/DL (ref 6–8.5)
PROT UR QL STRIP: ABNORMAL
RBC # BLD AUTO: 5.42 10*6/MM3 (ref 3.77–5.28)
RBC # UR STRIP: NORMAL /HPF
REF LAB TEST METHOD: NORMAL
SARS-COV-2 RNA PNL SPEC NAA+PROBE: NOT DETECTED
SODIUM SERPL-SCNC: 134 MMOL/L (ref 136–145)
SP GR UR STRIP: >=1.03 (ref 1–1.03)
SQUAMOUS #/AREA URNS HPF: NORMAL /HPF
UROBILINOGEN UR QL STRIP: ABNORMAL
WBC # UR STRIP: NORMAL /HPF
WBC NRBC COR # BLD: 11.5 10*3/MM3 (ref 3.4–10.8)
YEAST URNS QL MICRO: NORMAL /HPF

## 2022-07-21 PROCEDURE — 87635 SARS-COV-2 COVID-19 AMP PRB: CPT | Performed by: EMERGENCY MEDICINE

## 2022-07-21 PROCEDURE — 25010000002 ONDANSETRON PER 1 MG: Performed by: EMERGENCY MEDICINE

## 2022-07-21 PROCEDURE — 83690 ASSAY OF LIPASE: CPT | Performed by: EMERGENCY MEDICINE

## 2022-07-21 PROCEDURE — 96375 TX/PRO/DX INJ NEW DRUG ADDON: CPT

## 2022-07-21 PROCEDURE — 85025 COMPLETE CBC W/AUTO DIFF WBC: CPT | Performed by: EMERGENCY MEDICINE

## 2022-07-21 PROCEDURE — 99283 EMERGENCY DEPT VISIT LOW MDM: CPT | Performed by: SURGERY

## 2022-07-21 PROCEDURE — 96374 THER/PROPH/DIAG INJ IV PUSH: CPT

## 2022-07-21 PROCEDURE — 74176 CT ABD & PELVIS W/O CONTRAST: CPT

## 2022-07-21 PROCEDURE — 25010000002 HYDROMORPHONE PER 4 MG: Performed by: EMERGENCY MEDICINE

## 2022-07-21 PROCEDURE — 36415 COLL VENOUS BLD VENIPUNCTURE: CPT

## 2022-07-21 PROCEDURE — 99284 EMERGENCY DEPT VISIT MOD MDM: CPT

## 2022-07-21 PROCEDURE — 83605 ASSAY OF LACTIC ACID: CPT | Performed by: EMERGENCY MEDICINE

## 2022-07-21 PROCEDURE — 81001 URINALYSIS AUTO W/SCOPE: CPT | Performed by: EMERGENCY MEDICINE

## 2022-07-21 PROCEDURE — 80053 COMPREHEN METABOLIC PANEL: CPT | Performed by: EMERGENCY MEDICINE

## 2022-07-21 PROCEDURE — 96376 TX/PRO/DX INJ SAME DRUG ADON: CPT

## 2022-07-21 RX ORDER — MEDROXYPROGESTERONE ACETATE 150 MG/ML
1 INJECTION, SUSPENSION INTRAMUSCULAR
COMMUNITY
Start: 2022-03-07

## 2022-07-21 RX ORDER — NALOXONE HCL 0.4 MG/ML
0.4 VIAL (ML) INJECTION
Status: CANCELLED | OUTPATIENT
Start: 2022-07-21

## 2022-07-21 RX ORDER — INSULIN LISPRO 100 [IU]/ML
INJECTION, SOLUTION INTRAVENOUS; SUBCUTANEOUS
Status: ON HOLD | COMMUNITY
End: 2023-01-14 | Stop reason: SDUPTHER

## 2022-07-21 RX ORDER — ONDANSETRON 2 MG/ML
4 INJECTION INTRAMUSCULAR; INTRAVENOUS ONCE
Status: COMPLETED | OUTPATIENT
Start: 2022-07-21 | End: 2022-07-21

## 2022-07-21 RX ORDER — HYDROMORPHONE HYDROCHLORIDE 1 MG/ML
0.5 INJECTION, SOLUTION INTRAMUSCULAR; INTRAVENOUS; SUBCUTANEOUS ONCE
Status: COMPLETED | OUTPATIENT
Start: 2022-07-21 | End: 2022-07-21

## 2022-07-21 RX ORDER — PANTOPRAZOLE SODIUM 40 MG/10ML
40 INJECTION, POWDER, LYOPHILIZED, FOR SOLUTION INTRAVENOUS
Status: CANCELLED | OUTPATIENT
Start: 2022-07-22

## 2022-07-21 RX ORDER — SODIUM CHLORIDE 0.9 % (FLUSH) 0.9 %
10 SYRINGE (ML) INJECTION AS NEEDED
Status: CANCELLED | OUTPATIENT
Start: 2022-07-21

## 2022-07-21 RX ORDER — GABAPENTIN 400 MG/1
400 CAPSULE ORAL 2 TIMES DAILY
Status: CANCELLED | OUTPATIENT
Start: 2022-07-21

## 2022-07-21 RX ORDER — CALCIUM CARBONATE 200(500)MG
2 TABLET,CHEWABLE ORAL 2 TIMES DAILY PRN
Status: CANCELLED | OUTPATIENT
Start: 2022-07-21

## 2022-07-21 RX ORDER — HYDROMORPHONE HYDROCHLORIDE 1 MG/ML
0.5 INJECTION, SOLUTION INTRAMUSCULAR; INTRAVENOUS; SUBCUTANEOUS
Status: CANCELLED | OUTPATIENT
Start: 2022-07-21 | End: 2022-07-28

## 2022-07-21 RX ORDER — ATORVASTATIN CALCIUM 20 MG/1
10 TABLET, FILM COATED ORAL NIGHTLY
Status: CANCELLED | OUTPATIENT
Start: 2022-07-21

## 2022-07-21 RX ORDER — NICOTINE POLACRILEX 4 MG
15 LOZENGE BUCCAL
Status: CANCELLED | OUTPATIENT
Start: 2022-07-21

## 2022-07-21 RX ORDER — SODIUM CHLORIDE 0.9 % (FLUSH) 0.9 %
10 SYRINGE (ML) INJECTION AS NEEDED
Status: DISCONTINUED | OUTPATIENT
Start: 2022-07-21 | End: 2022-07-21 | Stop reason: HOSPADM

## 2022-07-21 RX ORDER — DEXTROSE MONOHYDRATE 25 G/50ML
25 INJECTION, SOLUTION INTRAVENOUS
Status: CANCELLED | OUTPATIENT
Start: 2022-07-21

## 2022-07-21 RX ORDER — CETIRIZINE HYDROCHLORIDE 10 MG/1
10 TABLET ORAL DAILY
Status: CANCELLED | OUTPATIENT
Start: 2022-07-21

## 2022-07-21 RX ORDER — ALBUTEROL SULFATE 90 UG/1
2 AEROSOL, METERED RESPIRATORY (INHALATION) EVERY 4 HOURS PRN
Status: CANCELLED | OUTPATIENT
Start: 2022-07-21

## 2022-07-21 RX ORDER — SODIUM CHLORIDE 0.9 % (FLUSH) 0.9 %
10 SYRINGE (ML) INJECTION EVERY 12 HOURS SCHEDULED
Status: CANCELLED | OUTPATIENT
Start: 2022-07-21

## 2022-07-21 RX ORDER — SODIUM CHLORIDE, SODIUM LACTATE, POTASSIUM CHLORIDE, CALCIUM CHLORIDE 600; 310; 30; 20 MG/100ML; MG/100ML; MG/100ML; MG/100ML
125 INJECTION, SOLUTION INTRAVENOUS CONTINUOUS
Status: CANCELLED | OUTPATIENT
Start: 2022-07-21

## 2022-07-21 RX ORDER — ONDANSETRON 2 MG/ML
4 INJECTION INTRAMUSCULAR; INTRAVENOUS EVERY 6 HOURS PRN
Status: CANCELLED | OUTPATIENT
Start: 2022-07-21

## 2022-07-21 RX ORDER — ACETAMINOPHEN 325 MG/1
650 TABLET ORAL EVERY 4 HOURS PRN
Status: CANCELLED | OUTPATIENT
Start: 2022-07-21

## 2022-07-21 RX ORDER — HEPARIN SODIUM 5000 [USP'U]/ML
5000 INJECTION, SOLUTION INTRAVENOUS; SUBCUTANEOUS EVERY 8 HOURS SCHEDULED
Status: CANCELLED | OUTPATIENT
Start: 2022-07-21

## 2022-07-21 RX ADMIN — SODIUM CHLORIDE 1000 ML: 9 INJECTION, SOLUTION INTRAVENOUS at 14:43

## 2022-07-21 RX ADMIN — SODIUM CHLORIDE 1000 ML: 9 INJECTION, SOLUTION INTRAVENOUS at 12:10

## 2022-07-21 RX ADMIN — HYDROMORPHONE HYDROCHLORIDE 0.5 MG: 1 INJECTION, SOLUTION INTRAMUSCULAR; INTRAVENOUS; SUBCUTANEOUS at 12:02

## 2022-07-21 RX ADMIN — ONDANSETRON 4 MG: 2 INJECTION INTRAMUSCULAR; INTRAVENOUS at 15:39

## 2022-07-21 RX ADMIN — ONDANSETRON 4 MG: 2 INJECTION INTRAMUSCULAR; INTRAVENOUS at 12:03

## 2022-07-21 NOTE — ED PROVIDER NOTES
EMERGENCY DEPARTMENT ENCOUNTER    Room Number:  19/19  Date of encounter:  7/21/2022  PCP: Layla Monique APRN  Patient Care Team:  Layla Monique APRN as PCP - General (Family Medicine)  Ryan Chatterjee MD as Consulting Physician (Nephrology)   Historian: Patient    HPI:  Chief Complaint: Abdominal pain, decreased ostomy output  A complete HPI/ROS/PMH/PSH/SH/FH are unobtainable due to: Nothing    Context: Edie Camacho is a 37 y.o. female who presents to the ED c/o lower abdominal pain.  She reports that she developed lower abdominal pain last night.  She reports it is severe.  She states that she woke up this morning with more severe pain and noticed that her ostomy had no output.  She states that she tried to take Zofran without improvement.  She states she has not been able to keep anything down.  She states she has never had problems with her ostomy output in the past.  She states that she has an ostomy because of diverticulitis 6 years ago.  She states it has never been reversed.  Nothing makes her pain worse or better.  She reports that her primary pain is in the lower abdomen but her abdomen hurts diffusely.    Prior record review: Surgery note 5/12/2021 for parastomal hernia.    PAST MEDICAL HISTORY  Active Ambulatory Problems     Diagnosis Date Noted   • Colitis presumed infectious 05/12/2019   • Type 2 diabetes mellitus (HCC) 05/12/2019   • Suprapubic pain, acute 05/12/2019   • Hyponatremia 05/12/2019   • Sepsis (HCC) 05/12/2019   • Metabolic acidosis 05/13/2019   • Chronic constipation 05/13/2019   • Other microscopic hematuria 05/14/2019   • Lower abdominal pain 06/18/2019   • Colitis 06/18/2019   • Acute pancreatitis without infection or necrosis 05/21/2020   • Hypothyroidism (acquired) 05/21/2020   • Mixed hyperlipidemia 05/23/2020   • Hyponatremia 05/24/2020   • Metabolic acidosis 05/24/2020   • Hypokalemia 05/24/2020   • Ileostomy in place (HCC) 05/25/2020   • Diarrhea 05/25/2020    • Renal lithiasis 2020   • Smoker 2021     Resolved Ambulatory Problems     Diagnosis Date Noted   • Dehydration 2019   • Large bowel obstruction (HCC) 2019   • Stricture of sigmoid colon (HCC) 2019     Past Medical History:   Diagnosis Date   • Abdominal bloating with cramps    • Asthma    • Bloating symptom    • Colon polyps 2019   • Diabetes mellitus (HCC)    • Disease of thyroid gland    • Diverticulitis    • Gastritis    • GERD (gastroesophageal reflux disease)    • H/O being hospitalized    • History of kidney stones    • History of staph infection    • Kidney stone    • Neuropathy    • Pregnancy    • Seasonal allergies    • Stricture of colon determined by endoscopy (HCC)    • Tachycardia        The patient has started, but not completed, their COVID-19 vaccination series.    PAST SURGICAL HISTORY  Past Surgical History:   Procedure Laterality Date   •  SECTION N/A    • COLON RESECTION N/A 7/10/2019    Procedure: LAPAROSCOPIC LOW ANTERIOR  COLON RESECTION WITH DIVERTING LOOP ILEOSTOMY;  Surgeon: Roderick Arnold MD;  Location: [facility] OR;  Service: General   • COLONOSCOPY N/A 2020    Procedure: COLONOSCOPY to Cecum via Colostomy and Rectum;  Surgeon: Roderick Arnold MD;  Location: [facility] ENDOSCOPY;  Service: General;  Laterality: N/A;  Colostomy Status   Post Surgical Anatomy   • COLONOSCOPY W/ BIOPSIES AND POLYPECTOMY N/A 2019   • DIAGNOSTIC LAPAROSCOPY N/A 2019    Procedure: DIAGNOSTIC LAPAROSCOPY;  Surgeon: Radha Loera MD;  Location: [facility] MAIN OR;  Service: General   • ENDOSCOPY N/A 2020    Procedure: ESOPHAGOGASTRODUODENOSCOPY with Bx's;  Surgeon: Roderick Arnold MD;  Location: [facility] ENDOSCOPY;  Service: General;  Laterality: N/A;  Relfux   Reflux Esophagitis, Mild Duodenitis   • EXPLORATORY LAPAROTOMY N/A 2019    Procedure: COLON RESECTION WITH WAYNE'S PROCEDURE WITH FLEXIBLE SIGMOIDSCOPE;  Surgeon: Evaristo  MD Radha;  Location: [facility] MAIN OR;  Service: General   • NEPHROSTOMY TRACT DILATATION W/ LITHOTRIPSY     • SIGMOIDOSCOPY N/A 6/2/2021    Procedure: FLEXIBLE SIGMOIDOSCOPY;  Surgeon: Radha Loera MD;  Location: [facility] ENDOSCOPY;  Service: General;  Laterality: N/A;  PREOP/ PERASTOMAL HERNIA  POSTOP/ 8CM RECTUM REMAINING         FAMILY HISTORY  Family History   Problem Relation Age of Onset   • Heart disease Father    • Diabetes Father    • Asthma Mother    • Diabetes Mother    • Malig Hyperthermia Neg Hx          SOCIAL HISTORY  Social History     Socioeconomic History   • Marital status:    Tobacco Use   • Smoking status: Current Every Day Smoker     Packs/day: 0.45     Years: 21.00     Pack years: 9.45     Types: Cigarettes     Start date: 1969   • Smokeless tobacco: Never Used   Vaping Use   • Vaping Use: Never used   Substance and Sexual Activity   • Alcohol use: No   • Drug use: No   • Sexual activity: Defer         ALLERGIES  Bactrim [sulfamethoxazole-trimethoprim], Ciprofloxacin, Sulfa antibiotics, and Toradol [ketorolac tromethamine]        REVIEW OF SYSTEMS  Review of Systems   No chest pain, no shortness of breath, positive nausea, positive vomiting, no diarrhea, no fever, no chills  All systems reviewed and negative except for those discussed in HPI.       PHYSICAL EXAM    I have reviewed the triage vital signs and nursing notes.    ED Triage Vitals [07/21/22 0934]   Temp Heart Rate Resp BP SpO2   96.3 °F (35.7 °C) (!) 136 18 -- 96 %      Temp src Heart Rate Source Patient Position BP Location FiO2 (%)   Tympanic Monitor -- -- --       Physical Exam  GENERAL: Awake, alert, no acute distress  SKIN: Warm, dry  HENT: Normocephalic, atraumatic  EYES: no scleral icterus  CV: regular rhythm, regular rate  RESPIRATORY: normal effort, lungs clear  ABDOMEN: soft, moderate diffusely tender, nondistended  MUSCULOSKELETAL: no deformity  NEURO: alert, moves all extremities, follows commands          LAB  RESULTS  Recent Results (from the past 24 hour(s))   CBC Auto Differential    Collection Time: 07/21/22 11:57 AM    Specimen: Blood   Result Value Ref Range    WBC 11.50 (H) 3.40 - 10.80 10*3/mm3    RBC 5.42 (H) 3.77 - 5.28 10*6/mm3    Hemoglobin 17.2 (H) 12.0 - 15.9 g/dL    Hematocrit 50.4 (H) 34.0 - 46.6 %    MCV 93.0 79.0 - 97.0 fL    MCH 31.7 26.6 - 33.0 pg    MCHC 34.1 31.5 - 35.7 g/dL    RDW 13.0 12.3 - 15.4 %    RDW-SD 44.0 37.0 - 54.0 fl    MPV 10.1 6.0 - 12.0 fL    Platelets 319 140 - 450 10*3/mm3    Neutrophil % 75.3 42.7 - 76.0 %    Lymphocyte % 18.7 (L) 19.6 - 45.3 %    Monocyte % 5.0 5.0 - 12.0 %    Eosinophil % 0.3 0.3 - 6.2 %    Basophil % 0.3 0.0 - 1.5 %    Immature Grans % 0.4 0.0 - 0.5 %    Neutrophils, Absolute 8.65 (H) 1.70 - 7.00 10*3/mm3    Lymphocytes, Absolute 2.15 0.70 - 3.10 10*3/mm3    Monocytes, Absolute 0.58 0.10 - 0.90 10*3/mm3    Eosinophils, Absolute 0.03 0.00 - 0.40 10*3/mm3    Basophils, Absolute 0.04 0.00 - 0.20 10*3/mm3    Immature Grans, Absolute 0.05 0.00 - 0.05 10*3/mm3    nRBC 0.0 0.0 - 0.2 /100 WBC   Urinalysis With Microscopic If Indicated (No Culture) - Urine, Clean Catch    Collection Time: 07/21/22 12:10 PM    Specimen: Urine, Clean Catch   Result Value Ref Range    Color, UA Yellow Yellow, Straw    Appearance, UA Clear Clear    pH, UA 5.5 5.0 - 8.0    Specific Gravity, UA >=1.030 1.005 - 1.030    Glucose, UA >=1000 mg/dL (3+) (A) Negative    Ketones, UA 80 mg/dL (3+) (A) Negative    Bilirubin, UA Negative Negative    Blood, UA Negative Negative    Protein,  mg/dL (2+) (A) Negative    Leuk Esterase, UA Negative Negative    Nitrite, UA Negative Negative    Urobilinogen, UA 0.2 E.U./dL 0.2 - 1.0 E.U./dL   Urinalysis, Microscopic Only - Urine, Clean Catch    Collection Time: 07/21/22 12:10 PM    Specimen: Urine, Clean Catch   Result Value Ref Range    RBC, UA None Seen None Seen, 0-2 /HPF    WBC, UA 0-2 None Seen, 0-2 /HPF    Bacteria, UA None Seen None Seen /HPF     Squamous Epithelial Cells, UA 0-2 None Seen, 0-2 /HPF    Yeast, UA Small/1+ Budding Yeast None Seen /HPF    Hyaline Casts, UA None Seen None Seen /LPF    Methodology Manual Light Microscopy    Lipase    Collection Time: 07/21/22 12:22 PM    Specimen: Blood   Result Value Ref Range    Lipase 50 13 - 60 U/L   Lactic Acid, Plasma    Collection Time: 07/21/22  1:36 PM    Specimen: Blood   Result Value Ref Range    Lactate 2.0 0.5 - 2.0 mmol/L   COVID-19,[facility] IN-HOUSE CEPHEID/JOSE DAVID NP SWAB IN TRANSPORT MEDIA 8-12 HR TAT - Swab, Nasopharynx    Collection Time: 07/21/22  2:04 PM    Specimen: Nasopharynx; Swab   Result Value Ref Range    COVID19 Not Detected Not Detected - Ref. Range       Ordered the above labs and independently reviewed the results.        RADIOLOGY  No Radiology Exams Resulted Within Past 24 Hours    I ordered the above noted radiological studies. Reviewed by me and discussed with radiologist.  See dictation for official radiology interpretation.      PROCEDURES    Procedures      MEDICATIONS GIVEN IN ER    Medications   sodium chloride 0.9 % flush 10 mL (has no administration in time range)   sodium chloride 0.9 % bolus 1,000 mL (has no administration in time range)   sodium chloride 0.9 % bolus 1,000 mL (0 mL Intravenous Stopped 7/21/22 1337)   ondansetron (ZOFRAN) injection 4 mg (4 mg Intravenous Given 7/21/22 1203)   HYDROmorphone (DILAUDID) injection 0.5 mg (0.5 mg Intravenous Given 7/21/22 1202)         PROGRESS, DATA ANALYSIS, CONSULTS, AND MEDICAL DECISION MAKING    All labs have been independently reviewed by me.  All radiology studies have been reviewed by me and discussed with radiologist dictating the report.   EKG's independently viewed and interpreted by me.  Discussion below represents my analysis of pertinent findings related to patient's condition, differential diagnosis, treatment plan and final disposition.    Differential diagnosis includes but is not limited to bowel obstruction,  hernia, dehydration, UTI, kidney stone, colitis.    ED Course as of 07/21/22 1441   u Jul 21, 2022   1256 Ketones, UA(!): 80 mg/dL (3+) [TR]   1256 WBC(!): 11.50 [TR]   1256 Hemoglobin(!): 17.2 [TR]   1355 Speaking with radiologist by phone.  CT shows small bowel obstruction at the ostomy in the right lower quadrant [TR]   1408 I reviewed work-up and findings with the patient at the bedside.  Answered all questions.  Plan admission.  She is agreeable. [TR]   1440 Speaking with Dr. Albert with general surgery.  He request NG tube.  He will admit. [TR]      ED Course User Index  [TR] Trev Hobbs MD           PPE: The patient wore a mask and I wore an N95 mask throughout the entire patient encounter.       AS OF 14:41 EDT VITALS:    BP - 142/94  HR - 101  TEMP - 96.3 °F (35.7 °C) (Tympanic)  O2 SATS - 95%        DIAGNOSIS  Final diagnoses:   Small bowel obstruction (HCC)   Dehydration         DISPOSITION  ED Disposition     ED Disposition   Decision to Admit    Condition   --    Comment   Level of Care: Med/Surg [1]   Diagnosis: Small bowel obstruction (HCC) [376683]   Admitting Physician: CARTER ALBERT [[id number]]   Attending Physician: CARTER ALBERT [[id number]]                     Trev Hobbs MD  07/21/22 1441

## 2022-07-21 NOTE — PROGRESS NOTES
Clinical Pharmacy Services: Medication History    Edie Camacho is a 37 y.o. female presenting to [facility] for   Chief Complaint   Patient presents with   • Abdominal Pain   • Vomiting     Pt from home with c/o lower abdomen pain, decreased ostomy output, vomiting.  Current pain level 9 of 10.       She  has a past medical history of Abdominal bloating with cramps, Asthma, Bloating symptom, Colon polyps (06/13/2019), Diabetes mellitus ([facility]), Disease of thyroid gland, Diverticulitis, Gastritis, GERD (gastroesophageal reflux disease), H/O being hospitalized, History of kidney stones, History of staph infection (2009), Kidney stone, Large bowel obstruction ([facility]), Neuropathy, Pregnancy, Seasonal allergies, Stricture of colon determined by endoscopy ([facility]), Stricture of sigmoid colon ([facility]) (7/8/2019), and Tachycardia.    Allergies as of 07/21/2022 - Reviewed 07/21/2022   Allergen Reaction Noted   • Bactrim [sulfamethoxazole-trimethoprim] Anaphylaxis 05/11/2019   • Ciprofloxacin Anaphylaxis 06/18/2016   • Sulfa antibiotics Anaphylaxis 06/18/2016   • Toradol [ketorolac tromethamine] Anxiety 08/04/2019       Medication information was obtained from: Patient   Pharmacy and Phone Number:     Prior to Admission Medications     Prescriptions Last Dose Informant Patient Reported? Taking?    atorvastatin (LIPITOR) 10 MG tablet 7/20/2022 Self No Yes    Take 1 tablet by mouth Every Night.    cetirizine (zyrTEC) 10 MG tablet 7/20/2022 Self Yes Yes    Take 10 mg by mouth Daily.    fenofibrate (TRICOR) 145 MG tablet 7/20/2022 Self No Yes    Take 1 tablet by mouth Daily.    gabapentin (NEURONTIN) 400 MG capsule 7/20/2022 Self No Yes    Take 1 capsule by mouth 2 (Two) Times a Day.    HYDROcodone-acetaminophen (NORCO) 5-325 MG per tablet Past Week Self No Yes    Take 1 tablet by mouth Every 6 (Six) Hours As Needed for Moderate Pain .    insulin glargine (LANTUS) 100 UNIT/ML injection 7/20/2022 Self No Yes    Inject  28 Units under the skin into the appropriate area as directed Every 12 (Twelve) Hours.    Insulin Lispro (humaLOG) 100 UNIT/ML injection 7/20/2022 Self Yes Yes    Inject  under the skin into the appropriate area as directed 3 (Three) Times a Day Before Meals. Per sliding Scale    levothyroxine (SYNTHROID, LEVOTHROID) 200 MCG tablet 7/20/2022 Self Yes Yes    Take 200 mcg by mouth Daily.    magnesium oxide 250 MG tablet 7/20/2022 Self Yes Yes    Take 1 tablet by mouth Daily.    medroxyPROGESTERone Acetate 150 MG/ML suspension prefilled syringe Past Month Self Yes Yes    Inject 1 mL under the skin into the appropriate area as directed Every 30 (Thirty) Days.    methocarbamol (ROBAXIN) 750 MG tablet 7/20/2022 Self Yes Yes    Take 750 mg by mouth As Needed.    montelukast (SINGULAIR) 10 MG tablet 7/20/2022 Self Yes Yes    Take 10 mg by mouth Daily.    Multiple Vitamins-Minerals (Multi-Vitamin Gummies) chewable tablet 7/20/2022 Self Yes Yes    Chew 1 tablet Daily.    mupirocin (BACTROBAN) 2 % ointment Past Month Self Yes Yes    Apply 1 application topically to the appropriate area as directed As Needed.    ondansetron ODT (ZOFRAN-ODT) 4 MG disintegrating tablet 7/20/2022 Self No Yes    Place 1 tablet on the tongue Every 6 (Six) Hours As Needed for Nausea.    pantoprazole (PROTONIX) 40 MG EC tablet 7/20/2022 Self Yes Yes    Take 40 mg by mouth Daily.    PARoxetine (PAXIL) 20 MG tablet 7/20/2022 Self Yes Yes    Take 20 mg by mouth Every Morning.    potassium chloride (K-DUR) 10 MEQ CR tablet 7/20/2022 Self No Yes    Take 2 tablets by mouth 2 (Two) Times a Day.    Patient taking differently:  Take 20 mEq by mouth Daily.    sodium bicarbonate 650 MG tablet 7/20/2022 Self No Yes    Take 2 tablets by mouth 3 (Three) Times a Day.    sucralfate (CARAFATE) 1 g tablet 7/20/2022 Self Yes Yes    Take 1 g by mouth 4 (Four) Times a Day Before Meals & at Bedtime.    vitamin D (ERGOCALCIFEROL) 1.25 MG (09993 UT) capsule capsule Past Week  Self Yes Yes    Take 50,000 Units by mouth Every 7 (Seven) Days. Mondays    albuterol sulfate  (90 Base) MCG/ACT inhaler More than a month Self Yes No    Inhale 2 puffs Every 4 (Four) Hours As Needed.    fluticasone (FLONASE) 50 MCG/ACT nasal spray More than a month Self Yes No    2 sprays into the nostril(s) as directed by provider Daily.            Medication notes:    This medication list is complete to the best of my knowledge as of 7/21/2022    Please call if questions.    Leonard Gutierrez  Medication History Technician  [phone]    7/21/2022 14:57 EDT

## 2022-07-21 NOTE — H&P
General Surgery H&P/Consultation      Impression/Plan: 37-year-old lady with small bowel obstruction.  Since presentation she is started to have some ostomy output.  Her pain is improved.  Will hold on NG tube placement for now.  Continue n.p.o. except ice chips and medications.  IV fluids ordered.  Monitor for return of regular bowel function.  I discussed with her should her symptoms worsen then an NG tube would need to be placed and the possibility of the need for operative intervention.    CC: Abdominal pain, nausea    HPI:   Miss Edie Camacho is a 37 y.o. female that presented to the hospital with 1 to 2-day history of abdominal pain, nausea, emesis.  She reports that yesterday she stopped having ostomy output.  She has never had an episode similar to this since having her ostomies.  She is followed by Dr. Loera for possible reversal however this is not occurred due to multiple reasons including smoking, poorly controlled diabetes, and obesity.  She reports that she is actually smoking more currently as she recently was laid off from her job due to her medical problems.  Additionally she reports her diabetes has been poorly controlled and her last hemoglobin A1c was greater than 10.  She does report an 8 pound weight loss.    Past Medical History:   Past Medical History:   Diagnosis Date   • Abdominal bloating with cramps    • Asthma    • Bloating symptom    • Colon polyps 06/13/2019    Rectum: hyperplastic polyp   • Diabetes mellitus (HCC)    • Disease of thyroid gland    • Diverticulitis    • Gastritis    • GERD (gastroesophageal reflux disease)    • H/O being hospitalized     TWO RECENT EPISODES S/P BOWEL OBSTRUCTIONS    • History of kidney stones    • History of staph infection 2009    S/P TATTOO-ON BACK    • Kidney stone    • Large bowel obstruction (HCC)    • Neuropathy     LOWER EXTRIMITES   • Pregnancy    • Seasonal allergies    • Stricture of colon determined by endoscopy ([facility])    •  Stricture of sigmoid colon (HCC) 2019    Added automatically from request for surgery [id number]   • Tachycardia        Past Surgical History:   Past Surgical History:   Procedure Laterality Date   •  SECTION N/A    • COLON RESECTION N/A 7/10/2019    Procedure: LAPAROSCOPIC LOW ANTERIOR  COLON RESECTION WITH DIVERTING LOOP ILEOSTOMY;  Surgeon: Roderick Arnold MD;  Location: [facility] MAIN OR;  Service: General   • COLONOSCOPY N/A 2020    Procedure: COLONOSCOPY to Cecum via Colostomy and Rectum;  Surgeon: Roderick Arnold MD;  Location: [facility] ENDOSCOPY;  Service: General;  Laterality: N/A;  Colostomy Status   Post Surgical Anatomy   • COLONOSCOPY W/ BIOPSIES AND POLYPECTOMY N/A 2019   • DIAGNOSTIC LAPAROSCOPY N/A 2019    Procedure: DIAGNOSTIC LAPAROSCOPY;  Surgeon: Radha Loera MD;  Location: [facility] MAIN OR;  Service: General   • ENDOSCOPY N/A 2020    Procedure: ESOPHAGOGASTRODUODENOSCOPY with Bx's;  Surgeon: Roderick Arnold MD;  Location: [facility] ENDOSCOPY;  Service: General;  Laterality: N/A;  Relfux   Reflux Esophagitis, Mild Duodenitis   • EXPLORATORY LAPAROTOMY N/A 2019    Procedure: COLON RESECTION WITH WAYNE'S PROCEDURE WITH FLEXIBLE SIGMOIDSCOPE;  Surgeon: Radha Loera MD;  Location: [facility] OR;  Service: General   • NEPHROSTOMY TRACT DILATATION W/ LITHOTRIPSY     • SIGMOIDOSCOPY N/A 2021    Procedure: FLEXIBLE SIGMOIDOSCOPY;  Surgeon: Radha Loera MD;  Location: [facility] ENDOSCOPY;  Service: General;  Laterality: N/A;  PREOP/ PERASTOMAL HERNIA  POSTOP/ 8CM RECTUM REMAINING       Medications:  (Not in a hospital admission)      Allergies:   Allergies   Allergen Reactions   • Bactrim [Sulfamethoxazole-Trimethoprim] Anaphylaxis   • Ciprofloxacin Anaphylaxis   • Sulfa Antibiotics Anaphylaxis   • Toradol [Ketorolac Tromethamine] Anxiety     Patient states she Is not allergic       Social History:   Social History     Socioeconomic History   • Marital  status:    Tobacco Use   • Smoking status: Current Every Day Smoker     Packs/day: 0.45     Years: 21.00     Pack years: 9.45     Types: Cigarettes     Start date: 1969   • Smokeless tobacco: Never Used   Vaping Use   • Vaping Use: Never used   Substance and Sexual Activity   • Alcohol use: No   • Drug use: No   • Sexual activity: Defer       Family History:   Family History   Problem Relation Age of Onset   • Heart disease Father    • Diabetes Father    • Asthma Mother    • Diabetes Mother    • Malig Hyperthermia Neg Hx        Review of Systems:  A comprehensive review of systems was negative except for the following positives: Abdominal pain, nausea    Physical Exam:   Vitals:    07/21/22 1533   BP:    Pulse: 106   Resp:    Temp:    SpO2: 91%     BMI: There is no height or weight on file to calculate BMI.   GENERAL: no acute distress, awake and alert  HEENT: normocephalic, atraumatic, no scleral icterus, moist mucous membranes  NECK: Supple, there is no thyromegaly or lymphadenopathy  RESPIRATORY: symmetric excursion bilaterally, normal work of breathing, no wheezes  CARDIOVASCULAR: regular rate, well perfused  GASTROINTESTINAL: Soft, mildly tender to palpation surrounding ileostomy but hernia feels reducible and soft, colostomy with no surrounding tenderness, small amount of stool and gas in the ileostomy appliance  MUSCULOSKELETAL: no cyanosis, clubbing, or edema  NEUROLOGIC: alert and oriented, normal speech, cranial nerves 2-12 grossly intact, no focal deficits  SKIN: Moist, warm, no rashes, no jaundice      Pertinent labs:   Results from last 7 days   Lab Units 07/21/22  1157   WBC 10*3/mm3 11.50*   HEMOGLOBIN g/dL 17.2*   HEMATOCRIT % 50.4*   PLATELETS 10*3/mm3 319     Results from last 7 days   Lab Units 07/21/22  1442   SODIUM mmol/L 134*   POTASSIUM mmol/L 3.9   CHLORIDE mmol/L 98   CO2 mmol/L 20.0*   BUN mg/dL 10   CREATININE mg/dL 0.66   CALCIUM mg/dL 8.6   BILIRUBIN mg/dL 1.1   ALK PHOS U/L  125*   ALT (SGPT) U/L 41*   AST (SGOT) U/L 25   GLUCOSE mg/dL 294*       IMAGING:  CT abdomen pelvis reviewed concerning for a small bowel obstruction near the level of the ileostomy.  No evidence of perforation          Santosh Chisholm MD  General and Endoscopic Surgery  [facility]    [address], [address]  [place] [id number]  P: [phone]  F: [phone]

## 2022-07-22 NOTE — ED NOTES
"Pt states \"I can go home, and take a laxative, I will return if I start feeling worse\"   Charge notified, and admitting MD   "

## 2023-01-12 ENCOUNTER — APPOINTMENT (OUTPATIENT)
Dept: GENERAL RADIOLOGY | Facility: HOSPITAL | Age: 38
DRG: 247 | End: 2023-01-12
Payer: MEDICAID

## 2023-01-12 ENCOUNTER — HOSPITAL ENCOUNTER (INPATIENT)
Facility: HOSPITAL | Age: 38
LOS: 2 days | Discharge: HOME | DRG: 247 | End: 2023-01-14
Attending: EMERGENCY MEDICINE | Admitting: INTERNAL MEDICINE
Payer: MEDICAID

## 2023-01-12 DIAGNOSIS — I21.11 STEMI INVOLVING RIGHT CORONARY ARTERY: Primary | ICD-10-CM

## 2023-01-12 DIAGNOSIS — I21.3 ST ELEVATION MYOCARDIAL INFARCTION (STEMI), UNSPECIFIED ARTERY: ICD-10-CM

## 2023-01-12 LAB
ALBUMIN SERPL-MCNC: 3.7 G/DL (ref 3.5–5.2)
ALBUMIN/GLOB SERPL: 1.5 G/DL
ALP SERPL-CCNC: 96 U/L (ref 39–117)
ALT SERPL W P-5'-P-CCNC: 26 U/L (ref 1–33)
ANION GAP SERPL CALCULATED.3IONS-SCNC: 15.2 MMOL/L (ref 5–15)
AST SERPL-CCNC: 45 U/L (ref 1–32)
BASOPHILS # BLD AUTO: 0.03 10*3/MM3 (ref 0–0.2)
BASOPHILS NFR BLD AUTO: 0.2 % (ref 0–1.5)
BILIRUB SERPL-MCNC: 0.5 MG/DL (ref 0–1.2)
BUN SERPL-MCNC: 14 MG/DL (ref 6–20)
BUN/CREAT SERPL: 20 (ref 7–25)
CALCIUM SPEC-SCNC: 8.9 MG/DL (ref 8.6–10.5)
CHLORIDE SERPL-SCNC: 103 MMOL/L (ref 98–107)
CO2 SERPL-SCNC: 13.8 MMOL/L (ref 22–29)
CREAT SERPL-MCNC: 0.7 MG/DL (ref 0.57–1)
DEPRECATED RDW RBC AUTO: 39.3 FL (ref 37–54)
EGFRCR SERPLBLD CKD-EPI 2021: 112.3 ML/MIN/1.73
EOSINOPHIL # BLD AUTO: 0.03 10*3/MM3 (ref 0–0.4)
EOSINOPHIL NFR BLD AUTO: 0.2 % (ref 0.3–6.2)
ERYTHROCYTE [DISTWIDTH] IN BLOOD BY AUTOMATED COUNT: 11.7 % (ref 12.3–15.4)
GLOBULIN UR ELPH-MCNC: 2.5 GM/DL
GLUCOSE BLDC GLUCOMTR-MCNC: 331 MG/DL (ref 70–130)
GLUCOSE SERPL-MCNC: 292 MG/DL (ref 65–99)
HCT VFR BLD AUTO: 41.5 % (ref 34–46.6)
HGB BLD-MCNC: 14.2 G/DL (ref 12–15.9)
HOLD SPECIMEN: NORMAL
HOLD SPECIMEN: NORMAL
IMM GRANULOCYTES # BLD AUTO: 0.1 10*3/MM3 (ref 0–0.05)
IMM GRANULOCYTES NFR BLD AUTO: 0.8 % (ref 0–0.5)
INR PPP: 1.2 (ref 0.9–1.1)
LYMPHOCYTES # BLD AUTO: 1.65 10*3/MM3 (ref 0.7–3.1)
LYMPHOCYTES NFR BLD AUTO: 13.6 % (ref 19.6–45.3)
MAGNESIUM SERPL-MCNC: 1.8 MG/DL (ref 1.6–2.6)
MCH RBC QN AUTO: 31.5 PG (ref 26.6–33)
MCHC RBC AUTO-ENTMCNC: 34.2 G/DL (ref 31.5–35.7)
MCV RBC AUTO: 92 FL (ref 79–97)
MONOCYTES # BLD AUTO: 0.45 10*3/MM3 (ref 0.1–0.9)
MONOCYTES NFR BLD AUTO: 3.7 % (ref 5–12)
NEUTROPHILS NFR BLD AUTO: 81.5 % (ref 42.7–76)
NEUTROPHILS NFR BLD AUTO: 9.85 10*3/MM3 (ref 1.7–7)
NRBC BLD AUTO-RTO: 0 /100 WBC (ref 0–0.2)
PLATELET # BLD AUTO: 274 10*3/MM3 (ref 140–450)
PMV BLD AUTO: 9.6 FL (ref 6–12)
POTASSIUM SERPL-SCNC: 3.9 MMOL/L (ref 3.5–5.2)
PROT SERPL-MCNC: 6.2 G/DL (ref 6–8.5)
PROTHROMBIN TIME: 15.4 SECONDS (ref 11.7–14.2)
RBC # BLD AUTO: 4.51 10*6/MM3 (ref 3.77–5.28)
SODIUM SERPL-SCNC: 132 MMOL/L (ref 136–145)
TROPONIN T SERPL-MCNC: 0.26 NG/ML (ref 0–0.03)
WBC NRBC COR # BLD: 12.11 10*3/MM3 (ref 3.4–10.8)
WHOLE BLOOD HOLD COAG: NORMAL
WHOLE BLOOD HOLD SPECIMEN: NORMAL

## 2023-01-12 PROCEDURE — C1887 CATHETER, GUIDING: HCPCS | Performed by: INTERNAL MEDICINE

## 2023-01-12 PROCEDURE — 25010000002 AMIODARONE PER 30 MG: Performed by: INTERNAL MEDICINE

## 2023-01-12 PROCEDURE — C1725 CATH, TRANSLUMIN NON-LASER: HCPCS | Performed by: INTERNAL MEDICINE

## 2023-01-12 PROCEDURE — 25010000002 FENTANYL CITRATE (PF) 50 MCG/ML SOLUTION: Performed by: INTERNAL MEDICINE

## 2023-01-12 PROCEDURE — 82962 GLUCOSE BLOOD TEST: CPT

## 2023-01-12 PROCEDURE — C1874 STENT, COATED/COV W/DEL SYS: HCPCS | Performed by: INTERNAL MEDICINE

## 2023-01-12 PROCEDURE — 4A023N7 MEASUREMENT OF CARDIAC SAMPLING AND PRESSURE, LEFT HEART, PERCUTANEOUS APPROACH: ICD-10-PCS | Performed by: INTERNAL MEDICINE

## 2023-01-12 PROCEDURE — 83735 ASSAY OF MAGNESIUM: CPT | Performed by: INTERNAL MEDICINE

## 2023-01-12 PROCEDURE — C1894 INTRO/SHEATH, NON-LASER: HCPCS | Performed by: INTERNAL MEDICINE

## 2023-01-12 PROCEDURE — 93010 ELECTROCARDIOGRAM REPORT: CPT | Performed by: INTERNAL MEDICINE

## 2023-01-12 PROCEDURE — 92941 PRQ TRLML REVSC TOT OCCL AMI: CPT | Performed by: INTERNAL MEDICINE

## 2023-01-12 PROCEDURE — B2111ZZ FLUOROSCOPY OF MULTIPLE CORONARY ARTERIES USING LOW OSMOLAR CONTRAST: ICD-10-PCS | Performed by: INTERNAL MEDICINE

## 2023-01-12 PROCEDURE — C9601 PERC DRUG-EL COR STENT BRAN: HCPCS | Performed by: INTERNAL MEDICINE

## 2023-01-12 PROCEDURE — 85025 COMPLETE CBC W/AUTO DIFF WBC: CPT | Performed by: EMERGENCY MEDICINE

## 2023-01-12 PROCEDURE — 99285 EMERGENCY DEPT VISIT HI MDM: CPT

## 2023-01-12 PROCEDURE — B2151ZZ FLUOROSCOPY OF LEFT HEART USING LOW OSMOLAR CONTRAST: ICD-10-PCS | Performed by: INTERNAL MEDICINE

## 2023-01-12 PROCEDURE — C9606 PERC D-E COR REVASC W AMI S: HCPCS | Performed by: INTERNAL MEDICINE

## 2023-01-12 PROCEDURE — 93005 ELECTROCARDIOGRAM TRACING: CPT | Performed by: INTERNAL MEDICINE

## 2023-01-12 PROCEDURE — 0 IOPAMIDOL PER 1 ML: Performed by: INTERNAL MEDICINE

## 2023-01-12 PROCEDURE — C1769 GUIDE WIRE: HCPCS | Performed by: INTERNAL MEDICINE

## 2023-01-12 PROCEDURE — 93005 ELECTROCARDIOGRAM TRACING: CPT | Performed by: EMERGENCY MEDICINE

## 2023-01-12 PROCEDURE — 71045 X-RAY EXAM CHEST 1 VIEW: CPT

## 2023-01-12 PROCEDURE — 93458 L HRT ARTERY/VENTRICLE ANGIO: CPT | Performed by: INTERNAL MEDICINE

## 2023-01-12 PROCEDURE — 25010000002 MIDAZOLAM PER 1 MG: Performed by: INTERNAL MEDICINE

## 2023-01-12 PROCEDURE — 99223 1ST HOSP IP/OBS HIGH 75: CPT | Performed by: INTERNAL MEDICINE

## 2023-01-12 PROCEDURE — 027136Z DILATION OF CORONARY ARTERY, TWO ARTERIES WITH THREE DRUG-ELUTING INTRALUMINAL DEVICES, PERCUTANEOUS APPROACH: ICD-10-PCS | Performed by: INTERNAL MEDICINE

## 2023-01-12 PROCEDURE — C1760 CLOSURE DEV, VASC: HCPCS | Performed by: INTERNAL MEDICINE

## 2023-01-12 PROCEDURE — 02C03ZZ EXTIRPATION OF MATTER FROM CORONARY ARTERY, ONE ARTERY, PERCUTANEOUS APPROACH: ICD-10-PCS | Performed by: INTERNAL MEDICINE

## 2023-01-12 PROCEDURE — 85347 COAGULATION TIME ACTIVATED: CPT

## 2023-01-12 PROCEDURE — C1757 CATH, THROMBECTOMY/EMBOLECT: HCPCS | Performed by: INTERNAL MEDICINE

## 2023-01-12 PROCEDURE — 92929 PR PRQ TRLUML CORONARY STENT W/ANGIO ADDL ART/BRNCH: CPT | Performed by: INTERNAL MEDICINE

## 2023-01-12 PROCEDURE — 25010000002 HEPARIN (PORCINE) PER 1000 UNITS: Performed by: INTERNAL MEDICINE

## 2023-01-12 PROCEDURE — 36415 COLL VENOUS BLD VENIPUNCTURE: CPT

## 2023-01-12 PROCEDURE — 25010000002 MORPHINE PER 10 MG: Performed by: INTERNAL MEDICINE

## 2023-01-12 PROCEDURE — 80053 COMPREHEN METABOLIC PANEL: CPT | Performed by: INTERNAL MEDICINE

## 2023-01-12 PROCEDURE — 84484 ASSAY OF TROPONIN QUANT: CPT | Performed by: EMERGENCY MEDICINE

## 2023-01-12 PROCEDURE — 85610 PROTHROMBIN TIME: CPT | Performed by: INTERNAL MEDICINE

## 2023-01-12 DEVICE — XIENCE SKYPOINT™ EVEROLIMUS ELUTING CORONARY STENT SYSTEM 3.00 MM X 38 MM / RAPID-EXCHANGE
Type: IMPLANTABLE DEVICE | Site: CORONARY | Status: FUNCTIONAL
Brand: XIENCE SKYPOINT™

## 2023-01-12 DEVICE — XIENCE SKYPOINT™ EVEROLIMUS ELUTING CORONARY STENT SYSTEM 2.50 MM X 15 MM / RAPID-EXCHANGE
Type: IMPLANTABLE DEVICE | Site: CORONARY | Status: FUNCTIONAL
Brand: XIENCE SKYPOINT™

## 2023-01-12 DEVICE — XIENCE SKYPOINT™ EVEROLIMUS ELUTING CORONARY STENT SYSTEM 2.75 MM X 08 MM / RAPID-EXCHANGE
Type: IMPLANTABLE DEVICE | Site: CORONARY | Status: FUNCTIONAL
Brand: XIENCE SKYPOINT™

## 2023-01-12 RX ORDER — SODIUM CHLORIDE 9 MG/ML
50 INJECTION, SOLUTION INTRAVENOUS CONTINUOUS
Status: DISCONTINUED | OUTPATIENT
Start: 2023-01-12 | End: 2023-01-13

## 2023-01-12 RX ORDER — LEVOTHYROXINE SODIUM 0.1 MG/1
200 TABLET ORAL DAILY
Status: DISCONTINUED | OUTPATIENT
Start: 2023-01-12 | End: 2023-01-14 | Stop reason: HOSPADM

## 2023-01-12 RX ORDER — MORPHINE SULFATE 2 MG/ML
2 INJECTION, SOLUTION INTRAMUSCULAR; INTRAVENOUS
Status: DISCONTINUED | OUTPATIENT
Start: 2023-01-12 | End: 2023-01-14 | Stop reason: HOSPADM

## 2023-01-12 RX ORDER — MIDAZOLAM HYDROCHLORIDE 1 MG/ML
INJECTION INTRAMUSCULAR; INTRAVENOUS
Status: DISCONTINUED | OUTPATIENT
Start: 2023-01-12 | End: 2023-01-12 | Stop reason: HOSPADM

## 2023-01-12 RX ORDER — NICOTINE 21 MG/24HR
1 PATCH, TRANSDERMAL 24 HOURS TRANSDERMAL EVERY 24 HOURS
Status: DISCONTINUED | OUTPATIENT
Start: 2023-01-12 | End: 2023-01-14 | Stop reason: HOSPADM

## 2023-01-12 RX ORDER — FENTANYL CITRATE 50 UG/ML
INJECTION, SOLUTION INTRAMUSCULAR; INTRAVENOUS
Status: DISCONTINUED | OUTPATIENT
Start: 2023-01-12 | End: 2023-01-12 | Stop reason: HOSPADM

## 2023-01-12 RX ORDER — HYDROCODONE BITARTRATE AND ACETAMINOPHEN 5; 325 MG/1; MG/1
1 TABLET ORAL EVERY 4 HOURS PRN
Status: DISCONTINUED | OUTPATIENT
Start: 2023-01-12 | End: 2023-01-14 | Stop reason: HOSPADM

## 2023-01-12 RX ORDER — GABAPENTIN 400 MG/1
400 CAPSULE ORAL 2 TIMES DAILY
Status: DISCONTINUED | OUTPATIENT
Start: 2023-01-12 | End: 2023-01-14 | Stop reason: HOSPADM

## 2023-01-12 RX ORDER — AMIODARONE HYDROCHLORIDE 50 MG/ML
INJECTION, SOLUTION INTRAVENOUS
Status: DISCONTINUED | OUTPATIENT
Start: 2023-01-12 | End: 2023-01-12 | Stop reason: HOSPADM

## 2023-01-12 RX ORDER — PRASUGREL 10 MG/1
10 TABLET, FILM COATED ORAL DAILY
Status: DISCONTINUED | OUTPATIENT
Start: 2023-01-13 | End: 2023-01-14 | Stop reason: HOSPADM

## 2023-01-12 RX ORDER — ONDANSETRON 4 MG/1
4 TABLET, FILM COATED ORAL EVERY 6 HOURS PRN
Status: DISCONTINUED | OUTPATIENT
Start: 2023-01-12 | End: 2023-01-14 | Stop reason: HOSPADM

## 2023-01-12 RX ORDER — ACETAMINOPHEN 325 MG/1
650 TABLET ORAL EVERY 4 HOURS PRN
Status: DISCONTINUED | OUTPATIENT
Start: 2023-01-12 | End: 2023-01-14 | Stop reason: HOSPADM

## 2023-01-12 RX ORDER — HEPARIN SODIUM 1000 [USP'U]/ML
INJECTION, SOLUTION INTRAVENOUS; SUBCUTANEOUS
Status: DISCONTINUED | OUTPATIENT
Start: 2023-01-12 | End: 2023-01-12 | Stop reason: HOSPADM

## 2023-01-12 RX ORDER — ONDANSETRON 2 MG/ML
4 INJECTION INTRAMUSCULAR; INTRAVENOUS EVERY 6 HOURS PRN
Status: DISCONTINUED | OUTPATIENT
Start: 2023-01-12 | End: 2023-01-14 | Stop reason: HOSPADM

## 2023-01-12 RX ORDER — ASPIRIN 81 MG/1
81 TABLET ORAL DAILY
Status: DISCONTINUED | OUTPATIENT
Start: 2023-01-12 | End: 2023-01-14 | Stop reason: HOSPADM

## 2023-01-12 RX ORDER — PAROXETINE HYDROCHLORIDE 20 MG/1
20 TABLET, FILM COATED ORAL EVERY MORNING
Status: DISCONTINUED | OUTPATIENT
Start: 2023-01-13 | End: 2023-01-14 | Stop reason: HOSPADM

## 2023-01-12 RX ORDER — ATORVASTATIN CALCIUM 20 MG/1
40 TABLET, FILM COATED ORAL NIGHTLY
Status: DISCONTINUED | OUTPATIENT
Start: 2023-01-12 | End: 2023-01-14 | Stop reason: HOSPADM

## 2023-01-12 RX ORDER — PRASUGREL 10 MG/1
TABLET, FILM COATED ORAL
Status: DISCONTINUED | OUTPATIENT
Start: 2023-01-12 | End: 2023-01-12 | Stop reason: HOSPADM

## 2023-01-12 RX ORDER — LIDOCAINE HYDROCHLORIDE 20 MG/ML
INJECTION, SOLUTION INFILTRATION; PERINEURAL
Status: DISCONTINUED | OUTPATIENT
Start: 2023-01-12 | End: 2023-01-12 | Stop reason: HOSPADM

## 2023-01-12 RX ORDER — MORPHINE SULFATE 2 MG/ML
4 INJECTION, SOLUTION INTRAMUSCULAR; INTRAVENOUS
Status: DISCONTINUED | OUTPATIENT
Start: 2023-01-12 | End: 2023-01-14 | Stop reason: HOSPADM

## 2023-01-12 RX ORDER — SODIUM CHLORIDE 0.9 % (FLUSH) 0.9 %
10 SYRINGE (ML) INJECTION AS NEEDED
Status: DISCONTINUED | OUTPATIENT
Start: 2023-01-12 | End: 2023-01-14 | Stop reason: HOSPADM

## 2023-01-12 RX ADMIN — HYDROCODONE BITARTRATE AND ACETAMINOPHEN 1 TABLET: 5; 325 TABLET ORAL at 21:39

## 2023-01-12 RX ADMIN — LEVOTHYROXINE SODIUM 200 MCG: 0.1 TABLET ORAL at 22:35

## 2023-01-12 RX ADMIN — ATORVASTATIN CALCIUM 40 MG: 20 TABLET, FILM COATED ORAL at 22:35

## 2023-01-12 RX ADMIN — MORPHINE SULFATE 2 MG: 2 INJECTION, SOLUTION INTRAMUSCULAR; INTRAVENOUS at 22:38

## 2023-01-12 RX ADMIN — INSULIN GLARGINE-YFGN 28 UNITS: 100 INJECTION, SOLUTION SUBCUTANEOUS at 22:38

## 2023-01-12 RX ADMIN — ASPIRIN 81 MG: 81 TABLET, COATED ORAL at 22:35

## 2023-01-12 RX ADMIN — GABAPENTIN 400 MG: 400 CAPSULE ORAL at 22:34

## 2023-01-12 RX ADMIN — SODIUM CHLORIDE 50 ML/HR: 9 INJECTION, SOLUTION INTRAVENOUS at 22:38

## 2023-01-12 NOTE — Clinical Note
First balloon inflation max pressure = 14 erick. First balloon inflation duration = 20 seconds. Second inflation of balloon - Max pressure = 14 erick. 2nd Inflation of balloon - Duration = 20 seconds. 2nd inflation was done at 20:10 EST.

## 2023-01-12 NOTE — Clinical Note
First balloon inflation max pressure = 20 erick. First balloon inflation duration = 10 seconds. Second inflation of balloon - Max pressure = 20 erick. 2nd Inflation of balloon - Duration = 10 seconds. 2nd inflation was done at 20:15 EST.

## 2023-01-12 NOTE — Clinical Note
A 6 fr sheath was  inserted using micropuncture technique with ultrasound guidance into the right femoral artery. Sheath insertion delayed. no chest pain and no edema.

## 2023-01-12 NOTE — Clinical Note
First balloon inflation max pressure = 14 erick. First balloon inflation duration = 20 seconds. Second inflation of balloon - Max pressure = 14 erick. 2nd Inflation of balloon - Duration = 20 seconds. 2nd inflation was done at 20:05 EST.

## 2023-01-12 NOTE — Clinical Note
Prepped: Right Wrist. Prepped with: ChloraPrep. The site was clipped. The patient was draped in a sterile fashion.

## 2023-01-13 ENCOUNTER — APPOINTMENT (OUTPATIENT)
Dept: CARDIOLOGY | Facility: HOSPITAL | Age: 38
DRG: 247 | End: 2023-01-13
Payer: MEDICAID

## 2023-01-13 PROBLEM — I21.11 STEMI INVOLVING RIGHT CORONARY ARTERY (HCC): Status: ACTIVE | Noted: 2023-01-13

## 2023-01-13 PROBLEM — E11.59 TYPE 2 DIABETES MELLITUS WITH CIRCULATORY DISORDER: Status: ACTIVE | Noted: 2019-05-12

## 2023-01-13 PROBLEM — Z87.19 HISTORY OF PANCREATITIS: Status: ACTIVE | Noted: 2023-01-13

## 2023-01-13 PROBLEM — E78.1 HYPERTRIGLYCERIDEMIA: Status: ACTIVE | Noted: 2023-01-13

## 2023-01-13 PROBLEM — F17.210 CIGARETTE NICOTINE DEPENDENCE WITHOUT COMPLICATION: Status: ACTIVE | Noted: 2023-01-13

## 2023-01-13 PROBLEM — E03.4 IDIOPATHIC ATROPHIC HYPOTHYROIDISM: Status: ACTIVE | Noted: 2020-05-21

## 2023-01-13 LAB
ACT BLD: 257 SECONDS (ref 82–152)
ACT BLD: 275 SECONDS (ref 82–152)
ANION GAP SERPL CALCULATED.3IONS-SCNC: 10.4 MMOL/L (ref 5–15)
BH CV ECHO MEAS - ACS: 1.77 CM
BH CV ECHO MEAS - AO MAX PG: 8.1 MMHG
BH CV ECHO MEAS - AO MEAN PG: 4.7 MMHG
BH CV ECHO MEAS - AO ROOT DIAM: 2.7 CM
BH CV ECHO MEAS - AO V2 MAX: 142.5 CM/SEC
BH CV ECHO MEAS - AO V2 VTI: 25.7 CM
BH CV ECHO MEAS - AVA(I,D): 2.21 CM2
BH CV ECHO MEAS - EDV(CUBED): 59.5 ML
BH CV ECHO MEAS - EDV(MOD-SP2): 33 ML
BH CV ECHO MEAS - EDV(MOD-SP4): 38 ML
BH CV ECHO MEAS - EF(MOD-BP): 62.6 %
BH CV ECHO MEAS - EF(MOD-SP2): 60.6 %
BH CV ECHO MEAS - EF(MOD-SP4): 63.2 %
BH CV ECHO MEAS - ESV(CUBED): 16.7 ML
BH CV ECHO MEAS - ESV(MOD-SP2): 13 ML
BH CV ECHO MEAS - ESV(MOD-SP4): 14 ML
BH CV ECHO MEAS - FS: 34.5 %
BH CV ECHO MEAS - IVS/LVPW: 1.35 CM
BH CV ECHO MEAS - IVSD: 1.29 CM
BH CV ECHO MEAS - LA DIMENSION: 3.3 CM
BH CV ECHO MEAS - LAT PEAK E' VEL: 11.9 CM/SEC
BH CV ECHO MEAS - LV DIASTOLIC VOL/BSA (35-75): 19.5 CM2
BH CV ECHO MEAS - LV MASS(C)D: 144.6 GRAMS
BH CV ECHO MEAS - LV MAX PG: 4.9 MMHG
BH CV ECHO MEAS - LV MEAN PG: 2.09 MMHG
BH CV ECHO MEAS - LV SYSTOLIC VOL/BSA (12-30): 7.2 CM2
BH CV ECHO MEAS - LV V1 MAX: 110.5 CM/SEC
BH CV ECHO MEAS - LV V1 VTI: 18.2 CM
BH CV ECHO MEAS - LVIDD: 3.9 CM
BH CV ECHO MEAS - LVIDS: 2.6 CM
BH CV ECHO MEAS - LVOT AREA: 3.1 CM2
BH CV ECHO MEAS - LVOT DIAM: 1.99 CM
BH CV ECHO MEAS - LVPWD: 0.95 CM
BH CV ECHO MEAS - MED PEAK E' VEL: 9.9 CM/SEC
BH CV ECHO MEAS - MV A MAX VEL: 90.8 CM/SEC
BH CV ECHO MEAS - MV DEC SLOPE: 503.4 CM/SEC2
BH CV ECHO MEAS - MV DEC TIME: 178 MSEC
BH CV ECHO MEAS - MV E MAX VEL: 89.5 CM/SEC
BH CV ECHO MEAS - MV E/A: 0.99
BH CV ECHO MEAS - MV MAX PG: 3.8 MMHG
BH CV ECHO MEAS - MV MEAN PG: 2.6 MMHG
BH CV ECHO MEAS - MV P1/2T: 61.6 MSEC
BH CV ECHO MEAS - MV V2 VTI: 21.6 CM
BH CV ECHO MEAS - MVA(P1/2T): 3.6 CM2
BH CV ECHO MEAS - MVA(VTI): 2.6 CM2
BH CV ECHO MEAS - PA ACC TIME: 0.12 SEC
BH CV ECHO MEAS - PA PR(ACCEL): 25.5 MMHG
BH CV ECHO MEAS - PA V2 MAX: 103.2 CM/SEC
BH CV ECHO MEAS - QP/QS: 1.22
BH CV ECHO MEAS - RAP SYSTOLE: 3 MMHG
BH CV ECHO MEAS - RV MAX PG: 1.93 MMHG
BH CV ECHO MEAS - RV V1 MAX: 69.4 CM/SEC
BH CV ECHO MEAS - RV V1 VTI: 13.8 CM
BH CV ECHO MEAS - RVOT DIAM: 2.5 CM
BH CV ECHO MEAS - RVSP: 25.6 MMHG
BH CV ECHO MEAS - SI(MOD-SP2): 10.2 ML/M2
BH CV ECHO MEAS - SI(MOD-SP4): 12.3 ML/M2
BH CV ECHO MEAS - SV(LVOT): 56.7 ML
BH CV ECHO MEAS - SV(MOD-SP2): 20 ML
BH CV ECHO MEAS - SV(MOD-SP4): 24 ML
BH CV ECHO MEAS - SV(RVOT): 69.4 ML
BH CV ECHO MEAS - TAPSE (>1.6): 1.57 CM
BH CV ECHO MEAS - TR MAX PG: 22.6 MMHG
BH CV ECHO MEAS - TR MAX VEL: 237.5 CM/SEC
BH CV ECHO MEASUREMENTS AVERAGE E/E' RATIO: 8.21
BH CV XLRA - RV BASE: 2.42 CM
BH CV XLRA - RV LENGTH: 6.1 CM
BH CV XLRA - RV MID: 2.3 CM
BH CV XLRA - TDI S': 10.4 CM/SEC
BUN SERPL-MCNC: 12 MG/DL (ref 6–20)
BUN/CREAT SERPL: 20.3 (ref 7–25)
CALCIUM SPEC-SCNC: 8.6 MG/DL (ref 8.6–10.5)
CHLORIDE SERPL-SCNC: 107 MMOL/L (ref 98–107)
CHOLEST SERPL-MCNC: 183 MG/DL (ref 0–200)
CO2 SERPL-SCNC: 17.6 MMOL/L (ref 22–29)
CREAT SERPL-MCNC: 0.59 MG/DL (ref 0.57–1)
DEPRECATED RDW RBC AUTO: 39.5 FL (ref 37–54)
EGFRCR SERPLBLD CKD-EPI 2021: 117 ML/MIN/1.73
ERYTHROCYTE [DISTWIDTH] IN BLOOD BY AUTOMATED COUNT: 11.7 % (ref 12.3–15.4)
GLUCOSE BLDC GLUCOMTR-MCNC: 233 MG/DL (ref 70–130)
GLUCOSE BLDC GLUCOMTR-MCNC: 398 MG/DL (ref 70–130)
GLUCOSE SERPL-MCNC: 205 MG/DL (ref 65–99)
HBA1C MFR BLD: 10.8 % (ref 4.8–5.6)
HCT VFR BLD AUTO: 41.1 % (ref 34–46.6)
HDLC SERPL-MCNC: 42 MG/DL (ref 40–60)
HGB BLD-MCNC: 14 G/DL (ref 12–15.9)
LDLC SERPL CALC-MCNC: 57 MG/DL (ref 0–100)
LDLC/HDLC SERPL: 0.67 {RATIO}
MAXIMAL PREDICTED HEART RATE: 180 BPM
MCH RBC QN AUTO: 31.5 PG (ref 26.6–33)
MCHC RBC AUTO-ENTMCNC: 34.1 G/DL (ref 31.5–35.7)
MCV RBC AUTO: 92.4 FL (ref 79–97)
PLATELET # BLD AUTO: 276 10*3/MM3 (ref 140–450)
PMV BLD AUTO: 9.3 FL (ref 6–12)
POTASSIUM SERPL-SCNC: 3.8 MMOL/L (ref 3.5–5.2)
QT INTERVAL: 368 MS
QT INTERVAL: 374 MS
QT INTERVAL: 375 MS
RBC # BLD AUTO: 4.45 10*6/MM3 (ref 3.77–5.28)
SINUS: 2.47 CM
SODIUM SERPL-SCNC: 135 MMOL/L (ref 136–145)
STJ: 2.34 CM
STRESS TARGET HR: 153 BPM
TRIGL SERPL-MCNC: 565 MG/DL (ref 0–150)
VLDLC SERPL-MCNC: 84 MG/DL (ref 5–40)
WBC NRBC COR # BLD: 10.1 10*3/MM3 (ref 3.4–10.8)

## 2023-01-13 PROCEDURE — 80048 BASIC METABOLIC PNL TOTAL CA: CPT | Performed by: INTERNAL MEDICINE

## 2023-01-13 PROCEDURE — 93306 TTE W/DOPPLER COMPLETE: CPT | Performed by: INTERNAL MEDICINE

## 2023-01-13 PROCEDURE — 93005 ELECTROCARDIOGRAM TRACING: CPT | Performed by: INTERNAL MEDICINE

## 2023-01-13 PROCEDURE — 63710000001 INSULIN LISPRO (HUMAN) PER 5 UNITS: Performed by: INTERNAL MEDICINE

## 2023-01-13 PROCEDURE — 83036 HEMOGLOBIN GLYCOSYLATED A1C: CPT | Performed by: INTERNAL MEDICINE

## 2023-01-13 PROCEDURE — 82962 GLUCOSE BLOOD TEST: CPT

## 2023-01-13 PROCEDURE — 93306 TTE W/DOPPLER COMPLETE: CPT

## 2023-01-13 PROCEDURE — 99232 SBSQ HOSP IP/OBS MODERATE 35: CPT | Performed by: INTERNAL MEDICINE

## 2023-01-13 PROCEDURE — 85027 COMPLETE CBC AUTOMATED: CPT | Performed by: INTERNAL MEDICINE

## 2023-01-13 PROCEDURE — 93010 ELECTROCARDIOGRAM REPORT: CPT | Performed by: INTERNAL MEDICINE

## 2023-01-13 PROCEDURE — 80061 LIPID PANEL: CPT | Performed by: INTERNAL MEDICINE

## 2023-01-13 PROCEDURE — 25010000002 MORPHINE PER 10 MG: Performed by: INTERNAL MEDICINE

## 2023-01-13 RX ORDER — INSULIN LISPRO 100 [IU]/ML
0-9 INJECTION, SOLUTION INTRAVENOUS; SUBCUTANEOUS
Status: DISCONTINUED | OUTPATIENT
Start: 2023-01-13 | End: 2023-01-14 | Stop reason: HOSPADM

## 2023-01-13 RX ORDER — POLYETHYLENE GLYCOL 3350 17 G/17G
17 POWDER, FOR SOLUTION ORAL DAILY PRN
COMMUNITY

## 2023-01-13 RX ORDER — METOPROLOL TARTRATE 50 MG/1
50 TABLET, FILM COATED ORAL EVERY 12 HOURS SCHEDULED
Status: DISCONTINUED | OUTPATIENT
Start: 2023-01-13 | End: 2023-01-14 | Stop reason: HOSPADM

## 2023-01-13 RX ORDER — NICOTINE POLACRILEX 4 MG
15 LOZENGE BUCCAL
Status: DISCONTINUED | OUTPATIENT
Start: 2023-01-13 | End: 2023-01-14 | Stop reason: HOSPADM

## 2023-01-13 RX ORDER — FENOFIBRATE 145 MG/1
145 TABLET, COATED ORAL DAILY
Status: DISCONTINUED | OUTPATIENT
Start: 2023-01-13 | End: 2023-01-14 | Stop reason: HOSPADM

## 2023-01-13 RX ORDER — DEXTROSE MONOHYDRATE 25 G/50ML
25 INJECTION, SOLUTION INTRAVENOUS
Status: DISCONTINUED | OUTPATIENT
Start: 2023-01-13 | End: 2023-01-14 | Stop reason: HOSPADM

## 2023-01-13 RX ADMIN — GABAPENTIN 400 MG: 400 CAPSULE ORAL at 20:40

## 2023-01-13 RX ADMIN — HYDROCODONE BITARTRATE AND ACETAMINOPHEN 1 TABLET: 5; 325 TABLET ORAL at 17:26

## 2023-01-13 RX ADMIN — METOPROLOL TARTRATE 50 MG: 50 TABLET, FILM COATED ORAL at 08:32

## 2023-01-13 RX ADMIN — ATORVASTATIN CALCIUM 40 MG: 20 TABLET, FILM COATED ORAL at 20:40

## 2023-01-13 RX ADMIN — MORPHINE SULFATE 2 MG: 2 INJECTION, SOLUTION INTRAMUSCULAR; INTRAVENOUS at 18:41

## 2023-01-13 RX ADMIN — METOPROLOL TARTRATE 50 MG: 50 TABLET, FILM COATED ORAL at 20:40

## 2023-01-13 RX ADMIN — MORPHINE SULFATE 2 MG: 2 INJECTION, SOLUTION INTRAMUSCULAR; INTRAVENOUS at 20:40

## 2023-01-13 RX ADMIN — MORPHINE SULFATE 2 MG: 2 INJECTION, SOLUTION INTRAMUSCULAR; INTRAVENOUS at 03:20

## 2023-01-13 RX ADMIN — GABAPENTIN 400 MG: 400 CAPSULE ORAL at 08:32

## 2023-01-13 RX ADMIN — ASPIRIN 81 MG: 81 TABLET, COATED ORAL at 08:32

## 2023-01-13 RX ADMIN — INSULIN GLARGINE-YFGN 28 UNITS: 100 INJECTION, SOLUTION SUBCUTANEOUS at 08:33

## 2023-01-13 RX ADMIN — NICOTINE 1 PATCH: 21 PATCH, EXTENDED RELEASE TRANSDERMAL at 15:15

## 2023-01-13 RX ADMIN — INSULIN GLARGINE-YFGN 28 UNITS: 100 INJECTION, SOLUTION SUBCUTANEOUS at 20:40

## 2023-01-13 RX ADMIN — HYDROCODONE BITARTRATE AND ACETAMINOPHEN 1 TABLET: 5; 325 TABLET ORAL at 23:56

## 2023-01-13 RX ADMIN — INSULIN LISPRO 4 UNITS: 100 INJECTION, SOLUTION INTRAVENOUS; SUBCUTANEOUS at 20:40

## 2023-01-13 RX ADMIN — LEVOTHYROXINE SODIUM 200 MCG: 0.1 TABLET ORAL at 08:32

## 2023-01-13 RX ADMIN — MORPHINE SULFATE 2 MG: 2 INJECTION, SOLUTION INTRAMUSCULAR; INTRAVENOUS at 00:29

## 2023-01-13 RX ADMIN — HYDROCODONE BITARTRATE AND ACETAMINOPHEN 1 TABLET: 5; 325 TABLET ORAL at 09:03

## 2023-01-13 RX ADMIN — FENOFIBRATE 145 MG: 145 TABLET, FILM COATED ORAL at 18:48

## 2023-01-13 RX ADMIN — PAROXETINE HYDROCHLORIDE HEMIHYDRATE 20 MG: 20 TABLET, FILM COATED ORAL at 08:00

## 2023-01-13 RX ADMIN — INSULIN LISPRO 8 UNITS: 100 INJECTION, SOLUTION INTRAVENOUS; SUBCUTANEOUS at 15:00

## 2023-01-13 RX ADMIN — EMPAGLIFLOZIN 10 MG: 10 TABLET, FILM COATED ORAL at 17:20

## 2023-01-13 RX ADMIN — PRASUGREL 10 MG: 10 TABLET, FILM COATED ORAL at 08:32

## 2023-01-13 RX ADMIN — NICOTINE 1 PATCH: 21 PATCH, EXTENDED RELEASE TRANSDERMAL at 00:19

## 2023-01-13 NOTE — PLAN OF CARE
Goal Outcome Evaluation:   Post cath 3 stents RCA arrival last night 2054 Inferior STEMI right groin sheath removed per perclosure Patient chest pain level 2 increased to level 8 DR Baca aware ordered morphine, EKG improved. Chest pain became less and controled with morphine no EKG changes, right groin oozy redressed and remain clean and intact, pulses 2+  no distress VSS

## 2023-01-13 NOTE — PROGRESS NOTES
"Edie Camacho  1985 38 y.o.  [id number]      Patient Care Team:  Layla Monique APRN as PCP - General (Family Medicine)  Ryan Chatterjee MD as Consulting Physician (Nephrology)    CC: Inferior STEMI status post PCI, preserved LV function, ongoing tobacco abuse and diabetes out-of-control    Interval History: She feels okay is pretty nervous today      Objective   Vital Signs  Temp:  [98 °F (36.7 °C)-98.3 °F (36.8 °C)] 98.3 °F (36.8 °C)  Heart Rate:  [] 110  Resp:  [12-28] 16  BP: (108-143)/() 135/81    Intake/Output Summary (Last 24 hours) at 1/13/2023 0904  Last data filed at 1/13/2023 0800  Gross per 24 hour   Intake 1145 ml   Output --   Net 1145 ml     Flowsheet Rows    Flowsheet Row First Filed Value   Admission Height 167.6 cm (66\") Documented at 01/12/2023 1955   Admission Weight 91.2 kg (201 lb) Documented at 01/12/2023 1955          Physical Exam:   General Appearance:    Alert,oriented, in no acute distress   Lungs:     Clear to auscultation,BS are equal    Heart:    Normal S1 and S2, RRR without murmur, gallop or rub   HEENT:    Sclerae are clear, no JVD or adenopathy   Abdomen:     Normal bowel sounds, soft nontender, nondistended, no HSM   Extremities:   Moves all extremities well, no edema, no cyanosis, no             Redness, no rash     Medication Review:      aspirin, 81 mg, Oral, Daily  atorvastatin, 40 mg, Oral, Nightly  gabapentin, 400 mg, Oral, BID  insulin glargine, 28 Units, Subcutaneous, Q12H  levothyroxine, 200 mcg, Oral, Daily  metoprolol tartrate, 50 mg, Oral, Q12H  nicotine, 1 patch, Transdermal, Q24H  PARoxetine, 20 mg, Oral, QAM  prasugrel, 10 mg, Oral, Daily             I reviewed the patient's new clinical results.  I personally viewed and interpreted the patient's EKG/Telemetry data    Assessment/Plan  There are no hospital problems to display for this patient.      Doing well I ended up having to put 3 stents in this artery on the right its not a very big " vessel and her LV function subsequently is still normal.  Other coronaries did not have severe disease.  The whole key with her is going to be getting her risk modification under control.  She has a hemoglobin A1c of 10.  That basically is no treatment.  I am going to start Jardiance from a diabetes and a coronary artery disease perspective whether she will be able to afford that or not I do not know.  I have put her on standard medical therapy.  We will keep her on aspirin for 3 months.  We will keep her on  prasugrel for a year.  Then change her to long-term clopidogrel.  I am going to also put her on a low-dose of losartan.  She can transfer out of the CCU today and may be able to go home tomorrow.  She needs to see an endocrinologist as an outpatient get put on a continuous glucose monitor may need a glucose pump we need to really ratchet up her control of her diabetes    Jose Baca MD  01/13/23  09:04 EST

## 2023-01-13 NOTE — CONSULTS
Patient Name:  Edie Camacho  YOB: 1985  MRN:  [id number]  Admit Date:  1/12/2023  Patient Care Team:  Layla Monique APRN as PCP - General (Family Medicine)  Ryan Chatterjee MD as Consulting Physician (Nephrology)  Referring Provider: Dr. Baca  Reason for Consultation: Type 2 Diabetes Mellitus    Subjective   History Present Illness     Chief Complaint   Patient presents with   • Chest Pain       Ms. Camacho is a 38 y.o. smoker with a history of HTN, Hypothyroidism, Type 2 DM, chronic metabolic acidosis, and hypertriglyceridemia resulting in pancreatitis admitted to the cardiology service at [facility] with a STEMI. This was treated with RCA stenting and her LV function is preserved. She has very poorly controlled diabetes and I was consulted to evaluate and treat this.       History of Present Illness  Review of Systems   All other systems reviewed and are negative.       Personal History     Past Medical History:   Diagnosis Date   • Abdominal bloating with cramps    • Asthma    • Bloating symptom    • Colon polyps 06/13/2019    Rectum: hyperplastic polyp   • Diabetes mellitus (HCC)    • Disease of thyroid gland    • Diverticulitis    • Gastritis    • GERD (gastroesophageal reflux disease)    • H/O being hospitalized     TWO RECENT EPISODES S/P BOWEL OBSTRUCTIONS    • Hernia due to colostomy ([facility])    • History of kidney stones    • History of staph infection 2009    S/P TATTOO-ON BACK    • Kidney stone    • Large bowel obstruction (HCC)    • Neuropathy     LOWER EXTRIMITES   • Pregnancy    • Seasonal allergies    • Stricture of colon determined by endoscopy ([facility])    • Stricture of sigmoid colon (HCC) 07/08/2019    Added automatically from request for surgery [id number]   • Tachycardia      Past Surgical History:   Procedure Laterality Date   • CARDIAC CATHETERIZATION N/A 1/12/2023    Procedure: Left Heart Cath;  Surgeon: Jose Baca MD;  Location: [facility]  CATH INVASIVE LOCATION;  Service: Cardiovascular;  Laterality: N/A;   • CARDIAC CATHETERIZATION N/A 2023    Procedure: Coronary angiography;  Surgeon: Jose Baca MD;  Location: [facility] CATH INVASIVE LOCATION;  Service: Cardiovascular;  Laterality: N/A;   • CARDIAC CATHETERIZATION N/A 2023    Procedure: Percutaneous Coronary Intervention;  Surgeon: Jose Baca MD;  Location: [facility] CATH INVASIVE LOCATION;  Service: Cardiovascular;  Laterality: N/A;   • CARDIAC CATHETERIZATION N/A 2023    Procedure: Stent JENNIFER coronary;  Surgeon: Jose Baca MD;  Location: [facility] CATH INVASIVE LOCATION;  Service: Cardiovascular;  Laterality: N/A;   • CARDIAC CATHETERIZATION  2023    Procedure: Percutaneous Manual Thrombectomy;  Surgeon: Jose Baca MD;  Location: [facility] CATH INVASIVE LOCATION;  Service: Cardiovascular;;   •  SECTION N/A    • COLON RESECTION N/A 7/10/2019    Procedure: LAPAROSCOPIC LOW ANTERIOR  COLON RESECTION WITH DIVERTING LOOP ILEOSTOMY;  Surgeon: Roderick Arnold MD;  Location: [facility] MAIN OR;  Service: General   • COLONOSCOPY N/A 2020    Procedure: COLONOSCOPY to Cecum via Colostomy and Rectum;  Surgeon: Roderick Arnold MD;  Location: [facility] ENDOSCOPY;  Service: General;  Laterality: N/A;  Colostomy Status   Post Surgical Anatomy   • COLONOSCOPY W/ BIOPSIES AND POLYPECTOMY N/A 2019   • DIAGNOSTIC LAPAROSCOPY N/A 2019    Procedure: DIAGNOSTIC LAPAROSCOPY;  Surgeon: Radha Loera MD;  Location: [facility] MAIN OR;  Service: General   • ENDOSCOPY N/A 2020    Procedure: ESOPHAGOGASTRODUODENOSCOPY with Bx's;  Surgeon: Roderick Arnold MD;  Location: [facility] ENDOSCOPY;  Service: General;  Laterality: N/A;  [place]   Reflux Esophagitis, Mild Duodenitis   • EXPLORATORY LAPAROTOMY N/A 2019    Procedure: COLON RESECTION WITH WAYNE'S PROCEDURE WITH FLEXIBLE SIGMOIDSCOPE;  Surgeon: Radha Loera MD;  Location: [facility] MAIN OR;  Service: General    • NEPHROSTOMY TRACT DILATATION W/ LITHOTRIPSY     • SIGMOIDOSCOPY N/A 6/2/2021    Procedure: FLEXIBLE SIGMOIDOSCOPY;  Surgeon: Radha Loera MD;  Location: [facility] ENDOSCOPY;  Service: General;  Laterality: N/A;  PREOP/ PERASTOMAL HERNIA  POSTOP/ 8CM RECTUM REMAINING     Family History   Problem Relation Age of Onset   • Heart disease Father    • Diabetes Father    • Asthma Mother    • Diabetes Mother    • Malig Hyperthermia Neg Hx      Social History     Tobacco Use   • Smoking status: Every Day     Packs/day: 0.45     Years: 21.00     Pack years: 9.45     Types: Cigarettes   • Smokeless tobacco: Never   Vaping Use   • Vaping Use: Never used   Substance Use Topics   • Alcohol use: No   • Drug use: No     No current facility-administered medications on file prior to encounter.     Current Outpatient Medications on File Prior to Encounter   Medication Sig Dispense Refill   • albuterol sulfate  (90 Base) MCG/ACT inhaler Inhale 2 puffs Every 4 (Four) Hours As Needed.     • atorvastatin (LIPITOR) 10 MG tablet Take 1 tablet by mouth Every Night. 30 tablet 0   • cetirizine (zyrTEC) 10 MG tablet Take 10 mg by mouth Daily.     • fenofibrate (TRICOR) 145 MG tablet Take 1 tablet by mouth Daily. 30 tablet 0   • fluticasone (FLONASE) 50 MCG/ACT nasal spray 2 sprays into the nostril(s) as directed by provider Daily.     • fluticasone (FLONASE) 50 MCG/ACT nasal spray 1 spray into the nostril(s) as directed by provider Daily for 30 days. 16 g 0   • gabapentin (NEURONTIN) 400 MG capsule Take 1 capsule by mouth 2 (Two) Times a Day.     • HYDROcodone-acetaminophen (NORCO) 5-325 MG per tablet Take 1 tablet by mouth Every 6 (Six) Hours As Needed for Moderate Pain . 12 tablet 0   • insulin glargine (LANTUS) 100 UNIT/ML injection Inject 28 Units under the skin into the appropriate area as directed Every 12 (Twelve) Hours.  12   • Insulin Lispro (humaLOG) 100 UNIT/ML injection Inject  under the skin into the appropriate area as  directed 3 (Three) Times a Day Before Meals. Per sliding Scale     • levothyroxine (SYNTHROID, LEVOTHROID) 200 MCG tablet Take 200 mcg by mouth Daily.     • magnesium oxide 250 MG tablet Take 1 tablet by mouth Daily.     • medroxyPROGESTERone Acetate 150 MG/ML suspension prefilled syringe Inject 1 mL under the skin into the appropriate area as directed Every 30 (Thirty) Days.     • methocarbamol (ROBAXIN) 750 MG tablet Take 750 mg by mouth As Needed.     • montelukast (SINGULAIR) 10 MG tablet Take 10 mg by mouth Daily.     • Multiple Vitamins-Minerals (Multi-Vitamin Gummies) chewable tablet Chew 1 tablet Daily.     • mupirocin (BACTROBAN) 2 % ointment Apply 1 application topically to the appropriate area as directed As Needed.     • ondansetron ODT (ZOFRAN-ODT) 4 MG disintegrating tablet Place 1 tablet on the tongue Every 6 (Six) Hours As Needed for Nausea. 10 tablet 0   • pantoprazole (PROTONIX) 40 MG EC tablet Take 40 mg by mouth Daily.     • PARoxetine (PAXIL) 20 MG tablet Take 20 mg by mouth Every Morning.     • polyethylene glycol (MIRALAX) 17 g packet Take 17 g by mouth Daily As Needed.     • vitamin D (ERGOCALCIFEROL) 1.25 MG (95784 UT) capsule capsule Take 50,000 Units by mouth Every 7 (Seven) Days. Mondays     • [DISCONTINUED] potassium chloride (K-DUR) 10 MEQ CR tablet Take 2 tablets by mouth 2 (Two) Times a Day. (Patient taking differently: Take 2 tablets by mouth Daily.)     • [DISCONTINUED] sodium bicarbonate 650 MG tablet Take 2 tablets by mouth 3 (Three) Times a Day. 180 tablet 0   • [DISCONTINUED] sucralfate (CARAFATE) 1 g tablet Take 1 g by mouth 4 (Four) Times a Day Before Meals & at Bedtime.       Allergies   Allergen Reactions   • Bactrim [Sulfamethoxazole-Trimethoprim] Anaphylaxis   • Ciprofloxacin Anaphylaxis   • Sulfa Antibiotics Anaphylaxis   • Toradol [Ketorolac Tromethamine] Anxiety     Patient states she Is not allergic       Objective    Objective     Vital Signs  Temp:  [98 °F (36.7  °C)-98.3 °F (36.8 °C)] 98.3 °F (36.8 °C)  Heart Rate:  [] 93  Resp:  [12-28] 16  BP: (108-152)/() 152/94  SpO2:  [94 %-99 %] 98 %  on   ;   Device (Oxygen Therapy): room air  Body mass index is 30.51 kg/m².    Physical Exam  Vitals and nursing note reviewed.   Constitutional:       General: She is not in acute distress.     Appearance: She is not toxic-appearing or diaphoretic.   HENT:      Head: Normocephalic and atraumatic.      Nose: Nose normal.      Mouth/Throat:      Mouth: Mucous membranes are moist.      Pharynx: Oropharynx is clear.   Eyes:      Conjunctiva/sclera: Conjunctivae normal.      Pupils: Pupils are equal, round, and reactive to light.   Cardiovascular:      Rate and Rhythm: Normal rate and regular rhythm.      Pulses: Normal pulses.   Pulmonary:      Effort: Pulmonary effort is normal.      Breath sounds: Normal breath sounds. No rales.   Abdominal:      General: Bowel sounds are normal.      Palpations: Abdomen is soft.      Tenderness: There is no abdominal tenderness.   Musculoskeletal:         General: No swelling or tenderness.      Cervical back: Normal range of motion and neck supple.   Skin:     General: Skin is warm and dry.      Capillary Refill: Capillary refill takes less than 2 seconds.   Neurological:      General: No focal deficit present.      Mental Status: She is alert and oriented to person, place, and time.   Psychiatric:         Mood and Affect: Mood normal.         Behavior: Behavior normal.       Results Review:  I reviewed the patient's new clinical results.  I reviewed the patient's new imaging results and agree with the interpretation.  I reviewed the patient's other test results and agree with the interpretation  I personally viewed and interpreted the patient's EKG/Telemetry data  Discussed with ED provider.    Lab Results (last 24 hours)     Procedure Component Value Units Date/Time    POC Activated Clotting Time [[id number]]  (Abnormal) Collected: 01/12/23  2009    Specimen: Blood Updated: 01/13/23 1446     Activated Clotting Time  257 Seconds      Comment: Serial Number: [id number]:  [id number]       POC Activated Clotting Time [[id number]]  (Abnormal) Collected: 01/12/23 2024    Specimen: Blood Updated: 01/13/23 1447     Activated Clotting Time  275 Seconds      Comment: Serial Number: [id number]:  [id number]       POC Glucose Once [[id number]]  (Abnormal) Collected: 01/12/23 2141    Specimen: Blood Updated: 01/12/23 2142     Glucose 331 mg/dL      Comment: Meter: [id number] : [id number] Andrew Grande RN       CBC & Differential [[id number]]  (Abnormal) Collected: 01/12/23 2207    Specimen: Blood Updated: 01/12/23 2243    Narrative:      The following orders were created for panel order CBC & Differential.  Procedure                               Abnormality         Status                     ---------                               -----------         ------                     CBC Auto Differential[[id number]]        Abnormal            Final result                 Please view results for these tests on the individual orders.    Troponin [[id number]]  (Abnormal) Collected: 01/12/23 2207    Specimen: Blood Updated: 01/12/23 2326     Troponin T 0.261 ng/mL     Narrative:      Troponin T Reference Range:  <= 0.03 ng/mL-   Negative for AMI  >0.03 ng/mL-     Abnormal for myocardial necrosis.  Clinicians would have to utilize clinical acumen, EKG, Troponin and serial changes to determine if it is an Acute Myocardial Infarction or myocardial injury due to an underlying chronic condition.       Results may be falsely decreased if patient taking Biotin.      Comprehensive Metabolic Panel [[id number]]  (Abnormal) Collected: 01/12/23 2207    Specimen: Blood Updated: 01/12/23 2357     Glucose 292 mg/dL      BUN 14 mg/dL      Creatinine 0.70 mg/dL      Sodium 132 mmol/L      Potassium 3.9 mmol/L      Comment: Slight hemolysis detected by analyzer. Results may be affected.         Chloride 103 mmol/L      CO2 13.8 mmol/L      Calcium 8.9 mg/dL      Total Protein 6.2 g/dL      Albumin 3.7 g/dL      ALT (SGPT) 26 U/L      AST (SGOT) 45 U/L      Comment: Slight hemolysis detected by analyzer. Results may be affected.        Alkaline Phosphatase 96 U/L      Total Bilirubin 0.5 mg/dL      Globulin 2.5 gm/dL      A/G Ratio 1.5 g/dL      BUN/Creatinine Ratio 20.0     Anion Gap 15.2 mmol/L      eGFR 112.3 mL/min/1.73      Comment: National Kidney Foundation and American Society of Nephrology (ASN) Task Force recommended calculation based on the Chronic Kidney Disease Epidemiology Collaboration (CKD-EPI) equation refit without adjustment for race.       Narrative:      GFR Normal >60  Chronic Kidney Disease <60  Kidney Failure <15      Magnesium [[id number]]  (Normal) Collected: 01/12/23 2207    Specimen: Blood Updated: 01/12/23 2311     Magnesium 1.8 mg/dL     Protime-INR [[id number]]  (Abnormal) Collected: 01/12/23 2207    Specimen: Blood Updated: 01/12/23 2309     Protime 15.4 Seconds      INR 1.20    CBC Auto Differential [[id number]]  (Abnormal) Collected: 01/12/23 2207    Specimen: Blood Updated: 01/12/23 2243     WBC 12.11 10*3/mm3      RBC 4.51 10*6/mm3      Hemoglobin 14.2 g/dL      Hematocrit 41.5 %      MCV 92.0 fL      MCH 31.5 pg      MCHC 34.2 g/dL      RDW 11.7 %      RDW-SD 39.3 fl      MPV 9.6 fL      Platelets 274 10*3/mm3      Neutrophil % 81.5 %      Lymphocyte % 13.6 %      Monocyte % 3.7 %      Eosinophil % 0.2 %      Basophil % 0.2 %      Immature Grans % 0.8 %      Neutrophils, Absolute 9.85 10*3/mm3      Lymphocytes, Absolute 1.65 10*3/mm3      Monocytes, Absolute 0.45 10*3/mm3      Eosinophils, Absolute 0.03 10*3/mm3      Basophils, Absolute 0.03 10*3/mm3      Immature Grans, Absolute 0.10 10*3/mm3      nRBC 0.0 /100 WBC     CBC (No Diff) [[id number]]  (Abnormal) Collected: 01/13/23 0359    Specimen: Blood Updated: 01/13/23 0417     WBC 10.10 10*3/mm3      RBC 4.45 10*6/mm3       Hemoglobin 14.0 g/dL      Hematocrit 41.1 %      MCV 92.4 fL      MCH 31.5 pg      MCHC 34.1 g/dL      RDW 11.7 %      RDW-SD 39.5 fl      MPV 9.3 fL      Platelets 276 10*3/mm3     Hemoglobin A1c [[id number]]  (Abnormal) Collected: 01/13/23 0359    Specimen: Blood Updated: 01/13/23 0427     Hemoglobin A1C 10.80 %     Narrative:      Hemoglobin A1C Ranges:    Increased Risk for Diabetes  5.7% to 6.4%  Diabetes                     >= 6.5%  Diabetic Goal                < 7.0%    Lipid Panel [[id number]]  (Abnormal) Collected: 01/13/23 0359    Specimen: Blood Updated: 01/13/23 0436     Total Cholesterol 183 mg/dL      Triglycerides 565 mg/dL      HDL Cholesterol 42 mg/dL      LDL Cholesterol  57 mg/dL      VLDL Cholesterol 84 mg/dL      LDL/HDL Ratio 0.67    Narrative:      Cholesterol Reference Ranges  (U.S. Department of Health and Human Services ATP III Classifications)    Desirable          <200 mg/dL  Borderline High    200-239 mg/dL  High Risk          >240 mg/dL      Triglyceride Reference Ranges  (U.S. Department of Health and Human Services ATP III Classifications)    Normal           <150 mg/dL  Borderline High  150-199 mg/dL  High             200-499 mg/dL  Very High        >500 mg/dL    HDL Reference Ranges  (U.S. Department of Health and Human Services ATP III Classifications)    Low     <40 mg/dl (major risk factor for CHD)  High    >60 mg/dl ('negative' risk factor for CHD)        LDL Reference Ranges  (U.S. Department of Health and Human Services ATP III Classifications)    Optimal          <100 mg/dL  Near Optimal     100-129 mg/dL  Borderline High  130-159 mg/dL  High             160-189 mg/dL  Very High        >189 mg/dL    Basic Metabolic Panel [[id number]]  (Abnormal) Collected: 01/13/23 0454    Specimen: Blood Updated: 01/13/23 0531     Glucose 205 mg/dL      BUN 12 mg/dL      Creatinine 0.59 mg/dL      Sodium 135 mmol/L      Potassium 3.8 mmol/L      Comment: Slight hemolysis detected by analyzer.  Results may be affected.        Chloride 107 mmol/L      CO2 17.6 mmol/L      Calcium 8.6 mg/dL      BUN/Creatinine Ratio 20.3     Anion Gap 10.4 mmol/L      eGFR 117.0 mL/min/1.73      Comment: National Kidney Foundation and American Society of Nephrology (ASN) Task Force recommended calculation based on the Chronic Kidney Disease Epidemiology Collaboration (CKD-EPI) equation refit without adjustment for race.       Narrative:      GFR Normal >60  Chronic Kidney Disease <60  Kidney Failure <15      POC Glucose Once [[id number]]  (Abnormal) Collected: 01/13/23 1337    Specimen: Blood Updated: 01/13/23 1338     Glucose 398 mg/dL      Comment: Meter: [id number] : [id number] Sunshine Beckford RN             Imaging Results (Last 24 Hours)     Procedure Component Value Units Date/Time    XR Chest 1 View [[id number]] Collected: 01/12/23 2126     Updated: 01/12/23 2131    Narrative:      SINGLE VIEW OF THE CHEST     HISTORY: Acute STEMI protocol     COMPARISON: 08/04/2019     FINDINGS:  Heart size is within normal limits for technique. No pneumothorax or  pleural effusion is seen. There is diffuse interstitial prominence. This  is new when compared to prior exam given history, this may represent  edema.       Impression:      Diffuse interstitial prominence. Given history, this may represent  edema.     This report was finalized on 1/12/2023 9:28 PM by Dr. Marie Jeong M.D.             Results for orders placed during the hospital encounter of 01/12/23    Adult Transthoracic Echo Complete W/ Cont if Necessary Per Protocol    Interpretation Summary  •  Left ventricular systolic function is normal. Calculated left ventricular EF = 62.6%.  There is mild hypokinesis of the basal inferior and basal inferoseptal walls.  •  Left ventricular diastolic function was normal.      ECG 12 Lead   Final Result   HEART RATE= 104  bpm   RR Interval= 577  ms   SC Interval= 138  ms   P Horizontal Axis= 17  deg   P Front Axis= 78   deg   QRSD Interval= 76  ms   QT Interval= 368  ms   QRS Axis= 69  deg   T Wave Axis= -4  deg   - BORDERLINE ECG -   Sinus tachycardia   Abnormal R-wave progression, early transition   Borderline T wave abnormalities   When compared with ECG of 12-Jan-2023 21:09:14,   No significant change   Electronically Signed By: Samuel Angulo ([facility]) 13-Jan-2023 16:15:31   Date and Time of Study: 2023-01-13 05:04:37      ECG 12 Lead   Final Result   HEART RATE= 95  bpm   RR Interval= 632  ms   MO Interval= 144  ms   P Horizontal Axis= 21  deg   P Front Axis= 79  deg   QRSD Interval= 81  ms   QT Interval= 374  ms   QRS Axis= 74  deg   T Wave Axis= 6  deg   - BORDERLINE ECG -   Sinus rhythm   Borderline T abnormalities, inferior leads   When compared with ECG of 12-Jan-2023 19:38:30,   KYLEE have improved   Electronically Signed By: Samuel Angulo ([facility]) 13-Jan-2023 16:15:38   Date and Time of Study: 2023-01-12 21:09:14      ECG 12 Lead Chest Pain   Final Result   HEART RATE= 105  bpm   RR Interval= 572  ms   MO Interval= 137  ms   P Horizontal Axis= 7  deg   P Front Axis= 63  deg   QRSD Interval= 76  ms   QT Interval= 375  ms   QRS Axis= 83  deg   T Wave Axis= 96  deg   - ABNORMAL ECG -   Sinus tachycardia   Inferior infarct, acute   When compared with ECG of 04-Aug-2019 16:54:16,   Inferior KYLEE is new   Electronically Signed By: Samuel Angulo ([facility]) 13-Jan-2023 16:15:26   Date and Time of Study: 2023-01-12 19:38:30           Assessment/Plan     Active Hospital Problems    Diagnosis  POA   • **STEMI involving right coronary artery (HCC) [I21.11]  Yes   • Hypertriglyceridemia [E78.1]  Yes   • History of pancreatitis [Z87.19]  Not Applicable   • Cigarette nicotine dependence without complication [F17.210]  Yes   • Metabolic acidosis [E87.20]  Yes   • Idiopathic atrophic hypothyroidism [E03.4]  Yes   • Type 2 diabetes mellitus with circulatory disorder (HCC) [E11.59]  Yes      Resolved Hospital Problems   No resolved problems to  display.   Type 2 DM  - uncontrolled, A1C 10.80%  - complications include atherosclerosis (CAD), peripheral neuropathy, and hypertriglyceridemia  - I agree with jardiance-patient was hesitant to start this due to history of pancreatitis(was stopped on januvia at that time) but it looks like this was from her hypertriglyceridemia rather than medication-induced and she is willing to try this after discussing risks and benefits-will start this and ask CCP to look into the cost  - will continue lantus 28 units every 12 hours and watch sugars closely  - will order correctional insulin  - gabapentin continued for neuropathy  - consult DM educator    Hypertriglyceridemia  - has history of pancreatitis due to this  - she is on an increased dose of lipitor for her CAD  - will restart her fenofibrate    Hypothyroidism  - clinically euthyroid  - continue levothyroxine    Metabolic Acidosis  - normal anion gap  - has this chronically  - improved today, will check BMP tomorrow but may need to go back on oral sodium bicarbonate    Nicotine Dependence  - advised cessation  - nicotine patch ordered    STEMI  - s/p RCA stenting  - on DAPT with ASA and Effient  - LV EF fortunately preserved  - management per primary    I discussed the patient's findings and my recommendations with patient and nursing staff.  Disposition Per Primary Team-should be okay from internal medicine standpoint to go tomorrow if BG are reasonably controlled    Thank you very much for this consult. I will follow the patient with you while she is hospitalized.    Sam Kirk MD  [facility]  01/13/23  17:33 EST

## 2023-01-13 NOTE — CONSULTS
Met with patient, discussed benefits of cardiac rehab. Provided phase II information along with the contact information for cardiac rehab at [facility]. Requested for referral to be sent.

## 2023-01-13 NOTE — ED PROVIDER NOTES
EMERGENCY DEPARTMENT ENCOUNTER    Room Number:  2220/1  Date of encounter:  1/19/2023  PCP: Layla Monique APRN  Patient Care Team:  Layla Monique APRN as PCP - General (Family Medicine)  Ryan Chatterjee MD as Consulting Physician (Nephrology)   Independent Historians: Patient, EMS    HPI:  Chief Complaint: Chest pain, possible STEMI  A complete HPI/ROS/PMH/PSH/SH/FH are unobtainable due to: Nothing    Chronic or social conditions impacting patient care (social determinants of health): None    Context: Edie Camacho is a 38 y.o. female who presents to the ED c/o chest pain since 330 or 4:00 PM today.  She reports it was a tightness.  She states that the pain does not radiate.  She states that she tried to tolerate as long she could.  She has a history of hypertriglyceridemia, smoking, diabetes.  She has never had any prior cardiac problems besides some tachycardia.  EMS gave her aspirin and 5 nitros.  They gave her 50 of fentanyl as well.  They transmitted an EKG prior to her arrival and interventional cardiology was notified prior to the patient's arrival.  [facility] was called upon the patient's arrival.  EKG sent prior to arrival showed ST elevation in leads II, III and aVF with ST depression in V2 and V3    Review of prior external notes (non-ED): General surgery note dated 7/21/2022 with abdominal pain, nausea, emesis.  Decreased ostomy output.    Review of prior external test results outside of this encounter: CT of the chest dated 8/4/2019 with small right pleural effusion.    PAST MEDICAL HISTORY  Active Ambulatory Problems     Diagnosis Date Noted   • Colitis presumed infectious 05/12/2019   • Type 2 diabetes mellitus with circulatory disorder (HCC) 05/12/2019   • Suprapubic pain, acute 05/12/2019   • Hyponatremia 05/12/2019   • Sepsis (HCC) 05/12/2019   • Metabolic acidosis 05/13/2019   • Chronic constipation 05/13/2019   • Other microscopic hematuria 05/14/2019   • Lower abdominal pain  06/18/2019   • Colitis 06/18/2019   • Acute pancreatitis without infection or necrosis 05/21/2020   • Idiopathic atrophic hypothyroidism 05/21/2020   • Mixed hyperlipidemia 05/23/2020   • Hyponatremia 05/24/2020   • Metabolic acidosis 05/24/2020   • Hypokalemia 05/24/2020   • Ileostomy in place ([facility]) 05/25/2020   • Diarrhea 05/25/2020   • Renal lithiasis 05/25/2020   • Smoker 05/12/2021   • Small bowel obstruction ([facility]) 07/21/2022     Resolved Ambulatory Problems     Diagnosis Date Noted   • Dehydration 05/12/2019   • Large bowel obstruction ([facility]) 06/20/2019   • Stricture of sigmoid colon ([facility]) 07/08/2019     Past Medical History:   Diagnosis Date   • Abdominal bloating with cramps    • Asthma    • Bloating symptom    • Colon polyps 06/13/2019   • Diabetes mellitus ([facility])    • Disease of thyroid gland    • Diverticulitis    • Gastritis    • GERD (gastroesophageal reflux disease)    • H/O being hospitalized    • Hernia due to colostomy ([facility])    • History of kidney stones    • History of staph infection 2009   • Kidney stone    • Neuropathy    • Pregnancy    • Seasonal allergies    • Stricture of colon determined by endoscopy ([facility])    • Tachycardia        The patient has started, but not completed, their COVID-19 vaccination series.    PAST SURGICAL HISTORY  Past Surgical History:   Procedure Laterality Date   • CARDIAC CATHETERIZATION N/A 1/12/2023    Procedure: Left Heart Cath;  Surgeon: Jose Baca MD;  Location: [facility] CATH INVASIVE LOCATION;  Service: Cardiovascular;  Laterality: N/A;   • CARDIAC CATHETERIZATION N/A 1/12/2023    Procedure: Coronary angiography;  Surgeon: Jose Baca MD;  Location:  [facility] CATH INVASIVE LOCATION;  Service: Cardiovascular;  Laterality: N/A;   • CARDIAC CATHETERIZATION N/A 1/12/2023    Procedure: Percutaneous Coronary Intervention;  Surgeon: Jose Baca MD;  Location:  [facility] CATH INVASIVE LOCATION;  Service: Cardiovascular;  Laterality: N/A;   • CARDIAC CATHETERIZATION  N/A 2023    Procedure: Stent JENNIFER coronary;  Surgeon: Jose Baca MD;  Location: [facility] CATH [facility]ASIVE LOC[facility];  Service: Cardiovascular;  Laterality: N/A;   • CARDIAC CATHETERIZATION  2023    Procedure: Percutaneous Manual Thrombectomy;  Surgeon: Jose Baca MD;  [facility]ATH [facility];  Service: Cardiovascular;;   •  SECTION N/A    • COLON RESECTION N/A 7/10/2019    Procedure: LAPAROSCOPIC LOW ANTERIOR  COLON RESECTION WITH DIVERTING LOOP ILEOSTOMY;  Surgeon: Roderick Arnold MD;  Location: [facility] MAIN OR;  Service: General   • COLONOSCOPY N/A 2020    Procedure: COLONOSCOPY to Cecum via Colostomy and Rectum;  Surgeon: Roderick Arnold MD;  [facility]: [facility] ENDOSCOPY;  Service: General;  Laterality: N/A;  Colostomy Status   Post Surgical Anatomy   • COLONOSCOPY W/ BIOPSIES AND POLYPECTOMY N/A 2019   • DIAGNOSTIC LAPAROSCOPY N/A 2019    Procedure: DIAGNOSTIC LAPAROSCOPY;  Surgeon: Radha Loera MD;  Location: [facility] MAIN OR;  Service: General   • ENDOSCOPY N/A 2020    Procedure: ESOPHAGOGASTRODUODENOSCOPY with Bx's;  Surgeon: Roderick Arnold MD;  Location: [facility] ENDOSCOPY;  Service: General;  Laterality: N/A;  Relfux   Reflux Esophagitis, Mild Duodenitis   • EXPLORATORY LAPAROTOMY N/A 2019    Procedure: COLON RESECTION WITH WAYNE'S PROCEDURE WITH FLEXIBLE SIGMOIDSCOPE;  Surgeon: Radha Loera MD;  Location: [facility] MAIN OR;  Service: General   • NEPHROSTOMY TRACT DILATATION W/ LITHOTRIPSY     • SIGMOIDOSCOPY N/A 2021    Procedure: FLEXIBLE SIGMOIDOSCOPY;  Surgeon: Radha Loera MD;  Location: [facility] ENDOSCOPY;  Service: General;  Laterality: N/A;  PREOP/ PERASTOMAL HERNIA  POSTOP/ 8CM RECTUM REMAINING         FAMILY HISTORY  Family History   Problem Relation Age of Onset   • Heart disease Father    • Diabetes Father    • Asthma Mother    • Diabetes Mother    • Malig Hyperthermia Neg Hx          SOCIAL HISTORY  Social History      Socioeconomic History   • Marital status:    Tobacco Use   • Smoking status: Every Day     Packs/day: 0.45     Years: 21.00     Pack years: 9.45     Types: Cigarettes   • Smokeless tobacco: Never   Vaping Use   • Vaping Use: Never used   Substance and Sexual Activity   • Alcohol use: No   • Drug use: No   • Sexual activity: Defer         ALLERGIES  Bactrim [sulfamethoxazole-trimethoprim], Ciprofloxacin, Sulfa antibiotics, and Toradol [ketorolac tromethamine]        REVIEW OF SYSTEMS  Review of Systems   Positive chest pain, positive shortness breath, no nausea, no vomiting, fever, no chills, no headache  All systems reviewed and negative except for those discussed in HPI.       PHYSICAL EXAM    I have reviewed the triage vital signs and nursing notes.    ED Triage Vitals   Temp Pulse Resp BP SpO2   -- -- -- -- --      Temp src Heart Rate Source Patient Position BP Location FiO2 (%)   -- -- -- -- --       Physical Exam  GENERAL: Awake, alert, no acute distress, appears uncomfortable  SKIN: Warm, dry  HENT: Normocephalic, atraumatic  EYES: no scleral icterus  CV: regular rhythm, regular rate  RESPIRATORY: normal effort, lungs clear  ABDOMEN: soft, nontender, nondistended  MUSCULOSKELETAL: no deformity  NEURO: alert, moves all extremities, follows commands          LAB RESULTS  No results found for this or any previous visit (from the past 24 hour(s)).    Ordered the above labs and independently reviewed the results.        RADIOLOGY  No Radiology Exams Resulted Within Past 24 Hours    I ordered the above noted radiological studies. Reviewed by me and discussed with radiologist.  See dictation for official radiology interpretation.      PROCEDURES    Critical Care  Performed by: Trev Hobbs MD  Authorized by: Trev Hobbs MD     Critical care provider statement:     Critical care time (minutes): <30.    Critical care time was exclusive of:  Separately billable procedures and treating other  patients    Critical care was necessary to treat or prevent imminent or life-threatening deterioration of the following conditions:  Cardiac failure    Critical care was time spent personally by me on the following activities:  Development of treatment plan with patient or surrogate, discussions with consultants, evaluation of patient's response to treatment, obtaining history from patient or surrogate, examination of patient, ordering and performing treatments and interventions, pulse oximetry and review of old charts          MEDICATIONS GIVEN IN ER    Medications - No data to display      PROGRESS, DATA ANALYSIS, CONSULTS, AND MEDICAL DECISION MAKING    All labs have been independently reviewed by me.  All radiology studies have been reviewed by me and discussed with radiologist dictating the report.   EKG's independently viewed and interpreted by me.  Discussion below represents my analysis of pertinent findings related to patient's condition, differential diagnosis, treatment plan and final disposition.    Differential diagnosis includes but is not limited to STEMI, non-STEMI, unstable angina, acute aortic syndrome.    ED Course as of 01/19/23 2230   Thu Jan 12, 2023 1937 Discussing with Dr. Baca, interventional cardiologist by phone.  He wants no additional medications.  He is taking the patient to the Cath Lab as soon as he arrives. [TR]   1947 EKG          EKG time: 1938  Rhythm/Rate: Sinus tachycardia, rate 105  P waves and UT: Normal P, normal UT  QRS, axis: Narrow QRS, normal axis  ST and T waves: ST elevation in leads II, 3, aVF.  ST depression V2, V3, V4, V5    Interpreted Contemporaneously by me, independently viewed  New ST elevations compared to 8/4/2019.   [TR]      ED Course User Index  [TR] Trev Hobbs MD           PPE: The patient wore a mask and I wore an N95 mask throughout the entire patient encounter.       AS OF 22:30 EST VITALS:    BP - 131/99  HR - 87  TEMP - 98.2 °F (36.8 °C)  (Oral)  O2 SATS - 96%        DIAGNOSIS  Final diagnoses:   ST elevation myocardial infarction (STEMI), unspecified artery (HCC)         DISPOSITION  ED Disposition     ED Disposition   Send to Cath Lab    Condition   --    Comment   --                Note Disclaimer: At [facility], we believe that sharing information builds trust and better relationships. You are receiving this note because you recently visited [facility]. It is possible you will see health information before a provider has talked with you about it. This kind of information can be easy to misunderstand. To help you fully understand what it means for your health, we urge you to discuss this note with your provider.       Trev Hobbs MD  01/12/23 1945       Trev Hobbs MD  01/12/23 1946       Trev Hobbs MD  01/12/23 1947       Trev Hobbs MD  01/19/23 4832

## 2023-01-13 NOTE — NURSING NOTE
RN called and spoke with Dr Baca patient 12 lead EKG improved but complain chest pain from level 2 to 8 since admission post cath. Dr Baca not concerned expecting chest pain ordered morphine IV q1 hr

## 2023-01-13 NOTE — H&P
Date of Hospital Visit: 23  Encounter Provider: Jose Baca MD  Place of Service: [facility] CARDIOLOGY  Patient Name: Edie Camacho  :1985  [id number]    Chief complaint: Chest pain    History of Present Illness: 38-year-old woman history diabetes hypertension obesity had diverticulitis had a partial bowel resection and has a colostomy.  Has had chest discomfort for about 4 hours today substernal pretty severe short of breath diaphoretic a little bit nauseated family called 911 at [facility] was diagnosed with an inferior STEMI and brought here no history of allergy no history of bleeding no history of lung or kidney problems she has diabetes she smokes    Past Medical History:   Diagnosis Date   • Abdominal bloating with cramps    • Asthma    • Bloating symptom    • Colon polyps 2019    Rectum: hyperplastic polyp   • Diabetes mellitus (HCC)    • Disease of thyroid gland    • Diverticulitis    • Gastritis    • GERD (gastroesophageal reflux disease)    • H/O being hospitalized     TWO RECENT EPISODES S/P BOWEL OBSTRUCTIONS    • Hernia due to colostomy (HCC)    • History of kidney stones    • History of staph infection 2009    S/P TATTOO-ON BACK    • Kidney stone    • Large bowel obstruction (HCC)    • Neuropathy     LOWER EXTRIMITES   • Pregnancy    • Seasonal allergies    • Stricture of colon determined by endoscopy (HCC)    • Stricture of sigmoid colon (HCC) 2019    Added automatically from request for surgery [id number]   • Tachycardia        Past Surgical History:   Procedure Laterality Date   •  SECTION N/A    • COLON RESECTION N/A 7/10/2019    Procedure: LAPAROSCOPIC LOW ANTERIOR  COLON RESECTION WITH DIVERTING LOOP ILEOSTOMY;  Surgeon: Roderick Arnold MD;  Location: [facility];  Service: General   • COLONOSCOPY N/A 2020    Procedure: COLONOSCOPY to Cecum via Colostomy and Rectum;  Surgeon: Roderick Arnold MD;   [facility];  Service: General;  Laterality: N/A;  Colostomy Status   Post Surgical Anatomy   • COLONOSCOPY W/ BIOPSIES AND POLYPECTOMY N/A 06/13/2019   • DIAGNOSTIC LAPAROSCOPY N/A 7/11/2019    Procedure: DIAGNOSTIC LAPAROSCOPY;  Surgeon: Radha Loera MD;  Location: [facility] MAIN OR;  Service: General   • ENDOSCOPY N/A 5/19/2020    Procedure: ESOPHAGOGASTRODUODENOSCOPY with Bx's;  Surgeon: Roderick Arnold MD;  Location: [facility] ENDOSCOPY;  Service: General;  Laterality: N/A;  Relfux   Reflux Esophagitis, Mild Duodenitis   • EXPLORATORY LAPAROTOMY N/A 7/11/2019    Procedure: COLON RESECTION WITH WAYNE'S PROCEDURE WITH FLEXIBLE SIGMOIDSCOPE;  Surgeon: Radha Loera MD;  Location: [facility] MAIN OR;  Service: General   • NEPHROSTOMY TRACT DILATATION W/ LITHOTRIPSY     • SIGMOIDOSCOPY N/A 6/2/2021    Procedure: FLEXIBLE SIGMOIDOSCOPY;  Surgeon: Radha Loera MD;  Location: [facility] ENDOSCOPY;  Service: General;  Laterality: N/A;  PREOP/ PERASTOMAL HERNIA  POSTOP/ 8CM RECTUM REMAINING       Medications Prior to Admission   Medication Sig Dispense Refill Last Dose   • albuterol sulfate  (90 Base) MCG/ACT inhaler Inhale 2 puffs Every 4 (Four) Hours As Needed.      • atorvastatin (LIPITOR) 10 MG tablet Take 1 tablet by mouth Every Night. 30 tablet 0    • cetirizine (zyrTEC) 10 MG tablet Take 10 mg by mouth Daily.      • fenofibrate (TRICOR) 145 MG tablet Take 1 tablet by mouth Daily. 30 tablet 0    • fluticasone (FLONASE) 50 MCG/ACT nasal spray 2 sprays into the nostril(s) as directed by provider Daily.      • fluticasone (FLONASE) 50 MCG/ACT nasal spray 1 spray into the nostril(s) as directed by provider Daily for 30 days. 16 g 0    • gabapentin (NEURONTIN) 400 MG capsule Take 1 capsule by mouth 2 (Two) Times a Day.      • HYDROcodone-acetaminophen (NORCO) 5-325 MG per tablet Take 1 tablet by mouth Every 6 (Six) Hours As Needed for Moderate Pain . 12 tablet 0    • insulin glargine (LANTUS) 100  UNIT/ML injection Inject 28 Units under the skin into the appropriate area as directed Every 12 (Twelve) Hours.  12    • Insulin Lispro (humaLOG) 100 UNIT/ML injection Inject  under the skin into the appropriate area as directed 3 (Three) Times a Day Before Meals. Per sliding Scale      • levothyroxine (SYNTHROID, LEVOTHROID) 200 MCG tablet Take 200 mcg by mouth Daily.      • magnesium oxide 250 MG tablet Take 1 tablet by mouth Daily.      • medroxyPROGESTERone Acetate 150 MG/ML suspension prefilled syringe Inject 1 mL under the skin into the appropriate area as directed Every 30 (Thirty) Days.      • methocarbamol (ROBAXIN) 750 MG tablet Take 750 mg by mouth As Needed.      • montelukast (SINGULAIR) 10 MG tablet Take 10 mg by mouth Daily.      • Multiple Vitamins-Minerals (Multi-Vitamin Gummies) chewable tablet Chew 1 tablet Daily.      • mupirocin (BACTROBAN) 2 % ointment Apply 1 application topically to the appropriate area as directed As Needed.      • ondansetron ODT (ZOFRAN-ODT) 4 MG disintegrating tablet Place 1 tablet on the tongue Every 6 (Six) Hours As Needed for Nausea. 10 tablet 0    • pantoprazole (PROTONIX) 40 MG EC tablet Take 40 mg by mouth Daily.      • PARoxetine (PAXIL) 20 MG tablet Take 20 mg by mouth Every Morning.      • potassium chloride (K-DUR) 10 MEQ CR tablet Take 2 tablets by mouth 2 (Two) Times a Day. (Patient taking differently: Take 2 tablets by mouth Daily.)      • sodium bicarbonate 650 MG tablet Take 2 tablets by mouth 3 (Three) Times a Day. 180 tablet 0    • sucralfate (CARAFATE) 1 g tablet Take 1 g by mouth 4 (Four) Times a Day Before Meals & at Bedtime.      • vitamin D (ERGOCALCIFEROL) 1.25 MG (91743 UT) capsule capsule Take 50,000 Units by mouth Every 7 (Seven) Days. Mondays          Current Meds  No current facility-administered medications on file prior to encounter.     Current Outpatient Medications on File Prior to Encounter   Medication Sig Dispense Refill   • albuterol  sulfate  (90 Base) MCG/ACT inhaler Inhale 2 puffs Every 4 (Four) Hours As Needed.     • atorvastatin (LIPITOR) 10 MG tablet Take 1 tablet by mouth Every Night. 30 tablet 0   • cetirizine (zyrTEC) 10 MG tablet Take 10 mg by mouth Daily.     • fenofibrate (TRICOR) 145 MG tablet Take 1 tablet by mouth Daily. 30 tablet 0   • fluticasone (FLONASE) 50 MCG/ACT nasal spray 2 sprays into the nostril(s) as directed by provider Daily.     • fluticasone (FLONASE) 50 MCG/ACT nasal spray 1 spray into the nostril(s) as directed by provider Daily for 30 days. 16 g 0   • gabapentin (NEURONTIN) 400 MG capsule Take 1 capsule by mouth 2 (Two) Times a Day.     • HYDROcodone-acetaminophen (NORCO) 5-325 MG per tablet Take 1 tablet by mouth Every 6 (Six) Hours As Needed for Moderate Pain . 12 tablet 0   • insulin glargine (LANTUS) 100 UNIT/ML injection Inject 28 Units under the skin into the appropriate area as directed Every 12 (Twelve) Hours.  12   • Insulin Lispro (humaLOG) 100 UNIT/ML injection Inject  under the skin into the appropriate area as directed 3 (Three) Times a Day Before Meals. Per sliding Scale     • levothyroxine (SYNTHROID, LEVOTHROID) 200 MCG tablet Take 200 mcg by mouth Daily.     • magnesium oxide 250 MG tablet Take 1 tablet by mouth Daily.     • medroxyPROGESTERone Acetate 150 MG/ML suspension prefilled syringe Inject 1 mL under the skin into the appropriate area as directed Every 30 (Thirty) Days.     • methocarbamol (ROBAXIN) 750 MG tablet Take 750 mg by mouth As Needed.     • montelukast (SINGULAIR) 10 MG tablet Take 10 mg by mouth Daily.     • Multiple Vitamins-Minerals (Multi-Vitamin Gummies) chewable tablet Chew 1 tablet Daily.     • mupirocin (BACTROBAN) 2 % ointment Apply 1 application topically to the appropriate area as directed As Needed.     • ondansetron ODT (ZOFRAN-ODT) 4 MG disintegrating tablet Place 1 tablet on the tongue Every 6 (Six) Hours As Needed for Nausea. 10 tablet 0   • pantoprazole  (PROTONIX) 40 MG EC tablet Take 40 mg by mouth Daily.     • PARoxetine (PAXIL) 20 MG tablet Take 20 mg by mouth Every Morning.     • potassium chloride (K-DUR) 10 MEQ CR tablet Take 2 tablets by mouth 2 (Two) Times a Day. (Patient taking differently: Take 2 tablets by mouth Daily.)     • sodium bicarbonate 650 MG tablet Take 2 tablets by mouth 3 (Three) Times a Day. 180 tablet 0   • sucralfate (CARAFATE) 1 g tablet Take 1 g by mouth 4 (Four) Times a Day Before Meals & at Bedtime.     • vitamin D (ERGOCALCIFEROL) 1.25 MG (55141 UT) capsule capsule Take 50,000 Units by mouth Every 7 (Seven) Days. Mondays         Social History     Socioeconomic History   • Marital status:    Tobacco Use   • Smoking status: Every Day     Packs/day: 0.45     Years: 21.00     Pack years: 9.45     Types: Cigarettes   • Smokeless tobacco: Never   Vaping Use   • Vaping Use: Never used   Substance and Sexual Activity   • Alcohol use: No   • Drug use: No   • Sexual activity: Defer       Family Hx: Non-contributory    REVIEW OF SYSTEMS:   ROS was performed and is negative except as outlined in HPI     REVIEW OF SYSTEMS:   CONSTITUTIONAL: No weight loss, fever, chills, weakness or fatigue.   HEENT: Eyes: No visual loss, blurred vision, double vision or yellow sclerae. Ears, Nose, Throat: No hearing loss, sneezing, congestion, runny nose or sore throat.   SKIN: No rash or itching.     RESPIRATORY: No shortness of breath, hemoptysis, cough or sputum.   GASTROINTESTINAL: No anorexia, nausea, vomiting or diarrhea. No abdominal pain, bright red blood per rectum or melena.  NEUROLOGICAL: No headache, dizziness, syncope, paralysis, numbness or tingling in the extremities.  MUSCULOSKELETAL: No muscle, back pain, joint pain or stiffness.   HEMATOLOGIC: No anemia, bleeding or bruising.   LYMPHATICS: No enlarged nodes.  PSYCHIATRIC: No history of depression, anxiety, hallucinations.   ENDOCRINOLOGIC: No reports of sweating, cold or heat  "intolerance. No polyuria or polydipsia.        Objective:     Vitals:    01/12/23 2018 01/12/23 2023 01/12/23 2028 01/12/23 2054   BP:       Pulse:       Resp: 19 14 15    SpO2: 98% 98%     Weight:    85.8 kg (189 lb 2.5 oz)   Height:         Body mass index is 30.53 kg/m².  Flowsheet Rows    Flowsheet Row First Filed Value   Admission Height 167.6 cm (66\") Documented at 01/12/2023 1955   Admission Weight 91.2 kg (201 lb) Documented at 01/12/2023 1955          General Appearance:    Alert, oriented x 3, in no acute distress   Head:    Normocephalic, without obvious abnormality, atraumatic   Ears:    Ears appear intact with no abnormalities noted   Throat:   No oral lesions, dentition good   Neck:   No adenopathy, supple, trachea midline, no thyromegaly, no carotid bruit, no JVD   Lungs:    Breath sounds are equal and  clear to auscultation    Heart:   Normal S1 and S2, RRR, no murmur/gallop or rub   Abdomen:    Normal bowel sounds, obese, soft non-tender, non-distended, no organomegaly, no guarding   Extremities:   Moves all extremities well, no edema, no cyanosis, no redness   Pulses:   Pulses palpable and equal bilaterally. Normal radial pulses   Skin:   No bleeding, bruising or rash   Lymph nodes:   No palpable adenopathy     I personally viewed and interpreted the patient's EKG/Telemetry data    Assessment:  There are no hospital problems to display for this patient.      Plan: Inferior STEMI I will get a take her directly to the Cath Lab further decisions will be based on the findings at the time of her cath this is obviously a life-threatening situation      "

## 2023-01-13 NOTE — CONSULTS
"Diabetes Education  Assessment/Teaching    Patient Name:  Edie Camacho  YOB: 1985  MRN: [id number]  Admit Date:  1/12/2023      Assessment Date:  1/13/2023  Flowsheet Row Most Recent Value   General Information     Referral From: A1c, Database  [a1C 10.8%]   Height 167.6 cm (66\")   Weight 85.7 kg (189 lb)   Weight Method Bed scale   Pregnancy Assessment    Diabetes History    What type of diabetes do you have? Type 2   Length of Diabetes Diagnosis 10 + years   Current DM knowledge fair   Do you test your blood sugar at home? yes   Frequency of checks two times day   Typical readings 200-300   Have you had high blood sugar? (>140mg/dl) yes   How often do you have high blood sugar? frequently   How would you rate your diabetes control? fair   What makes it difficult for you to take care of your diabetes or yourself? numerous health issueas   Education Preferences    What areas of diabetes would you like to learn about? avoiding high blood sugar, medications for diabetes, testing my blood sugar at home   Nutrition Information    Assessment Topics    Healthy Eating - Assessment N/A-unable to assess   Being Active - Assessment N/A- unable to assess   Taking Medication - Assessment Needs education   Problem Solving - Assessment Needs education   Healthy Coping - Assessment Needs education   Monitoring - Assessment Needs education   DM Goals    Taking Medication - Goal 0-7 days from discharge   Monitoring - Goal 0-7 days from discharge          Flowsheet Row Most Recent Value   DM Education Needs    Meter Has own   Frequency of Testing 2 times a day   Medication Insulin  [lantus 40U BID, humulog 15U plis sliding scale TID]   Problem Solving Hyperglycemia   Healthy Coping Appropriate, Frustrated   Discharge Plan Home, Follow-up with MD   Motivation Moderate   Teaching Method Explanation, Discussion   Patient Response Verbalized understanding, Needs reinforcement            Other Comments:  Spoke to pt " on phone to discuss her diabetes management. Pt frustrated with her BG control. She see a PCP,Dr Monique every 4 months. She tests her Bg BID. She takes Lantus 40U BID and HUmulog 15U plus slliding scale TID. Pt states hse is mostly good about taking all her doses but often forgets the mealtime insulin in middle of day.  We discussed using a CGM. She has tried to use the Dexcom but could not get it to stick,even trying numerous techniques. We discussed her looking into a Cindy to possibly see if that could be ay different?  Pt has seen Dr Gonzalez in distant past(after a hospital stay) but is not  Really interested in trying to go now( mostly just because it adds another MD appointment,when its difficult to get to her current MD's.)    Problem solved to remember her lunchtime insulin. Also pt should speak to her MD about increasing the mealtime insulin doses. Pt was open to making some changes.            Electronically signed by:  Ava Li RN  01/13/23 13:36 EST

## 2023-01-13 NOTE — PLAN OF CARE
Goal Outcome Evaluation:  Plan of Care Reviewed With: patient        Progress: improving   Pt is a telemetry overflow in CICU.  A&O; tearful at times because she wants to be at home with her children.  Intermittent chest pain relieved with rest and PRN Norco.  Room air.  Good appetite.  Up to bathroom ad haily.  Cath lab sites soft, ecchymotic.  Pt had discussion with Dr. Baca today about getting diabetes under better control.  Internal medicine consulted; SSI and Jardiance started after discussion with patient.  Pt also spoke on the phone with our diabetes educator.  Pharmacy and case management verified that Jardiance is covered by her insurance.  Plan is to discharge home tomorrow.

## 2023-01-13 NOTE — CASE MANAGEMENT/SOCIAL WORK
Discharge Planning Assessment  [facility]     Patient Name: Edie Camacho  MRN: [id number]  Today's Date: 1/13/2023    Admit Date: 1/12/2023    Plan: Home with family  Denies referrals   Discharge Needs Assessment     Row Name 01/13/23 1158       Living Environment    People in Home child(inez), dependent    Current Living Arrangements home    Potentially Unsafe Housing Conditions unable to assess    Primary Care Provided by self    Provides Primary Care For child(inez)    Family Caregiver if Needed parent(s)    Quality of Family Relationships unable to assess    Able to Return to Prior Arrangements yes       Resource/Environmental Concerns    Resource/Environmental Concerns none    Transportation Concerns none       Transition Planning    Patient/Family Anticipates Transition to home    Patient/Family Anticipated Services at Transition other (see comments)    Transportation Anticipated family or friend will provide       Discharge Needs Assessment    Equipment Currently Used at Home glucometer    Concerns to be Addressed discharge planning    Anticipated Changes Related to Illness none    Equipment Needed After Discharge none               Discharge Plan     Row Name 01/13/23 1200       Plan    Plan Home with family  Denies referrals    Plan Comments CCP spoke to patient at bedside.   CCP role explained.  Face sheet verified.  Discharge planning discussed.  Pt emergency contact is her son Sudheer [phone].     Pt obtains her medications from [facility] pharmacy in [facility].   Pt lives in a house with her two dependent sons.    She uses no DME to ambulate.   She is independent with ADL’s.   She has no rehab history.  She has no Home Health history.   Plan is home.  Pt mother will assist with her care at [place].  Pt states she has a working glucometer.  She has Medicaid which covers her insulin.  CCP following for therapy evaluations.              Continued Care and Services - Admitted Since 1/12/2023     Coordination has not been started for this encounter.          Demographic Summary    No documentation.                Functional Status    No documentation.                Psychosocial    No documentation.                Abuse/Neglect    No documentation.                Legal    No documentation.                Substance Abuse    No documentation.                Patient Forms    No documentation.                   Gay Grissom RN

## 2023-01-14 ENCOUNTER — READMISSION MANAGEMENT (OUTPATIENT)
Dept: CALL CENTER | Facility: HOSPITAL | Age: 38
End: 2023-01-14
Payer: MEDICAID

## 2023-01-14 VITALS
HEART RATE: 87 BPM | TEMPERATURE: 98.2 F | RESPIRATION RATE: 16 BRPM | HEIGHT: 66 IN | OXYGEN SATURATION: 96 % | BODY MASS INDEX: 30.37 KG/M2 | SYSTOLIC BLOOD PRESSURE: 131 MMHG | DIASTOLIC BLOOD PRESSURE: 99 MMHG | WEIGHT: 189 LBS

## 2023-01-14 LAB
ANION GAP SERPL CALCULATED.3IONS-SCNC: 11.1 MMOL/L (ref 5–15)
BUN SERPL-MCNC: 12 MG/DL (ref 6–20)
BUN/CREAT SERPL: 17.1 (ref 7–25)
CALCIUM SPEC-SCNC: 9 MG/DL (ref 8.6–10.5)
CHLORIDE SERPL-SCNC: 105 MMOL/L (ref 98–107)
CO2 SERPL-SCNC: 20.9 MMOL/L (ref 22–29)
CREAT SERPL-MCNC: 0.7 MG/DL (ref 0.57–1)
EGFRCR SERPLBLD CKD-EPI 2021: 112.3 ML/MIN/1.73
GLUCOSE BLDC GLUCOMTR-MCNC: 149 MG/DL (ref 70–130)
GLUCOSE BLDC GLUCOMTR-MCNC: 233 MG/DL (ref 70–130)
GLUCOSE SERPL-MCNC: 145 MG/DL (ref 65–99)
POTASSIUM SERPL-SCNC: 3.6 MMOL/L (ref 3.5–5.2)
SODIUM SERPL-SCNC: 137 MMOL/L (ref 136–145)

## 2023-01-14 PROCEDURE — 99239 HOSP IP/OBS DSCHRG MGMT >30: CPT | Performed by: STUDENT IN AN ORGANIZED HEALTH CARE EDUCATION/TRAINING PROGRAM

## 2023-01-14 PROCEDURE — 63710000001 INSULIN LISPRO (HUMAN) PER 5 UNITS: Performed by: INTERNAL MEDICINE

## 2023-01-14 PROCEDURE — 80048 BASIC METABOLIC PNL TOTAL CA: CPT | Performed by: INTERNAL MEDICINE

## 2023-01-14 PROCEDURE — 63710000001 ONDANSETRON PER 8 MG: Performed by: INTERNAL MEDICINE

## 2023-01-14 PROCEDURE — 82962 GLUCOSE BLOOD TEST: CPT

## 2023-01-14 RX ORDER — PRASUGREL 10 MG/1
10 TABLET, FILM COATED ORAL DAILY
Qty: 30 TABLET | Refills: 11 | Status: SHIPPED | OUTPATIENT
Start: 2023-01-15

## 2023-01-14 RX ORDER — METOPROLOL TARTRATE 50 MG/1
50 TABLET, FILM COATED ORAL EVERY 12 HOURS SCHEDULED
Qty: 60 TABLET | Refills: 11 | Status: SHIPPED | OUTPATIENT
Start: 2023-01-14 | End: 2023-02-20

## 2023-01-14 RX ORDER — NICOTINE 21 MG/24HR
PATCH, TRANSDERMAL 24 HOURS TRANSDERMAL DAILY
Qty: 28 EACH | Refills: 0 | Status: SHIPPED | OUTPATIENT
Start: 2023-01-14 | End: 2023-02-20 | Stop reason: ALTCHOICE

## 2023-01-14 RX ORDER — ATORVASTATIN CALCIUM 40 MG/1
40 TABLET, FILM COATED ORAL NIGHTLY
Qty: 90 TABLET | Refills: 3 | Status: SHIPPED | OUTPATIENT
Start: 2023-01-14

## 2023-01-14 RX ORDER — INSULIN LISPRO 100 [IU]/ML
INJECTION, SOLUTION INTRAVENOUS; SUBCUTANEOUS
Start: 2023-01-14

## 2023-01-14 RX ORDER — ASPIRIN 81 MG/1
81 TABLET ORAL DAILY
Qty: 90 TABLET | Refills: 3 | Status: SHIPPED | OUTPATIENT
Start: 2023-01-15

## 2023-01-14 RX ORDER — INSULIN GLARGINE 100 [IU]/ML
40 INJECTION, SOLUTION SUBCUTANEOUS EVERY 12 HOURS SCHEDULED
Start: 2023-01-14

## 2023-01-14 RX ADMIN — GABAPENTIN 400 MG: 400 CAPSULE ORAL at 08:29

## 2023-01-14 RX ADMIN — INSULIN LISPRO 4 UNITS: 100 INJECTION, SOLUTION INTRAVENOUS; SUBCUTANEOUS at 11:32

## 2023-01-14 RX ADMIN — PRASUGREL 10 MG: 10 TABLET, FILM COATED ORAL at 08:29

## 2023-01-14 RX ADMIN — METOPROLOL TARTRATE 50 MG: 50 TABLET, FILM COATED ORAL at 08:29

## 2023-01-14 RX ADMIN — EMPAGLIFLOZIN 10 MG: 10 TABLET, FILM COATED ORAL at 08:29

## 2023-01-14 RX ADMIN — PAROXETINE HYDROCHLORIDE HEMIHYDRATE 20 MG: 20 TABLET, FILM COATED ORAL at 06:19

## 2023-01-14 RX ADMIN — ASPIRIN 81 MG: 81 TABLET, COATED ORAL at 08:29

## 2023-01-14 RX ADMIN — FENOFIBRATE 145 MG: 145 TABLET, FILM COATED ORAL at 08:29

## 2023-01-14 RX ADMIN — ONDANSETRON HYDROCHLORIDE 4 MG: 4 TABLET, FILM COATED ORAL at 08:29

## 2023-01-14 RX ADMIN — INSULIN GLARGINE-YFGN 28 UNITS: 100 INJECTION, SOLUTION SUBCUTANEOUS at 08:29

## 2023-01-14 RX ADMIN — LEVOTHYROXINE SODIUM 200 MCG: 0.1 TABLET ORAL at 08:29

## 2023-01-14 NOTE — PROGRESS NOTES
Name: Edie Camacho ADMIT: 2023   : 1985  PCP: Layla Monique APRN    MRN: [id number] LOS: 2 days   AGE/SEX: 38 y.o. female  ROOM:      Subjective   Subjective   Referring Provider: Dr. Baca  Reason for Follow-up: Type 2 DM  No acute events. Patient overall is feeling better. Taking PO. No further chest discomfort this morning. No f/c/n/v/d.    Objective   Objective   Vital Signs  Temp:  [98 °F (36.7 °C)-98.7 °F (37.1 °C)] 98.2 °F (36.8 °C)  Heart Rate:  [] 87  Resp:  [16] 16  BP: (116-152)/(88-99) 131/99  SpO2:  [92 %-98 %] 96 %  on   ;   Device (Oxygen Therapy): room air  Body mass index is 30.51 kg/m².  Physical Exam  Vitals and nursing note reviewed.   Constitutional:       General: She is not in acute distress.     Appearance: She is not toxic-appearing or diaphoretic.   HENT:      Head: Normocephalic and atraumatic.      Mouth/Throat:      Mouth: Mucous membranes are moist.      Pharynx: Oropharynx is clear.   Eyes:      Conjunctiva/sclera: Conjunctivae normal.      Pupils: Pupils are equal, round, and reactive to light.   Cardiovascular:      Rate and Rhythm: Normal rate and regular rhythm.      Pulses: Normal pulses.   Pulmonary:      Effort: Pulmonary effort is normal.      Breath sounds: Normal breath sounds. No rales.   Abdominal:      General: Bowel sounds are normal.      Palpations: Abdomen is soft.      Tenderness: There is no abdominal tenderness.   Musculoskeletal:         General: No swelling or tenderness.   Skin:     General: Skin is warm and dry.      Capillary Refill: Capillary refill takes less than 2 seconds.   Neurological:      General: No focal deficit present.      Mental Status: She is alert and oriented to person, place, and time.   Psychiatric:         Mood and Affect: Mood normal.         Behavior: Behavior normal.       Results Review     I reviewed the patient's new clinical results.  I reviewed the patient's telemetry.  Results from last 7  days   Lab Units 01/13/23 0359 01/12/23 2207   WBC 10*3/mm3 10.10 12.11*   HEMOGLOBIN g/dL 14.0 14.2   PLATELETS 10*3/mm3 276 274     Results from last 7 days   Lab Units 01/14/23 0307 01/13/23 0454 01/12/23 2207   SODIUM mmol/L 137 135* 132*   POTASSIUM mmol/L 3.6 3.8 3.9   CHLORIDE mmol/L 105 107 103   CO2 mmol/L 20.9* 17.6* 13.8*   BUN mg/dL 12 12 14   CREATININE mg/dL 0.70 0.59 0.70   GLUCOSE mg/dL 145* 205* 292*   EGFR mL/min/1.73 112.3 117.0 112.3     Results from last 7 days   Lab Units 01/12/23 2207   ALBUMIN g/dL 3.7   BILIRUBIN mg/dL 0.5   ALK PHOS U/L 96   AST (SGOT) U/L 45*   ALT (SGPT) U/L 26     Results from last 7 days   Lab Units 01/14/23 0307 01/13/23 0454 01/12/23 2207   CALCIUM mg/dL 9.0 8.6 8.9   ALBUMIN g/dL  --   --  3.7   MAGNESIUM mg/dL  --   --  1.8       Hemoglobin A1C   Date/Time Value Ref Range Status   01/13/2023 0359 10.80 (H) 4.80 - 5.60 % Final     Glucose   Date/Time Value Ref Range Status   01/14/2023 1037 233 (H) 70 - 130 mg/dL Final     Comment:     Meter: [id number] : [id number] Antoni Valentine NA   01/14/2023 0648 149 (H) 70 - 130 mg/dL Final     Comment:     Meter: [id number] : [id number] Juan Antonio John NA   01/13/2023 2018 233 (H) 70 - 130 mg/dL Final     Comment:     Meter: [id number] : [id number] Sturomana John NA   01/13/2023 1337 398 (H) 70 - 130 mg/dL Final     Comment:     Meter: [id number] : [id number] Sunshine Beckford RN   01/12/2023 2141 331 (H) 70 - 130 mg/dL Final     Comment:     Meter: [id number] : [id number] Andrew Grande RN       XR Chest 1 View    Result Date: 1/12/2023  Diffuse interstitial prominence. Given history, this may represent edema.  This report was finalized on 1/12/2023 9:28 PM by Dr. Marie Jeong M.D.      Scheduled Medications  aspirin, 81 mg, Oral, Daily  atorvastatin, 40 mg, Oral, Nightly  empagliflozin, 10 mg, Oral, Daily  fenofibrate, 145 mg, Oral, Daily  gabapentin, 400 mg, Oral, BID  insulin glargine,  28 Units, Subcutaneous, Q12H  insulin lispro, 0-9 Units, Subcutaneous, TID AC  levothyroxine, 200 mcg, Oral, Daily  metoprolol tartrate, 50 mg, Oral, Q12H  nicotine, 1 patch, Transdermal, Q24H  PARoxetine, 20 mg, Oral, QAM  prasugrel, 10 mg, Oral, Daily    Infusions   Diet  Diet: Diabetic Diets; Consistent Carbohydrate; Texture: Regular Texture (IDDSI 7); Fluid Consistency: Thin (IDDSI 0)       Assessment/Plan     Active Hospital Problems    Diagnosis  POA   • **STEMI involving right coronary artery (HCC) [I21.11]  Yes   • Hypertriglyceridemia [E78.1]  Yes   • History of pancreatitis [Z87.19]  Not Applicable   • Cigarette nicotine dependence without complication [F17.210]  Yes   • Metabolic acidosis [E87.20]  Yes   • Idiopathic atrophic hypothyroidism [E03.4]  Yes   • Type 2 diabetes mellitus with circulatory disorder (HCC) [E11.59]  Yes      Resolved Hospital Problems   No resolved problems to display.   Type 2 DM  - uncontrolled, A1C 10.80%  - complications include atherosclerosis (CAD), peripheral neuropathy, and hypertriglyceridemia  - gabapentin continued for neuropathy  - tolerated jardiance well-I have sent a prescription to the pharmacy and asked RN to have CCP check the cost-the dose can be increased in the next few weeks if tolerated  - her BG are overall acceptable but probably not representative of what she is doing at home-she actually takes lantus 40 units BID at home and this is fine to resume along with her 15 units of humalog tid with meals plus sliding scale-she often skips lunch time insulin as she does not eat a full meal and I have suggested that she just take sliding scale at that time and she is agreeable-she states she has plenty of insulin and supplies at home; DM educator has seen  - she should keep close follow up with her PCP-she is hesitant on endocrinology referral but this can be done as an outpatient if she is agreeable     Hypertriglyceridemia  - has history of pancreatitis due to  this  - she is on an increased dose of lipitor for her CAD  - continue fenofibrate, tolerating well     Hypothyroidism  - clinically euthyroid  - continue levothyroxine     Metabolic Acidosis  - normal anion gap  - has had this in the past as well  - improved today, no need for PO bicarbonate     Nicotine Dependence  - I discussed cessation again and she is agreeable to trying this  - I have prescribed a nicotine patch taper kit      STEMI  - s/p RCA stenting  - on DAPT with ASA and Effient  - LV EF fortunately preserved  - management per primary    Okay to discharge from medicine standpoint. I have updated DM/general medicine-related medications in med rec      Sam Kirk MD  [facility]  01/14/23  12:12 EST

## 2023-01-14 NOTE — PLAN OF CARE
Goal Outcome Evaluation:  Plan of Care Reviewed With: patient      S/p STEMI with PCI to RCA. JACINTO. CARLOS A.  on tele. Up ad haily. Patient continues to intermittently complain of midsternal chest pain, relieved by pain medication. Pt is anxious and worries about children and animals at home. She is hoping to discharge today.

## 2023-01-14 NOTE — DISCHARGE SUMMARY
[facility] Discharge Summary      Patient Name: Edie Camacho  Age/Sex: 38 y.o. female  : 1985  MRN: [id number]    Encounter Provider: Vasu Aguilar MD  Referring Provider: No ref. provider found  Place of Service: [facility] CARDIOLOGY  Patient Care Team:  Layla Monique APRN as PCP - General (Family Medicine)  Ryan Chatterjee MD as Consulting Physician (Nephrology)         Date of Discharge:  2023     Date of Admit: 2023      Discharge Diagnosis:   STEMI involving right coronary artery (HCC)    Type 2 diabetes mellitus with circulatory disorder (HCC)    Idiopathic atrophic hypothyroidism    Metabolic acidosis    Hypertriglyceridemia    History of pancreatitis    Cigarette nicotine dependence without complication        Hospital Course: 38-year-old smoker with hypertension, hypothyroidism, diabetes, hypertriglyceridemia with prior pancreatitis who presented with chest pain found to have an inferior wall STEMI.  She underwent left heart catheterization with PCI with 3.5 x 15mm, 3.0 x 38mm, and 2.75 x 8mm Xience drug-eluting stents to the mid RCA and RPL.  Echocardiogram revealed normal systolic function and normal diastolic function with mild hypokinesis of the basal inferior basal present levels.  Patient started on GDMT including aspirin, Effient, metoprolol, Jardiance and was stable for discharge home on 2023 with planned outpatient follow-up.    Vitals:    23 1036   BP: 131/99   Pulse: 87   Resp: 16   Temp: 98.2 °F (36.8 °C)   SpO2:      Physical Exam:  GEN: no distress, alert and oriented  HEENT: NACT, EOMI, moist mucous membranes  Lungs: CTAB, no wheezes, rales or rhonchi  CV: normal rate, regular rhythm, normal S1, S2, right femoral access site CDI, mild ecchymosis, no hematoma.  Right radial access site with mild ecchymosis and tenderness to palpation but no hematoma  Abdomen: soft, nontender, nondistended,  NABS  Extremities: no edema  Skin: no rash, warm, dry  Heme/Lymph: no bruising  Psych: organized thought, normal behavior and affect     Procedures Performed:  Procedure(s):  Left Heart Cath  Coronary angiography  Percutaneous Coronary Intervention  Stent JENNIFER coronary  Percutaneous Manual Thrombectomy         Consults:  Consults     Date and Time Order Name Status Description    1/13/2023  7:56 AM Inpatient Internal Medicine Consult Completed     1/12/2023  7:36 PM Consult Interventional Cardiology and Notify Cath Lab            Pertinent Test Results:    Results from last 7 days   Lab Units 01/14/23  0307 01/13/23  0454 01/12/23 2207   SODIUM mmol/L 137 135* 132*   POTASSIUM mmol/L 3.6 3.8 3.9   CHLORIDE mmol/L 105 107 103   CO2 mmol/L 20.9* 17.6* 13.8*   BUN mg/dL 12 12 14   CREATININE mg/dL 0.70 0.59 0.70   GLUCOSE mg/dL 145* 205* 292*   CALCIUM mg/dL 9.0 8.6 8.9       Results from last 7 days   Lab Units 01/12/23  2207   TROPONIN T ng/mL 0.261*     Results from last 7 days   Lab Units 01/13/23  0359 01/12/23  2207   WBC 10*3/mm3 10.10 12.11*   HEMOGLOBIN g/dL 14.0 14.2   HEMATOCRIT % 41.1 41.5   PLATELETS 10*3/mm3 276 274     Results from last 7 days   Lab Units 01/12/23  2207   INR  1.20*     Results from last 7 days   Lab Units 01/12/23  2207   MAGNESIUM mg/dL 1.8     Results from last 7 days   Lab Units 01/13/23  0359   CHOLESTEROL mg/dL 183   TRIGLYCERIDES mg/dL 565*   HDL CHOL mg/dL 42                   Discharge Medications     Your medication list      START taking these medications      Instructions Last Dose Given Next Dose Due   aspirin 81 MG EC tablet  Start taking on: January 15, 2023      Take 1 tablet by mouth Daily.       Jardiance 10 MG tablet tablet  Generic drug: empagliflozin  Start taking on: January 15, 2023      Take 1 tablet by mouth Daily.       metoprolol tartrate 50 MG tablet  Commonly known as: LOPRESSOR      Take 1 tablet by mouth Every 12 (Twelve) Hours.       Nicotine Step 1 21  MG/24HR patch  Generic drug: nicotine      Place 1 patch on the skin as directed by provider Daily.       nicotine 14 MG/24HR patch  Commonly known as: NICODERM CQ      Place  1 patch on the skin as directed by provider after completing 21mg dose       nicotine 7 MG/24HR patch  Commonly known as: NICODERM CQ      Place 1 patch on the skin as directed by provider after completing 14mg dose       prasugrel 10 MG tablet  Commonly known as: EFFIENT  Start taking on: January 15, 2023      Take 1 tablet by mouth Daily.          CHANGE how you take these medications      Instructions Last Dose Given Next Dose Due   atorvastatin 40 MG tablet  Commonly known as: LIPITOR  What changed:   · medication strength  · how much to take      Take 1 tablet by mouth Every Night.       insulin glargine 100 UNIT/ML injection  Commonly known as: LANTUS, SEMGLEE  What changed: how much to take      Inject 40 Units under the skin into the appropriate area as directed Every 12 (Twelve) Hours.       Insulin Lispro 100 UNIT/ML injection  Commonly known as: humaLOG  What changed:   · how to take this  · when to take this  · additional instructions      15 units tid with meals plus sliding Scale. Hold the 15 units and take sliding scale only if not eating a meal          CONTINUE taking these medications      Instructions Last Dose Given Next Dose Due   albuterol sulfate  (90 Base) MCG/ACT inhaler  Commonly known as: PROVENTIL HFA;VENTOLIN HFA;PROAIR HFA      Inhale 2 puffs Every 4 (Four) Hours As Needed.       cetirizine 10 MG tablet  Commonly known as: zyrTEC      Take 10 mg by mouth Daily.       fenofibrate 145 MG tablet  Commonly known as: TRICOR      Take 1 tablet by mouth Daily.       fluticasone 50 MCG/ACT nasal spray  Commonly known as: FLONASE      2 sprays into the nostril(s) as directed by provider Daily.       fluticasone 50 MCG/ACT nasal spray  Commonly known as: FLONASE      1 spray into the nostril(s) as directed by  provider Daily for 30 days.       gabapentin 400 MG capsule  Commonly known as: NEURONTIN      Take 1 capsule by mouth 2 (Two) Times a Day.       HYDROcodone-acetaminophen 5-325 MG per tablet  Commonly known as: NORCO      Take 1 tablet by mouth Every 6 (Six) Hours As Needed for Moderate Pain .       levothyroxine 200 MCG tablet  Commonly known as: SYNTHROID, LEVOTHROID      Take 200 mcg by mouth Daily.       magnesium oxide 250 MG tablet      Take 1 tablet by mouth Daily.       medroxyPROGESTERone Acetate 150 MG/ML suspension prefilled syringe      Inject 1 mL under the skin into the appropriate area as directed Every 30 (Thirty) Days.       methocarbamol 750 MG tablet  Commonly known as: ROBAXIN      Take 750 mg by mouth As Needed.       montelukast 10 MG tablet  Commonly known as: SINGULAIR      Take 10 mg by mouth Daily.       Multi-Vitamin Gummies chewable tablet      Chew 1 tablet Daily.       mupirocin 2 % ointment  Commonly known as: BACTROBAN      Apply 1 application topically to the appropriate area as directed As Needed.       ondansetron ODT 4 MG disintegrating tablet  Commonly known as: ZOFRAN-ODT      Place 1 tablet on the tongue Every 6 (Six) Hours As Needed for Nausea.       pantoprazole 40 MG EC tablet  Commonly known as: PROTONIX      Take 40 mg by mouth Daily.       PARoxetine 20 MG tablet  Commonly known as: PAXIL      Take 20 mg by mouth Every Morning.       polyethylene glycol 17 g packet  Commonly known as: MIRALAX      Take 17 g by mouth Daily As Needed.       vitamin D 1.25 MG (77721 UT) capsule capsule  Commonly known as: ERGOCALCIFEROL      Take 50,000 Units by mouth Every 7 (Seven) Days. Mondays             Where to Get Your Medications      These medications were sent to [facility] [place] [address]    Hours: 7:00 AM-6:00 PM Mon-Fri, 8:00 AM-4:30 PM Sat-Sun (Closed 12-12:30PM) Phone: [phone]   · aspirin 81 MG EC tablet  · atorvastatin 40  MG tablet  · Jardiance 10 MG tablet tablet  · metoprolol tartrate 50 MG tablet  · nicotine 14 MG/24HR patch  · nicotine 7 MG/24HR patch  · Nicotine Step 1 21 MG/24HR patch  · prasugrel 10 MG tablet     Information about where to get these medications is not yet available    Ask your nurse or doctor about these medications  · insulin glargine 100 UNIT/ML injection  · Insulin Lispro 100 UNIT/ML injection           Discharge Diet:   Dietary Orders (From admission, onward)     Start     Ordered    01/12/23 2035  Diet: Diabetic Diets; Consistent Carbohydrate; Texture: Regular Texture (IDDSI 7); Fluid Consistency: Thin (IDDSI 0)  Diet Effective Now        References:    Diet Order [place]   Question Answer Comment   Diets: Diabetic Diets    Diabetic Diet: Consistent Carbohydrate    Texture: Regular Texture (IDDSI 7)    Fluid Consistency: Thin (IDDSI 0)        01/12/23 2035                    Discharge disposition: Home    Discharge condition: Stable      Follow-up Appointments  No future appointments.   Follow-up Information     [facility] .    Specialty: Cardiac Rehabilitation  Contact information:  [address] [facility] [address] [phone]           Layla Monqiue, APRN .    Specialty: Family Medicine  Contact information:  [address] [facility] [address] [phone]                           Test Results Pending at Discharge  Additional Instructions for the Follow-ups that You Need to Schedule     Ambulatory Referral to Cardiac Rehab   As directed               Time:   I spent  > 30  minutes on this discharge activity which included: face-to-face encounter with the patient, reviewing the data in the system, coordination of the care with the nursing staff as well as consultants, documentation, and entering orders.        Vasu Aguilar MD  [facility]  01/14/23  13:30 EST

## 2023-01-15 NOTE — OUTREACH NOTE
Prep Survey    Flowsheet Row Responses   [facility] facility patient discharged from? [facility]   Is LACE score < 7 ? No   Eligibility Readm Mgmt   Discharge diagnosis STEMI   Does the patient have one of the following disease processes/diagnoses(primary or secondary)? Acute MI (STEMI,NSTEMI)   Does the patient have Home health ordered? No   Is there a DME ordered? No   Prep survey completed? Yes          BRENDA MERCADO - Registered Nurse

## 2023-01-18 ENCOUNTER — READMISSION MANAGEMENT (OUTPATIENT)
Dept: CALL CENTER | Facility: HOSPITAL | Age: 38
End: 2023-01-18
Payer: MEDICAID

## 2023-01-18 NOTE — OUTREACH NOTE
AMI Week 1 Survey    Flowsheet Row Responses   [facility] facility patient discharged from? [facility]   Does the patient have one of the following disease processes/diagnoses(primary or secondary)? Acute MI (STEMI,NSTEMI)   Week 1 attempt successful? No   Unsuccessful attempts Attempt 1          RO INMAN - Registered Nurse

## 2023-01-20 ENCOUNTER — READMISSION MANAGEMENT (OUTPATIENT)
Dept: CALL CENTER | Facility: HOSPITAL | Age: 38
End: 2023-01-20
Payer: MEDICAID

## 2023-01-20 NOTE — OUTREACH NOTE
AMI Week 1 Survey    Flowsheet Row Responses   [facility] patient discharged from? [place]   Does the patient have one of the following disease processes/diagnoses(primary or secondary)? Acute MI (STEMI,NSTEMI)   Week 1 attempt successful? Yes   Call start time 1541   Call end time 1545   Discharge diagnosis STEMI   Meds reviewed with patient/caregiver? Yes   Is the patient having any side effects they believe may be caused by any medication additions or changes? No   Does the patient have all prescriptions related to this admission filled (includes statins,anticoagulants,HTN meds,anti-arrhythmia meds) Yes   Is the patient taking all medications as directed (includes completed medication regime)? Yes   Does the patient have a primary care provider?  Yes   Does the patient have an appointment with their PCP,cardiologist,or clinic within 7 days of discharge? Yes   Has the patient kept scheduled appointments due by today? Yes   Comments [facility] site right radial and groin are sore, bruising present. No chest pain.   Did the patient receive a copy of their discharge instructions? Yes   Nursing interventions Reviewed instructions with patient   What is the patient's perception of their health status since discharge? Improving   Nursing interventions Nurse provided patient education   Is the patient/caregiver able to teach back signs and symptoms of when to call for help immediately: Sudden chest discomfort, Sudden discomfort in arms, back, neck or jaw, Nausea or vomiting, Dizziness or lightheadedness   Nursing interventions Nurse provided patient education   Is the patient/caregiver able to teach back lifestyle changes to help prevent MIs Quit smoking, Regular exercise as approved by provider   Is the patient/caregiver able to teach back ways to prevent a second heart attack: Take medications, Follow up with MD   If the patient is a current smoker, are they able to teach back resources for cessation? --  [stopped  using patches, smoking 1pk/day]   Is the patient/caregiver able to teach back the hierarchy of who to call/visit for symptoms/problems? PCP, Specialist, Home health nurse, Urgent Care, ED, 911 Yes   Week 1 call completed? Yes          JULIANNA ROBERTS - Registered Nurse

## 2023-02-20 ENCOUNTER — OFFICE VISIT (OUTPATIENT)
Dept: CARDIOLOGY | Facility: CLINIC | Age: 38
End: 2023-02-20
Payer: MEDICAID

## 2023-02-20 VITALS
SYSTOLIC BLOOD PRESSURE: 98 MMHG | HEART RATE: 88 BPM | HEIGHT: 66 IN | WEIGHT: 188 LBS | DIASTOLIC BLOOD PRESSURE: 78 MMHG | OXYGEN SATURATION: 98 % | BODY MASS INDEX: 30.22 KG/M2

## 2023-02-20 DIAGNOSIS — I21.11 STEMI INVOLVING RIGHT CORONARY ARTERY: ICD-10-CM

## 2023-02-20 DIAGNOSIS — I25.10 CORONARY ARTERY DISEASE INVOLVING NATIVE CORONARY ARTERY OF NATIVE HEART WITHOUT ANGINA PECTORIS: Primary | ICD-10-CM

## 2023-02-20 DIAGNOSIS — Z72.0 TOBACCO ABUSE: ICD-10-CM

## 2023-02-20 DIAGNOSIS — E78.2 MIXED HYPERLIPIDEMIA: ICD-10-CM

## 2023-02-20 PROCEDURE — 93000 ELECTROCARDIOGRAM COMPLETE: CPT | Performed by: NURSE PRACTITIONER

## 2023-02-20 PROCEDURE — 99214 OFFICE O/P EST MOD 30 MIN: CPT | Performed by: NURSE PRACTITIONER

## 2023-02-20 NOTE — PROGRESS NOTES
Date of Office Visit: 2023  Encounter Provider: JEFE Chung  Place of Service: [facility] CARDIOLOGY  Patient Name: Edie Camacho  :1985    Chief Complaint   Patient presents with   • Coronary Artery Disease   :     HPI: Edie Camacho is a 38 y.o. female with diabetes and hypertension.  No previous cardiac history.  On , she called EMS for severe sternal chest pain and shortness of breath.  She was diagnosed with an inferior STEMI in the field.  Upon arrival to [facility], she was taken directly for cardiac catheterization.  This demonstrated a 100% occluded mid RCA for which she underwent angioplasty and drug-eluting stent placement.  She also had a 30% proximal RCA but otherwise normal coronaries.  Echocardiogram the following day demonstrated normal LV systolic function and no significant valvular abnormalities.  She was started on aspirin, prasugrel, metoprolol, and Jardiance.  On , she was stable for discharge.  She is here today for follow-up.   Overall, she has been doing well.  She reports 2 episodes of pain in her chest during high stress situations.  The pain was not exertional.  She denies any shortness of breath, palpitations, edema, dizziness, syncope, bleeding difficulties or melena.  She does report fatigue.  She has been unable to attend cardiac rehab due to time constraints.  Additionally, she has been off work and cannot afford gas.  She needs a return to work note.  She is still smoking less than 1/2 pack/day.  She tried to quit cold turkey but was unsuccessful.  Since November, she has been working on weight loss and has successfully lost nearly 20 pounds.    Past Medical History:   Diagnosis Date   • Abdominal bloating with cramps    • Asthma    • Bloating symptom    • Colon polyps 2019    Rectum: hyperplastic polyp   • Diabetes mellitus (HCC)    • Disease of thyroid gland    • Diverticulitis    •  Gastritis    • GERD (gastroesophageal reflux disease)    • H/O being hospitalized     TWO RECENT EPISODES S/P BOWEL OBSTRUCTIONS    • Hernia due to colostomy (HCC)    • History of kidney stones    • History of staph infection     S/P TATTOO-ON BACK    • Kidney stone    • Large bowel obstruction (HCC)    • Neuropathy     LOWER EXTRIMITES   • Pregnancy    • Seasonal allergies    • Stricture of colon determined by endoscopy (HCC)    • Stricture of sigmoid colon (HCC) 2019    Added automatically from request for surgery [id number]   • Tachycardia        Past Surgical History:   Procedure Laterality Date   • CARDIAC CATHETERIZATION N/A 2023    Procedure: Left Heart Cath;  Surgeon: Jose Baca MD;  Location: [facility] CATH INVASIVE LOCATION;  Service: Cardiovascular;  Laterality: N/A;   • CARDIAC CATHETERIZATION N/A 2023    Procedure: Coronary angiography;  Surgeon: Jose Baca MD;  Location: [facility] CATH INVASIVE LOCATION;  Service: Cardiovascular;  Laterality: N/A;   • CARDIAC CATHETERIZATION N/A 2023    Procedure: Percutaneous Coronary Intervention;  Surgeon: Jose Baca MD;  Location: [facility] CATH INVASIVE LOCATION;  Service: Cardiovascular;  Laterality: N/A;   • CARDIAC CATHETERIZATION N/A 2023    Procedure: Stent JENNIFER coronary;  Surgeon: Jose Baca MD;  Location: [facility] CATH INVASIVE LOCATION;  Service: Cardiovascular;  Laterality: N/A;   • CARDIAC CATHETERIZATION  2023    Procedure: Percutaneous Manual Thrombectomy;  Surgeon: Jose Baca MD;  Location: [facility] CATH INVASIVE LOCATION;  Service: Cardiovascular;;   •  SECTION N/A    • COLON RESECTION N/A 7/10/2019    Procedure: LAPAROSCOPIC LOW ANTERIOR  COLON RESECTION WITH DIVERTING LOOP ILEOSTOMY;  Surgeon: Roderick Arnold MD;  Location: [facility] OR;  Service: General   • COLONOSCOPY N/A 2020    Procedure: COLONOSCOPY to Cecum via Colostomy and Rectum;  Surgeon: Roderick Arnold MD;   [facility];  Service: General;  Laterality: N/A;  Colostomy Status   Post Surgical Anatomy   • COLONOSCOPY W/ BIOPSIES AND POLYPECTOMY N/A 06/13/2019   • DIAGNOSTIC LAPAROSCOPY N/A 7/11/2019    Procedure: DIAGNOSTIC LAPAROSCOPY;  Surgeon: Radha Loera MD;  Location: [facility] MAIN OR;  Service: General   • ENDOSCOPY N/A 5/19/2020    Procedure: ESOPHAGOGASTRODUODENOSCOPY with Bx's;  Surgeon: Roderick Arnold MD;  Location: [facility] ENDOSCOPY;  Service: General;  Laterality: N/A;  [place]   Reflux Esophagitis, Mild Duodenitis   • EXPLORATORY LAPAROTOMY N/A 7/11/2019    Procedure: COLON RESECTION WITH WAYNE'S PROCEDURE WITH FLEXIBLE SIGMOIDSCOPE;  Surgeon: Radha Loera MD;  Location: [facility] MAIN OR;  Service: General   • NEPHROSTOMY TRACT DILATATION W/ LITHOTRIPSY     • SIGMOIDOSCOPY N/A 6/2/2021    Procedure: FLEXIBLE SIGMOIDOSCOPY;  Surgeon: Radha Loera MD;  Location: [facility] ENDOSCOPY;  Service: General;  Laterality: N/A;  PREOP/ PERASTOMAL HERNIA  POSTOP/ 8CM RECTUM REMAINING       Social History     Socioeconomic History   • Marital status:    Tobacco Use   • Smoking status: Every Day     Packs/day: 0.45     Years: 21.00     Pack years: 9.45     Types: Cigarettes   • Smokeless tobacco: Never   Vaping Use   • Vaping Use: Never used   Substance and Sexual Activity   • Alcohol use: No   • Drug use: No   • Sexual activity: Defer       Family History   Problem Relation Age of Onset   • Heart disease Father    • Diabetes Father    • Asthma Mother    • Diabetes Mother    • Malig Hyperthermia Neg Hx        Review of Systems   Constitutional: Positive for malaise/fatigue.   Cardiovascular: Positive for chest pain. Negative for dyspnea on exertion, leg swelling, orthopnea, paroxysmal nocturnal dyspnea and syncope.   Respiratory: Negative.    Hematologic/Lymphatic: Negative for bleeding problem.   Musculoskeletal: Negative for falls.   Gastrointestinal: Negative for melena.   Neurological:  Negative for dizziness and light-headedness.       Allergies   Allergen Reactions   • Bactrim [Sulfamethoxazole-Trimethoprim] Anaphylaxis   • Ciprofloxacin Anaphylaxis   • Sulfa Antibiotics Anaphylaxis   • Toradol [Ketorolac Tromethamine] Anxiety     Patient states she Is not allergic         Current Outpatient Medications:   •  albuterol sulfate  (90 Base) MCG/ACT inhaler, Inhale 2 puffs Every 4 (Four) Hours As Needed., Disp: , Rfl:   •  aspirin 81 MG EC tablet, Take 1 tablet by mouth Daily., Disp: 90 tablet, Rfl: 3  •  atorvastatin (LIPITOR) 40 MG tablet, Take 1 tablet by mouth Every Night., Disp: 90 tablet, Rfl: 3  •  cetirizine (zyrTEC) 10 MG tablet, Take 10 mg by mouth Daily., Disp: , Rfl:   •  empagliflozin (JARDIANCE) 10 MG tablet tablet, Take 1 tablet by mouth Daily., Disp: 30 tablet, Rfl: 0  •  fenofibrate (TRICOR) 145 MG tablet, Take 1 tablet by mouth Daily., Disp: 30 tablet, Rfl: 0  •  fluticasone (FLONASE) 50 MCG/ACT nasal spray, 2 sprays into the nostril(s) as directed by provider Daily., Disp: , Rfl:   •  gabapentin (NEURONTIN) 400 MG capsule, Take 1 capsule by mouth 2 (Two) Times a Day., Disp: , Rfl:   •  insulin glargine (LANTUS, SEMGLEE) 100 UNIT/ML injection, Inject 40 Units under the skin into the appropriate area as directed Every 12 (Twelve) Hours., Disp: , Rfl:   •  Insulin Lispro (humaLOG) 100 UNIT/ML injection, 15 units tid with meals plus sliding Scale. Hold the 15 units and take sliding scale only if not eating a meal, Disp: , Rfl:   •  levothyroxine (SYNTHROID, LEVOTHROID) 200 MCG tablet, Take 200 mcg by mouth Daily., Disp: , Rfl:   •  magnesium oxide 250 MG tablet, Take 1 tablet by mouth Daily., Disp: , Rfl:   •  medroxyPROGESTERone Acetate 150 MG/ML suspension prefilled syringe, Inject 1 mL under the skin into the appropriate area as directed Every 30 (Thirty) Days., Disp: , Rfl:   •  methocarbamol (ROBAXIN) 750 MG tablet, Take 750 mg by mouth As Needed., Disp: , Rfl:   •   "metoprolol tartrate (LOPRESSOR) 25 MG tablet, Take 1 tablet by mouth Every 12 (Twelve) Hours., Disp: 180 tablet, Rfl: 0  •  montelukast (SINGULAIR) 10 MG tablet, Take 10 mg by mouth Daily., Disp: , Rfl:   •  Multiple Vitamins-Minerals (Multi-Vitamin Gummies) chewable tablet, Chew 1 tablet Daily., Disp: , Rfl:   •  mupirocin (BACTROBAN) 2 % ointment, Apply 1 application topically to the appropriate area as directed As Needed., Disp: , Rfl:   •  ondansetron ODT (ZOFRAN-ODT) 4 MG disintegrating tablet, Place 1 tablet on the tongue Every 6 (Six) Hours As Needed for Nausea., Disp: 10 tablet, Rfl: 0  •  pantoprazole (PROTONIX) 40 MG EC tablet, Take 40 mg by mouth Daily., Disp: , Rfl:   •  PARoxetine (PAXIL) 20 MG tablet, Take 20 mg by mouth Every Morning., Disp: , Rfl:   •  polyethylene glycol (MIRALAX) 17 g packet, Take 17 g by mouth Daily As Needed., Disp: , Rfl:   •  prasugrel (EFFIENT) 10 MG tablet, Take 1 tablet by mouth Daily., Disp: 30 tablet, Rfl: 11  •  vitamin D (ERGOCALCIFEROL) 1.25 MG (90239 UT) capsule capsule, Take 50,000 Units by mouth Every 7 (Seven) Days. Mondays, Disp: , Rfl:   •  fluticasone (FLONASE) 50 MCG/ACT nasal spray, 1 spray into the nostril(s) as directed by provider Daily for 30 days., Disp: 16 g, Rfl: 0      Objective:     Vitals:    02/20/23 1249   BP: 98/78   Pulse: 88   SpO2: 98%   Weight: 85.3 kg (188 lb)   Height: 167.6 cm (66\")     Body mass index is 30.34 kg/m².    PHYSICAL EXAM:    Neck:      Vascular: No JVD.   Pulmonary:      Effort: Pulmonary effort is normal.      Breath sounds: Normal breath sounds.   Cardiovascular:      Normal rate. Regular rhythm.      Murmurs: There is no murmur.      No gallop. No click. No rub.   Pulses:     Intact distal pulses.           ECG 12 Lead    Date/Time: 2/20/2023 12:57 PM  Performed by: Yaa Sanchez APRN  Authorized by: Yaa Sanchez APRN   Comparison: compared with previous ECG from 1/13/2023  Similar to previous ECG  Rhythm: " sinus rhythm  Rate: normal  BPM: 88  Q waves: V1    T inversion: II, III and aVF                Assessment:       Diagnosis Plan   1. Coronary artery disease involving native coronary artery of native heart without angina pectoris  ECG 12 Lead      2. STEMI involving right coronary artery (HCC)        3. Mixed hyperlipidemia        4. Tobacco abuse          Orders Placed This Encounter   Procedures   • ECG 12 Lead     This order was created via procedure documentation     Order Specific Question:   Release to patient     Answer:   Routine Release          Plan:       1.  Coronary artery disease.  Status post inferior STEMI, angioplasty and drug-eluting stenting of the mid RCA.  She is on DAPT with aspirin and prasugrel.  She denies any symptoms of angina.  The 2 episodes of chest pain she reports sound more rest induced.      2.  Hyperlipidemia.  Lipid panel from hospitalization demonstrated an LDL of 57 and HDL of 42.  Continue atorvastatin.      3.  Tobacco abuse.  Smoking cessation was discussed and encouraged.  She will continue working on cessation.      Overall I think she is stable.  Her blood pressure is low, likely contributing to the fatigue.  I recommended decreasing the dose to 25 mg twice daily.  I see no reason why she cannot return to work.  I did encourage her to increase her physical activity, especially since she cannot attend rehab. She will see Dr. Baca in 1 month.      As always, it has been a pleasure to participate in your patient's care.      Sincerely,         JEFE Tapia

## 2023-04-21 ENCOUNTER — OFFICE VISIT (OUTPATIENT)
Dept: CARDIOLOGY | Facility: CLINIC | Age: 38
End: 2023-04-21
Payer: MEDICAID

## 2023-04-21 VITALS
DIASTOLIC BLOOD PRESSURE: 90 MMHG | WEIGHT: 191.2 LBS | HEART RATE: 70 BPM | BODY MASS INDEX: 30.73 KG/M2 | HEIGHT: 66 IN | SYSTOLIC BLOOD PRESSURE: 128 MMHG

## 2023-04-21 DIAGNOSIS — I21.11 STEMI INVOLVING RIGHT CORONARY ARTERY: Primary | ICD-10-CM

## 2023-04-21 DIAGNOSIS — E11.59 TYPE 2 DIABETES MELLITUS WITH OTHER CIRCULATORY COMPLICATION, WITH LONG-TERM CURRENT USE OF INSULIN: ICD-10-CM

## 2023-04-21 DIAGNOSIS — Z72.0 TOBACCO ABUSE: ICD-10-CM

## 2023-04-21 DIAGNOSIS — Z79.4 TYPE 2 DIABETES MELLITUS WITH OTHER CIRCULATORY COMPLICATION, WITH LONG-TERM CURRENT USE OF INSULIN: ICD-10-CM

## 2023-04-21 NOTE — PROGRESS NOTES
"Date of Office Visit: 23  Encounter Provider: Jose Baca MD  Place of Service: [facility] CARDIOLOGY  Patient Name: Edie Camacho  :1985  [id number]    Chief Complaint   Patient presents with   • Coronary Artery Disease   :     HPI: Edie Camacho is a 38 y.o. female she had an inferior STEMI in 2023.  We took her directly to the Cath Lab and up stenting her mid and distal RCA and actually looked really great when we were done.  Her other coronaries were normal and her LV function was normal she smokes has diabetes is obese does not do a lot of exercise she is here for follow-up still smoking I have gotten her smokes down to 5 a day from a pack and a half which is a good start but of course I encouraged her to stop.  Blood pressures been okay she says she is having \"angina pain\".  When you ask her if it is pain that comes on not related to activity last for a few seconds to maybe a minute no other accompanying symptoms that on the right side of her chest.  This is not angina.  She has some stomach issues no nausea no vomiting she has a lot of anxiety she is worried about her health    Past Medical History:   Diagnosis Date   • Abdominal bloating with cramps    • Asthma    • Bloating symptom    • Colon polyps 2019    Rectum: hyperplastic polyp   • Diabetes mellitus    • Disease of thyroid gland    • Diverticulitis    • Gastritis    • GERD (gastroesophageal reflux disease)    • H/O being hospitalized     TWO RECENT EPISODES S/P BOWEL OBSTRUCTIONS -   • Hernia due to colostomy    • History of kidney stones    • History of staph infection     S/P TATTOO-ON BACK    • Kidney stone    • Large bowel obstruction    • Neuropathy     LOWER EXTRIMITES   • Pregnancy    • Seasonal allergies    • Stricture of colon determined by endoscopy    • Stricture of sigmoid colon 2019    Added automatically from request for surgery [id number]   • " Tachycardia        Past Surgical History:   Procedure Laterality Date   • CARDIAC CATHETERIZATION N/A 2023    Procedure: Left Heart Cath;  Surgeon: Jose Baca MD;  Location: [facility] CATH INVASIVE LOCATION;  Service: Cardiovascular;  Laterality: N/A;   • CARDIAC CATHETERIZATION N/A 2023    Procedure: Coronary angiography;  Surgeon: Jose Baca MD;  Location: [facility] CATH INVASIVE LOCATION;  Service: Cardiovascular;  Laterality: N/A;   • CARDIAC CATHETERIZATION N/A 2023    Procedure: Percutaneous Coronary Intervention;  Surgeon: Jose Baca MD;  [facility]AS[facility];  Service: Cardiovascular;  Laterality: N/A;   • CARDIAC CATHETERIZATION N/A 2023    Procedure: Stent JENNIFER coronary;  Surgeon: Jose Baca MD;  Location: [facility] CATH INVASIVE LOCATION;  Service: Cardiovascular;  Laterality: N/A;   • CARDIAC CATHETERIZATION  2023    Procedure: Percutaneous Manual Thrombectomy;  Surgeon: Jose Baca MD;  Location: [facility] CATH INVASIVE LOCATION;  Service: Cardiovascular;;   •  SECTION N/A    • COLON RESECTION N/A 7/10/2019    Procedure: LAPAROSCOPIC LOW ANTERIOR  COLON RESECTION WITH DIVERTING LOOP ILEOSTOMY;  Surgeon: Roderick Arnold MD;  Location: [facility] MAIN OR;  Service: General   • COLONOSCOPY N/A 2020    Procedure: COLONOSCOPY to Cecum via Colostomy and Rectum;  Surgeon: Roderick Arnold MD;  Location: [facility] ENDOSCOPY;  Service: General;  Laterality: N/A;  Colostomy Status   Post Surgical Anatomy   • COLONOSCOPY W/ BIOPSIES AND POLYPECTOMY N/A 2019   • DIAGNOSTIC LAPAROSCOPY N/A 2019    Procedure: DIAGNOSTIC LAPAROSCOPY;  Surgeon: Radha Loera MD;  Location: [facility] MAIN OR;  Service: General   • ENDOSCOPY N/A 2020    Procedure: ESOPHAGOGASTRODUODENOSCOPY with Bx's;  Surgeon: Roderick Arnold MD;  Location: [facility] ENDOSCOPY;  Service: General;  Laterality: N/A;  Relfux   Reflux Esophagitis, Mild Duodenitis   •  EXPLORATORY LAPAROTOMY N/A 7/11/2019    Procedure: COLON RESECTION WITH WAYNE'S PROCEDURE WITH FLEXIBLE SIGMOIDSCOPE;  Surgeon: Radha Loera MD;  Location: [facility] MAIN OR;  Service: General   • NEPHROSTOMY TRACT DILATATION W/ LITHOTRIPSY     • SIGMOIDOSCOPY N/A 6/2/2021    Procedure: FLEXIBLE SIGMOIDOSCOPY;  Surgeon: Radha Loera MD;  Location: [facility] ENDOSCOPY;  Service: General;  Laterality: N/A;  PREOP/ PERASTOMAL HERNIA  POSTOP/ 8CM RECTUM REMAINING       Social History     Socioeconomic History   • Marital status:    Tobacco Use   • Smoking status: Every Day     Packs/day: 0.45     Years: 21.00     Pack years: 9.45     Types: Cigarettes   • Smokeless tobacco: Never   Vaping Use   • Vaping Use: Never used   Substance and Sexual Activity   • Alcohol use: No   • Drug use: No   • Sexual activity: Defer       Family History   Problem Relation Age of Onset   • Heart disease Father    • Diabetes Father    • Asthma Mother    • Diabetes Mother    • Malig Hyperthermia Neg Hx        Review of Systems   Constitutional: Negative for decreased appetite, fever, malaise/fatigue and weight loss.   HENT: Negative for nosebleeds.    Eyes: Negative for double vision.   Cardiovascular: Negative for chest pain, claudication, cyanosis, dyspnea on exertion, irregular heartbeat, leg swelling, near-syncope, orthopnea, palpitations, paroxysmal nocturnal dyspnea and syncope.   Respiratory: Negative for cough, hemoptysis and shortness of breath.    Hematologic/Lymphatic: Negative for bleeding problem.   Skin: Negative for rash.   Musculoskeletal: Negative for falls and myalgias.   Gastrointestinal: Negative for hematochezia, jaundice, melena, nausea and vomiting.   Genitourinary: Negative for hematuria.   Neurological: Negative for dizziness and seizures.   Psychiatric/Behavioral: Negative for altered mental status and memory loss.       Allergies   Allergen Reactions   • Bactrim [Sulfamethoxazole-Trimethoprim] Anaphylaxis    • Ciprofloxacin Anaphylaxis   • Sulfa Antibiotics Anaphylaxis   • Toradol [Ketorolac Tromethamine] Anxiety     Patient states she Is not allergic         Current Outpatient Medications:   •  albuterol sulfate  (90 Base) MCG/ACT inhaler, Inhale 2 puffs Every 4 (Four) Hours As Needed., Disp: , Rfl:   •  aspirin 81 MG EC tablet, Take 1 tablet by mouth Daily., Disp: 90 tablet, Rfl: 3  •  atorvastatin (LIPITOR) 40 MG tablet, Take 1 tablet by mouth Every Night., Disp: 90 tablet, Rfl: 3  •  cetirizine (zyrTEC) 10 MG tablet, Take 1 tablet by mouth Daily., Disp: , Rfl:   •  empagliflozin (JARDIANCE) 10 MG tablet tablet, Take 1 tablet by mouth Daily., Disp: 30 tablet, Rfl: 0  •  fenofibrate (TRICOR) 145 MG tablet, Take 1 tablet by mouth Daily., Disp: 30 tablet, Rfl: 0  •  fluticasone (FLONASE) 50 MCG/ACT nasal spray, 2 sprays into the nostril(s) as directed by provider Daily., Disp: , Rfl:   •  gabapentin (NEURONTIN) 400 MG capsule, Take 1 capsule by mouth 2 (Two) Times a Day., Disp: , Rfl:   •  insulin glargine (LANTUS, SEMGLEE) 100 UNIT/ML injection, Inject 40 Units under the skin into the appropriate area as directed Every 12 (Twelve) Hours., Disp: , Rfl:   •  Insulin Lispro (humaLOG) 100 UNIT/ML injection, 15 units tid with meals plus sliding Scale. Hold the 15 units and take sliding scale only if not eating a meal, Disp: , Rfl:   •  levothyroxine (SYNTHROID, LEVOTHROID) 200 MCG tablet, Take 1 tablet by mouth Daily., Disp: , Rfl:   •  medroxyPROGESTERone Acetate 150 MG/ML suspension prefilled syringe, Inject 1 mL under the skin into the appropriate area as directed Every 30 (Thirty) Days., Disp: , Rfl:   •  methocarbamol (ROBAXIN) 750 MG tablet, Take 1 tablet by mouth As Needed., Disp: , Rfl:   •  metoprolol tartrate (LOPRESSOR) 25 MG tablet, Take 1 tablet by mouth Every 12 (Twelve) Hours., Disp: 180 tablet, Rfl: 0  •  montelukast (SINGULAIR) 10 MG tablet, Take 1 tablet by mouth Daily., Disp: , Rfl:   •  Multiple  "Vitamins-Minerals (Multi-Vitamin Gummies) chewable tablet, Chew 1 tablet Daily., Disp: , Rfl:   •  mupirocin (BACTROBAN) 2 % ointment, Apply 1 application topically to the appropriate area as directed As Needed., Disp: , Rfl:   •  ondansetron ODT (ZOFRAN-ODT) 4 MG disintegrating tablet, Place 1 tablet on the tongue Every 6 (Six) Hours As Needed for Nausea., Disp: 10 tablet, Rfl: 0  •  pantoprazole (PROTONIX) 40 MG EC tablet, Take 1 tablet by mouth Daily., Disp: , Rfl:   •  PARoxetine (PAXIL) 20 MG tablet, Take 1 tablet by mouth Every Morning., Disp: , Rfl:   •  polyethylene glycol (MIRALAX) 17 g packet, Take 17 g by mouth Daily As Needed., Disp: , Rfl:   •  prasugrel (EFFIENT) 10 MG tablet, Take 1 tablet by mouth Daily., Disp: 30 tablet, Rfl: 11  •  vitamin D (ERGOCALCIFEROL) 1.25 MG (11016 UT) capsule capsule, Take 1 capsule by mouth Every 7 (Seven) Days. Mondays, Disp: , Rfl:   •  fluticasone (FLONASE) 50 MCG/ACT nasal spray, 1 spray into the nostril(s) as directed by provider Daily for 30 days., Disp: 16 g, Rfl: 0  •  magnesium oxide 250 MG tablet, Take 1 tablet by mouth Daily. (Patient not taking: Reported on 4/21/2023), Disp: , Rfl:       Objective:     Vitals:    04/21/23 1014   BP: 128/90   Pulse: 70   Weight: 86.7 kg (191 lb 3.2 oz)   Height: 167.6 cm (66\")     Body mass index is 30.86 kg/m².    Constitutional:       Appearance: Well-developed.   Eyes:      General: No scleral icterus.  HENT:      Head: Normocephalic.   Neck:      Thyroid: No thyromegaly.      Vascular: No JVD.      Lymphadenopathy: No cervical adenopathy.   Pulmonary:      Effort: Pulmonary effort is normal.      Breath sounds: Normal breath sounds. No wheezing. No rales.   Cardiovascular:      Normal rate. Regular rhythm.      No gallop.   Edema:     Peripheral edema absent.   Abdominal:      Palpations: Abdomen is soft.      Tenderness: There is no abdominal tenderness.   Musculoskeletal: Normal range of motion. Skin:     General: Skin is " warm and dry.      Findings: No rash.   Neurological:      Mental Status: Alert and oriented to person, place, and time.           ECG 12 Lead    Date/Time: 4/21/2023 10:59 AM  Performed by: Jose Baca MD  Authorized by: Jose Baca MD   Comparison: compared with previous ECG   Similar to previous ECG  Rhythm: sinus rhythm    Clinical impression: normal ECG             Assessment:       Diagnosis Plan   1. STEMI involving right coronary artery        2. Type 2 diabetes mellitus with other circulatory complication, with long-term current use of insulin        3. Tobacco abuse               Plan:       I think she is doing well right now.  I reviewed her cath films I really do not see a reason that she would be having chest discomfort and I do not think this sounds like angina at all.  I think is probably related to anxiety I like her medical therapy but I am going to stop her aspirin today and just keep her on clopidogrel for this next year.  In a year she will come back and see Yaa will probably stop her off the prasugrel and switch her over to long-term clopidogrel    No follow-ups on file.     As always, it has been a pleasure to participate in your patient's care.      Sincerely,       Jose Baca MD

## 2024-05-22 ENCOUNTER — OFFICE VISIT (OUTPATIENT)
Dept: CARDIOLOGY | Facility: CLINIC | Age: 39
End: 2024-05-22
Payer: MEDICAID

## 2024-05-22 VITALS
DIASTOLIC BLOOD PRESSURE: 82 MMHG | WEIGHT: 179.4 LBS | HEIGHT: 66 IN | BODY MASS INDEX: 28.83 KG/M2 | SYSTOLIC BLOOD PRESSURE: 140 MMHG | HEART RATE: 96 BPM

## 2024-05-22 DIAGNOSIS — E11.59 TYPE 2 DIABETES MELLITUS WITH OTHER CIRCULATORY COMPLICATION, WITH LONG-TERM CURRENT USE OF INSULIN: ICD-10-CM

## 2024-05-22 DIAGNOSIS — I25.10 CORONARY ARTERY DISEASE INVOLVING NATIVE CORONARY ARTERY OF NATIVE HEART WITHOUT ANGINA PECTORIS: ICD-10-CM

## 2024-05-22 DIAGNOSIS — Z79.4 TYPE 2 DIABETES MELLITUS WITH OTHER CIRCULATORY COMPLICATION, WITH LONG-TERM CURRENT USE OF INSULIN: ICD-10-CM

## 2024-05-22 DIAGNOSIS — I21.11 STEMI INVOLVING RIGHT CORONARY ARTERY: Primary | ICD-10-CM

## 2024-05-22 PROCEDURE — 99214 OFFICE O/P EST MOD 30 MIN: CPT | Performed by: INTERNAL MEDICINE

## 2024-05-22 PROCEDURE — 93000 ELECTROCARDIOGRAM COMPLETE: CPT | Performed by: INTERNAL MEDICINE

## 2024-05-22 RX ORDER — SEMAGLUTIDE 2.68 MG/ML
2 INJECTION, SOLUTION SUBCUTANEOUS WEEKLY
COMMUNITY
Start: 2024-05-10

## 2024-05-22 NOTE — PROGRESS NOTES
Date of Office Visit: 24  Encounter Provider: Jose Baca MD  Place of Service: [facility] CARDIOLOGY  Patient Name: Edie Camacho  :1985  [id number]    Chief Complaint   Patient presents with    Coronary Artery Disease   :     HPI: Edie Camacho is a 39 y.o. female she had an inferior STEMI in 2023.  We took her directly to the Cath Lab and up stenting her mid and distal RCA and actually looked really great when we were done.  Her other coronaries were normal and her LV function.  She is here for follow-up and is doing well occasionally she will have some atypical chest pain but she is otherwise doing well she is lost about 25 pounds but is still smoking.  No PND orthopnea edema no other issues    Past Medical History:   Diagnosis Date    Abdominal bloating with cramps     Asthma     Bloating symptom     Colon polyps 2019    Rectum: hyperplastic polyp    Diabetes mellitus     Disease of thyroid gland     Diverticulitis     Gastritis     GERD (gastroesophageal reflux disease)     H/O being hospitalized     TWO RECENT EPISODES S/P BOWEL OBSTRUCTIONS     Hernia due to colostomy     History of kidney stones     History of staph infection     S/P TATTOO-ON BACK     Kidney stone     Large bowel obstruction     Neuropathy     LOWER EXTRIMITES    Pregnancy     Seasonal allergies     Stricture of colon determined by endoscopy     Stricture of sigmoid colon 2019    Added automatically from request for surgery [id number]    Tachycardia        Past Surgical History:   Procedure Laterality Date    CARDIAC CATHETERIZATION N/A 2023    Procedure: Left Heart Cath;  Surgeon: Jose Baca MD;  Location: [facility] INVASIVE LOCATION;  Service: Cardiovascular;  Laterality: N/A;    CARDIAC CATHETERIZATION N/A 2023    Procedure: Coronary angiography;  Surgeon: Jose Baca MD;  Location: [facility] CATH INVASIVE LOCATION;  Service:  Cardiovascular;  Laterality: N/A;    CARDIAC CATHETERIZATION N/A 2023    Procedure: Percutaneous Coronary Intervention;  Surgeon: Jose Baca MD;  Location: [facility] CATH INVASIVE LOCATION;  Service: Cardiovascular;  Laterality: N/A;    CARDIAC CATHETERIZATION N/A 2023    Procedure: Stent JENNIFER coronary;  Surgeon: Jose Baca MD;  Location: [facility] CATH INVASIVE LOCATION;  Service: Cardiovascular;  Laterality: N/A;    CARDIAC CATHETERIZATION  2023    Procedure: Percutaneous Manual Thrombectomy;  Surgeon: Jose Baca MD;  [facility];  Service: Cardiovascular;;     SECTION N/A     COLON RESECTION N/A 7/10/2019    Procedure: LAPAROSCOPIC LOW ANTERIOR  COLON RESECTION WITH DIVERTING LOOP ILEOSTOMY;  Surgeon: Roderick Arnold MD;  Location: [facility] MAIN OR;  Service: General    COLONOSCOPY N/A 2020    Procedure: COLONOSCOPY to Cecum via Colostomy and Rectum;  Surgeon: Roderick Arnold MD;  Location: [facility] ENDOSCOPY;  Service: General;  Laterality: N/A;  Colostomy Status   Post Surgical Anatomy    COLONOSCOPY W/ BIOPSIES AND POLYPECTOMY N/A 2019    DIAGNOSTIC LAPAROSCOPY N/A 2019    Procedure: DIAGNOSTIC LAPAROSCOPY;  Surgeon: Radha Loera MD;  Location: [facility] MAIN OR;  Service: General    ENDOSCOPY N/A 2020    Procedure: ESOPHAGOGASTRODUODENOSCOPY with Bx's;  Surgeon: Roderick Arnold MD;  Location: [facility] ENDOSCOPY;  Service: General;  Laterality: N/A;  Relfux   Reflux Esophagitis, Mild Duodenitis    EXPLORATORY LAPAROTOMY N/A 2019    Procedure: COLON RESECTION WITH WAYNE'S PROCEDURE WITH FLEXIBLE SIGMOIDSCOPE;  Surgeon: Radha Loera MD;  Location: [facility] MAIN OR;  Service: General    NEPHROSTOMY TRACT DILATATION W/ LITHOTRIPSY      SIGMOIDOSCOPY N/A 2021    Procedure: FLEXIBLE SIGMOIDOSCOPY;  Surgeon: Radha Loera MD;  Location: [facility] ENDOSCOPY;  Service: General;  Laterality: N/A;  PREOP/ PERASTOMAL  HERNIA  POSTOP/ 8CM RECTUM REMAINING       Social History     Socioeconomic History    Marital status:    Tobacco Use    Smoking status: Every Day     Current packs/day: 0.45     Average packs/day: 0.5 packs/day for 21.0 years (9.5 ttl pk-yrs)     Types: Cigarettes    Smokeless tobacco: Never   Vaping Use    Vaping status: Never Used   Substance and Sexual Activity    Alcohol use: No    Drug use: No    Sexual activity: Defer       Family History   Problem Relation Age of Onset    Heart disease Father     Diabetes Father     Asthma Mother     Diabetes Mother     Malig Hyperthermia Neg Hx        Review of Systems   Constitutional: Negative for decreased appetite, fever, malaise/fatigue and weight loss.   HENT:  Negative for nosebleeds.    Eyes:  Negative for double vision.   Cardiovascular:  Negative for chest pain, claudication, cyanosis, dyspnea on exertion, irregular heartbeat, leg swelling, near-syncope, orthopnea, palpitations, paroxysmal nocturnal dyspnea and syncope.   Respiratory:  Negative for cough, hemoptysis and shortness of breath.    Hematologic/Lymphatic: Negative for bleeding problem.   Skin:  Negative for rash.   Musculoskeletal:  Negative for falls and myalgias.   Gastrointestinal:  Negative for hematochezia, jaundice, melena, nausea and vomiting.   Genitourinary:  Negative for hematuria.   Neurological:  Negative for dizziness and seizures.   Psychiatric/Behavioral:  Negative for altered mental status and memory loss.        Allergies   Allergen Reactions    Bactrim [Sulfamethoxazole-Trimethoprim] Anaphylaxis    Ciprofloxacin Anaphylaxis    Sulfa Antibiotics Anaphylaxis    Toradol [Ketorolac Tromethamine] Anxiety     Patient states she Is not allergic         Current Outpatient Medications:     albuterol sulfate  (90 Base) MCG/ACT inhaler, Inhale 2 puffs Every 4 (Four) Hours As Needed., Disp: , Rfl:     atorvastatin (LIPITOR) 40 MG tablet, Take 1 tablet by mouth Every Night., Disp: 90  tablet, Rfl: 3    cetirizine (zyrTEC) 10 MG tablet, Take 1 tablet by mouth Daily., Disp: , Rfl:     empagliflozin (JARDIANCE) 10 MG tablet tablet, Take 1 tablet by mouth Daily., Disp: 30 tablet, Rfl: 0    fenofibrate (TRICOR) 145 MG tablet, Take 1 tablet by mouth Daily., Disp: 30 tablet, Rfl: 0    fluticasone (FLONASE) 50 MCG/ACT nasal spray, 2 sprays into the nostril(s) as directed by provider Daily., Disp: , Rfl:     gabapentin (NEURONTIN) 400 MG capsule, Take 1 capsule by mouth 2 (Two) Times a Day., Disp: , Rfl:     insulin glargine (LANTUS, SEMGLEE) 100 UNIT/ML injection, Inject 40 Units under the skin into the appropriate area as directed Every 12 (Twelve) Hours., Disp: , Rfl:     Insulin Lispro (humaLOG) 100 UNIT/ML injection, 15 units tid with meals plus sliding Scale. Hold the 15 units and take sliding scale only if not eating a meal, Disp: , Rfl:     levothyroxine (SYNTHROID, LEVOTHROID) 200 MCG tablet, Take 1 tablet by mouth Daily., Disp: , Rfl:     magnesium oxide 250 MG tablet, Take 1 tablet by mouth Daily., Disp: , Rfl:     methocarbamol (ROBAXIN) 750 MG tablet, Take 1 tablet by mouth As Needed., Disp: , Rfl:     metoprolol succinate XL (TOPROL-XL) 25 MG 24 hr tablet, Take 1 tablet by mouth Daily., Disp: 30 tablet, Rfl: 11    Multiple Vitamins-Minerals (Multi-Vitamin Gummies) chewable tablet, Chew 1 tablet Daily., Disp: , Rfl:     mupirocin (BACTROBAN) 2 % ointment, Apply 1 Application topically to the appropriate area as directed As Needed., Disp: , Rfl:     ondansetron ODT (ZOFRAN-ODT) 4 MG disintegrating tablet, Place 1 tablet on the tongue Every 6 (Six) Hours As Needed for Nausea., Disp: 10 tablet, Rfl: 0    Ozempic, 2 MG/DOSE, 8 MG/3ML solution pen-injector, Inject 2 mg under the skin into the appropriate area as directed 1 (One) Time Per Week., Disp: , Rfl:     pantoprazole (PROTONIX) 40 MG EC tablet, Take 1 tablet by mouth Daily., Disp: , Rfl:     PARoxetine (PAXIL) 20 MG tablet, Take 1 tablet by  "mouth Every Morning., Disp: , Rfl:     polyethylene glycol (MIRALAX) 17 g packet, Take 17 g by mouth Daily As Needed., Disp: , Rfl:     prasugrel (EFFIENT) 10 MG tablet, Take 1 tablet by mouth Daily., Disp: 30 tablet, Rfl: 11    vitamin D (ERGOCALCIFEROL) 1.25 MG (19890 UT) capsule capsule, Take 1 capsule by mouth Every 7 (Seven) Days. Mondays, Disp: , Rfl:     aspirin 81 MG EC tablet, Take 1 tablet by mouth Daily. (Patient not taking: Reported on 5/22/2024), Disp: 90 tablet, Rfl: 3    fluticasone (FLONASE) 50 MCG/ACT nasal spray, 1 spray into the nostril(s) as directed by provider Daily for 30 days., Disp: 16 g, Rfl: 0    medroxyPROGESTERone Acetate 150 MG/ML suspension prefilled syringe, Inject 1 mL under the skin into the appropriate area as directed Every 30 (Thirty) Days. (Patient not taking: Reported on 5/22/2024), Disp: , Rfl:     montelukast (SINGULAIR) 10 MG tablet, Take 1 tablet by mouth Daily. (Patient not taking: Reported on 5/22/2024), Disp: , Rfl:       Objective:     Vitals:    05/22/24 1014   BP: 140/82   Pulse: 96   Weight: 81.4 kg (179 lb 6.4 oz)   Height: 167.6 cm (66\")     Body mass index is 28.96 kg/m².    Constitutional:       Appearance: Well-developed.   Eyes:      General: No scleral icterus.  HENT:      Head: Normocephalic.   Neck:      Thyroid: No thyromegaly.      Vascular: No JVD.      Lymphadenopathy: No cervical adenopathy.   Pulmonary:      Effort: Pulmonary effort is normal.      Breath sounds: Normal breath sounds. No wheezing. No rales.   Cardiovascular:      Normal rate. Regular rhythm.      No gallop.    Edema:     Peripheral edema absent.   Abdominal:      Palpations: Abdomen is soft.      Tenderness: There is no abdominal tenderness.   Musculoskeletal: Normal range of motion. Skin:     General: Skin is warm and dry.      Findings: No rash.   Neurological:      Mental Status: Alert and oriented to person, place, and time.           ECG 12 Lead    Date/Time: 5/22/2024 10:44 " AM  Performed by: Jose Baca MD    Authorized by: Jose Baca MD  Comparison: compared with previous ECG   Rhythm: sinus rhythm  Other findings: non-specific ST-T wave changes    Clinical impression: abnormal EKG           Assessment:       Diagnosis Plan   1. STEMI involving right coronary artery        2. Coronary artery disease involving native coronary artery of native heart without angina pectoris        3. Type 2 diabetes mellitus with other circulatory complication, with long-term current use of insulin               Plan:       I think she is doing well overall I do not think she is having angina right now she has to get off the tobacco.  She is really set up to have future problems if we do not get off of it with her diabetes now her weight is down about 25 pounds which is great and she has been put on semaglutide I think that is a great drug for her.  But if you are still smoking she is going to be at high risk.  She has had depression some a little bit uncomfortable given her Chantix and Wellbutrin she smoked more while she took it.  Okay to stop her prasugrel and switch her over to clopidogrel I have her see Yaa in a year    No follow-ups on file.     As always, it has been a pleasure to participate in your patient's care.      Sincerely,       Jose Baca MD

## 2024-09-09 ENCOUNTER — TELEPHONE (OUTPATIENT)
Dept: CARDIOLOGY | Facility: CLINIC | Age: 39
End: 2024-09-09
Payer: MEDICAID

## 2024-09-09 RX ORDER — CLOPIDOGREL BISULFATE 75 MG/1
75 TABLET ORAL DAILY
Qty: 90 TABLET | Refills: 3 | Status: SHIPPED | OUTPATIENT
Start: 2024-09-09

## 2024-09-09 NOTE — TELEPHONE ENCOUNTER
Your office visit from May you stated it is  Okay to stop her prasugrel and switch her over to clopidogrel I have her see Yaa in a year     No follow-ups on file.      As always, it has been a pleasure to participate in your patient's care.        Sincerely,         Jose Baca MD       She would like the Plavix sent to her [facility]    Is this ok for me to send in the Plavix 75mg once daily for her.

## (undated) DEVICE — SENSR O2 OXIMAX FNGR A/ 18IN NONSTR

## (undated) DEVICE — TUBING, SUCTION, 1/4" X 10', STRAIGHT: Brand: MEDLINE

## (undated) DEVICE — KT ORCA ORCAPOD DISP STRL

## (undated) DEVICE — GLV SURG BIOGEL LTX PF 8 1/2

## (undated) DEVICE — LAPAROSCOPIC SMOKE ELIMINATION DEVICE: Brand: PNEUVIEW XE

## (undated) DEVICE — SUT VIC 3/0 TIES 18IN J110T

## (undated) DEVICE — GW HITORQUE/BAL MID/WT J W/HCOAT .014 3X190CM

## (undated) DEVICE — TREK CORONARY DILATATION CATHETER 3.0 MM X 30 MM / RAPID-EXCHANGE: Brand: TREK

## (undated) DEVICE — VIOLET BRAIDED (POLYGLACTIN 910), SYNTHETIC ABSORBABLE SUTURE: Brand: COATED VICRYL

## (undated) DEVICE — TREK CORONARY DILATATION CATHETER 3.50 MM X 20 MM / RAPID-EXCHANGE: Brand: TREK

## (undated) DEVICE — ST. SORBAVIEW ULTIMATE IJ SYSTEM A,C: Brand: CENTURION

## (undated) DEVICE — COVER,MAYO STAND,STERILE: Brand: MEDLINE

## (undated) DEVICE — DRSNG WND GZ PAD BORDERED 4X8IN STRL

## (undated) DEVICE — ENDOPATH PNEUMONEEDLE INSUFFLATION NEEDLES WITH LUER LOCK CONNECTORS 120MM: Brand: ENDOPATH

## (undated) DEVICE — GW EMR FIX EXCHG J STD .035 3MM 260CM

## (undated) DEVICE — SINGLE-USE BIOPSY FORCEPS: Brand: RADIAL JAW 4

## (undated) DEVICE — NC TREK NEO™ CORONARY DILATATION CATHETER 3.00 MM X 20 MM / RAPID-EXCHANGE: Brand: NC TREK NEO™

## (undated) DEVICE — CANN O2 ETCO2 FITS ALL CONN CO2 SMPL A/ 7IN DISP LF

## (undated) DEVICE — ENDOPATH XCEL BLADELESS TROCARS WITH STABILITY SLEEVES: Brand: ENDOPATH XCEL

## (undated) DEVICE — ELECTRD BLD EDGE/STD 1P 2.4X6.35MM STRL

## (undated) DEVICE — KT MANIFLD CARDIAC

## (undated) DEVICE — DRSNG WND BORDR/ADHS NONADHR/GZ LF 2X2IN STRL

## (undated) DEVICE — LOU LAP SIGMOID COLON: Brand: MEDLINE INDUSTRIES, INC.

## (undated) DEVICE — DEV COND GAS LAP INSUFLOW W/LUER CONN

## (undated) DEVICE — CATH DIAG IMPULSE FR4 5F 100CM

## (undated) DEVICE — INTRO SHEATH ART/FEM ENGAGE .035 6F12CM

## (undated) DEVICE — SUT VIC 0 TIES 18IN J912G

## (undated) DEVICE — VISUALIZATION SYSTEM: Brand: CLEARIFY

## (undated) DEVICE — 6F .070 JR 4 100CM: Brand: CORDIS

## (undated) DEVICE — THE TORRENT IRRIGATION SCOPE CONNECTOR IS USED WITH THE TORRENT IRRIGATION TUBING TO PROVIDE IRRIGATION FLUIDS SUCH AS STERILE WATER DURING GASTROINTESTINAL ENDOSCOPIC PROCEDURES WHEN USED IN CONJUNCTION WITH AN IRRIGATION PUMP (OR ELECTROSURGICAL UNIT).: Brand: TORRENT

## (undated) DEVICE — CATH DIAG IMPULSE FL3.5 5F 100CM

## (undated) DEVICE — Device

## (undated) DEVICE — SUT PROLN 2/0 CT2 30IN 8411H

## (undated) DEVICE — BIOPATCH™ ANTIMICROBIAL DRESSING WITH CHLORHEXIDINE GLUCONATE IS A HYDROPHILLIC POLYURETHANE ABSORPTIVE FOAM WITH CHLORHEXIDINE GLUCONATE (CHG) WHICH INHIBITS BACTERIAL GROWTH UNDER THE DRESSING. THE DRESSING IS INTENDED TO BE USED TO ABSORB EXUDATE, COVER A WOUND CAUSED BY VASCULAR AND NONVASCULAR PERCUTANEOUS MEDICAL DEVICES DURING SURGERY, AS WELL AS REDUCE LOCAL INFECTION AND COLONIZATION OF MICROORGANISMS.: Brand: BIOPATCH

## (undated) DEVICE — BANDAGE,GAUZE,BULKEE II,4.5"X4.1YD,STRL: Brand: MEDLINE

## (undated) DEVICE — POOLE SUCTION HANDLE: Brand: CARDINAL HEALTH

## (undated) DEVICE — COLOSTOMY/ILEOSTOMY KIT, FLEXWEAR: Brand: NEW IMAGE

## (undated) DEVICE — PENCL E/S HNDSWCH ROCKR CB

## (undated) DEVICE — SOL NACL 0.9PCT 1000ML

## (undated) DEVICE — SUT VIC 0 TN 27IN DYED JTN0G

## (undated) DEVICE — LAPAROSCOPIC ACCESS SYSTEM: Brand: ALEXIS LAPAROSCOPIC SYSTEM WITH KII FIOS FIRST ENTRY

## (undated) DEVICE — JELLY,LUBE,STERILE,FLIP TOP,TUBE,4-OZ: Brand: MEDLINE

## (undated) DEVICE — GLV SURG BIOGEL LTX PF 6 1/2

## (undated) DEVICE — ENDOPATH XCEL UNIVERSAL TROCAR STABLILITY SLEEVES: Brand: ENDOPATH XCEL

## (undated) DEVICE — ENSEAL TEMPERATURE CONTROLLED TISSUE SEALING TECHNOLOGY DISPOSABLE TISSUE SEALING DEVICE TAPTRONIC TRIGGER ACTIVATED POWER 5MM JAW STYLE: Brand: ENSEAL

## (undated) DEVICE — SUT MNCRYL PLS ANTIB UD 4/0 PS2 18IN

## (undated) DEVICE — TTL1LYR 16FR10ML 100%SIL TMPST TR: Brand: MEDLINE

## (undated) DEVICE — GOWN,NON-REINFORCED,SIRUS,SET IN SLV,XL: Brand: MEDLINE

## (undated) DEVICE — ENDOCUT SCISSOR TIP, DISPOSABLE: Brand: RENEW

## (undated) DEVICE — TOWEL,OR,DSP,ST,BLUE,STD,4/PK,20PK/CS: Brand: MEDLINE

## (undated) DEVICE — JACKSON-PRATT 100CC BULB RESERVOIR: Brand: CARDINAL HEALTH

## (undated) DEVICE — ADAPT CLN BIOGUARD AIR/H2O DISP

## (undated) DEVICE — APPL CHLORAPREP W/TINT 26ML ORNG

## (undated) DEVICE — SUT VIC 2/0 TIES 18IN J111T

## (undated) DEVICE — PERCLOSE PROGLIDE™ SUTURE-MEDIATED CLOSURE SYSTEM: Brand: PERCLOSE PROGLIDE™

## (undated) DEVICE — CATH4F INF PIG 145Â° MOD 110CM: Brand: INFINITI

## (undated) DEVICE — DISPOSABLE GRASPER CARTRIDGE: Brand: DIRECT DRIVE REPOSABLE GRASPERS

## (undated) DEVICE — LN SMPL CO2 SHTRM SD STREAM W/M LUER

## (undated) DEVICE — TOTAL TRAY, 16FR 10ML SIL FOLEY, URN: Brand: MEDLINE

## (undated) DEVICE — SUT PDS 0 CT1 36IN Z346H

## (undated) DEVICE — ECHELON FLEX45 ENDOPATH STAPLER, ARTICULATING ENDOSCOPIC LINEAR CUTTER (NO CARTRIDGE): Brand: ECHELON ENDOPATH

## (undated) DEVICE — DEV INDEFLATOR P/N 580289

## (undated) DEVICE — NC TREK NEO™ CORONARY DILATATION CATHETER 3.50 MM X 20 MM / RAPID-EXCHANGE: Brand: NC TREK NEO™

## (undated) DEVICE — DRSNG PAD ABD 8X10IN STRL

## (undated) DEVICE — STPLR SKIN VISISTAT WD 35CT

## (undated) DEVICE — ENDOPATH XCEL BLUNT TIP TROCARS WITH SMOOTH SLEEVES: Brand: ENDOPATH XCEL

## (undated) DEVICE — PK CATH CARD 40

## (undated) DEVICE — ENDOSCOPE KLEENSPEC ILLUM SYS 19 25CM

## (undated) DEVICE — GLIDESHEATH BASIC HYDROPHILIC COATED INTRODUCER SHEATH: Brand: GLIDESHEATH

## (undated) DEVICE — CATH ASPIR EXPORTADVANCE 6F .014IN 140CM

## (undated) DEVICE — CLIP LIGAT VASC HORIZON TI MD/LG GRN 6CT: Type: IMPLANTABLE DEVICE | Status: NON-FUNCTIONAL

## (undated) DEVICE — BLUNT TIP LAPAROSCOPIC SEALER/DIVIDER NANO-COATED: Brand: LIGASURE

## (undated) DEVICE — BITEBLOCK OMNI BLOC